# Patient Record
Sex: FEMALE | Race: WHITE | NOT HISPANIC OR LATINO | Employment: OTHER | URBAN - METROPOLITAN AREA
[De-identification: names, ages, dates, MRNs, and addresses within clinical notes are randomized per-mention and may not be internally consistent; named-entity substitution may affect disease eponyms.]

---

## 2020-01-14 ENCOUNTER — TELEPHONE (OUTPATIENT)
Dept: FAMILY MEDICINE CLINIC | Facility: CLINIC | Age: 55
End: 2020-01-14

## 2020-01-14 RX ORDER — SIMVASTATIN 80 MG
80 TABLET ORAL NIGHTLY
Qty: 90 TABLET | Refills: 0 | Status: SHIPPED | OUTPATIENT
Start: 2020-01-14 | End: 2020-06-02

## 2020-01-14 RX ORDER — VENLAFAXINE 75 MG/1
75 TABLET ORAL 3 TIMES DAILY
Qty: 270 TABLET | Refills: 0 | Status: SHIPPED | OUTPATIENT
Start: 2020-01-14 | End: 2020-04-06 | Stop reason: SDUPTHER

## 2020-01-14 NOTE — TELEPHONE ENCOUNTER
Pt made an appt and signed a release at previous office.    Needing refills on two medications:    venlafaxine 75mg 3x daily   simvastatin 80mg 1x daily

## 2020-01-22 PROBLEM — F32.A ANXIETY AND DEPRESSION: Status: ACTIVE | Noted: 2020-01-22

## 2020-01-22 PROBLEM — F41.9 ANXIETY AND DEPRESSION: Status: ACTIVE | Noted: 2020-01-22

## 2020-01-22 PROBLEM — M25.50 MULTIPLE JOINT PAIN: Status: ACTIVE | Noted: 2020-01-22

## 2020-01-22 PROBLEM — E66.9 DIABETES MELLITUS TYPE 2 IN OBESE (HCC): Status: ACTIVE | Noted: 2020-01-22

## 2020-01-22 PROBLEM — R06.83 SNORING: Status: ACTIVE | Noted: 2020-01-22

## 2020-01-22 PROBLEM — I10 ESSENTIAL HYPERTENSION: Status: ACTIVE | Noted: 2020-01-22

## 2020-01-22 PROBLEM — E11.69 DIABETES MELLITUS TYPE 2 IN OBESE (HCC): Status: ACTIVE | Noted: 2020-01-22

## 2020-01-22 PROBLEM — G47.30 SLEEP APNEA: Status: ACTIVE | Noted: 2020-01-22

## 2020-01-29 ENCOUNTER — TRANSCRIBE ORDERS (OUTPATIENT)
Dept: CARDIOLOGY | Facility: HOSPITAL | Age: 55
End: 2020-01-29

## 2020-01-29 ENCOUNTER — HOSPITAL ENCOUNTER (OUTPATIENT)
Dept: GENERAL RADIOLOGY | Facility: HOSPITAL | Age: 55
Discharge: HOME | End: 2020-01-29

## 2020-01-29 ENCOUNTER — CONSULT (OUTPATIENT)
Dept: BARIATRICS/WEIGHT MGMT | Facility: CLINIC | Age: 55
End: 2020-01-29

## 2020-01-29 ENCOUNTER — HOSPITAL ENCOUNTER (OUTPATIENT)
Dept: CARDIOLOGY | Facility: HOSPITAL | Age: 55
Discharge: HOME | End: 2020-01-29
Admitting: NURSE PRACTITIONER

## 2020-01-29 ENCOUNTER — LAB (OUTPATIENT)
Dept: LAB | Facility: HOSPITAL | Age: 55
End: 2020-01-29

## 2020-01-29 VITALS
WEIGHT: 283 LBS | HEART RATE: 84 BPM | BODY MASS INDEX: 45.48 KG/M2 | TEMPERATURE: 98 F | DIASTOLIC BLOOD PRESSURE: 94 MMHG | RESPIRATION RATE: 18 BRPM | HEIGHT: 66 IN | SYSTOLIC BLOOD PRESSURE: 182 MMHG

## 2020-01-29 DIAGNOSIS — Z01.811 PRE-OP CHEST EXAM: Primary | ICD-10-CM

## 2020-01-29 DIAGNOSIS — G47.30 SLEEP APNEA, UNSPECIFIED TYPE: ICD-10-CM

## 2020-01-29 DIAGNOSIS — I10 ESSENTIAL HYPERTENSION: ICD-10-CM

## 2020-01-29 DIAGNOSIS — F41.9 ANXIETY AND DEPRESSION: ICD-10-CM

## 2020-01-29 DIAGNOSIS — M25.50 MULTIPLE JOINT PAIN: ICD-10-CM

## 2020-01-29 DIAGNOSIS — E66.9 DIABETES MELLITUS TYPE 2 IN OBESE (HCC): ICD-10-CM

## 2020-01-29 DIAGNOSIS — F32.A ANXIETY AND DEPRESSION: ICD-10-CM

## 2020-01-29 DIAGNOSIS — E11.69 DIABETES MELLITUS TYPE 2 IN OBESE (HCC): ICD-10-CM

## 2020-01-29 DIAGNOSIS — E66.01 OBESITY, CLASS III, BMI 40-49.9 (MORBID OBESITY) (HCC): ICD-10-CM

## 2020-01-29 DIAGNOSIS — E66.01 OBESITY, CLASS III, BMI 40-49.9 (MORBID OBESITY) (HCC): Primary | ICD-10-CM

## 2020-01-29 PROBLEM — E66.813 OBESITY, CLASS III, BMI 40-49.9 (MORBID OBESITY): Status: ACTIVE | Noted: 2020-01-29

## 2020-01-29 LAB
ALBUMIN SERPL-MCNC: 3.9 G/DL (ref 3.5–5.2)
ALBUMIN/GLOB SERPL: 1.1 G/DL
ALP SERPL-CCNC: 83 U/L (ref 39–117)
ALT SERPL W P-5'-P-CCNC: 28 U/L (ref 1–33)
ANION GAP SERPL CALCULATED.3IONS-SCNC: 14.1 MMOL/L (ref 5–15)
AST SERPL-CCNC: 35 U/L (ref 1–32)
BASOPHILS # BLD AUTO: 0.04 10*3/MM3 (ref 0–0.2)
BASOPHILS NFR BLD AUTO: 0.7 % (ref 0–1.5)
BILIRUB SERPL-MCNC: 0.4 MG/DL (ref 0.2–1.2)
BUN BLD-MCNC: 6 MG/DL (ref 6–20)
BUN/CREAT SERPL: 7.8 (ref 7–25)
CALCIUM SPEC-SCNC: 8.9 MG/DL (ref 8.6–10.5)
CHLORIDE SERPL-SCNC: 101 MMOL/L (ref 98–107)
CHOLEST SERPL-MCNC: 167 MG/DL (ref 0–200)
CO2 SERPL-SCNC: 24.9 MMOL/L (ref 22–29)
CREAT BLD-MCNC: 0.77 MG/DL (ref 0.57–1)
DEPRECATED RDW RBC AUTO: 43.8 FL (ref 37–54)
EOSINOPHIL # BLD AUTO: 0.15 10*3/MM3 (ref 0–0.4)
EOSINOPHIL NFR BLD AUTO: 2.7 % (ref 0.3–6.2)
ERYTHROCYTE [DISTWIDTH] IN BLOOD BY AUTOMATED COUNT: 12.7 % (ref 12.3–15.4)
GFR SERPL CREATININE-BSD FRML MDRD: 78 ML/MIN/1.73
GLOBULIN UR ELPH-MCNC: 3.7 GM/DL
GLUCOSE BLD-MCNC: 191 MG/DL (ref 65–99)
HBA1C MFR BLD: 8.8 % (ref 4.8–5.6)
HCT VFR BLD AUTO: 40.9 % (ref 34–46.6)
HDLC SERPL-MCNC: 48 MG/DL (ref 40–60)
HGB BLD-MCNC: 13.7 G/DL (ref 12–15.9)
LDLC SERPL CALC-MCNC: 98 MG/DL (ref 0–100)
LDLC/HDLC SERPL: 2.05 {RATIO}
LYMPHOCYTES # BLD AUTO: 2.23 10*3/MM3 (ref 0.7–3.1)
LYMPHOCYTES NFR BLD AUTO: 40.1 % (ref 19.6–45.3)
MCH RBC QN AUTO: 31.3 PG (ref 26.6–33)
MCHC RBC AUTO-ENTMCNC: 33.5 G/DL (ref 31.5–35.7)
MCV RBC AUTO: 93.4 FL (ref 79–97)
MONOCYTES # BLD AUTO: 0.33 10*3/MM3 (ref 0.1–0.9)
MONOCYTES NFR BLD AUTO: 5.9 % (ref 5–12)
NEUTROPHILS # BLD AUTO: 2.8 10*3/MM3 (ref 1.7–7)
NEUTROPHILS NFR BLD AUTO: 50.4 % (ref 42.7–76)
PLATELET # BLD AUTO: 127 10*3/MM3 (ref 140–450)
PMV BLD AUTO: 9.9 FL (ref 6–12)
POTASSIUM BLD-SCNC: 4.4 MMOL/L (ref 3.5–5.2)
PROT SERPL-MCNC: 7.6 G/DL (ref 6–8.5)
RBC # BLD AUTO: 4.38 10*6/MM3 (ref 3.77–5.28)
SODIUM BLD-SCNC: 140 MMOL/L (ref 136–145)
TRIGL SERPL-MCNC: 103 MG/DL (ref 0–150)
TSH SERPL DL<=0.05 MIU/L-ACNC: 4.22 UIU/ML (ref 0.27–4.2)
VLDLC SERPL-MCNC: 20.6 MG/DL (ref 5–40)
WBC NRBC COR # BLD: 5.56 10*3/MM3 (ref 3.4–10.8)

## 2020-01-29 PROCEDURE — 83036 HEMOGLOBIN GLYCOSYLATED A1C: CPT

## 2020-01-29 PROCEDURE — 71046 X-RAY EXAM CHEST 2 VIEWS: CPT

## 2020-01-29 PROCEDURE — 93005 ELECTROCARDIOGRAM TRACING: CPT

## 2020-01-29 PROCEDURE — 99205 OFFICE O/P NEW HI 60 MIN: CPT | Performed by: NURSE PRACTITIONER

## 2020-01-29 PROCEDURE — 80061 LIPID PANEL: CPT

## 2020-01-29 PROCEDURE — 80053 COMPREHEN METABOLIC PANEL: CPT

## 2020-01-29 PROCEDURE — 85025 COMPLETE CBC W/AUTO DIFF WBC: CPT

## 2020-01-29 PROCEDURE — 84443 ASSAY THYROID STIM HORMONE: CPT

## 2020-01-29 PROCEDURE — 93010 ELECTROCARDIOGRAM REPORT: CPT | Performed by: INTERNAL MEDICINE

## 2020-01-29 PROCEDURE — 36415 COLL VENOUS BLD VENIPUNCTURE: CPT

## 2020-01-29 NOTE — PROGRESS NOTES
"Bariatric Nutrition Counseling Interview    Patient Name:  Stefani Bhandari  YOB: 1965  Age:  54 y.o.  Sex:  female  MRN: [id number]  Date:  01/29/20    Procedure Considering:  Sleeve    Last Documented Height:    Ht Readings from Last 1 Encounters:   01/29/20 167.1 cm (65.79\")     Last Documented Weight:   Wt Readings from Last 1 Encounters:   01/29/20 128 kg (283 lb)      Body mass index is 45.97 kg/m².    Highest Weight:  305  Goal Weight: 160    History:  No past medical history on file.  Past Surgical History:   Procedure Laterality Date   • HYSTERECTOMY  2007   • TUBAL ABDOMINAL LIGATION  1996     Family History   Problem Relation Age of Onset   • Obesity Mother    • Diabetes Mother    • Hypertension Mother    • Sleep apnea Mother    • Hypertension Father    • Heart attack Father    • Diabetes Sister    • Hypertension Sister    • Heart attack Sister    • Diabetes Brother    • Hypertension Brother    • Heart attack Brother      Social History     Socioeconomic History   • Marital status:      Spouse name: Not on file   • Number of children: Not on file   • Years of education: Not on file   • Highest education level: Not on file   Tobacco Use   • Smoking status: Former Smoker     Years: 20.00   • Smokeless tobacco: Never Used   Substance and Sexual Activity   • Alcohol use: Never     Frequency: Never   • Drug use: Never     Additional Health Issues to Consider:  Diabetes, high cholesterol, high blood pressure per patient report; noted high TSH on labs (refer to PCP)    Weight History:  Weight gain as a result of an event or condition - pregnancies and loss of mother (stress/emotional eating but states that she has it under control now)    Previous Weight Loss Efforts:  Weight Watchers, Calorie counting, exercise  Most Successful Weight Loss Effort:  Weight Watchers - lost 15lbs    Eating Habits: Eat in response to stress, Eat large portions, emotional eating  Eat three meals on most days? "  No  Worst eating habit?  Eat in response to stress, Eat large portions    How often do you eat fast food? 3 times weekly    Do you exercise regularly? (at least 3 times each week)  No walks 2 times/week for 30 min    Occupation:  Retired    Personal Goal After Procedure:  Decrease meds and be more active with grand children   Personal Support:  children    Assessment:  Program materials for successful weight loss before/after bariatric surgery were provided, reviewed, and discussed. The significance of taking in at least 70g of protein and 64 ounces of fluid was emphasized. The importance of routine exercise was discussed. Nutrition materials provided included a reduced calorie meal plan, protein sources, snack options, and diet guidelines post-surgery. Discussed personal habits and lifestyle behaviors that may influence diet efforts. She demonstrated a good comprehension of diet requirements and a commitment to work on personal challenges.  Patient was also provided a list of short-term goals to work towards prior to surgery. She appears to be an appropriate candidate for bariatric surgery.    Electronically signed by:  Angelica Molina RD  01/29/20 1:27 PM

## 2020-01-29 NOTE — PROGRESS NOTES
[facility]  [address] [facility] [address] [id number] [phone]  [address] [id number] [phone]  Dept: [phone]  1/29/2020      Stefani Bhandari.  [id number]  [id number]  1965  female      Chief Complaint of weight gain; unable to maintain weight loss    History of Present Illness:   Stefani is a 54 y.o. female who presents today for evaluation, education and consultation regarding weight loss surgery. The patient is interested in the sleeve gastrectomy.      Diet History:Stefani has been overweight for at least 10 years, has been 35 pounds or more overweight for at least 8 years, has been 100 pounds or more overweight for 4 or more years and started dieting at age 15.  The most weight Stefani lost was 30 pounds on diet and exercise and maintained the weight loss for 6 months. Stefani describes her eating habits as snacking between meals, eating a lot of sweets, drinking coffee and soda, eating to cope with emotions or loneliness. Stefani Bhandari has tried Weight Watchers, Fasting, Physician monitored, reduced calorie and exercising among others with success of losing up to 30 pounds, but in each instance regained the weight.    See dietician documentation for complete history.    Bariatric Surgery Evaluation: The patient is being seen for an initial visit for bariatric surgery evaluation.     Bariatric Co-morbidities:  sleep apnea, diabetes, hypertension, back pain, knee pain, depression and mental health disease    Patient Active Problem List   Diagnosis   • Essential hypertension   • Snoring   • Sleep apnea   • Multiple joint pain   • Anxiety and depression   • Diabetes mellitus type 2 in obese (CMS/[facility])   • Obesity, Class III, BMI 40-49.9 (morbid obesity) (CMS/[facility])       No past medical history on file.    Past Surgical History:   Procedure Laterality Date   • HYSTERECTOMY  2007   • TUBAL ABDOMINAL LIGATION   "1996       Allergies   Allergen Reactions   • Tramadol Provider Review Needed     \"seizure like behavior\"         Current Outpatient Medications:   •  cholecalciferol (VITAMIN D3) 25 MCG (1000 UT) tablet, Take 2,000 Units by mouth Daily., Disp: , Rfl:   •  cyclobenzaprine (FLEXERIL) 10 MG tablet, Take 10 mg by mouth 3 (Three) Times a Day As Needed for Muscle Spasms., Disp: , Rfl:   •  dicloxacillin (DYNAPEN) 250 MG capsule, Take 500 mg by mouth 4 (Four) Times a Day., Disp: , Rfl:   •  HYDROcodone-acetaminophen (NORCO)  MG per tablet, Take 1 tablet by mouth Every 6 (Six) Hours As Needed for Moderate Pain ., Disp: , Rfl:   •  levothyroxine (SYNTHROID, LEVOTHROID) 75 MCG tablet, Take 75 mcg by mouth Daily., Disp: , Rfl:   •  losartan (COZAAR) 100 MG tablet, Take 100 mg by mouth Daily., Disp: , Rfl:   •  metFORMIN (GLUCOPHAGE) 500 MG tablet, Take 500 mg by mouth 2 (Two) Times a Day With Meals., Disp: , Rfl:   •  simvastatin (ZOCOR) 80 MG tablet, Take 1 tablet by mouth Every Night., Disp: 90 tablet, Rfl: 0  •  venlafaxine (EFFEXOR) 75 MG tablet, Take 1 tablet by mouth 3 (Three) Times a Day., Disp: 270 tablet, Rfl: 0    Social History     Socioeconomic History   • Marital status:      Spouse name: Not on file   • Number of children: Not on file   • Years of education: Not on file   • Highest education level: Not on file   Tobacco Use   • Smoking status: Former Smoker     Years: 20.00   • Smokeless tobacco: Never Used   Substance and Sexual Activity   • Alcohol use: Never     Frequency: Never   • Drug use: Never       Family History   Problem Relation Age of Onset   • Obesity Mother    • Diabetes Mother    • Hypertension Mother    • Sleep apnea Mother    • Hypertension Father    • Heart attack Father    • Diabetes Sister    • Hypertension Sister    • Heart attack Sister    • Diabetes Brother    • Hypertension Brother    • Heart attack Brother          Review of Systems:  Review of Systems   Constitutional: " Positive for fatigue.   HENT: Negative.    Respiratory: Negative.    Cardiovascular: Negative.    Gastrointestinal: Negative.    Endocrine: Negative.    Genitourinary: Negative.    Musculoskeletal: Positive for back pain.   Skin: Negative.    Neurological: Negative.    Psychiatric/Behavioral: Negative.        Physical Exam:  Vital Signs:  Weight: 128 kg (283 lb)   Body mass index is 45.97 kg/m².  Temp: 98 °F (36.7 °C)   Heart Rate: 84   BP: (!) 182/94     Physical Exam   Constitutional: She is oriented to person, place, and time. She appears well-developed and well-nourished.   HENT:   Head: Normocephalic and atraumatic.   Neck: Normal range of motion.   Cardiovascular: Normal rate, regular rhythm and normal heart sounds.   Pulmonary/Chest: Effort normal and breath sounds normal. No respiratory distress. She has no wheezes.   Abdominal: Soft. Bowel sounds are normal. She exhibits no distension. There is no tenderness.   Musculoskeletal: She exhibits no edema or deformity.   Neurological: She is alert and oriented to person, place, and time.   Skin: Skin is warm and dry.   Psychiatric: She has a normal mood and affect. Her behavior is normal.   Nursing note and vitals reviewed.         Assessment:         Stefani Bhandari is a 54 y.o. year old female with medically complicated severe obesity. Weight: 128 kg (283 lb), Body mass index is 45.97 kg/m². and weight related problems including sleep apnea, diabetes, hypertension and GERD.    I explained in detail the procedures that we are performing.  All of those procedures can be performed laparoscopically but there is a chance to convert to open if any technical challenges or complications do occur.  Bariatric surgery is not cosmetic surgery but rather a tool to help a patient make a life-long commitment lifestyle changes including diet, exercise, behavior changes, and taking supplemental vitamins and minerals.    Due to the patient's BMI and co-morbidities they are at a  high risk for surgery and will obtain the following:  The patient has been advised that a letter of medical support and a history and physical must be obtained from her primary care physician. A psychological evaluation will be arranged for this patient. CBC, CMP, FLP, TSH and HgbA1C will be drawn. Stefani Bhandari will obtain a pre-operative CXR and EKG.       Stefani Bhandari will be set up for a pre-operative diagnostic esophagogastroduodenoscopy with biopsy for evaluation. The risks and benefits of the procedure were discussed with the patient in detail and all questions were answered.  Possibility of perforation, bleeding, aspiration, anoxic brain injury, respiratory and/or cardiac arrest and death were discussed.   She received handouts regarding, all questions were answered.     The risks, benefits, alternatives, and potential complications of all of the procedures were explained in detail including, but not limited to death, anesthesia and medication adverse effect/DVT, pulmonary embolism, trocar site/incisional hernia, wound infection, abdominal infection, bleeding, failure to lose weight or gain weight and change in body image, metabolic complications with calcium, thiamine, vitamin B12, folate, iron, and anemia.    The patient was advised to start a high protein, low fat and low carbohydrate diet. The patient was given individualized information by our dietician along with general group information and handouts.       The patient was given information regarding the YURY educational video. YURY is an internet based educational video which explains the surgical procedure and answers basic questions regarding the procedure. The patient was provided with instructions and a password to watch the video.    The patient was encouraged to start routine exercise including but not limited to 150 minutes per week. The patient received a resistance band along with a handout of exercises.     The consultation plan was  reviewed with the patient.    The patient understands the surgical procedures and the different surgical options that are available.  She understands the lifestyle changes that would be required after surgery and has agreed to participate in a pre-operative and postoperative weight management program.  She also expressed understanding of possible risks, had several questions answered and desires to proceed.    I think she is a good candidate for this surgery, and is interested in a sleeve gastrectomy.    Encounter Diagnoses   Name Primary?   • Obesity, Class III, BMI 40-49.9 (morbid obesity) (CMS/HCC) Yes   • Essential hypertension    • Sleep apnea, unspecified type    • Diabetes mellitus type 2 in obese (CMS/HCC)    • Multiple joint pain    • Anxiety and depression        Plan:    Patient will have evaluations and follow up with bariatric dieticians and a psychologist before undergoing a multidisciplinary review of her candidacy.  We also discussed the weight loss requirement and rationale, and other program requirements.      Tamiko Ojeda, NITESH  1/29/2020

## 2020-01-30 ENCOUNTER — TELEPHONE (OUTPATIENT)
Dept: BARIATRICS/WEIGHT MGMT | Facility: CLINIC | Age: 55
End: 2020-01-30

## 2020-01-30 NOTE — TELEPHONE ENCOUNTER
LM for patient regarding lab results and normal chest xray. Instructed patient to call office back.    ----- Message from NITESH Choudhury sent at 1/29/2020 12:14 PM EST -----  His labs were not resulted in time to go over with patient during intake.  Please let her know that her TSH was elevated.  Her HbA1c was elevated.  Fasting blood sugar was elevated.  These have patient follow-up with her PCP regarding her thyroid

## 2020-01-31 ENCOUNTER — TELEPHONE (OUTPATIENT)
Dept: BARIATRICS/WEIGHT MGMT | Facility: CLINIC | Age: 55
End: 2020-01-31

## 2020-01-31 NOTE — TELEPHONE ENCOUNTER
Spoke with pt regarding normal EGK. Pt gave a verbal understanding          ----- Message from NITESH Choudhury sent at 1/31/2020  1:36 PM EST -----  Normal EKG

## 2020-04-06 ENCOUNTER — TELEMEDICINE (OUTPATIENT)
Dept: FAMILY MEDICINE CLINIC | Facility: CLINIC | Age: 55
End: 2020-04-06

## 2020-04-06 VITALS — BODY MASS INDEX: 44.98 KG/M2 | HEIGHT: 65 IN | WEIGHT: 270 LBS

## 2020-04-06 DIAGNOSIS — M51.36 LUMBAR DEGENERATIVE DISC DISEASE: ICD-10-CM

## 2020-04-06 DIAGNOSIS — E78.2 MIXED HYPERLIPIDEMIA: ICD-10-CM

## 2020-04-06 DIAGNOSIS — I10 ESSENTIAL HYPERTENSION: ICD-10-CM

## 2020-04-06 DIAGNOSIS — E66.9 DIABETES MELLITUS TYPE 2 IN OBESE (HCC): Primary | ICD-10-CM

## 2020-04-06 DIAGNOSIS — E03.8 OTHER SPECIFIED HYPOTHYROIDISM: ICD-10-CM

## 2020-04-06 DIAGNOSIS — E11.69 DIABETES MELLITUS TYPE 2 IN OBESE (HCC): Primary | ICD-10-CM

## 2020-04-06 DIAGNOSIS — F32.A ANXIETY AND DEPRESSION: ICD-10-CM

## 2020-04-06 DIAGNOSIS — F41.9 ANXIETY AND DEPRESSION: ICD-10-CM

## 2020-04-06 PROBLEM — M25.50 MULTIPLE JOINT PAIN: Status: RESOLVED | Noted: 2020-01-22 | Resolved: 2020-04-06

## 2020-04-06 PROBLEM — R06.83 SNORING: Status: RESOLVED | Noted: 2020-01-22 | Resolved: 2020-04-06

## 2020-04-06 PROBLEM — M51.369 LUMBAR DEGENERATIVE DISC DISEASE: Status: ACTIVE | Noted: 2020-04-06

## 2020-04-06 PROCEDURE — 99214 OFFICE O/P EST MOD 30 MIN: CPT | Performed by: FAMILY MEDICINE

## 2020-04-06 RX ORDER — VENLAFAXINE 75 MG/1
75 TABLET ORAL 3 TIMES DAILY
Qty: 270 TABLET | Refills: 1 | Status: SHIPPED | OUTPATIENT
Start: 2020-04-06 | End: 2020-06-02

## 2020-04-06 RX ORDER — LEVOTHYROXINE SODIUM 0.07 MG/1
75 TABLET ORAL DAILY
Qty: 90 TABLET | Refills: 1 | Status: SHIPPED | OUTPATIENT
Start: 2020-04-06 | End: 2020-07-15 | Stop reason: SDUPTHER

## 2020-04-06 RX ORDER — DICLOFENAC SODIUM 75 MG/1
75 TABLET, DELAYED RELEASE ORAL 2 TIMES DAILY
Qty: 180 TABLET | Refills: 1 | Status: SHIPPED | OUTPATIENT
Start: 2020-04-06 | End: 2020-10-19

## 2020-04-06 NOTE — PROGRESS NOTES
Subjective   Stefani Bhandari is a 54 y.o. female.     There were no vitals filed for this visit.     Chief Complaint   Patient presents with   • Establish Care     new pt establishing today with dr gutiérrez   • Hypothyroidism     follow up no complains will need refills   • Spasms     will need muscle relaxer  refills        History of Present Illness    This regular scheduled appointment has been converted to a video visit in order to comply with CDC recommendations and guidelines due to the coronavirus pandemic    6-month follow-up on diabetes, hypertension, hypothyroidism, hyperlipidemia, anxiety/depression, and chronic arthritis/back pain    Last office visit with me at [place] was approximately 6 months ago for medication refills and follow-up labs.  At that time, all labs were stable and within normal limits thus no changes were made to medications.  Unfortunately, patient's records are unavailable for my review as a record release has not been signed yet    On a good note, in the last 3 to 4 months Stefani has lost approximately 35 pounds and is feeling much better.  She has done this with healthy eating, decreased sugars, and increasing cardio exercise.  She was hoping she would be able to discontinue some medications.  Thus, she thought since she was feeling better and lost weight that she would not need her thyroid medication.  Therefore, she has not been taking her Synthroid for the last 2 months.  She has been compliant with all other medications.    Her diabetes has been well controlled with metformin 500 mg twice daily.  She reports her fasting blood sugars have been approximately 100-120.    Her hypertension has been well controlled with losartan.  She recently ordered a home blood pressure monitor and is waiting to receive it.  Thus, at this time she does not have any blood pressure readings for me.  She does report in recent weeks she has noticed when she bends over and gets up she will occasionally  get lightheaded.  Denies chest pain, shortness of air    Her hyperlipidemia has been well controlled with Zocor 80 mg.  She tolerates this medication without any side effects.    Her generalized anxiety has been well controlled with Effexor 225 mg daily.    Patient has lumbar degenerative disc disease/chronic back pain has been treated by Dr. Cardona with injections and chronic pain prescription/[place].  She does complain of more low back pain with activity and spasms.  She stopped Flexeril because it made her groggy.  For some unknown reason, she has not been taking her anti-inflammatory.  In the past she has used meloxicam and describes this as just only being somewhat effective    The following portions of the patient's history were reviewed and updated as appropriate: allergies, current medications, past family history, past medical history, past social history, past surgical history and problem list.    Review of Systems   Constitutional: Negative.    HENT: Negative.    Eyes: Negative.    Respiratory: Negative.    Cardiovascular: Negative for chest pain, palpitations and leg swelling.   Gastrointestinal: Negative.    Endocrine: Negative.    Genitourinary: Negative.    Musculoskeletal: Negative.    Skin: Negative.    Allergic/Immunologic: Negative.    Neurological: Positive for light-headedness.   Hematological: Negative.    Psychiatric/Behavioral: Negative.    I have reviewed and confirmed the accuracy of the ROS as documented by the MA/LPN/RN Dea Martinez MD      Objective   Physical Exam   Constitutional: She is oriented to person, place, and time. She appears well-developed and well-nourished. No distress.   HENT:   Head: Normocephalic and atraumatic.   Pulmonary/Chest: Effort normal.   Neurological: She is alert and oriented to person, place, and time.   Psychiatric: She has a normal mood and affect. Her behavior is normal. Judgment and thought content normal.   Nursing note and vitals reviewed.        LABS/STUDIES --CMP, lipids, A1c proximally 6 months ago within normal limits per patient report    Procedures     Assessment/Plan   Stefani was seen today for establish care, hypothyroidism and spasms.    Diagnoses and all orders for this visit:    Diabetes mellitus type 2 in obese (CMS/[facility]) --stable, given patient's much improved healthy lifestyle have asked her to check her fasting blood sugar and nightly blood sugar and send me the numbers in 2 weeks.  If this looks much improved we may go ahead and try to decrease metformin.  As of right now no change with metformin 500 mg 1 p.o. twice daily will refill upon request    Other specified hypothyroidism --likely uncontrolled as patient has been out of this medication for 2 months.  Will restart Synthroid 75 mcg 1 p.o. every morning.  Recheck in 2 months TSH    Essential hypertension --stable, at this time we will make no changes to losartan.  However, have asked patient to check home ambulatory blood pressures and send me numbers in 2 weeks as we may be able to decrease the dose of losartan given her new healthy lifestyle.    Anxiety and depression --stable, again, given patient's new healthy lifestyle will go ahead and try to wean Effexor.  Thus have asked patient to take 75 mg 2-1/2 p.o. daily x10 days then 2 p.o. daily until further evaluated    Mixed hyperlipidemia --stable, no change to Zocor at this time.  Will recheck lipids and CMP in 2 months    Lumbar degenerative disc disease --uncontrolled, will restart nonsteroidal.  Prescribed Voltaren 75 mg 1 p.o. twice daily as needed.  Continue with patient's pain management doctor as directed    Other orders  -     diclofenac (VOLTAREN) 75 MG EC tablet; Take 1 tablet by mouth 2 (Two) Times a Day.  -     levothyroxine (SYNTHROID, LEVOTHROID) 75 MCG tablet; Take 1 tablet by mouth Daily.  -     venlafaxine (EFFEXOR) 75 MG tablet; Take 1 tablet by mouth 3 (Three) Times a Day.                 Return in about 2 months  (around 6/6/2020) for Recheck.

## 2020-04-06 NOTE — PATIENT INSTRUCTIONS
Please start checking blood pressures and blood sugars in the morning and at bed and send numbers to me in 2 to 4 weeks.  Also, restart Synthroid every a.m.  And given doing much better with diet and exercise may try to wean Effexor very slowly.  Therefore may take 2-1/2 tablets a day for 10 days and then may go to 2 tablets a day until seen  Signed record release

## 2020-06-01 ENCOUNTER — TELEPHONE (OUTPATIENT)
Dept: FAMILY MEDICINE CLINIC | Facility: CLINIC | Age: 55
End: 2020-06-01

## 2020-06-01 NOTE — TELEPHONE ENCOUNTER
Patient called and requested a refill for losartan (COZAAR) 100 MG tablet be sent to    [id number] - [place] [id number] AT [facility] [address]  & [facility] - [phone]  - [phone] FX    Patient would like a 90 day supply       Patient also stated her back Dr wanted her to ask Dr. Martinez if she would start her back on  flexeril      Please advise   [phone]

## 2020-06-02 RX ORDER — SIMVASTATIN 80 MG
80 TABLET ORAL NIGHTLY
Qty: 90 TABLET | Refills: 0 | Status: SHIPPED | OUTPATIENT
Start: 2020-06-02 | End: 2020-08-31

## 2020-06-02 RX ORDER — VENLAFAXINE 75 MG/1
TABLET ORAL
Qty: 270 TABLET | Refills: 1 | Status: SHIPPED | OUTPATIENT
Start: 2020-06-02 | End: 2020-11-25

## 2020-06-04 RX ORDER — LOSARTAN POTASSIUM 100 MG/1
100 TABLET ORAL DAILY
Qty: 30 TABLET | Refills: 2 | Status: SHIPPED | OUTPATIENT
Start: 2020-06-04 | End: 2020-08-26

## 2020-07-15 ENCOUNTER — OFFICE VISIT (OUTPATIENT)
Dept: FAMILY MEDICINE CLINIC | Facility: CLINIC | Age: 55
End: 2020-07-15

## 2020-07-15 VITALS
HEIGHT: 65 IN | BODY MASS INDEX: 47.48 KG/M2 | WEIGHT: 285 LBS | TEMPERATURE: 97.9 F | OXYGEN SATURATION: 100 % | SYSTOLIC BLOOD PRESSURE: 126 MMHG | HEART RATE: 64 BPM | DIASTOLIC BLOOD PRESSURE: 82 MMHG

## 2020-07-15 DIAGNOSIS — F32.A ANXIETY AND DEPRESSION: ICD-10-CM

## 2020-07-15 DIAGNOSIS — E66.9 DIABETES MELLITUS TYPE 2 IN OBESE (HCC): Primary | ICD-10-CM

## 2020-07-15 DIAGNOSIS — E78.2 MIXED HYPERLIPIDEMIA: ICD-10-CM

## 2020-07-15 DIAGNOSIS — E11.69 DIABETES MELLITUS TYPE 2 IN OBESE (HCC): Primary | ICD-10-CM

## 2020-07-15 DIAGNOSIS — E03.8 OTHER SPECIFIED HYPOTHYROIDISM: ICD-10-CM

## 2020-07-15 DIAGNOSIS — D69.6 THROMBOCYTOPENIA (HCC): ICD-10-CM

## 2020-07-15 DIAGNOSIS — F41.9 ANXIETY AND DEPRESSION: ICD-10-CM

## 2020-07-15 DIAGNOSIS — I10 ESSENTIAL HYPERTENSION: ICD-10-CM

## 2020-07-15 DIAGNOSIS — K14.8 TONGUE LESION: ICD-10-CM

## 2020-07-15 PROCEDURE — 99214 OFFICE O/P EST MOD 30 MIN: CPT | Performed by: FAMILY MEDICINE

## 2020-07-15 RX ORDER — LEVOTHYROXINE SODIUM 0.07 MG/1
75 TABLET ORAL DAILY
Qty: 90 TABLET | Refills: 1 | Status: SHIPPED | OUTPATIENT
Start: 2020-07-15 | End: 2020-07-17 | Stop reason: SDUPTHER

## 2020-07-15 NOTE — PROGRESS NOTES
Subjective   Stefani Bhandari is a 54 y.o. female.     Vitals:    07/15/20 0854   BP: 126/82   Pulse: 64   Temp: 97.9 °F (36.6 °C)   SpO2: 100%        Chief Complaint   Patient presents with   • Hypothyroidism     follow up no complains    • Diabetes Mellitus     follow up no complains    • Hypertension     Complains of a lesion on her tongue        History of Present Illness    3 month follow-up on diabetes, hypertension, hypo-thyroidism, hyperlipidemia, anxiety, and complaints of a tongue lesion    Last office visit via telehealth to get established and medication refills.  A few changes were made at that time, see below    Diabetes was uncontrolled earlier this year (January 2020) with an A1c of 8.8.  She was planning surgical gastric sleeve bypass; however, given the pandemic the surgery was canceled.  She decided to improve upon healthy lifestyle on her own.  She has made remarkable improvements with her lifestyle in terms of a healthier diet/low carb/low calorie and increased cardio exercise.  She had lost 20 to 30 pounds in recent months and her blood sugars were much improved at our tele-visit in April.  Thus, the decision was made to continue the metformin 500 mg twice daily given her new improved lifestyle.  Currently, she has continued to do well for the most part however has regained some weight in recent months.  Yet, approximately 4 weeks ago she did restart a good exercise program and improve upon her diet.  Needs A1c today.    Hypertension has remained well controlled with losartan.  Tolerates medication without side effects.  Denies chest pain, shortness of air.  Due for labs    Hyperlipidemia has been in fair control with Zocor 80 mg daily.  She tolerates this medication without side effects.  Last LDL checked January 2020 was 98.  Due for labs today, fasting    Hypothyroidism had previously been well controlled with levothyroxine 75 mcg daily.  However, she discontinued the medication when she lost  weight and was feeling much better (she did not think she needed it anymore).  Yet, at our last tele-visit in April 2020 we did discuss this and she did restart the Synthroid 75 mcg daily.  Has been compliant with dosing regimen.  Weight gain noted in the last 3 months.  Due for TSH level    Anxiety disorder has been well controlled with decreased dose of Effexor.  At our telemedicine visit she was doing much better with her anxiety thus asked about weaning her Effexor down.  On a good note, she has been able to wean successfully from Effexor 225 mg a day to 150 mg daily.  She is doing well on this decreased dose of Effexor.    Complains of a lesion on her tongue, x2 months.  She states this is getting bigger.  It is irritating.  She is concerned.  Previous history of tobacco use.      The following portions of the patient's history were reviewed and updated as appropriate: allergies, current medications, past family history, past medical history, past social history, past surgical history and problem list.    Review of Systems   Constitutional: Negative for fever, unexpected weight gain and unexpected weight loss.   Respiratory: Negative for shortness of breath.    Cardiovascular: Negative for chest pain.   Endocrine: Negative for polydipsia, polyphagia and polyuria.       Objective   Physical Exam   Constitutional: She appears well-developed and well-nourished.   HENT:   Head: Normocephalic and atraumatic.   Mouth/Throat: Oral lesions present.   Lateral aspect of tongue with a raised smooth symmetric papular lesion   Eyes: Pupils are equal, round, and reactive to light. Conjunctivae are normal.   Neck: Normal range of motion. Neck supple. No thyromegaly present.   Cardiovascular: Normal rate, regular rhythm and normal heart sounds.   No murmur heard.  Pulmonary/Chest: Effort normal and breath sounds normal.   Abdominal: Soft. Bowel sounds are normal. She exhibits no distension. There is no hepatosplenomegaly.  There is no tenderness.   Musculoskeletal: She exhibits no edema.   Lymphadenopathy:     She has no cervical adenopathy.   Psychiatric: She has a normal mood and affect. Her behavior is normal. Judgment and thought content normal.   Nursing note and vitals reviewed.       LABS/STUDIES today's hemoglobin A1c 7.1 (previous A1c 8.8 in January 2020)                              January 2020 platelets 127, LDL 98, TSH 4.2    Procedures     Assessment/Plan   Stefani was seen today for hypothyroidism, diabetes mellitus and hypertension.    Diagnoses and all orders for this visit:    Diabetes mellitus type 2 in obese (CMS/HCC) uncontrolled although much improved.  At this time will not change metformin 500 mg 1 p.o. twice daily.  As patient is improving upon a healthier lifestyle overall.  Continue to encourage low calorie/low carb diet and increase cardio exercise greater than 150 minutes weekly.  Will recheck A1c in 3 months    Other specified hypothyroidism uncontrolled?  Last TSH value in January slightly uncontrolled.  Weight gain noted.  Will recheck TSH today.  May need to increase the dose of Synthroid?  If therapeutic then will refill Synthroid 75 mcg 1 p.o. every morning.  -     TSH    Mixed hyperlipidemia uncontrolled.  Goal LDL needs to be less than 70 given history of diabetes.  If at goal then no change to Zocor 80 mg 1 p.o. daily.  Otherwise, may need to add Zetia?  Order placed to check CMP and lipids today    Thrombocytopenia (CMS/HCC) etiology?  Reactive?  We will recheck CBC today  -     CBC & Differential    Essential hypertension stable.  No change in medication.  Will refill losartan 100 mg 1 p.o. daily.  Improve upon healthier lifestyle  -     Comprehensive Metabolic Panel  -     Lipid Panel With LDL / HDL Ratio    Anxiety and depression stable.  Continue/refill Effexor 75 mg 1 p.o. twice daily upon request    Tongue lesion appears benign yet enlarging.  Will refer to ENT per patient request  -      Ambulatory Referral to ENT (Otolaryngology)    Other orders  -     levothyroxine (SYNTHROID, LEVOTHROID) 75 MCG tablet; Take 1 tablet by mouth Daily.                 Return in about 3 months (around 10/15/2020), or pelvic exam, for Annual physical.

## 2020-07-16 LAB
ALBUMIN SERPL-MCNC: 4.3 G/DL (ref 3.5–5.2)
ALBUMIN/GLOB SERPL: 1.3 G/DL
ALP SERPL-CCNC: 81 U/L (ref 39–117)
ALT SERPL-CCNC: 26 U/L (ref 1–33)
AST SERPL-CCNC: 36 U/L (ref 1–32)
BASOPHILS # BLD AUTO: ABNORMAL 10*3/UL
BILIRUB SERPL-MCNC: 0.4 MG/DL (ref 0–1.2)
BUN SERPL-MCNC: 10 MG/DL (ref 6–20)
BUN/CREAT SERPL: 10.6 (ref 7–25)
CALCIUM SERPL-MCNC: 8.7 MG/DL (ref 8.6–10.5)
CHLORIDE SERPL-SCNC: 103 MMOL/L (ref 98–107)
CHOLEST SERPL-MCNC: 175 MG/DL (ref 0–200)
CO2 SERPL-SCNC: 24.1 MMOL/L (ref 22–29)
CREAT SERPL-MCNC: 0.94 MG/DL (ref 0.57–1)
DIFFERENTIAL COMMENT: ABNORMAL
EOSINOPHIL # BLD AUTO: ABNORMAL 10*3/UL
EOSINOPHIL NFR BLD AUTO: ABNORMAL %
ERYTHROCYTE [DISTWIDTH] IN BLOOD BY AUTOMATED COUNT: 13.2 % (ref 12.3–15.4)
GLOBULIN SER CALC-MCNC: 3.2 GM/DL
GLUCOSE SERPL-MCNC: 112 MG/DL (ref 65–99)
HCT VFR BLD AUTO: 38.6 % (ref 34–46.6)
HDLC SERPL-MCNC: 53 MG/DL (ref 40–60)
HGB BLD-MCNC: 13.1 G/DL (ref 12–15.9)
LDLC SERPL CALC-MCNC: 96 MG/DL (ref 0–100)
LDLC/HDLC SERPL: 1.81 {RATIO}
LYMPHOCYTES # BLD AUTO: ABNORMAL 10*3/UL
LYMPHOCYTES # BLD MANUAL: 2.45 10*3/MM3 (ref 0.7–3.1)
LYMPHOCYTES NFR BLD AUTO: ABNORMAL %
LYMPHOCYTES NFR BLD MANUAL: 46 % (ref 19.6–45.3)
MCH RBC QN AUTO: 31.7 PG (ref 26.6–33)
MCHC RBC AUTO-ENTMCNC: 33.9 G/DL (ref 31.5–35.7)
MCV RBC AUTO: 93.5 FL (ref 79–97)
MONOCYTES # BLD MANUAL: 0.32 10*3/MM3 (ref 0.1–0.9)
MONOCYTES NFR BLD AUTO: ABNORMAL %
MONOCYTES NFR BLD MANUAL: 6 % (ref 5–12)
NEUTROPHILS # BLD MANUAL: 2.55 10*3/MM3 (ref 1.7–7)
NEUTROPHILS NFR BLD AUTO: ABNORMAL %
NEUTROPHILS NFR BLD MANUAL: 48 % (ref 42.7–76)
PLATELET # BLD AUTO: 123 10*3/MM3 (ref 140–450)
PLATELET BLD QL SMEAR: ABNORMAL
POTASSIUM SERPL-SCNC: 4.1 MMOL/L (ref 3.5–5.2)
PROT SERPL-MCNC: 7.5 G/DL (ref 6–8.5)
RBC # BLD AUTO: 4.13 10*6/MM3 (ref 3.77–5.28)
RBC MORPH BLD: ABNORMAL
SODIUM SERPL-SCNC: 139 MMOL/L (ref 136–145)
TRIGL SERPL-MCNC: 131 MG/DL (ref 0–150)
TSH SERPL DL<=0.005 MIU/L-ACNC: 5.7 UIU/ML (ref 0.27–4.2)
VLDLC SERPL CALC-MCNC: 26.2 MG/DL
WBC # BLD AUTO: 5.32 10*3/MM3 (ref 3.4–10.8)

## 2020-07-17 RX ORDER — LEVOTHYROXINE SODIUM 88 UG/1
88 TABLET ORAL DAILY
Qty: 30 TABLET | Refills: 2 | Status: SHIPPED | OUTPATIENT
Start: 2020-07-17 | End: 2020-10-24

## 2020-07-17 RX ORDER — EZETIMIBE 10 MG/1
10 TABLET ORAL DAILY
Qty: 30 TABLET | Refills: 3 | Status: SHIPPED | OUTPATIENT
Start: 2020-07-17 | End: 2020-11-23

## 2020-08-26 RX ORDER — LOSARTAN POTASSIUM 100 MG/1
100 TABLET ORAL DAILY
Qty: 30 TABLET | Refills: 2 | Status: SHIPPED | OUTPATIENT
Start: 2020-08-26 | End: 2020-10-14 | Stop reason: ALTCHOICE

## 2020-08-27 ENCOUNTER — TELEPHONE (OUTPATIENT)
Dept: FAMILY MEDICINE CLINIC | Facility: CLINIC | Age: 55
End: 2020-08-27

## 2020-08-27 RX ORDER — CYCLOBENZAPRINE HCL 10 MG
10 TABLET ORAL 3 TIMES DAILY PRN
Qty: 90 TABLET | Refills: 1 | Status: SHIPPED | OUTPATIENT
Start: 2020-08-27 | End: 2020-12-29

## 2020-08-27 NOTE — TELEPHONE ENCOUNTER
PATIENT CALLED IN REGARDS TO HAVING BAD PAIN IN HER KNEES AND BACK. PATIENTS PAIN MANAGEMENT DOCTOR ASKED IF SHE WAS TAKING HER FLEXERIL AND SUGGESTED THAT MAY HELP WITH PATIENT TRYING TO EXERCISE.    PATIENT IS REQUESTING THIS TO BE SENT TO [id number] - [place], [place] - [facility] AT [facility] - [phone]  - [phone]   [phone]    PLEASE CALL PATIENT [phone]

## 2020-08-31 RX ORDER — SIMVASTATIN 80 MG
80 TABLET ORAL NIGHTLY
Qty: 90 TABLET | Refills: 0 | Status: SHIPPED | OUTPATIENT
Start: 2020-08-31 | End: 2020-12-09

## 2020-10-14 ENCOUNTER — OFFICE VISIT (OUTPATIENT)
Dept: FAMILY MEDICINE CLINIC | Facility: CLINIC | Age: 55
End: 2020-10-14

## 2020-10-14 VITALS
DIASTOLIC BLOOD PRESSURE: 82 MMHG | OXYGEN SATURATION: 99 % | SYSTOLIC BLOOD PRESSURE: 134 MMHG | TEMPERATURE: 97 F | BODY MASS INDEX: 47.15 KG/M2 | HEART RATE: 79 BPM | HEIGHT: 65 IN | WEIGHT: 283 LBS

## 2020-10-14 DIAGNOSIS — D69.6 THROMBOCYTOPENIA (HCC): ICD-10-CM

## 2020-10-14 DIAGNOSIS — I10 ESSENTIAL HYPERTENSION: ICD-10-CM

## 2020-10-14 DIAGNOSIS — Z23 NEED FOR IMMUNIZATION AGAINST INFLUENZA: ICD-10-CM

## 2020-10-14 DIAGNOSIS — E03.8 OTHER SPECIFIED HYPOTHYROIDISM: ICD-10-CM

## 2020-10-14 DIAGNOSIS — Z78.0 POSTMENOPAUSAL: ICD-10-CM

## 2020-10-14 DIAGNOSIS — Z12.11 ENCOUNTER FOR SCREENING COLONOSCOPY: ICD-10-CM

## 2020-10-14 DIAGNOSIS — Z87.891 PERSONAL HISTORY OF TOBACCO USE: ICD-10-CM

## 2020-10-14 DIAGNOSIS — Z01.419 WELL WOMAN EXAM WITH ROUTINE GYNECOLOGICAL EXAM: Primary | ICD-10-CM

## 2020-10-14 DIAGNOSIS — E11.69 DIABETES MELLITUS TYPE 2 IN OBESE (HCC): ICD-10-CM

## 2020-10-14 DIAGNOSIS — E66.9 DIABETES MELLITUS TYPE 2 IN OBESE (HCC): ICD-10-CM

## 2020-10-14 DIAGNOSIS — Z12.31 ENCOUNTER FOR SCREENING MAMMOGRAM FOR BREAST CANCER: ICD-10-CM

## 2020-10-14 DIAGNOSIS — E78.2 MIXED HYPERLIPIDEMIA: ICD-10-CM

## 2020-10-14 LAB — HBA1C MFR BLD: 7.5 %

## 2020-10-14 PROCEDURE — 83036 HEMOGLOBIN GLYCOSYLATED A1C: CPT | Performed by: FAMILY MEDICINE

## 2020-10-14 PROCEDURE — 99214 OFFICE O/P EST MOD 30 MIN: CPT | Performed by: FAMILY MEDICINE

## 2020-10-14 PROCEDURE — 90686 IIV4 VACC NO PRSV 0.5 ML IM: CPT | Performed by: FAMILY MEDICINE

## 2020-10-14 PROCEDURE — 90471 IMMUNIZATION ADMIN: CPT | Performed by: FAMILY MEDICINE

## 2020-10-14 PROCEDURE — 99396 PREV VISIT EST AGE 40-64: CPT | Performed by: FAMILY MEDICINE

## 2020-10-14 RX ORDER — LOSARTAN POTASSIUM AND HYDROCHLOROTHIAZIDE 25; 100 MG/1; MG/1
1 TABLET ORAL DAILY
Qty: 90 TABLET | Refills: 1 | Status: SHIPPED | OUTPATIENT
Start: 2020-10-14 | End: 2020-11-25

## 2020-10-14 RX ORDER — LOSARTAN POTASSIUM 100 MG/1
100 TABLET ORAL DAILY
Qty: 30 TABLET | Refills: 2 | Status: CANCELLED | OUTPATIENT
Start: 2020-10-14

## 2020-10-14 NOTE — PROGRESS NOTES
Subjective   Stefani Bhandari is a 54 y.o. female.     Vitals:    10/14/20 0853   BP: 134/82   Pulse: 79   Temp: 97 °F (36.1 °C)   SpO2: 99%        Chief Complaint   Patient presents with   • Annual Exam     no complains i wore mask andfface shield    • Gynecologic Exam     no complains    • Diabetes     Blood sugar still high   • Hyperlipidemia     Added Zetia last visit   • Hypothyroidism   • Hypertension        History of Present Illness    Here for yearly well woman exam and 3 month F/u on uncontrolled DM, HTN, Hyperlipidemia, and hypothyroidism    LOV, several changes made. See below  Overall, doing ok   No compliants, except still struggling with weight.    DM--uncontrolled, A1C was 7.1. No changes made to meds b/c blood sugars were improving ( previous A1C was 8.8). checking blood sugars and generally running between 150-180 fasting and qhs. Diet overall has improved but sounds like eating lots of fruits. On metformin 500 mg BID, ten med.     HTN--fair controll with losartan. ten med . Denies CP, SOA     Hyperlipidemia---uncontdrolled at LOV. Thus, Zetia was added to zocor 80 mg .Ten med without side effects.      Hypothyroidism--uncontrolled at LOV. Synthroid was increased to 88 mcg am. Takes med correctly. Weight stable. Yet, frustrated b/c feels like eating healthier and trying to be more active yet not losing weight.     The following portions of the patient's history were reviewed and updated as appropriate: allergies, current medications, past family history, past medical history, past social history, past surgical history and problem list.    Review of Systems   Constitutional: Negative for fatigue, fever, unexpected weight gain and unexpected weight loss.   Respiratory: Negative for shortness of breath.    Cardiovascular: Negative for chest pain.   I have reviewed and confirmed the accuracy of the ROS as documented by the MA/LPN/RN Dea Martinez MD      Objective   Physical Exam  Vitals signs and  nursing note reviewed. Exam conducted with a chaperone present.   Constitutional:       Appearance: Normal appearance. She is well-developed.   HENT:      Head: Normocephalic and atraumatic.      Right Ear: External ear normal.      Left Ear: External ear normal.      Nose: Nose normal.   Eyes:      Conjunctiva/sclera: Conjunctivae normal.      Pupils: Pupils are equal, round, and reactive to light.   Neck:      Musculoskeletal: Normal range of motion and neck supple.      Thyroid: No thyromegaly.      Vascular: No carotid bruit.   Cardiovascular:      Rate and Rhythm: Normal rate and regular rhythm.      Pulses: Normal pulses.      Heart sounds: Normal heart sounds. No murmur.   Pulmonary:      Effort: Pulmonary effort is normal.      Breath sounds: Normal breath sounds.   Chest:      Breasts:         Right: Normal. No inverted nipple, mass, nipple discharge or tenderness.         Left: Normal. No inverted nipple, mass, nipple discharge or tenderness.   Abdominal:      General: Abdomen is flat. Bowel sounds are normal. There is no distension.      Palpations: Abdomen is soft. There is no hepatomegaly, splenomegaly or mass.      Tenderness: There is no abdominal tenderness.      Hernia: No hernia is present. There is no hernia in the left inguinal area or right inguinal area.   Genitourinary:     General: Normal vulva.      Exam position: Lithotomy position.      Vagina: Normal.      Adnexa: Right adnexa normal and left adnexa normal.        Right: No mass, tenderness or fullness.          Left: No mass, tenderness or fullness.        Rectum: Normal. Guaiac result negative. No mass.      Comments: Vaginal cuff intact  Musculoskeletal: Normal range of motion.      Right lower leg: No edema.      Left lower leg: No edema.   Feet:      Right foot:      Skin integrity: Skin integrity normal. No ulcer.      Left foot:      Skin integrity: Skin integrity normal. No ulcer.   Lymphadenopathy:      Cervical: No cervical  adenopathy.      Upper Body:      Right upper body: No supraclavicular or axillary adenopathy.      Left upper body: No supraclavicular or axillary adenopathy.      Lower Body: No right inguinal adenopathy. No left inguinal adenopathy.   Skin:     General: Skin is warm and dry.      Capillary Refill: Capillary refill takes less than 2 seconds.      Comments: No dysplastic nevi or abnormal lesions   Neurological:      General: No focal deficit present.      Mental Status: She is alert and oriented to person, place, and time.   Psychiatric:         Mood and Affect: Mood normal.         Behavior: Behavior normal.         Thought Content: Thought content normal.         Judgment: Judgment normal.          LABS/STUDIES --today's hemoglobin A1c 7.5 (previous A1c 7.1)                                  July 2020--LDL 96, normal CMP, platelets 123    Procedures     Assessment/Plan   Diagnoses and all orders for this visit:    1. Well woman exam with routine gynecological exam (Primary) --S/P breast, pelvic exam done, wnl.  Needs a lot of screening up dated which includes: labs listed below, Mammo, Cscope, Dexa, CT chest, Tdap and Influenza vaccine. See below orders.   Otherwise, continue to improve upon healthy well-balanced diet that includes fruits, vegetables, and increase cardio exercise to greater than 150 minutes weekly.  -     CBC & Differential  -     Hepatitis C Antibody    2. Encounter for screening colonoscopy  -     Ambulatory Referral For Screening Colonoscopy    3. Encounter for screening mammogram for breast cancer  -     Mammo Screening Bilateral With CAD; Future    4. Postmenopausal  -     DEXA Bone Density Axial; Future    5. Diabetes mellitus type 2 in obese (CMS/HCC) uncontrolled.   Will increase Metformin to 1500 mg a day, fill as directed below.   Cont to improve upon low carb diet and inc exercise. More vegetables, less simple fruits.  To opthal, check urine microalbumin. S/P DM foot exam done, wnl.  -      POC Glycosylated Hemoglobin (Hb A1C)  -     Microalbumin / Creatinine Urine Ratio - Urine, Clean Catch    6. Essential hypertension uncontrolled given DM hx.  Stop losartan  Start Hyzaar /25 mg a day  -     Comprehensive Metabolic Panel    7. Other specified hypothyroidism uncontrolled.   Recheck TSH , likely may still need to be adjusted??   -     TSH    8. Mixed hyperlipidemia uncontrolled. Recheck lipids, goal LDL needs to be < 70 given DM.  If at goal, then cont/refill Zocor 80 mg a day and Zetia 10mg a day.   Needs low-carb/low calorie/low cholesterol diet and increase cardio exercise to greater than 150 minutes weekly  -     Comprehensive Metabolic Panel  -     Lipid Panel With LDL / HDL Ratio    9. Thrombocytopenia (CMS/HCC) stable. Asym  Recheck CBC  -     CBC & Differential    10. Personal history of tobacco use - screen for lung cancer with low dose CT chest.   -     CT Chest Low Dose Wo; Future    11. Need for immunization against influenza  -     FluLaval Quad >6 Months ([id number])    Other orders  -     losartan-hydrochlorothiazide (Hyzaar) 100-25 MG per tablet; Take 1 tablet by mouth Daily.  Dispense: 90 tablet; Refill: 1  -     Cancel: losartan (COZAAR) 100 MG tablet; Take 1 tablet by mouth Daily.  Dispense: 30 tablet; Refill: 2  -     metFORMIN (GLUCOPHAGE) 500 MG tablet; Take 2 p.o. every morning 1 p.o. every afternoon  Dispense: 270 tablet; Refill: 1                 Wore goggles and face mask during entire visit.    Return in about 3 months (around 1/14/2021) for Recheck.     Answers for HPI/ROS submitted by the patient on 10/14/2020   What is the primary reason for your visit?: Physical

## 2020-10-14 NOTE — PATIENT INSTRUCTIONS
Start hyzaar   Increase metformin  Recommend low fat/low calorie diet and exercise greater than 150 minutes of cardio per week.   Continue current treatment plan.

## 2020-10-15 LAB
ALBUMIN SERPL-MCNC: 4.3 G/DL (ref 3.5–5.2)
ALBUMIN/CREAT UR: 7 MG/G CREAT (ref 0–29)
ALBUMIN/GLOB SERPL: 1.4 G/DL
ALP SERPL-CCNC: 92 U/L (ref 39–117)
ALT SERPL-CCNC: 37 U/L (ref 1–33)
AST SERPL-CCNC: 59 U/L (ref 1–32)
BASOPHILS # BLD AUTO: 0.05 10*3/MM3 (ref 0–0.2)
BASOPHILS NFR BLD AUTO: 0.9 % (ref 0–1.5)
BILIRUB SERPL-MCNC: 0.5 MG/DL (ref 0–1.2)
BUN SERPL-MCNC: 12 MG/DL (ref 6–20)
BUN/CREAT SERPL: 14.5 (ref 7–25)
CALCIUM SERPL-MCNC: 8.7 MG/DL (ref 8.6–10.5)
CHLORIDE SERPL-SCNC: 103 MMOL/L (ref 98–107)
CHOLEST SERPL-MCNC: 109 MG/DL (ref 0–200)
CO2 SERPL-SCNC: 23.8 MMOL/L (ref 22–29)
CREAT SERPL-MCNC: 0.83 MG/DL (ref 0.57–1)
CREAT UR-MCNC: 167 MG/DL
EOSINOPHIL # BLD AUTO: 0.13 10*3/MM3 (ref 0–0.4)
EOSINOPHIL NFR BLD AUTO: 2.2 % (ref 0.3–6.2)
ERYTHROCYTE [DISTWIDTH] IN BLOOD BY AUTOMATED COUNT: 12.8 % (ref 12.3–15.4)
GLOBULIN SER CALC-MCNC: 3.1 GM/DL
GLUCOSE SERPL-MCNC: 141 MG/DL (ref 65–99)
HCT VFR BLD AUTO: 37.3 % (ref 34–46.6)
HCV AB S/CO SERPL IA: <0.1 S/CO RATIO (ref 0–0.9)
HDLC SERPL-MCNC: 44 MG/DL (ref 40–60)
HGB BLD-MCNC: 12.8 G/DL (ref 12–15.9)
IMM GRANULOCYTES # BLD AUTO: 0.01 10*3/MM3 (ref 0–0.05)
IMM GRANULOCYTES NFR BLD AUTO: 0.2 % (ref 0–0.5)
LDLC SERPL CALC-MCNC: 48 MG/DL (ref 0–100)
LDLC/HDLC SERPL: 1.08 {RATIO}
LYMPHOCYTES # BLD AUTO: 2.37 10*3/MM3 (ref 0.7–3.1)
LYMPHOCYTES NFR BLD AUTO: 40.4 % (ref 19.6–45.3)
MCH RBC QN AUTO: 31.2 PG (ref 26.6–33)
MCHC RBC AUTO-ENTMCNC: 34.3 G/DL (ref 31.5–35.7)
MCV RBC AUTO: 91 FL (ref 79–97)
MICROALBUMIN UR-MCNC: 11.3 UG/ML
MONOCYTES # BLD AUTO: 0.28 10*3/MM3 (ref 0.1–0.9)
MONOCYTES NFR BLD AUTO: 4.8 % (ref 5–12)
NEUTROPHILS # BLD AUTO: 3.03 10*3/MM3 (ref 1.7–7)
NEUTROPHILS NFR BLD AUTO: 51.5 % (ref 42.7–76)
NRBC BLD AUTO-RTO: 0 /100 WBC (ref 0–0.2)
PLATELET # BLD AUTO: 134 10*3/MM3 (ref 140–450)
POTASSIUM SERPL-SCNC: 4.7 MMOL/L (ref 3.5–5.2)
PROT SERPL-MCNC: 7.4 G/DL (ref 6–8.5)
RBC # BLD AUTO: 4.1 10*6/MM3 (ref 3.77–5.28)
SODIUM SERPL-SCNC: 138 MMOL/L (ref 136–145)
TRIGL SERPL-MCNC: 87 MG/DL (ref 0–150)
TSH SERPL DL<=0.005 MIU/L-ACNC: 1.42 UIU/ML (ref 0.27–4.2)
VLDLC SERPL CALC-MCNC: 17 MG/DL (ref 5–40)
WBC # BLD AUTO: 5.87 10*3/MM3 (ref 3.4–10.8)

## 2020-10-19 RX ORDER — DICLOFENAC SODIUM 75 MG/1
TABLET, DELAYED RELEASE ORAL
Qty: 180 TABLET | Refills: 0 | Status: SHIPPED | OUTPATIENT
Start: 2020-10-19 | End: 2020-10-20 | Stop reason: SDUPTHER

## 2020-10-21 RX ORDER — DICLOFENAC SODIUM 75 MG/1
75 TABLET, DELAYED RELEASE ORAL 2 TIMES DAILY
Qty: 180 TABLET | Refills: 0 | Status: SHIPPED | OUTPATIENT
Start: 2020-10-21 | End: 2021-01-20

## 2020-10-23 ENCOUNTER — PREP FOR SURGERY (OUTPATIENT)
Dept: OTHER | Facility: HOSPITAL | Age: 55
End: 2020-10-23

## 2020-10-23 DIAGNOSIS — Z12.11 ENCOUNTER FOR SCREENING FOR MALIGNANT NEOPLASM OF COLON: Primary | ICD-10-CM

## 2020-10-24 RX ORDER — LEVOTHYROXINE SODIUM 88 UG/1
88 TABLET ORAL DAILY
Qty: 30 TABLET | Refills: 2 | Status: SHIPPED | OUTPATIENT
Start: 2020-10-24 | End: 2021-01-23

## 2020-11-18 ENCOUNTER — TRANSCRIBE ORDERS (OUTPATIENT)
Dept: ADMINISTRATIVE | Facility: HOSPITAL | Age: 55
End: 2020-11-18

## 2020-11-18 DIAGNOSIS — M54.50 LOW BACK PAIN, UNSPECIFIED BACK PAIN LATERALITY, UNSPECIFIED CHRONICITY, UNSPECIFIED WHETHER SCIATICA PRESENT: Primary | ICD-10-CM

## 2020-11-18 DIAGNOSIS — M54.16 LUMBAR RADICULOPATHY: ICD-10-CM

## 2020-11-20 ENCOUNTER — APPOINTMENT (OUTPATIENT)
Dept: MRI IMAGING | Facility: HOSPITAL | Age: 55
End: 2020-11-20

## 2020-11-23 RX ORDER — EZETIMIBE 10 MG/1
10 TABLET ORAL DAILY
Qty: 30 TABLET | Refills: 1 | Status: SHIPPED | OUTPATIENT
Start: 2020-11-23 | End: 2021-01-15 | Stop reason: SDUPTHER

## 2020-11-25 RX ORDER — LOSARTAN POTASSIUM 100 MG/1
100 TABLET ORAL DAILY
Qty: 30 TABLET | Refills: 1 | Status: SHIPPED | OUTPATIENT
Start: 2020-11-25 | End: 2020-12-29 | Stop reason: SDUPTHER

## 2020-11-25 RX ORDER — VENLAFAXINE 75 MG/1
75 TABLET ORAL 2 TIMES DAILY
Qty: 60 TABLET | Refills: 1 | Status: SHIPPED | OUTPATIENT
Start: 2020-11-25 | End: 2020-12-29 | Stop reason: SDUPTHER

## 2020-12-09 RX ORDER — SIMVASTATIN 80 MG
80 TABLET ORAL NIGHTLY
Qty: 30 TABLET | Refills: 0 | Status: SHIPPED | OUTPATIENT
Start: 2020-12-09 | End: 2020-12-10

## 2020-12-12 RX ORDER — SIMVASTATIN 80 MG
80 TABLET ORAL NIGHTLY
Qty: 30 TABLET | Refills: 0 | Status: SHIPPED | OUTPATIENT
Start: 2020-12-12 | End: 2020-12-29

## 2020-12-17 ENCOUNTER — APPOINTMENT (OUTPATIENT)
Dept: MRI IMAGING | Facility: HOSPITAL | Age: 55
End: 2020-12-17

## 2020-12-18 ENCOUNTER — HOSPITAL ENCOUNTER (OUTPATIENT)
Dept: MAMMOGRAPHY | Facility: HOSPITAL | Age: 55
Discharge: HOME | End: 2020-12-18

## 2020-12-18 ENCOUNTER — HOSPITAL ENCOUNTER (OUTPATIENT)
Dept: BONE DENSITY | Facility: HOSPITAL | Age: 55
Discharge: HOME | End: 2020-12-18

## 2020-12-18 ENCOUNTER — TELEPHONE (OUTPATIENT)
Dept: FAMILY MEDICINE CLINIC | Facility: CLINIC | Age: 55
End: 2020-12-18

## 2020-12-18 ENCOUNTER — HOSPITAL ENCOUNTER (OUTPATIENT)
Dept: CT IMAGING | Facility: HOSPITAL | Age: 55
Discharge: HOME | End: 2020-12-18

## 2020-12-18 DIAGNOSIS — Z87.891 PERSONAL HISTORY OF TOBACCO USE: ICD-10-CM

## 2020-12-18 DIAGNOSIS — Z78.0 POSTMENOPAUSAL: ICD-10-CM

## 2020-12-18 DIAGNOSIS — Z12.31 ENCOUNTER FOR SCREENING MAMMOGRAM FOR BREAST CANCER: ICD-10-CM

## 2020-12-18 PROCEDURE — 77063 BREAST TOMOSYNTHESIS BI: CPT

## 2020-12-18 PROCEDURE — 77080 DXA BONE DENSITY AXIAL: CPT

## 2020-12-18 PROCEDURE — G0297 LDCT FOR LUNG CA SCREEN: HCPCS

## 2020-12-18 PROCEDURE — 77067 SCR MAMMO BI INCL CAD: CPT

## 2020-12-29 RX ORDER — VENLAFAXINE 75 MG/1
75 TABLET ORAL 2 TIMES DAILY
Qty: 180 TABLET | Refills: 0 | Status: SHIPPED | OUTPATIENT
Start: 2020-12-29 | End: 2021-02-19 | Stop reason: SDUPTHER

## 2020-12-29 RX ORDER — LOSARTAN POTASSIUM 100 MG/1
100 TABLET ORAL DAILY
Qty: 90 TABLET | Refills: 0 | Status: ON HOLD | OUTPATIENT
Start: 2020-12-29 | End: 2021-01-24

## 2020-12-29 RX ORDER — SIMVASTATIN 80 MG
80 TABLET ORAL NIGHTLY
Qty: 90 TABLET | Refills: 0 | Status: SHIPPED | OUTPATIENT
Start: 2020-12-29 | End: 2021-02-19 | Stop reason: SDUPTHER

## 2020-12-29 RX ORDER — CYCLOBENZAPRINE HCL 10 MG
TABLET ORAL
Qty: 270 TABLET | Refills: 0 | Status: SHIPPED | OUTPATIENT
Start: 2020-12-29 | End: 2021-02-19 | Stop reason: SDUPTHER

## 2020-12-29 NOTE — TELEPHONE ENCOUNTER
Caller: [id number] - [place] - [address] AT [facility] - [phone] [facility] [phone] FX    Relationship:     Best call back number: [phone]    Medication needed:   Requested Prescriptions     Pending Prescriptions Disp Refills   • cyclobenzaprine (FLEXERIL) 10 MG tablet [Pharmacy Med Name: CYCLOBENZAPRINE 10MG TABLETS] 270 tablet      Sig: TAKE 1 TABLET BY MOUTH THREE TIMES DAILY AS NEEDED FOR MUSCLE SPASMS   • simvastatin (ZOCOR) 80 MG tablet [Pharmacy Med Name: SIMVASTATIN 80MG TABLETS] 30 tablet 0     Sig: TAKE 1 TABLET BY MOUTH EVERY NIGHT   • losartan (COZAAR) 100 MG tablet 30 tablet 1     Sig: Take 1 tablet by mouth Daily.   • venlafaxine (EFFEXOR) 75 MG tablet 60 tablet 1     Sig: Take 1 tablet by mouth 2 (Two) Times a Day.       When do you need the refill by: 12/29    What details did the patient provide when requesting the medication: PATIENT REQUESTS A 90 DAY SUPPLY OF ALL MEDICATIONS.  STATED THAT SHE USED TO GET A 90 DAYS SUPPLY BUT DOESN'T KNOW WHEN THAT WAS CHANGED.  ONLY HAS ONE DAY LEFT OF ALL EXCEPT THE FLEXERIL.    Does the patient have less than a 3 day supply:  [x] Yes  [] No    What is the patient's preferred pharmacy: [facility] #[id number] - [place] - [address] AT [facility] - [phone] [facility] [phone] FX

## 2021-01-15 ENCOUNTER — TELEMEDICINE (OUTPATIENT)
Dept: FAMILY MEDICINE CLINIC | Facility: CLINIC | Age: 56
End: 2021-01-15

## 2021-01-15 VITALS — HEIGHT: 65 IN | WEIGHT: 285 LBS | BODY MASS INDEX: 47.48 KG/M2

## 2021-01-15 DIAGNOSIS — I10 ESSENTIAL HYPERTENSION: ICD-10-CM

## 2021-01-15 DIAGNOSIS — E66.9 DIABETES MELLITUS TYPE 2 IN OBESE (HCC): Primary | ICD-10-CM

## 2021-01-15 DIAGNOSIS — U07.1 COVID-19 VIRUS INFECTION: ICD-10-CM

## 2021-01-15 DIAGNOSIS — R79.89 ELEVATED LIVER FUNCTION TESTS: ICD-10-CM

## 2021-01-15 DIAGNOSIS — E11.69 DIABETES MELLITUS TYPE 2 IN OBESE (HCC): Primary | ICD-10-CM

## 2021-01-15 DIAGNOSIS — R93.89 ABNORMAL CT OF THE CHEST: ICD-10-CM

## 2021-01-15 DIAGNOSIS — R91.8 PULMONARY NODULES: ICD-10-CM

## 2021-01-15 DIAGNOSIS — E78.2 MIXED HYPERLIPIDEMIA: ICD-10-CM

## 2021-01-15 PROCEDURE — 99214 OFFICE O/P EST MOD 30 MIN: CPT | Performed by: FAMILY MEDICINE

## 2021-01-15 RX ORDER — LOSARTAN POTASSIUM AND HYDROCHLOROTHIAZIDE 25; 100 MG/1; MG/1
1 TABLET ORAL DAILY
COMMUNITY
End: 2021-02-05 | Stop reason: HOSPADM

## 2021-01-15 RX ORDER — BUPRENORPHINE HYDROCHLORIDE 150 UG/1
FILM, SOLUBLE BUCCAL
Status: ON HOLD | COMMUNITY
End: 2021-01-24

## 2021-01-15 RX ORDER — EZETIMIBE 10 MG/1
10 TABLET ORAL DAILY
Qty: 90 TABLET | Refills: 1 | Status: SHIPPED | OUTPATIENT
Start: 2021-01-15 | End: 2021-02-19 | Stop reason: SDUPTHER

## 2021-01-15 NOTE — PROGRESS NOTES
"Gabrielle Bhandari is a 55 y.o. female.     There were no vitals filed for this visit.     Chief Complaint   Patient presents with   • Diabetes     3 months check up nose is sore and dry needs something   • Hyperlipidemia     Follow-up last visit a lot screening        History of Present Illness    This appointment was converted to a video visit in accordance with CDC guidelines given the current coronavirus pandemic    3-month follow-up on DM, hyperlipidemia, elevated LFTs, hypertension, and lots of screening test from last visit including abnormal CT of chest    LOV with me in October for well woman exam, uncontrolled DM and HTN, and med refills with labs  At last visit, DM was uncontrolled with an A1c of 7.5, thus metformin was increased to 1500 mg daily.  Also, HTN was slightly uncontrolled thus Hyzaar was changed to 100/25 mg daily.  On a good note, she is tolerating both these medication adjustments without side effects.  States she is doing better with diet and exercise.  Not checking blood sugars.    Overall, she is doing okay.  Needs refill, labs, and follow-up on screening tests today.  Mammo and DEXA were both done, within normal limits  Lung cancer screening with low-dose CT of chest--- showed 2 tiny pulmonary nodules, yet subtle groundglass infiltrate bilaterally possibly consistent with \"Covid infection\"  Shortly after this CT scan of chest was done she did test positive for COVID-19.  Luckily, remained relatively asymptomatic and was able to recover at home with no hospitalization.        The following portions of the patient's history were reviewed and updated as appropriate: allergies, current medications, past family history, past medical history, past social history, past surgical history and problem list.    Review of Systems   Constitutional: Negative for fever, unexpected weight gain and unexpected weight loss.   Respiratory: Negative for shortness of breath and wheezing.  "   Cardiovascular: Negative for chest pain.       Objective   Physical Exam  Vitals signs and nursing note reviewed.   Constitutional:       General: She is not in acute distress.     Appearance: She is well-developed. She is obese.   HENT:      Head: Normocephalic and atraumatic.   Pulmonary:      Effort: Pulmonary effort is normal.   Neurological:      Mental Status: She is alert and oriented to person, place, and time.   Psychiatric:         Behavior: Behavior normal.         Thought Content: Thought content normal.         Judgment: Judgment normal.          LABS/STUDIES --October 2020--low-dose CT of chest--2 tiny 6 mm pulmonary nodules, groundglass infiltrate bilaterally, recommend follow-up CT of chest in 12 months and clinical correlation, urine microalbumin normal, TSH normal, LDL 48, ALT 37    Procedures     Assessment/Plan   Diagnoses and all orders for this visit:    1. Diabetes mellitus type 2 in obese (CMS/HCC) (Primary) --uncontrolled at last visit.  Hopefully improved since increase dosage of Metformin  Order placed to check A1c in near future, goal A1c needs to be less than 7.  If at goal the no change with Metformin 1500 mg daily, will refill upon request.  Otherwise, may need to increase Metformin to 1000 mg twice daily?  Continue to improve upon a low carb/low calorie diet and increase cardio exercise to greater than 150 minutes weekly  -     Cancel: Hemoglobin A1c  -     Hemoglobin A1c; Future    2. Elevated liver function tests --new diagnosis. asymptomatic, mild.  Most likely secondary to fatty infiltration of liver/uncontrolled DM at last visit?  Order placed to recheck LFTs with a CMP.  If within normal limits then no further work-up  If remains elevated especially in light of controlled DM then will order liver ultrasound and further lab work?  -     Cancel: Comprehensive Metabolic Panel  -     Comprehensive Metabolic Panel; Future    3. Mixed hyperlipidemia --well-controlled.  Lipids/LDL  up-to-date, 48.  No change in medication at this time, continue both Zocor 80 mg and Zetia 10 mg daily  Continue/refill Zocor 80 mg daily, will refill upon request  Continue/refill today Zetia 10 mg 1 p.o. daily  -     Cancel: Comprehensive Metabolic Panel  -     Comprehensive Metabolic Panel; Future  -     Cancel: Comprehensive Metabolic Panel; Future    4. Essential hypertension --stable.  No change of medication  Continue Hyzaar 100/25 mg 1 p.o. daily, will refill upon request  -     Cancel: Comprehensive Metabolic Panel; Future    5. Abnormal CT of the chest --clinically consistent with history of Covid 19 infection/pneumonia.    6. COVID-19 virus infection --clinically resolved.  No further treatment needed    7. Pulmonary nodules --new diagnosis.  Benign.  We will recheck low-dose CT of chest in 12 months (due October 2021)    Other orders  -     ezetimibe (ZETIA) 10 MG tablet; Take 1 tablet by mouth Daily.  Dispense: 90 tablet; Refill: 1      Follow-up based on labs, if all labs within normal limits and no changes made to medications then may follow-up in 6 months  Otherwise, may need follow-up in 3 months       Video visit time 25 minutes    Return in about 4 months (around 5/15/2021) for Recheck.     This was an audio and video enabled telemedicine encounter.

## 2021-01-16 NOTE — PATIENT INSTRUCTIONS
Get labs that have been ordered  Recommend low fat/low calorie diet and exercise greater than 150 minutes of cardio per week.   Continue current treatment plan.

## 2021-01-19 PROCEDURE — 99283 EMERGENCY DEPT VISIT LOW MDM: CPT

## 2021-01-19 PROCEDURE — 93005 ELECTROCARDIOGRAM TRACING: CPT | Performed by: EMERGENCY MEDICINE

## 2021-01-19 PROCEDURE — 93005 ELECTROCARDIOGRAM TRACING: CPT

## 2021-01-19 PROCEDURE — 93010 ELECTROCARDIOGRAM REPORT: CPT | Performed by: INTERNAL MEDICINE

## 2021-01-19 PROCEDURE — 36415 COLL VENOUS BLD VENIPUNCTURE: CPT

## 2021-01-20 ENCOUNTER — HOSPITAL ENCOUNTER (EMERGENCY)
Facility: HOSPITAL | Age: 56
Discharge: HOME | End: 2021-01-20
Attending: EMERGENCY MEDICINE | Admitting: EMERGENCY MEDICINE

## 2021-01-20 ENCOUNTER — APPOINTMENT (OUTPATIENT)
Dept: GENERAL RADIOLOGY | Facility: HOSPITAL | Age: 56
End: 2021-01-20

## 2021-01-20 VITALS
SYSTOLIC BLOOD PRESSURE: 122 MMHG | BODY MASS INDEX: 44.73 KG/M2 | DIASTOLIC BLOOD PRESSURE: 88 MMHG | RESPIRATION RATE: 18 BRPM | HEART RATE: 100 BPM | WEIGHT: 285 LBS | OXYGEN SATURATION: 98 % | TEMPERATURE: 97.9 F | HEIGHT: 67 IN

## 2021-01-20 DIAGNOSIS — M79.602 LEFT ARM PAIN: ICD-10-CM

## 2021-01-20 DIAGNOSIS — M25.512 ACUTE PAIN OF LEFT SHOULDER: Primary | ICD-10-CM

## 2021-01-20 DIAGNOSIS — R79.89 ELEVATED SERUM CREATININE: ICD-10-CM

## 2021-01-20 LAB
ANION GAP SERPL CALCULATED.3IONS-SCNC: 10.3 MMOL/L (ref 5–15)
BASOPHILS # BLD AUTO: 0.04 10*3/MM3 (ref 0–0.2)
BASOPHILS NFR BLD AUTO: 0.7 % (ref 0–1.5)
BUN SERPL-MCNC: 17 MG/DL (ref 6–20)
BUN/CREAT SERPL: 12.5 (ref 7–25)
CALCIUM SPEC-SCNC: 8.5 MG/DL (ref 8.6–10.5)
CHLORIDE SERPL-SCNC: 96 MMOL/L (ref 98–107)
CO2 SERPL-SCNC: 25.7 MMOL/L (ref 22–29)
CREAT SERPL-MCNC: 1.36 MG/DL (ref 0.57–1)
DEPRECATED RDW RBC AUTO: 48 FL (ref 37–54)
EOSINOPHIL # BLD AUTO: 0.01 10*3/MM3 (ref 0–0.4)
EOSINOPHIL NFR BLD AUTO: 0.2 % (ref 0.3–6.2)
ERYTHROCYTE [DISTWIDTH] IN BLOOD BY AUTOMATED COUNT: 13.5 % (ref 12.3–15.4)
GFR SERPL CREATININE-BSD FRML MDRD: 40 ML/MIN/1.73
GLUCOSE SERPL-MCNC: 219 MG/DL (ref 65–99)
HCT VFR BLD AUTO: 34.5 % (ref 34–46.6)
HGB BLD-MCNC: 11.8 G/DL (ref 12–15.9)
IMM GRANULOCYTES # BLD AUTO: 0.01 10*3/MM3 (ref 0–0.05)
IMM GRANULOCYTES NFR BLD AUTO: 0.2 % (ref 0–0.5)
LYMPHOCYTES # BLD AUTO: 0.95 10*3/MM3 (ref 0.7–3.1)
LYMPHOCYTES NFR BLD AUTO: 17.6 % (ref 19.6–45.3)
MCH RBC QN AUTO: 32.8 PG (ref 26.6–33)
MCHC RBC AUTO-ENTMCNC: 34.2 G/DL (ref 31.5–35.7)
MCV RBC AUTO: 95.8 FL (ref 79–97)
MONOCYTES # BLD AUTO: 0.38 10*3/MM3 (ref 0.1–0.9)
MONOCYTES NFR BLD AUTO: 7 % (ref 5–12)
NEUTROPHILS NFR BLD AUTO: 4.02 10*3/MM3 (ref 1.7–7)
NEUTROPHILS NFR BLD AUTO: 74.3 % (ref 42.7–76)
NRBC BLD AUTO-RTO: 0 /100 WBC (ref 0–0.2)
PLATELET # BLD AUTO: 94 10*3/MM3 (ref 140–450)
PMV BLD AUTO: 10.7 FL (ref 6–12)
POTASSIUM SERPL-SCNC: 4.1 MMOL/L (ref 3.5–5.2)
QT INTERVAL: 355 MS
RBC # BLD AUTO: 3.6 10*6/MM3 (ref 3.77–5.28)
SODIUM SERPL-SCNC: 132 MMOL/L (ref 136–145)
TROPONIN T SERPL-MCNC: <0.01 NG/ML (ref 0–0.03)
WBC # BLD AUTO: 5.41 10*3/MM3 (ref 3.4–10.8)

## 2021-01-20 PROCEDURE — 84484 ASSAY OF TROPONIN QUANT: CPT | Performed by: PHYSICIAN ASSISTANT

## 2021-01-20 PROCEDURE — 85025 COMPLETE CBC W/AUTO DIFF WBC: CPT | Performed by: PHYSICIAN ASSISTANT

## 2021-01-20 PROCEDURE — 80048 BASIC METABOLIC PNL TOTAL CA: CPT | Performed by: PHYSICIAN ASSISTANT

## 2021-01-20 PROCEDURE — 36415 COLL VENOUS BLD VENIPUNCTURE: CPT

## 2021-01-20 PROCEDURE — 73030 X-RAY EXAM OF SHOULDER: CPT

## 2021-01-20 RX ORDER — OXYCODONE HYDROCHLORIDE AND ACETAMINOPHEN 5; 325 MG/1; MG/1
1 TABLET ORAL ONCE
Status: COMPLETED | OUTPATIENT
Start: 2021-01-20 | End: 2021-01-20

## 2021-01-20 RX ORDER — METHYLPREDNISOLONE 4 MG/1
TABLET ORAL
Qty: 1 EACH | Refills: 0 | Status: SHIPPED | OUTPATIENT
Start: 2021-01-20 | End: 2021-02-19

## 2021-01-20 RX ORDER — DICLOFENAC SODIUM 75 MG/1
TABLET, DELAYED RELEASE ORAL
Qty: 180 TABLET | Refills: 0 | Status: SHIPPED | OUTPATIENT
Start: 2021-01-20 | End: 2021-02-19

## 2021-01-20 RX ORDER — OXYCODONE HYDROCHLORIDE AND ACETAMINOPHEN 5; 325 MG/1; MG/1
1 TABLET ORAL EVERY 6 HOURS PRN
Qty: 10 TABLET | Refills: 0 | Status: SHIPPED | OUTPATIENT
Start: 2021-01-20 | End: 2021-01-20

## 2021-01-20 RX ORDER — METHYLPREDNISOLONE 4 MG/1
TABLET ORAL
Qty: 1 EACH | Refills: 0 | Status: SHIPPED | OUTPATIENT
Start: 2021-01-20 | End: 2021-01-20 | Stop reason: SDUPTHER

## 2021-01-20 RX ADMIN — OXYCODONE HYDROCHLORIDE AND ACETAMINOPHEN 1 TABLET: 5; 325 TABLET ORAL at 01:08

## 2021-01-20 NOTE — ED NOTES
Patient states she has pain in the Left AC area- She has restricted movement in that limb.   Constant dull pain.      Jayme Arora RN  01/20/21 0036

## 2021-01-20 NOTE — ED NOTES
I wore full protective equipment throughout this patient encounter including a face mask, goggles, and gloves. Hand hygiene was performed before donning protective equipment and after removal when leaving the room.       Cheyanne Muhammad RN  01/20/21 0109     Statement Selected

## 2021-01-20 NOTE — ED PROVIDER NOTES
MD ATTESTATION NOTE    The GILLIAN and I have discussed this patient's history, physical exam, and treatment plan.  I have reviewed the documentation and personally had a face to face interaction with the patient. I affirm the documentation and agree with the treatment and plan.  The attached note describes my personal findings.      History  55-year-old female with history of diabetes and obesity presents with left arm and shoulder pain ongoing for 4 days.  Pain is worsened with movement    Physical Exam  Vital Signs reviewed  Alert, Well Appearing in NAD  Lungs-respirations unlabored, saturations upper 90s on room air  Extremities-tenderness to palpation across the upper left arm.  Distal strength sensation pulses grossly intact.    Disposition  I discussed treatment evaluation this patient with LUCILLE Kramer.  I reviewed EKG x-ray and labs and see no worrisome abnormalities.  She does have a mildly elevated creatinine and will ask her to drink plenty of fluids and follow-up with primary care provider.  We will give a short course of steroids as well as immobilization of the left arm with a sling.  Patient does have chronic pain syndrome will avoid narcotic pain medications at this time.     James Lozada MD  01/20/21 0314

## 2021-01-20 NOTE — ED PROVIDER NOTES
EMERGENCY DEPARTMENT ENCOUNTER    Room Number:  08/08  Date of encounter:  1/20/2021  PCP: Dea Martinez MD  Historian: Patient      HPI:  Chief Complaint: Left shoulder and left arm pain.  A complete HPI/ROS/PMH/PSH/SH/FH are unobtainable due to: Nothing    Context: Stefani Bhandari is a 55 y.o. female who presents to the ED c/o left shoulder and left arm pain that has been ongoing for the past 4 days.  She states during that time her pain is progressively worsened.  She denies injury.  Pain starts at the left lateral shoulder and extends down the arm to the left wrist.  The pain is made worse with slight movement and slightly improved when holding it completely still.  She denies chest pain, palpitations, fever, chills, neck pain associated with the shoulder pain.  She is here for further evaluation.    Reviewing patient's chart she has a history of diabetes mellitus, GERD, obesity, hyperlipidemia, hypertension, elevated LFTs.  Patient is followed by Dr. Dea Martinez.  There are no previous hospital or ER visits in [facility].      PAST MEDICAL HISTORY  Active Ambulatory Problems     Diagnosis Date Noted   • Essential hypertension 01/22/2020   • Sleep apnea 01/22/2020   • Anxiety and depression 01/22/2020   • Diabetes mellitus type 2 in obese (CMS/[facility]) 01/22/2020   • Obesity, Class III, BMI 40-49.9 (morbid obesity) (CMS/[facility]) 01/29/2020   • Other specified hypothyroidism 04/06/2020   • Mixed hyperlipidemia 04/06/2020   • Lumbar degenerative disc disease 04/06/2020   • Thrombocytopenia (CMS/[facility]) 07/15/2020   • Tongue lesion 07/15/2020   • Pulmonary nodules 01/15/2021   • Abnormal CT of the chest 01/15/2021   • Elevated liver function tests 01/15/2021   • COVID-19 virus infection 01/15/2021     Resolved Ambulatory Problems     Diagnosis Date Noted   • Snoring 01/22/2020   • Multiple joint pain 01/22/2020     Past Medical History:   Diagnosis Date   • Anxiety    • DDD (degenerative disc disease), lumbar    • Depression     • Diabetes mellitus (CMS/HCC)    • Elevated LFTs    • NICK (generalized anxiety disorder)    • History of epilepsy    • Hyperlipidemia    • Hypertension    • Hypothyroidism    • MDD (major depressive disorder)    • Morbid obesity (CMS/HCC)    • OA (osteoarthritis)    • Obesity          PAST SURGICAL HISTORY  Past Surgical History:   Procedure Laterality Date   • HYSTERECTOMY  2007   • TUBAL ABDOMINAL LIGATION  1996         FAMILY HISTORY  Family History   Problem Relation Age of Onset   • Obesity Mother    • Diabetes Mother    • Hypertension Mother    • Sleep apnea Mother    • Asthma Mother    • Hypertension Father    • Heart attack Father    • Diabetes Sister    • Hypertension Sister    • Heart attack Sister    • Diabetes Brother    • Hypertension Brother    • Heart attack Brother    • No Known Problems Other    • Breast cancer Paternal Cousin          SOCIAL HISTORY  Social History     Socioeconomic History   • Marital status:      Spouse name: Not on file   • Number of children: Not on file   • Years of education: Not on file   • Highest education level: Not on file   Tobacco Use   • Smoking status: Former Smoker     Years: 20.00   • Smokeless tobacco: Never Used   • Tobacco comment: 1 pack per 2 days   Substance and Sexual Activity   • Alcohol use: Defer     Frequency: Never   • Drug use: Never         ALLERGIES  Tramadol        REVIEW OF SYSTEMS  Review of Systems   Constitutional: Negative for chills and fever.   HENT: Negative.    Respiratory: Negative for cough and shortness of breath.    Cardiovascular: Negative for chest pain, palpitations and leg swelling.   Gastrointestinal: Negative.    Genitourinary: Negative.    Musculoskeletal: Negative for neck pain.        Right shoulder pain   Neurological: Negative for numbness and headaches.        All systems reviewed and negative except for those discussed in HPI.       PHYSICAL EXAM    I have reviewed the triage vital signs and nursing notes.    ED  Triage Vitals   Temp Heart Rate Resp BP SpO2   01/19/21 2143 01/19/21 2143 01/19/21 2143 01/19/21 2228 01/19/21 2143   97.9 °F (36.6 °C) 119 18 132/77 98 %      Temp src Heart Rate Source Patient Position BP Location FiO2 (%)   01/19/21 2143 01/19/21 2143 -- -- --   Tympanic Monitor          Physical Exam  GENERAL: Obese, nontoxic, appears uncomfortable  HENT: nares patent and atraumatic  Neck: C-spine nontender, negative Spurling's test  EYES: no scleral icterus, PERRL   CV: regular rhythm, regular rate, no obvious murmur, no JVD, no pedal edema, palpable radial and brachial pulses bilaterally, palpable pedal pulses bilaterally.  RESPIRATORY: normal effort  ABDOMEN: soft  MUSCULOSKELETAL: no deformity, increased pain with slight extension, flexion, abduction of the left shoulder.  Compartments of the involved extremity are soft.  NEURO: alert and oriented x3, moves all extremities, follows commands, sharp sensation is intact C5-C6-C7.  Motor function of the radial median and ulnar nerves appear intact.  SKIN: warm, dry, no obvious shingles rash, ecchymosis and/or signs of trauma.        LAB RESULTS  Recent Results (from the past 24 hour(s))   ECG 12 Lead    Collection Time: 01/19/21  9:48 PM   Result Value Ref Range    QT Interval 355 ms   Basic Metabolic Panel    Collection Time: 01/20/21  1:07 AM    Specimen: Blood   Result Value Ref Range    Glucose 219 (H) 65 - 99 mg/dL    BUN 17 6 - 20 mg/dL    Creatinine 1.36 (H) 0.57 - 1.00 mg/dL    Sodium 132 (L) 136 - 145 mmol/L    Potassium 4.1 3.5 - 5.2 mmol/L    Chloride 96 (L) 98 - 107 mmol/L    CO2 25.7 22.0 - 29.0 mmol/L    Calcium 8.5 (L) 8.6 - 10.5 mg/dL    eGFR Non African Amer 40 (L) >60 mL/min/1.73    BUN/Creatinine Ratio 12.5 7.0 - 25.0    Anion Gap 10.3 5.0 - 15.0 mmol/L   Troponin    Collection Time: 01/20/21  1:07 AM    Specimen: Blood   Result Value Ref Range    Troponin T <0.010 0.000 - 0.030 ng/mL   CBC Auto Differential    Collection Time: 01/20/21  1:07  AM    Specimen: Blood   Result Value Ref Range    WBC 5.41 3.40 - 10.80 10*3/mm3    RBC 3.60 (L) 3.77 - 5.28 10*6/mm3    Hemoglobin 11.8 (L) 12.0 - 15.9 g/dL    Hematocrit 34.5 34.0 - 46.6 %    MCV 95.8 79.0 - 97.0 fL    MCH 32.8 26.6 - 33.0 pg    MCHC 34.2 31.5 - 35.7 g/dL    RDW 13.5 12.3 - 15.4 %    RDW-SD 48.0 37.0 - 54.0 fl    MPV 10.7 6.0 - 12.0 fL    Platelets 94 (L) 140 - 450 10*3/mm3    Neutrophil % 74.3 42.7 - 76.0 %    Lymphocyte % 17.6 (L) 19.6 - 45.3 %    Monocyte % 7.0 5.0 - 12.0 %    Eosinophil % 0.2 (L) 0.3 - 6.2 %    Basophil % 0.7 0.0 - 1.5 %    Immature Grans % 0.2 0.0 - 0.5 %    Neutrophils, Absolute 4.02 1.70 - 7.00 10*3/mm3    Lymphocytes, Absolute 0.95 0.70 - 3.10 10*3/mm3    Monocytes, Absolute 0.38 0.10 - 0.90 10*3/mm3    Eosinophils, Absolute 0.01 0.00 - 0.40 10*3/mm3    Basophils, Absolute 0.04 0.00 - 0.20 10*3/mm3    Immature Grans, Absolute 0.01 0.00 - 0.05 10*3/mm3    nRBC 0.0 0.0 - 0.2 /100 WBC       Ordered the above labs and independently reviewed the results.        RADIOLOGY  Xr Shoulder 2+ View Left    Result Date: 1/20/2021  Patient: DAHIANA LEE  Time Out: 01:27 Exam(s): FILM LEFT SHOULDER EXAM:   XR Left Shoulder Complete, 2 or More Views CLINICAL HISTORY:    Left shoulder pain  Reason for exam: Left shoulder pain. Reason for Exam:->Left shoulder pain. Portable->No. Does this patient have a diabetic monitoring medication delivering device on?->No.. Additional notes: LEFT SHOULDER PAIN TECHNIQUE:   Two or more views of the left shoulder. COMPARISON:   none available FINDINGS:   Bones joints:  Unremarkable.  No acute fracture.  No dislocation.   Soft tissues:  Unremarkable.       Normal left shoulder x-rays. Electronically signed by Anastacio Bledsoe M.D. on 01-20-21 at 0127      I ordered the above noted radiological studies. Reviewed by me and discussed with radiologist.  See dictation for official radiology interpretation.      PROCEDURES    Procedures      MEDICATIONS GIVEN  IN ER    Medications   oxyCODONE-acetaminophen (PERCOCET) 5-325 MG per tablet 1 tablet (1 tablet Oral Given 1/20/21 0108)         PROGRESS, DATA ANALYSIS, CONSULTS, AND MEDICAL DECISION MAKING    All labs have been independently reviewed by me.  All radiology studies have been reviewed by me and discussed with radiologist dictating the report.   EKG's independently viewed and interpreted by me.  Discussion below represents my analysis of pertinent findings related to patient's condition, differential diagnosis, treatment plan and final disposition.    DDx includes but is not limited to: Cervical radiculopathy, musculoskeletal pain, bursitis, arthritis, ACS.  Cardiac work-up is unremarkable.  Pain is worse with movement.  Suspect the patient's pain is more musculoskeletal in nature.  I doubt cervical radiculopathy but cannot completely rule out.  We will place the patient on a Medrol pack and a sling for the next 3 to 5 days.  We will have her to follow-up with the on-call orthopedist for further management.  He is to return to the ER with any chest pain, shortness of breath, fever, worsening symptoms not controlled with the medication prescribed.    Is also noted that her serum creatinine is slightly elevated.  She is to hydrate avoid NSAIDs and follow-up with her PCP in 2 weeks for a repeat test.    ED Course as of Jan 20 0602   Wed Jan 20, 2021   0150 I have viewed the patient's two-view left shoulder and there are no acute findings.    [RC]   0256 EKG          EKG time: 2148  Rhythm/Rate: 100/Sinus  P waves and VA: Normal p waves and pr interval  QRS, axis: Normal Axis   ST and T waves: No acute st or t wave changes.     Interpreted Contemporaneously by me, independently viewed  -No acute changes when compared to 1/29/2020      [RC]      ED Course User Index  [RC] Boris Kramer III, PA       Patient was placed in face mask in first look. Patient was wearing facemask when I entered the room and  throughout our encounter. I wore full protective equipment throughout this patient encounter including a face mask, and gloves. Hand hygiene was performed before donning protective equipment and after removal when leaving the room.    AS OF 06:02 EST VITALS:    BP - 122/88  HR - 100  TEMP - 97.9 °F (36.6 °C) (Tympanic)  O2 SATS - 98%        DIAGNOSIS  Final diagnoses:   Acute pain of left shoulder   Left arm pain   Elevated serum creatinine         DISPOSITION  DISCHARGE    Patient discharged in stable condition.    Reviewed implications of results, diagnosis, meds, responsibility to follow up, warning signs and symptoms of possible worsening, potential complications and reasons to return to ER.    Patient/Family voiced understanding of above instructions.    Discussed plan for discharge, as there is no emergent indication for admission. Patient referred to primary care provider for BP management due to today's BP. Pt/family is agreeable and understands need for follow up and repeat testing.  Pt is aware that discharge does not mean that nothing is wrong but it indicates no emergency is present that requires admission and they must continue care with follow-up as given below or physician of their choice.     FOLLOW-UP  Shubham Mcqueen MD  [address] [facility] [address]  [facility] [place] [id number] [phone]    Schedule an appointment as soon as possible for a visit   For further evaluation and treatment    Dea Martinez MD  [address] [facility] [id number] [phone]    Schedule an appointment as soon as possible for a visit   For further evaluation and treatment and interim management as needed         Medication List      New Prescriptions    methylPREDNISolone 4 MG dose pack  Commonly known as: MEDROL  Take as directed on package instructions.           Where to Get Your Medications      You can get these medications from any pharmacy    Bring a paper prescription for each of these  medications  · methylPREDNISolone 4 MG dose pack                Boris Kramer III, PA  01/20/21 0602

## 2021-01-20 NOTE — ED NOTES
Pt to ER via POV from home,pt in mask, staff in mask. Pt reports left upper arm pain for 2 days, no known injury, no numbness or tingling of fingers. No discoloration or swelling noted.     Isabel Campos, RN  01/19/21 1178

## 2021-01-20 NOTE — DISCHARGE INSTRUCTIONS
Wear the sling as needed for the next 3 days.  Recommend following up with the orthopedist provided above.    Return to the emergency department should your pain not be controlled or improving with the medication provided tonight in the emergency department, should your arm began to swell, should you develop a fever, or should you develop chest pain.

## 2021-01-23 RX ORDER — LEVOTHYROXINE SODIUM 88 UG/1
88 TABLET ORAL DAILY
Qty: 30 TABLET | Refills: 3 | Status: SHIPPED | OUTPATIENT
Start: 2021-01-23 | End: 2021-02-19 | Stop reason: SDUPTHER

## 2021-01-24 ENCOUNTER — APPOINTMENT (OUTPATIENT)
Dept: CT IMAGING | Facility: HOSPITAL | Age: 56
End: 2021-01-24

## 2021-01-24 ENCOUNTER — APPOINTMENT (OUTPATIENT)
Dept: GENERAL RADIOLOGY | Facility: HOSPITAL | Age: 56
End: 2021-01-24

## 2021-01-24 ENCOUNTER — HOSPITAL ENCOUNTER (INPATIENT)
Facility: HOSPITAL | Age: 56
LOS: 12 days | Discharge: HOME HEALTH | End: 2021-02-05
Attending: EMERGENCY MEDICINE | Admitting: HOSPITALIST

## 2021-01-24 DIAGNOSIS — R73.9 HYPERGLYCEMIA: ICD-10-CM

## 2021-01-24 DIAGNOSIS — E87.1 HYPONATREMIA: ICD-10-CM

## 2021-01-24 DIAGNOSIS — A41.9 SEPSIS WITHOUT ACUTE ORGAN DYSFUNCTION, DUE TO UNSPECIFIED ORGANISM (HCC): Primary | ICD-10-CM

## 2021-01-24 DIAGNOSIS — N17.9 AKI (ACUTE KIDNEY INJURY) (HCC): ICD-10-CM

## 2021-01-24 DIAGNOSIS — R91.8 PULMONARY NODULES: ICD-10-CM

## 2021-01-24 PROBLEM — Z86.16 HISTORY OF COVID-19: Status: ACTIVE | Noted: 2021-01-24

## 2021-01-24 LAB
ALBUMIN SERPL-MCNC: 2.8 G/DL (ref 3.5–5.2)
ALBUMIN/GLOB SERPL: 0.6 G/DL
ALP SERPL-CCNC: 80 U/L (ref 39–117)
ALT SERPL W P-5'-P-CCNC: 34 U/L (ref 1–33)
ANION GAP SERPL CALCULATED.3IONS-SCNC: 18 MMOL/L (ref 5–15)
ANISOCYTOSIS BLD QL: ABNORMAL
ARTERIAL PATENCY WRIST A: POSITIVE
AST SERPL-CCNC: 41 U/L (ref 1–32)
ATMOSPHERIC PRESS: 755.1 MMHG
B PARAPERT DNA SPEC QL NAA+PROBE: NOT DETECTED
B PERT DNA SPEC QL NAA+PROBE: NOT DETECTED
BACTERIA BLD CULT: ABNORMAL
BACTERIA UR QL AUTO: ABNORMAL /HPF
BASE EXCESS BLDA CALC-SCNC: -2.8 MMOL/L (ref 0–2)
BDY SITE: ABNORMAL
BILIRUB SERPL-MCNC: 0.7 MG/DL (ref 0–1.2)
BILIRUB UR QL STRIP: NEGATIVE
BOTTLE TYPE: ABNORMAL
BUN SERPL-MCNC: 43 MG/DL (ref 6–20)
BUN/CREAT SERPL: 30.3 (ref 7–25)
C PNEUM DNA NPH QL NAA+NON-PROBE: NOT DETECTED
CALCIUM SPEC-SCNC: 8.5 MG/DL (ref 8.6–10.5)
CHLORIDE SERPL-SCNC: 89 MMOL/L (ref 98–107)
CLARITY UR: ABNORMAL
CO2 SERPL-SCNC: 19 MMOL/L (ref 22–29)
COLOR UR: ABNORMAL
CREAT SERPL-MCNC: 1.42 MG/DL (ref 0.57–1)
CRP SERPL-MCNC: 28.13 MG/DL (ref 0–0.5)
D-LACTATE SERPL-SCNC: 3.6 MMOL/L (ref 0.5–2)
D-LACTATE SERPL-SCNC: 6.7 MMOL/L (ref 0.5–2)
DEPRECATED RDW RBC AUTO: 46.1 FL (ref 37–54)
ERYTHROCYTE [DISTWIDTH] IN BLOOD BY AUTOMATED COUNT: 13.4 % (ref 12.3–15.4)
ERYTHROCYTE [SEDIMENTATION RATE] IN BLOOD: 111 MM/HR (ref 0–30)
FLUAV SUBTYP SPEC NAA+PROBE: NOT DETECTED
FLUBV RNA ISLT QL NAA+PROBE: NOT DETECTED
GAS FLOW AIRWAY: 2 LPM
GFR SERPL CREATININE-BSD FRML MDRD: 38 ML/MIN/1.73
GLOBULIN UR ELPH-MCNC: 4.6 GM/DL
GLUCOSE BLDC GLUCOMTR-MCNC: 281 MG/DL (ref 70–130)
GLUCOSE BLDC GLUCOMTR-MCNC: 286 MG/DL (ref 70–130)
GLUCOSE BLDC GLUCOMTR-MCNC: 321 MG/DL (ref 70–130)
GLUCOSE SERPL-MCNC: 320 MG/DL (ref 65–99)
GLUCOSE UR STRIP-MCNC: ABNORMAL MG/DL
GRAN CASTS URNS QL MICRO: ABNORMAL /LPF
HADV DNA SPEC NAA+PROBE: NOT DETECTED
HCO3 BLDA-SCNC: 20.9 MMOL/L (ref 22–28)
HCOV 229E RNA SPEC QL NAA+PROBE: NOT DETECTED
HCOV HKU1 RNA SPEC QL NAA+PROBE: NOT DETECTED
HCOV NL63 RNA SPEC QL NAA+PROBE: NOT DETECTED
HCOV OC43 RNA SPEC QL NAA+PROBE: NOT DETECTED
HCT VFR BLD AUTO: 34.3 % (ref 34–46.6)
HGB BLD-MCNC: 11.6 G/DL (ref 12–15.9)
HGB UR QL STRIP.AUTO: ABNORMAL
HMPV RNA NPH QL NAA+NON-PROBE: NOT DETECTED
HPIV1 RNA SPEC QL NAA+PROBE: NOT DETECTED
HPIV2 RNA SPEC QL NAA+PROBE: NOT DETECTED
HPIV3 RNA NPH QL NAA+PROBE: NOT DETECTED
HPIV4 P GENE NPH QL NAA+PROBE: NOT DETECTED
HYALINE CASTS UR QL AUTO: ABNORMAL /LPF
KETONES UR QL STRIP: ABNORMAL
LACTATE HOLD SPECIMEN: NORMAL
LEUKOCYTE ESTERASE UR QL STRIP.AUTO: NEGATIVE
LYMPHOCYTES # BLD MANUAL: 0.73 10*3/MM3 (ref 0.7–3.1)
LYMPHOCYTES NFR BLD MANUAL: 7.1 % (ref 19.6–45.3)
M PNEUMO IGG SER IA-ACNC: NOT DETECTED
MCH RBC QN AUTO: 31.9 PG (ref 26.6–33)
MCHC RBC AUTO-ENTMCNC: 33.8 G/DL (ref 31.5–35.7)
MCV RBC AUTO: 94.2 FL (ref 79–97)
MICROCYTES BLD QL: ABNORMAL
MODALITY: ABNORMAL
NEUTROPHILS # BLD AUTO: 9.61 10*3/MM3 (ref 1.7–7)
NEUTROPHILS NFR BLD MANUAL: 92.9 % (ref 42.7–76)
NITRITE UR QL STRIP: NEGATIVE
NRBC SPEC MANUAL: 1 /100 WBC (ref 0–0.2)
PCO2 BLDA: 31.2 MM HG (ref 35–45)
PH BLDA: 7.43 PH UNITS (ref 7.35–7.45)
PH UR STRIP.AUTO: <=5 [PH] (ref 5–8)
PLAT MORPH BLD: NORMAL
PLATELET # BLD AUTO: 102 10*3/MM3 (ref 140–450)
PMV BLD AUTO: 10.7 FL (ref 6–12)
PO2 BLDA: 126.9 MM HG (ref 80–100)
POTASSIUM SERPL-SCNC: 4.5 MMOL/L (ref 3.5–5.2)
PROCALCITONIN SERPL-MCNC: 3.96 NG/ML (ref 0–0.25)
PROT SERPL-MCNC: 7.4 G/DL (ref 6–8.5)
PROT UR QL STRIP: ABNORMAL
RBC # BLD AUTO: 3.64 10*6/MM3 (ref 3.77–5.28)
RBC # UR: ABNORMAL /HPF
REF LAB TEST METHOD: ABNORMAL
RHINOVIRUS RNA SPEC NAA+PROBE: NOT DETECTED
RSV RNA NPH QL NAA+NON-PROBE: NOT DETECTED
SAO2 % BLDCOA: 99 % (ref 92–99)
SARS-COV-2 RNA NPH QL NAA+NON-PROBE: NOT DETECTED
SODIUM SERPL-SCNC: 126 MMOL/L (ref 136–145)
SP GR UR STRIP: 1.02 (ref 1–1.03)
SQUAMOUS #/AREA URNS HPF: ABNORMAL /HPF
TOTAL RATE: 22 BREATHS/MINUTE
UROBILINOGEN UR QL STRIP: ABNORMAL
WBC # BLD AUTO: 10.34 10*3/MM3 (ref 3.4–10.8)
WBC MORPH BLD: NORMAL
WBC UR QL AUTO: ABNORMAL /HPF

## 2021-01-24 PROCEDURE — 87147 CULTURE TYPE IMMUNOLOGIC: CPT | Performed by: EMERGENCY MEDICINE

## 2021-01-24 PROCEDURE — 85652 RBC SED RATE AUTOMATED: CPT | Performed by: EMERGENCY MEDICINE

## 2021-01-24 PROCEDURE — 25010000002 PIPERACILLIN SOD-TAZOBACTAM PER 1 G: Performed by: PHYSICIAN ASSISTANT

## 2021-01-24 PROCEDURE — 25010000002 HYDROMORPHONE 1 MG/ML SOLUTION: Performed by: PHYSICIAN ASSISTANT

## 2021-01-24 PROCEDURE — 87186 SC STD MICRODIL/AGAR DIL: CPT | Performed by: EMERGENCY MEDICINE

## 2021-01-24 PROCEDURE — 80053 COMPREHEN METABOLIC PANEL: CPT | Performed by: EMERGENCY MEDICINE

## 2021-01-24 PROCEDURE — 87040 BLOOD CULTURE FOR BACTERIA: CPT | Performed by: EMERGENCY MEDICINE

## 2021-01-24 PROCEDURE — 25010000002 ONDANSETRON PER 1 MG: Performed by: EMERGENCY MEDICINE

## 2021-01-24 PROCEDURE — P9612 CATHETERIZE FOR URINE SPEC: HCPCS

## 2021-01-24 PROCEDURE — 85025 COMPLETE CBC W/AUTO DIFF WBC: CPT | Performed by: EMERGENCY MEDICINE

## 2021-01-24 PROCEDURE — 99285 EMERGENCY DEPT VISIT HI MDM: CPT

## 2021-01-24 PROCEDURE — 82803 BLOOD GASES ANY COMBINATION: CPT

## 2021-01-24 PROCEDURE — 82962 GLUCOSE BLOOD TEST: CPT

## 2021-01-24 PROCEDURE — 25010000002 PIPERACILLIN SOD-TAZOBACTAM PER 1 G: Performed by: HOSPITALIST

## 2021-01-24 PROCEDURE — 71250 CT THORAX DX C-: CPT

## 2021-01-24 PROCEDURE — 87150 DNA/RNA AMPLIFIED PROBE: CPT | Performed by: EMERGENCY MEDICINE

## 2021-01-24 PROCEDURE — 25010000002 VANCOMYCIN 10 G RECONSTITUTED SOLUTION: Performed by: PHYSICIAN ASSISTANT

## 2021-01-24 PROCEDURE — 85007 BL SMEAR W/DIFF WBC COUNT: CPT | Performed by: EMERGENCY MEDICINE

## 2021-01-24 PROCEDURE — 36600 WITHDRAWAL OF ARTERIAL BLOOD: CPT

## 2021-01-24 PROCEDURE — 83605 ASSAY OF LACTIC ACID: CPT | Performed by: EMERGENCY MEDICINE

## 2021-01-24 PROCEDURE — 36415 COLL VENOUS BLD VENIPUNCTURE: CPT | Performed by: EMERGENCY MEDICINE

## 2021-01-24 PROCEDURE — 63710000001 INSULIN LISPRO (HUMAN) PER 5 UNITS: Performed by: NURSE PRACTITIONER

## 2021-01-24 PROCEDURE — 84145 PROCALCITONIN (PCT): CPT | Performed by: EMERGENCY MEDICINE

## 2021-01-24 PROCEDURE — 0202U NFCT DS 22 TRGT SARS-COV-2: CPT | Performed by: EMERGENCY MEDICINE

## 2021-01-24 PROCEDURE — 71045 X-RAY EXAM CHEST 1 VIEW: CPT

## 2021-01-24 PROCEDURE — 25010000002 MORPHINE PER 10 MG: Performed by: EMERGENCY MEDICINE

## 2021-01-24 PROCEDURE — 74176 CT ABD & PELVIS W/O CONTRAST: CPT

## 2021-01-24 PROCEDURE — 81001 URINALYSIS AUTO W/SCOPE: CPT | Performed by: EMERGENCY MEDICINE

## 2021-01-24 PROCEDURE — 86140 C-REACTIVE PROTEIN: CPT | Performed by: EMERGENCY MEDICINE

## 2021-01-24 RX ORDER — MORPHINE SULFATE 2 MG/ML
4 INJECTION, SOLUTION INTRAMUSCULAR; INTRAVENOUS ONCE
Status: COMPLETED | OUTPATIENT
Start: 2021-01-24 | End: 2021-01-24

## 2021-01-24 RX ORDER — BUPRENORPHINE HYDROCHLORIDE 150 UG/1
FILM, SOLUBLE BUCCAL
COMMUNITY
Start: 2020-10-27 | End: 2021-02-19

## 2021-01-24 RX ORDER — LEVOTHYROXINE SODIUM 88 UG/1
88 TABLET ORAL DAILY
Status: DISCONTINUED | OUTPATIENT
Start: 2021-01-24 | End: 2021-02-05 | Stop reason: HOSPADM

## 2021-01-24 RX ORDER — INSULIN LISPRO 100 [IU]/ML
0-9 INJECTION, SOLUTION INTRAVENOUS; SUBCUTANEOUS
Status: DISCONTINUED | OUTPATIENT
Start: 2021-01-24 | End: 2021-02-05 | Stop reason: HOSPADM

## 2021-01-24 RX ORDER — ACETAMINOPHEN 500 MG
1000 TABLET ORAL ONCE
Status: COMPLETED | OUTPATIENT
Start: 2021-01-24 | End: 2021-01-24

## 2021-01-24 RX ORDER — OXYCODONE AND ACETAMINOPHEN 7.5; 325 MG/1; MG/1
1 TABLET ORAL EVERY 4 HOURS PRN
Status: DISPENSED | OUTPATIENT
Start: 2021-01-24 | End: 2021-02-05

## 2021-01-24 RX ORDER — SODIUM CHLORIDE 0.9 % (FLUSH) 0.9 %
10 SYRINGE (ML) INJECTION EVERY 12 HOURS SCHEDULED
Status: DISCONTINUED | OUTPATIENT
Start: 2021-01-24 | End: 2021-02-05 | Stop reason: HOSPADM

## 2021-01-24 RX ORDER — SODIUM CHLORIDE 9 MG/ML
125 INJECTION, SOLUTION INTRAVENOUS CONTINUOUS
Status: DISCONTINUED | OUTPATIENT
Start: 2021-01-24 | End: 2021-01-27

## 2021-01-24 RX ORDER — SODIUM CHLORIDE 0.9 % (FLUSH) 0.9 %
10 SYRINGE (ML) INJECTION AS NEEDED
Status: DISCONTINUED | OUTPATIENT
Start: 2021-01-24 | End: 2021-02-05 | Stop reason: HOSPADM

## 2021-01-24 RX ORDER — CYCLOBENZAPRINE HCL 10 MG
10 TABLET ORAL 3 TIMES DAILY PRN
Status: DISCONTINUED | OUTPATIENT
Start: 2021-01-24 | End: 2021-02-05 | Stop reason: HOSPADM

## 2021-01-24 RX ORDER — VENLAFAXINE 75 MG/1
75 TABLET ORAL 2 TIMES DAILY
Status: DISCONTINUED | OUTPATIENT
Start: 2021-01-24 | End: 2021-02-05 | Stop reason: HOSPADM

## 2021-01-24 RX ORDER — ONDANSETRON 4 MG/1
4 TABLET, FILM COATED ORAL EVERY 6 HOURS PRN
Status: DISCONTINUED | OUTPATIENT
Start: 2021-01-24 | End: 2021-02-05 | Stop reason: HOSPADM

## 2021-01-24 RX ORDER — CEFAZOLIN SODIUM 2 G/100ML
2 INJECTION, SOLUTION INTRAVENOUS EVERY 8 HOURS
Status: DISCONTINUED | OUTPATIENT
Start: 2021-01-25 | End: 2021-02-05 | Stop reason: HOSPADM

## 2021-01-24 RX ORDER — HYDROMORPHONE HYDROCHLORIDE 1 MG/ML
0.5 INJECTION, SOLUTION INTRAMUSCULAR; INTRAVENOUS; SUBCUTANEOUS
Status: DISPENSED | OUTPATIENT
Start: 2021-01-24 | End: 2021-02-05

## 2021-01-24 RX ORDER — NICOTINE POLACRILEX 4 MG
15 LOZENGE BUCCAL
Status: DISCONTINUED | OUTPATIENT
Start: 2021-01-24 | End: 2021-02-05 | Stop reason: HOSPADM

## 2021-01-24 RX ORDER — LORAZEPAM 1 MG/1
1 TABLET ORAL ONCE
Status: COMPLETED | OUTPATIENT
Start: 2021-01-25 | End: 2021-01-25

## 2021-01-24 RX ORDER — ACETAMINOPHEN 325 MG/1
650 TABLET ORAL EVERY 4 HOURS PRN
Status: DISCONTINUED | OUTPATIENT
Start: 2021-01-24 | End: 2021-02-05 | Stop reason: HOSPADM

## 2021-01-24 RX ORDER — DEXTROSE MONOHYDRATE 25 G/50ML
25 INJECTION, SOLUTION INTRAVENOUS
Status: DISCONTINUED | OUTPATIENT
Start: 2021-01-24 | End: 2021-02-05 | Stop reason: HOSPADM

## 2021-01-24 RX ORDER — ONDANSETRON 2 MG/ML
4 INJECTION INTRAMUSCULAR; INTRAVENOUS ONCE
Status: COMPLETED | OUTPATIENT
Start: 2021-01-24 | End: 2021-01-24

## 2021-01-24 RX ADMIN — SODIUM CHLORIDE 1000 ML: 9 INJECTION, SOLUTION INTRAVENOUS at 07:49

## 2021-01-24 RX ADMIN — ONDANSETRON 4 MG: 2 INJECTION INTRAMUSCULAR; INTRAVENOUS at 07:28

## 2021-01-24 RX ADMIN — INSULIN LISPRO 7 UNITS: 100 INJECTION, SOLUTION INTRAVENOUS; SUBCUTANEOUS at 16:51

## 2021-01-24 RX ADMIN — ACETAMINOPHEN 1000 MG: 500 TABLET ORAL at 07:26

## 2021-01-24 RX ADMIN — ACETAMINOPHEN 650 MG: 325 TABLET, FILM COATED ORAL at 21:17

## 2021-01-24 RX ADMIN — SODIUM CHLORIDE, PRESERVATIVE FREE 10 ML: 5 INJECTION INTRAVENOUS at 20:13

## 2021-01-24 RX ADMIN — OXYCODONE HYDROCHLORIDE AND ACETAMINOPHEN 1 TABLET: 7.5; 325 TABLET ORAL at 20:12

## 2021-01-24 RX ADMIN — HYDROMORPHONE HYDROCHLORIDE 1 MG: 1 INJECTION, SOLUTION INTRAMUSCULAR; INTRAVENOUS; SUBCUTANEOUS at 09:02

## 2021-01-24 RX ADMIN — VANCOMYCIN HYDROCHLORIDE 2500 MG: 10 INJECTION, POWDER, LYOPHILIZED, FOR SOLUTION INTRAVENOUS at 09:46

## 2021-01-24 RX ADMIN — TAZOBACTAM SODIUM AND PIPERACILLIN SODIUM 4.5 G: 500; 4 INJECTION, SOLUTION INTRAVENOUS at 22:39

## 2021-01-24 RX ADMIN — SODIUM CHLORIDE 125 ML/HR: 9 INJECTION, SOLUTION INTRAVENOUS at 23:32

## 2021-01-24 RX ADMIN — LEVOTHYROXINE SODIUM 88 MCG: 88 TABLET ORAL at 16:51

## 2021-01-24 RX ADMIN — TAZOBACTAM SODIUM AND PIPERACILLIN SODIUM 4.5 G: 500; 4 INJECTION, SOLUTION INTRAVENOUS at 15:42

## 2021-01-24 RX ADMIN — SODIUM CHLORIDE 1000 ML: 9 INJECTION, SOLUTION INTRAVENOUS at 09:15

## 2021-01-24 RX ADMIN — MORPHINE SULFATE 4 MG: 2 INJECTION, SOLUTION INTRAMUSCULAR; INTRAVENOUS at 07:29

## 2021-01-24 RX ADMIN — OXYCODONE HYDROCHLORIDE AND ACETAMINOPHEN 1 TABLET: 7.5; 325 TABLET ORAL at 16:14

## 2021-01-24 RX ADMIN — SODIUM CHLORIDE, PRESERVATIVE FREE 10 ML: 5 INJECTION INTRAVENOUS at 14:19

## 2021-01-24 RX ADMIN — TAZOBACTAM SODIUM AND PIPERACILLIN SODIUM 3.38 G: 375; 3 INJECTION, SOLUTION INTRAVENOUS at 09:16

## 2021-01-24 RX ADMIN — VENLAFAXINE HYDROCHLORIDE 75 MG: 75 TABLET ORAL at 20:12

## 2021-01-24 RX ADMIN — SODIUM CHLORIDE 125 ML/HR: 9 INJECTION, SOLUTION INTRAVENOUS at 14:19

## 2021-01-25 ENCOUNTER — APPOINTMENT (OUTPATIENT)
Dept: CARDIOLOGY | Facility: HOSPITAL | Age: 56
End: 2021-01-25

## 2021-01-25 ENCOUNTER — APPOINTMENT (OUTPATIENT)
Dept: MRI IMAGING | Facility: HOSPITAL | Age: 56
End: 2021-01-25

## 2021-01-25 LAB
ALBUMIN SERPL-MCNC: 2.5 G/DL (ref 3.5–5.2)
ALBUMIN/GLOB SERPL: 0.6 G/DL
ALP SERPL-CCNC: 77 U/L (ref 39–117)
ALT SERPL W P-5'-P-CCNC: 29 U/L (ref 1–33)
ANION GAP SERPL CALCULATED.3IONS-SCNC: 10.5 MMOL/L (ref 5–15)
AORTIC DIMENSIONLESS INDEX: 0.6 (DI)
AST SERPL-CCNC: 39 U/L (ref 1–32)
AV HCM GRAD VALS: 43 MMHG
AV LVOT PEAK GRADIENT: 24 MMHG
BH CV ECHO MEAS - ACS: 1.8 CM
BH CV ECHO MEAS - AO MAX PG: 24 MMHG
BH CV ECHO MEAS - AO MEAN PG (FULL): 8 MMHG
BH CV ECHO MEAS - AO MEAN PG: 13 MMHG
BH CV ECHO MEAS - AO ROOT AREA (BSA CORRECTED): 1.2
BH CV ECHO MEAS - AO ROOT AREA: 5.7 CM^2
BH CV ECHO MEAS - AO ROOT DIAM: 2.7 CM
BH CV ECHO MEAS - AO V2 MAX: 243 CM/SEC
BH CV ECHO MEAS - AO V2 MEAN: 167 CM/SEC
BH CV ECHO MEAS - AO V2 VTI: 36.4 CM
BH CV ECHO MEAS - AVA(I,A): 1.9 CM^2
BH CV ECHO MEAS - AVA(I,D): 1.9 CM^2
BH CV ECHO MEAS - BSA(HAYCOCK): 2.5 M^2
BH CV ECHO MEAS - BSA: 2.3 M^2
BH CV ECHO MEAS - BZI_BMI: 44.5 KILOGRAMS/M^2
BH CV ECHO MEAS - BZI_METRIC_HEIGHT: 170.2 CM
BH CV ECHO MEAS - BZI_METRIC_WEIGHT: 128.8 KG
BH CV ECHO MEAS - EDV(MOD-SP2): 59 ML
BH CV ECHO MEAS - EDV(MOD-SP4): 79 ML
BH CV ECHO MEAS - EDV(TEICH): 54.4 ML
BH CV ECHO MEAS - EF(CUBED): 85.3 %
BH CV ECHO MEAS - EF(MOD-BP): 63 %
BH CV ECHO MEAS - EF(MOD-SP2): 64.4 %
BH CV ECHO MEAS - EF(MOD-SP4): 62 %
BH CV ECHO MEAS - EF(TEICH): 79.5 %
BH CV ECHO MEAS - ESV(MOD-SP2): 21 ML
BH CV ECHO MEAS - ESV(MOD-SP4): 30 ML
BH CV ECHO MEAS - ESV(TEICH): 11.2 ML
BH CV ECHO MEAS - FS: 47.2 %
BH CV ECHO MEAS - IVS/LVPW: 0.9
BH CV ECHO MEAS - IVSD: 0.9 CM
BH CV ECHO MEAS - LAT PEAK E' VEL: 15 CM/SEC
BH CV ECHO MEAS - LV DIASTOLIC VOL/BSA (35-75): 33.7 ML/M^2
BH CV ECHO MEAS - LV MASS(C)D: 100.2 GRAMS
BH CV ECHO MEAS - LV MASS(C)DI: 42.7 GRAMS/M^2
BH CV ECHO MEAS - LV MAX PG: 11 MMHG
BH CV ECHO MEAS - LV MEAN PG: 5 MMHG
BH CV ECHO MEAS - LV SYSTOLIC VOL/BSA (12-30): 12.8 ML/M^2
BH CV ECHO MEAS - LV V1 MAX: 165 CM/SEC
BH CV ECHO MEAS - LV V1 MEAN: 97.9 CM/SEC
BH CV ECHO MEAS - LV V1 VTI: 22 CM
BH CV ECHO MEAS - LVIDD: 3.6 CM
BH CV ECHO MEAS - LVIDS: 1.9 CM
BH CV ECHO MEAS - LVLD AP2: 7.4 CM
BH CV ECHO MEAS - LVLD AP4: 7.2 CM
BH CV ECHO MEAS - LVLS AP2: 6.2 CM
BH CV ECHO MEAS - LVLS AP4: 5.7 CM
BH CV ECHO MEAS - LVOT AREA (M): 3.1 CM^2
BH CV ECHO MEAS - LVOT AREA: 3.1 CM^2
BH CV ECHO MEAS - LVOT DIAM: 2 CM
BH CV ECHO MEAS - LVPWD: 1 CM
BH CV ECHO MEAS - MED PEAK E' VEL: 9 CM/SEC
BH CV ECHO MEAS - MR MAX PG: 16 MMHG
BH CV ECHO MEAS - MR MAX VEL: 200 CM/SEC
BH CV ECHO MEAS - MV A DUR: 0.11 SEC
BH CV ECHO MEAS - MV A MAX VEL: 77.1 CM/SEC
BH CV ECHO MEAS - MV DEC SLOPE: 825 CM/SEC^2
BH CV ECHO MEAS - MV DEC TIME: 0.17 SEC
BH CV ECHO MEAS - MV E MAX VEL: 101 CM/SEC
BH CV ECHO MEAS - MV E/A: 1.3
BH CV ECHO MEAS - MV MEAN PG: 3 MMHG
BH CV ECHO MEAS - MV P1/2T MAX VEL: 110.5 CM/SEC
BH CV ECHO MEAS - MV P1/2T: 39.2 MSEC
BH CV ECHO MEAS - MV V2 MEAN: 76.5 CM/SEC
BH CV ECHO MEAS - MV V2 VTI: 25 CM
BH CV ECHO MEAS - MVA P1/2T LCG: 2 CM^2
BH CV ECHO MEAS - MVA(P1/2T): 5.6 CM^2
BH CV ECHO MEAS - MVA(VTI): 2.8 CM^2
BH CV ECHO MEAS - PA MAX PG: 12.8 MMHG
BH CV ECHO MEAS - PA V2 MAX: 179 CM/SEC
BH CV ECHO MEAS - PULM A REVS DUR: 0.13 SEC
BH CV ECHO MEAS - PULM A REVS VEL: 39.4 CM/SEC
BH CV ECHO MEAS - PULM DIAS VEL: 36.4 CM/SEC
BH CV ECHO MEAS - PULM S/D: 1.4
BH CV ECHO MEAS - PULM SYS VEL: 52 CM/SEC
BH CV ECHO MEAS - QP/QS: 0.86
BH CV ECHO MEAS - RAP SYSTOLE: 3 MMHG
BH CV ECHO MEAS - RV MEAN PG: 3 MMHG
BH CV ECHO MEAS - RV V1 MEAN: 76.2 CM/SEC
BH CV ECHO MEAS - RV V1 VTI: 18.9 CM
BH CV ECHO MEAS - RVOT AREA: 3.1 CM^2
BH CV ECHO MEAS - RVOT DIAM: 2 CM
BH CV ECHO MEAS - RVSP: 26 MMHG
BH CV ECHO MEAS - SI(AO): 88.8 ML/M^2
BH CV ECHO MEAS - SI(CUBED): 17 ML/M^2
BH CV ECHO MEAS - SI(LVOT): 29.4 ML/M^2
BH CV ECHO MEAS - SI(MOD-SP2): 16.2 ML/M^2
BH CV ECHO MEAS - SI(MOD-SP4): 20.9 ML/M^2
BH CV ECHO MEAS - SI(TEICH): 18.4 ML/M^2
BH CV ECHO MEAS - SV(AO): 208.4 ML
BH CV ECHO MEAS - SV(CUBED): 39.8 ML
BH CV ECHO MEAS - SV(LVOT): 69.1 ML
BH CV ECHO MEAS - SV(MOD-SP2): 38 ML
BH CV ECHO MEAS - SV(MOD-SP4): 49 ML
BH CV ECHO MEAS - SV(RVOT): 59.4 ML
BH CV ECHO MEAS - SV(TEICH): 43.3 ML
BH CV ECHO MEAS - TAPSE (>1.6): 2.5 CM
BH CV ECHO MEAS - TR MAX VEL: 242 CM/SEC
BH CV ECHO MEASUREMENTS AVERAGE E/E' RATIO: 8.42
BH CV XLRA - RV BASE: 2.5 CM
BH CV XLRA - RV LENGTH: 6.1 CM
BH CV XLRA - RV MID: 2.3 CM
BH CV XLRA - TDI S': 25 CM/SEC
BILIRUB SERPL-MCNC: 0.6 MG/DL (ref 0–1.2)
BUN SERPL-MCNC: 44 MG/DL (ref 6–20)
BUN/CREAT SERPL: 30.6 (ref 7–25)
CALCIUM SPEC-SCNC: 7.9 MG/DL (ref 8.6–10.5)
CHLORIDE SERPL-SCNC: 92 MMOL/L (ref 98–107)
CO2 SERPL-SCNC: 23.5 MMOL/L (ref 22–29)
CREAT SERPL-MCNC: 1.44 MG/DL (ref 0.57–1)
D-LACTATE SERPL-SCNC: 2 MMOL/L (ref 0.5–2)
DEPRECATED RDW RBC AUTO: 47.2 FL (ref 37–54)
ERYTHROCYTE [DISTWIDTH] IN BLOOD BY AUTOMATED COUNT: 14 % (ref 12.3–15.4)
GFR SERPL CREATININE-BSD FRML MDRD: 38 ML/MIN/1.73
GLOBULIN UR ELPH-MCNC: 4.2 GM/DL
GLUCOSE BLDC GLUCOMTR-MCNC: 262 MG/DL (ref 70–130)
GLUCOSE BLDC GLUCOMTR-MCNC: 275 MG/DL (ref 70–130)
GLUCOSE BLDC GLUCOMTR-MCNC: 276 MG/DL (ref 70–130)
GLUCOSE BLDC GLUCOMTR-MCNC: 304 MG/DL (ref 70–130)
GLUCOSE SERPL-MCNC: 252 MG/DL (ref 65–99)
HBA1C MFR BLD: 7.3 % (ref 4.8–5.6)
HCT VFR BLD AUTO: 29.9 % (ref 34–46.6)
HGB BLD-MCNC: 10.1 G/DL (ref 12–15.9)
LEFT ATRIUM VOLUME INDEX: 23 ML/M2
LV EF 2D ECHO EST: 63 %
MAXIMAL PREDICTED HEART RATE: 164 BPM
MCH RBC QN AUTO: 31.1 PG (ref 26.6–33)
MCHC RBC AUTO-ENTMCNC: 33.8 G/DL (ref 31.5–35.7)
MCV RBC AUTO: 92 FL (ref 79–97)
PLATELET # BLD AUTO: 96 10*3/MM3 (ref 140–450)
PMV BLD AUTO: 10.7 FL (ref 6–12)
POTASSIUM SERPL-SCNC: 3.5 MMOL/L (ref 3.5–5.2)
PROT SERPL-MCNC: 6.7 G/DL (ref 6–8.5)
RBC # BLD AUTO: 3.25 10*6/MM3 (ref 3.77–5.28)
SODIUM SERPL-SCNC: 126 MMOL/L (ref 136–145)
STRESS TARGET HR: 139 BPM
WBC # BLD AUTO: 6.2 10*3/MM3 (ref 3.4–10.8)

## 2021-01-25 PROCEDURE — 63710000001 INSULIN LISPRO (HUMAN) PER 5 UNITS: Performed by: NURSE PRACTITIONER

## 2021-01-25 PROCEDURE — 93306 TTE W/DOPPLER COMPLETE: CPT | Performed by: INTERNAL MEDICINE

## 2021-01-25 PROCEDURE — 82962 GLUCOSE BLOOD TEST: CPT

## 2021-01-25 PROCEDURE — 93306 TTE W/DOPPLER COMPLETE: CPT

## 2021-01-25 PROCEDURE — 25010000002 HYDROMORPHONE 1 MG/ML SOLUTION: Performed by: HOSPITALIST

## 2021-01-25 PROCEDURE — 85027 COMPLETE CBC AUTOMATED: CPT | Performed by: HOSPITALIST

## 2021-01-25 PROCEDURE — 83036 HEMOGLOBIN GLYCOSYLATED A1C: CPT | Performed by: NURSE PRACTITIONER

## 2021-01-25 PROCEDURE — 83605 ASSAY OF LACTIC ACID: CPT | Performed by: NURSE PRACTITIONER

## 2021-01-25 PROCEDURE — 99254 IP/OBS CNSLTJ NEW/EST MOD 60: CPT | Performed by: INTERNAL MEDICINE

## 2021-01-25 PROCEDURE — 25010000003 CEFAZOLIN IN DEXTROSE 2-4 GM/100ML-% SOLUTION: Performed by: HOSPITALIST

## 2021-01-25 PROCEDURE — 80053 COMPREHEN METABOLIC PANEL: CPT | Performed by: HOSPITALIST

## 2021-01-25 RX ADMIN — SODIUM CHLORIDE 125 ML/HR: 9 INJECTION, SOLUTION INTRAVENOUS at 22:52

## 2021-01-25 RX ADMIN — OXYCODONE HYDROCHLORIDE AND ACETAMINOPHEN 1 TABLET: 7.5; 325 TABLET ORAL at 10:46

## 2021-01-25 RX ADMIN — VENLAFAXINE HYDROCHLORIDE 75 MG: 75 TABLET ORAL at 10:46

## 2021-01-25 RX ADMIN — INSULIN LISPRO 7 UNITS: 100 INJECTION, SOLUTION INTRAVENOUS; SUBCUTANEOUS at 18:12

## 2021-01-25 RX ADMIN — CEFAZOLIN SODIUM 2 G: 2 INJECTION, SOLUTION INTRAVENOUS at 18:12

## 2021-01-25 RX ADMIN — OXYCODONE HYDROCHLORIDE AND ACETAMINOPHEN 1 TABLET: 7.5; 325 TABLET ORAL at 22:52

## 2021-01-25 RX ADMIN — HYDROMORPHONE HYDROCHLORIDE 0.5 MG: 10 INJECTION INTRAMUSCULAR; INTRAVENOUS; SUBCUTANEOUS at 07:46

## 2021-01-25 RX ADMIN — SODIUM CHLORIDE 125 ML/HR: 9 INJECTION, SOLUTION INTRAVENOUS at 10:46

## 2021-01-25 RX ADMIN — OXYCODONE HYDROCHLORIDE AND ACETAMINOPHEN 1 TABLET: 7.5; 325 TABLET ORAL at 18:12

## 2021-01-25 RX ADMIN — INSULIN LISPRO 6 UNITS: 100 INJECTION, SOLUTION INTRAVENOUS; SUBCUTANEOUS at 12:52

## 2021-01-25 RX ADMIN — LEVOTHYROXINE SODIUM 88 MCG: 88 TABLET ORAL at 10:46

## 2021-01-25 RX ADMIN — VENLAFAXINE HYDROCHLORIDE 75 MG: 75 TABLET ORAL at 20:21

## 2021-01-25 RX ADMIN — SODIUM CHLORIDE, PRESERVATIVE FREE 10 ML: 5 INJECTION INTRAVENOUS at 10:47

## 2021-01-25 RX ADMIN — OXYCODONE HYDROCHLORIDE AND ACETAMINOPHEN 1 TABLET: 7.5; 325 TABLET ORAL at 02:05

## 2021-01-25 RX ADMIN — CEFAZOLIN SODIUM 2 G: 2 INJECTION, SOLUTION INTRAVENOUS at 02:01

## 2021-01-25 RX ADMIN — LORAZEPAM 1 MG: 1 TABLET ORAL at 06:02

## 2021-01-25 RX ADMIN — SODIUM CHLORIDE 125 ML/HR: 9 INJECTION, SOLUTION INTRAVENOUS at 14:55

## 2021-01-25 RX ADMIN — CEFAZOLIN SODIUM 2 G: 2 INJECTION, SOLUTION INTRAVENOUS at 10:46

## 2021-01-26 ENCOUNTER — ANESTHESIA EVENT (OUTPATIENT)
Dept: MRI IMAGING | Facility: HOSPITAL | Age: 56
End: 2021-01-26

## 2021-01-26 ENCOUNTER — ANESTHESIA (OUTPATIENT)
Dept: MRI IMAGING | Facility: HOSPITAL | Age: 56
End: 2021-01-26

## 2021-01-26 ENCOUNTER — APPOINTMENT (OUTPATIENT)
Dept: MRI IMAGING | Facility: HOSPITAL | Age: 56
End: 2021-01-26

## 2021-01-26 LAB
ALBUMIN SERPL-MCNC: 3.5 G/DL (ref 3.5–5.2)
ALBUMIN/GLOB SERPL: 0.8 G/DL
ALP SERPL-CCNC: 101 U/L (ref 39–117)
ALT SERPL W P-5'-P-CCNC: 27 U/L (ref 1–33)
ANION GAP SERPL CALCULATED.3IONS-SCNC: 19.1 MMOL/L (ref 5–15)
AST SERPL-CCNC: 44 U/L (ref 1–32)
BACTERIA SPEC AEROBE CULT: ABNORMAL
BILIRUB SERPL-MCNC: 0.6 MG/DL (ref 0–1.2)
BUN SERPL-MCNC: 30 MG/DL (ref 6–20)
BUN/CREAT SERPL: 25.9 (ref 7–25)
CALCIUM SPEC-SCNC: 8.7 MG/DL (ref 8.6–10.5)
CHLORIDE SERPL-SCNC: 93 MMOL/L (ref 98–107)
CO2 SERPL-SCNC: 18.9 MMOL/L (ref 22–29)
CREAT SERPL-MCNC: 0.92 MG/DL (ref 0.57–1)
CREAT SERPL-MCNC: 1.16 MG/DL (ref 0.57–1)
GFR SERPL CREATININE-BSD FRML MDRD: 48 ML/MIN/1.73
GFR SERPL CREATININE-BSD FRML MDRD: 63 ML/MIN/1.73
GLOBULIN UR ELPH-MCNC: 4.2 GM/DL
GLUCOSE BLDC GLUCOMTR-MCNC: 203 MG/DL (ref 70–130)
GLUCOSE BLDC GLUCOMTR-MCNC: 211 MG/DL (ref 70–130)
GLUCOSE BLDC GLUCOMTR-MCNC: 229 MG/DL (ref 70–130)
GLUCOSE BLDC GLUCOMTR-MCNC: 316 MG/DL (ref 70–130)
GLUCOSE SERPL-MCNC: 224 MG/DL (ref 65–99)
GRAM STN SPEC: ABNORMAL
GRAM STN SPEC: ABNORMAL
POTASSIUM SERPL-SCNC: 3.6 MMOL/L (ref 3.5–5.2)
PROT SERPL-MCNC: 7.7 G/DL (ref 6–8.5)
SODIUM SERPL-SCNC: 131 MMOL/L (ref 136–145)

## 2021-01-26 PROCEDURE — 82962 GLUCOSE BLOOD TEST: CPT

## 2021-01-26 PROCEDURE — 25010000003 CEFAZOLIN IN DEXTROSE 2-4 GM/100ML-% SOLUTION: Performed by: HOSPITALIST

## 2021-01-26 PROCEDURE — 25010000002 PROPOFOL 10 MG/ML EMULSION: Performed by: ANESTHESIOLOGY

## 2021-01-26 PROCEDURE — 80053 COMPREHEN METABOLIC PANEL: CPT | Performed by: HOSPITALIST

## 2021-01-26 PROCEDURE — 63710000001 INSULIN GLARGINE PER 5 UNITS: Performed by: HOSPITALIST

## 2021-01-26 PROCEDURE — 73223 MRI JOINT UPR EXTR W/O&W/DYE: CPT

## 2021-01-26 PROCEDURE — 72158 MRI LUMBAR SPINE W/O & W/DYE: CPT

## 2021-01-26 PROCEDURE — 82565 ASSAY OF CREATININE: CPT | Performed by: HOSPITALIST

## 2021-01-26 PROCEDURE — A9577 INJ MULTIHANCE: HCPCS | Performed by: HOSPITALIST

## 2021-01-26 PROCEDURE — 25010000002 FENTANYL CITRATE (PF) 100 MCG/2ML SOLUTION: Performed by: ANESTHESIOLOGY

## 2021-01-26 PROCEDURE — 0 GADOBENATE DIMEGLUMINE 529 MG/ML SOLUTION: Performed by: HOSPITALIST

## 2021-01-26 PROCEDURE — 63710000001 INSULIN LISPRO (HUMAN) PER 5 UNITS: Performed by: NURSE PRACTITIONER

## 2021-01-26 PROCEDURE — 25010000002 PHENYLEPHRINE PER 1 ML: Performed by: ANESTHESIOLOGY

## 2021-01-26 PROCEDURE — 87040 BLOOD CULTURE FOR BACTERIA: CPT | Performed by: INTERNAL MEDICINE

## 2021-01-26 RX ORDER — HYDROMORPHONE HYDROCHLORIDE 1 MG/ML
0.5 INJECTION, SOLUTION INTRAMUSCULAR; INTRAVENOUS; SUBCUTANEOUS
Status: DISCONTINUED | OUTPATIENT
Start: 2021-01-26 | End: 2021-01-30

## 2021-01-26 RX ORDER — SODIUM CHLORIDE, SODIUM LACTATE, POTASSIUM CHLORIDE, CALCIUM CHLORIDE 600; 310; 30; 20 MG/100ML; MG/100ML; MG/100ML; MG/100ML
INJECTION, SOLUTION INTRAVENOUS CONTINUOUS PRN
Status: DISCONTINUED | OUTPATIENT
Start: 2021-01-26 | End: 2021-01-26 | Stop reason: SURG

## 2021-01-26 RX ORDER — PROMETHAZINE HYDROCHLORIDE 25 MG/1
25 SUPPOSITORY RECTAL ONCE AS NEEDED
Status: DISCONTINUED | OUTPATIENT
Start: 2021-01-26 | End: 2021-01-30

## 2021-01-26 RX ORDER — OXYCODONE AND ACETAMINOPHEN 7.5; 325 MG/1; MG/1
1 TABLET ORAL ONCE AS NEEDED
Status: COMPLETED | OUTPATIENT
Start: 2021-01-26 | End: 2021-01-29

## 2021-01-26 RX ORDER — HYDROCODONE BITARTRATE AND ACETAMINOPHEN 7.5; 325 MG/1; MG/1
1 TABLET ORAL ONCE AS NEEDED
Status: COMPLETED | OUTPATIENT
Start: 2021-01-26 | End: 2021-01-26

## 2021-01-26 RX ORDER — DIPHENHYDRAMINE HCL 25 MG
25 CAPSULE ORAL
Status: DISCONTINUED | OUTPATIENT
Start: 2021-01-26 | End: 2021-01-30

## 2021-01-26 RX ORDER — NALOXONE HCL 0.4 MG/ML
0.2 VIAL (ML) INJECTION AS NEEDED
Status: DISCONTINUED | OUTPATIENT
Start: 2021-01-26 | End: 2021-01-30

## 2021-01-26 RX ORDER — DEXMEDETOMIDINE HYDROCHLORIDE 100 UG/ML
INJECTION, SOLUTION INTRAVENOUS AS NEEDED
Status: DISCONTINUED | OUTPATIENT
Start: 2021-01-26 | End: 2021-01-26 | Stop reason: SURG

## 2021-01-26 RX ORDER — HYDRALAZINE HYDROCHLORIDE 20 MG/ML
5 INJECTION INTRAMUSCULAR; INTRAVENOUS
Status: DISCONTINUED | OUTPATIENT
Start: 2021-01-26 | End: 2021-01-30

## 2021-01-26 RX ORDER — PROMETHAZINE HYDROCHLORIDE 25 MG/1
25 TABLET ORAL ONCE AS NEEDED
Status: DISCONTINUED | OUTPATIENT
Start: 2021-01-26 | End: 2021-01-30

## 2021-01-26 RX ORDER — FENTANYL CITRATE 50 UG/ML
50 INJECTION, SOLUTION INTRAMUSCULAR; INTRAVENOUS
Status: DISCONTINUED | OUTPATIENT
Start: 2021-01-26 | End: 2021-01-30

## 2021-01-26 RX ORDER — LABETALOL HYDROCHLORIDE 5 MG/ML
5 INJECTION, SOLUTION INTRAVENOUS
Status: DISCONTINUED | OUTPATIENT
Start: 2021-01-26 | End: 2021-01-30

## 2021-01-26 RX ORDER — INSULIN GLARGINE 100 [IU]/ML
15 INJECTION, SOLUTION SUBCUTANEOUS NIGHTLY
Status: DISCONTINUED | OUTPATIENT
Start: 2021-01-26 | End: 2021-02-02

## 2021-01-26 RX ORDER — LIDOCAINE HYDROCHLORIDE 20 MG/ML
INJECTION, SOLUTION INFILTRATION; PERINEURAL AS NEEDED
Status: DISCONTINUED | OUTPATIENT
Start: 2021-01-26 | End: 2021-01-26 | Stop reason: SURG

## 2021-01-26 RX ORDER — FLUMAZENIL 0.1 MG/ML
0.2 INJECTION INTRAVENOUS AS NEEDED
Status: DISCONTINUED | OUTPATIENT
Start: 2021-01-26 | End: 2021-01-30

## 2021-01-26 RX ORDER — DIPHENHYDRAMINE HYDROCHLORIDE 50 MG/ML
12.5 INJECTION INTRAMUSCULAR; INTRAVENOUS
Status: DISCONTINUED | OUTPATIENT
Start: 2021-01-26 | End: 2021-01-30

## 2021-01-26 RX ORDER — PROPOFOL 10 MG/ML
VIAL (ML) INTRAVENOUS AS NEEDED
Status: DISCONTINUED | OUTPATIENT
Start: 2021-01-26 | End: 2021-01-26 | Stop reason: SURG

## 2021-01-26 RX ORDER — ONDANSETRON 2 MG/ML
4 INJECTION INTRAMUSCULAR; INTRAVENOUS ONCE AS NEEDED
Status: DISCONTINUED | OUTPATIENT
Start: 2021-01-26 | End: 2021-01-30

## 2021-01-26 RX ORDER — EPHEDRINE SULFATE 50 MG/ML
5 INJECTION, SOLUTION INTRAVENOUS ONCE AS NEEDED
Status: DISCONTINUED | OUTPATIENT
Start: 2021-01-26 | End: 2021-01-30

## 2021-01-26 RX ADMIN — OXYCODONE HYDROCHLORIDE AND ACETAMINOPHEN 1 TABLET: 7.5; 325 TABLET ORAL at 05:53

## 2021-01-26 RX ADMIN — SODIUM CHLORIDE 125 ML/HR: 9 INJECTION, SOLUTION INTRAVENOUS at 21:35

## 2021-01-26 RX ADMIN — PHENYLEPHRINE HYDROCHLORIDE 100 MCG: 10 INJECTION INTRAVENOUS at 14:32

## 2021-01-26 RX ADMIN — CEFAZOLIN SODIUM 2 G: 2 INJECTION, SOLUTION INTRAVENOUS at 16:54

## 2021-01-26 RX ADMIN — OXYCODONE HYDROCHLORIDE AND ACETAMINOPHEN 1 TABLET: 7.5; 325 TABLET ORAL at 21:47

## 2021-01-26 RX ADMIN — CEFAZOLIN SODIUM 2 G: 2 INJECTION, SOLUTION INTRAVENOUS at 08:46

## 2021-01-26 RX ADMIN — SODIUM CHLORIDE, POTASSIUM CHLORIDE, SODIUM LACTATE AND CALCIUM CHLORIDE: 600; 310; 30; 20 INJECTION, SOLUTION INTRAVENOUS at 14:09

## 2021-01-26 RX ADMIN — FENTANYL CITRATE 50 MCG: 50 INJECTION, SOLUTION INTRAMUSCULAR; INTRAVENOUS at 16:21

## 2021-01-26 RX ADMIN — HYDROCODONE BITARTRATE AND ACETAMINOPHEN 1 TABLET: 7.5; 325 TABLET ORAL at 17:04

## 2021-01-26 RX ADMIN — CEFAZOLIN SODIUM 2 G: 2 INJECTION, SOLUTION INTRAVENOUS at 00:56

## 2021-01-26 RX ADMIN — VENLAFAXINE HYDROCHLORIDE 75 MG: 75 TABLET ORAL at 21:41

## 2021-01-26 RX ADMIN — PHENYLEPHRINE HYDROCHLORIDE 100 MCG: 10 INJECTION INTRAVENOUS at 14:25

## 2021-01-26 RX ADMIN — ACETAMINOPHEN 650 MG: 325 TABLET, FILM COATED ORAL at 17:04

## 2021-01-26 RX ADMIN — INSULIN GLARGINE 15 UNITS: 100 INJECTION, SOLUTION SUBCUTANEOUS at 21:42

## 2021-01-26 RX ADMIN — SODIUM CHLORIDE 125 ML/HR: 9 INJECTION, SOLUTION INTRAVENOUS at 08:45

## 2021-01-26 RX ADMIN — SODIUM CHLORIDE, PRESERVATIVE FREE 10 ML: 5 INJECTION INTRAVENOUS at 08:46

## 2021-01-26 RX ADMIN — LIDOCAINE HYDROCHLORIDE 60 MG: 20 INJECTION, SOLUTION INFILTRATION; PERINEURAL at 14:13

## 2021-01-26 RX ADMIN — DEXMEDETOMIDINE 12 MCG: 100 INJECTION, SOLUTION, CONCENTRATE INTRAVENOUS at 14:09

## 2021-01-26 RX ADMIN — GADOBENATE DIMEGLUMINE 20 ML: 529 INJECTION, SOLUTION INTRAVENOUS at 15:24

## 2021-01-26 RX ADMIN — PHENYLEPHRINE HYDROCHLORIDE 200 MCG: 10 INJECTION INTRAVENOUS at 14:27

## 2021-01-26 RX ADMIN — SODIUM CHLORIDE, PRESERVATIVE FREE 10 ML: 5 INJECTION INTRAVENOUS at 21:53

## 2021-01-26 RX ADMIN — INSULIN LISPRO 4 UNITS: 100 INJECTION, SOLUTION INTRAVENOUS; SUBCUTANEOUS at 16:54

## 2021-01-26 RX ADMIN — PROPOFOL 150 MG: 10 INJECTION, EMULSION INTRAVENOUS at 14:13

## 2021-01-26 NOTE — ANESTHESIA PROCEDURE NOTES
Airway  Date/Time: 1/26/2021 2:15 PM  End Time:1/26/2021 2:15 PM  Airway not difficult    General Information and Staff    Patient location during procedure: radiology  Anesthesiologist: Howard Chang DO    Indications and Patient Condition  Indications for airway management: airway protection    Preoxygenated: yes  MILS maintained throughout  Mask difficulty assessment: 0 - not attempted    Final Airway Details  Final airway type: supraglottic airway      Successful airway: classic  Size 4    Number of attempts at approach: 1  Assessment: lips, teeth, and gum same as pre-op

## 2021-01-26 NOTE — ANESTHESIA POSTPROCEDURE EVALUATION
Patient: Stefani Bhandari    Procedure Summary     Date: 01/26/21 Room / Location: [facility] MRI    Anesthesia Start: 1409 Anesthesia Stop: 1608    Procedure: MRI LUMBAR SPINE W WO CONTRAST Diagnosis:     Scheduled Providers:  Provider: Howard Chang DO    Anesthesia Type: general ASA Status: 3          Anesthesia Type: general    Vitals  Vitals Value Taken Time   /82 01/26/21 1630   Temp 38.3 °C (100.9 °F) 01/26/21 1638   Pulse 103 01/26/21 1641   Resp 22 01/26/21 1630   SpO2 94 % 01/26/21 1643   Vitals shown include unvalidated device data.        Post Anesthesia Care and Evaluation    Patient location during evaluation: PACU  Anesthetic complications: No anesthetic complications

## 2021-01-26 NOTE — ANESTHESIA PREPROCEDURE EVALUATION
Anesthesia Evaluation     Patient summary reviewed and Nursing notes reviewed   no history of anesthetic complications:  NPO Solid Status: > 8 hours  NPO Liquid Status: > 2 hours           Airway   Mallampati: I  TM distance: >3 FB  Neck ROM: full  No difficulty expected  Dental - normal exam     Pulmonary - normal exam   (+) a smoker Former, sleep apnea,   (-) shortness of breath, recent URI  Cardiovascular - normal exam    (+) hypertension,   (-) past MI, cardiac stents    ROS comment: TTE (1/25/21) Interpretation Summary    · Estimated left ventricular EF = 63% Left ventricular systolic function is normal.  · Left ventricular diastolic function was normal.  · Mild tricuspid valve regurgitation is present. Estimated right ventricular systolic pressure from tricuspid regurgitation is normal (<35 mmHg). Calculated right ventricular systolic pressure from tricuspid regurgitation is 26 mmHg.  · There is turbulent flow through the left ventricular outflow tract. There is a 43 mmHg gradient with Valsalva. This appears to be due to the hyperdynamic looking function of the left ventricle.        Neuro/Psych  (+) psychiatric history Anxiety and Depression,     (-) seizures (as a child, none sense), neuromuscular disease, TIA, CVA  GI/Hepatic/Renal/Endo    (+) obesity, morbid obesity,  renal disease (Cr 1.16, trending down), diabetes mellitus type 2 poorly controlled, thyroid problem hypothyroidism    Musculoskeletal     (+) back pain,       ROS comment: Concern for sepsis from L shoulder or L spine.  Abdominal  - normal exam    Bowel sounds: normal.   Substance History - negative use     OB/GYN negative ob/gyn ROS         Other   arthritis, blood dyscrasia anemia thrombocytopenia,                     Anesthesia Plan    ASA 3     general   (+LMA)    Anesthetic plan, all risks, benefits, and alternatives have been provided, discussed and informed consent has been obtained with: patient.

## 2021-01-27 ENCOUNTER — APPOINTMENT (OUTPATIENT)
Dept: GENERAL RADIOLOGY | Facility: HOSPITAL | Age: 56
End: 2021-01-27

## 2021-01-27 LAB
ANION GAP SERPL CALCULATED.3IONS-SCNC: 12.9 MMOL/L (ref 5–15)
BUN SERPL-MCNC: 19 MG/DL (ref 6–20)
BUN/CREAT SERPL: 24.4 (ref 7–25)
CALCIUM SPEC-SCNC: 8.1 MG/DL (ref 8.6–10.5)
CHLORIDE SERPL-SCNC: 98 MMOL/L (ref 98–107)
CO2 SERPL-SCNC: 24.1 MMOL/L (ref 22–29)
CREAT SERPL-MCNC: 0.78 MG/DL (ref 0.57–1)
DEPRECATED RDW RBC AUTO: 46.5 FL (ref 37–54)
ERYTHROCYTE [DISTWIDTH] IN BLOOD BY AUTOMATED COUNT: 14.4 % (ref 12.3–15.4)
GFR SERPL CREATININE-BSD FRML MDRD: 76 ML/MIN/1.73
GLUCOSE BLDC GLUCOMTR-MCNC: 200 MG/DL (ref 70–130)
GLUCOSE BLDC GLUCOMTR-MCNC: 216 MG/DL (ref 70–130)
GLUCOSE BLDC GLUCOMTR-MCNC: 218 MG/DL (ref 70–130)
GLUCOSE BLDC GLUCOMTR-MCNC: 240 MG/DL (ref 70–130)
GLUCOSE SERPL-MCNC: 213 MG/DL (ref 65–99)
HCT VFR BLD AUTO: 27.8 % (ref 34–46.6)
HGB BLD-MCNC: 9.5 G/DL (ref 12–15.9)
MCH RBC QN AUTO: 30.6 PG (ref 26.6–33)
MCHC RBC AUTO-ENTMCNC: 34.2 G/DL (ref 31.5–35.7)
MCV RBC AUTO: 89.7 FL (ref 79–97)
PLATELET # BLD AUTO: 132 10*3/MM3 (ref 140–450)
PMV BLD AUTO: 10.2 FL (ref 6–12)
POTASSIUM SERPL-SCNC: 4.1 MMOL/L (ref 3.5–5.2)
RBC # BLD AUTO: 3.1 10*6/MM3 (ref 3.77–5.28)
SODIUM SERPL-SCNC: 135 MMOL/L (ref 136–145)
WBC # BLD AUTO: 11 10*3/MM3 (ref 3.4–10.8)

## 2021-01-27 PROCEDURE — 82962 GLUCOSE BLOOD TEST: CPT

## 2021-01-27 PROCEDURE — 63710000001 INSULIN LISPRO (HUMAN) PER 5 UNITS: Performed by: NURSE PRACTITIONER

## 2021-01-27 PROCEDURE — 77002 NEEDLE LOCALIZATION BY XRAY: CPT

## 2021-01-27 PROCEDURE — 99222 1ST HOSP IP/OBS MODERATE 55: CPT | Performed by: NURSE PRACTITIONER

## 2021-01-27 PROCEDURE — 63710000001 INSULIN GLARGINE PER 5 UNITS: Performed by: HOSPITALIST

## 2021-01-27 PROCEDURE — 80048 BASIC METABOLIC PNL TOTAL CA: CPT | Performed by: HOSPITALIST

## 2021-01-27 PROCEDURE — 85027 COMPLETE CBC AUTOMATED: CPT | Performed by: HOSPITALIST

## 2021-01-27 PROCEDURE — 25010000003 LIDOCAINE 1 % SOLUTION: Performed by: RADIOLOGY

## 2021-01-27 PROCEDURE — 25010000003 CEFAZOLIN IN DEXTROSE 2-4 GM/100ML-% SOLUTION: Performed by: HOSPITALIST

## 2021-01-27 PROCEDURE — 99232 SBSQ HOSP IP/OBS MODERATE 35: CPT | Performed by: INTERNAL MEDICINE

## 2021-01-27 RX ORDER — LOSARTAN POTASSIUM 100 MG/1
100 TABLET ORAL
Status: DISCONTINUED | OUTPATIENT
Start: 2021-01-27 | End: 2021-02-05 | Stop reason: HOSPADM

## 2021-01-27 RX ORDER — LIDOCAINE HYDROCHLORIDE 10 MG/ML
10 INJECTION, SOLUTION INFILTRATION; PERINEURAL ONCE
Status: COMPLETED | OUTPATIENT
Start: 2021-01-27 | End: 2021-01-27

## 2021-01-27 RX ADMIN — OXYCODONE HYDROCHLORIDE AND ACETAMINOPHEN 1 TABLET: 7.5; 325 TABLET ORAL at 04:58

## 2021-01-27 RX ADMIN — LEVOTHYROXINE SODIUM 88 MCG: 88 TABLET ORAL at 07:41

## 2021-01-27 RX ADMIN — INSULIN LISPRO 4 UNITS: 100 INJECTION, SOLUTION INTRAVENOUS; SUBCUTANEOUS at 18:01

## 2021-01-27 RX ADMIN — INSULIN GLARGINE 15 UNITS: 100 INJECTION, SOLUTION SUBCUTANEOUS at 20:00

## 2021-01-27 RX ADMIN — OXYCODONE HYDROCHLORIDE AND ACETAMINOPHEN 1 TABLET: 7.5; 325 TABLET ORAL at 23:54

## 2021-01-27 RX ADMIN — VENLAFAXINE HYDROCHLORIDE 75 MG: 75 TABLET ORAL at 07:41

## 2021-01-27 RX ADMIN — OXYCODONE HYDROCHLORIDE AND ACETAMINOPHEN 1 TABLET: 7.5; 325 TABLET ORAL at 15:35

## 2021-01-27 RX ADMIN — VENLAFAXINE HYDROCHLORIDE 75 MG: 75 TABLET ORAL at 20:01

## 2021-01-27 RX ADMIN — CEFAZOLIN SODIUM 2 G: 2 INJECTION, SOLUTION INTRAVENOUS at 01:15

## 2021-01-27 RX ADMIN — LOSARTAN POTASSIUM 100 MG: 100 TABLET, FILM COATED ORAL at 14:56

## 2021-01-27 RX ADMIN — OXYCODONE HYDROCHLORIDE AND ACETAMINOPHEN 1 TABLET: 7.5; 325 TABLET ORAL at 20:00

## 2021-01-27 RX ADMIN — OXYCODONE HYDROCHLORIDE AND ACETAMINOPHEN 1 TABLET: 7.5; 325 TABLET ORAL at 09:45

## 2021-01-27 RX ADMIN — LIDOCAINE HYDROCHLORIDE 2 ML: 10 INJECTION, SOLUTION INFILTRATION; PERINEURAL at 17:57

## 2021-01-27 RX ADMIN — CEFAZOLIN SODIUM 2 G: 2 INJECTION, SOLUTION INTRAVENOUS at 16:52

## 2021-01-27 RX ADMIN — SODIUM CHLORIDE, PRESERVATIVE FREE 10 ML: 5 INJECTION INTRAVENOUS at 20:01

## 2021-01-27 RX ADMIN — CEFAZOLIN SODIUM 2 G: 2 INJECTION, SOLUTION INTRAVENOUS at 09:37

## 2021-01-28 PROBLEM — K74.60 LIVER CIRRHOSIS SECONDARY TO NASH (HCC): Status: ACTIVE | Noted: 2021-01-28

## 2021-01-28 PROBLEM — R78.81 MSSA BACTEREMIA: Status: ACTIVE | Noted: 2021-01-28

## 2021-01-28 PROBLEM — B95.61 MSSA BACTEREMIA: Status: ACTIVE | Noted: 2021-01-28

## 2021-01-28 PROBLEM — K75.81 LIVER CIRRHOSIS SECONDARY TO NASH (HCC): Status: ACTIVE | Noted: 2021-01-28

## 2021-01-28 PROBLEM — M00.9 SEPTIC ARTHRITIS OF SHOULDER, LEFT: Status: ACTIVE | Noted: 2021-01-28

## 2021-01-28 LAB
BACTERIA SPEC AEROBE CULT: ABNORMAL
GLUCOSE BLDC GLUCOMTR-MCNC: 160 MG/DL (ref 70–130)
GLUCOSE BLDC GLUCOMTR-MCNC: 169 MG/DL (ref 70–130)
GLUCOSE BLDC GLUCOMTR-MCNC: 213 MG/DL (ref 70–130)
GLUCOSE BLDC GLUCOMTR-MCNC: 253 MG/DL (ref 70–130)
GRAM STN SPEC: ABNORMAL
INR PPP: 1.3 (ref 0.9–1.1)
PROTHROMBIN TIME: 16 SECONDS (ref 11.7–14.2)

## 2021-01-28 PROCEDURE — 85610 PROTHROMBIN TIME: CPT | Performed by: INTERNAL MEDICINE

## 2021-01-28 PROCEDURE — 25010000003 CEFAZOLIN IN DEXTROSE 2-4 GM/100ML-% SOLUTION: Performed by: HOSPITALIST

## 2021-01-28 PROCEDURE — 99231 SBSQ HOSP IP/OBS SF/LOW 25: CPT | Performed by: NURSE PRACTITIONER

## 2021-01-28 PROCEDURE — 63710000001 INSULIN GLARGINE PER 5 UNITS: Performed by: HOSPITALIST

## 2021-01-28 PROCEDURE — 63710000001 INSULIN LISPRO (HUMAN) PER 5 UNITS: Performed by: NURSE PRACTITIONER

## 2021-01-28 PROCEDURE — 82962 GLUCOSE BLOOD TEST: CPT

## 2021-01-28 RX ADMIN — VENLAFAXINE HYDROCHLORIDE 75 MG: 75 TABLET ORAL at 08:42

## 2021-01-28 RX ADMIN — CEFAZOLIN SODIUM 2 G: 2 INJECTION, SOLUTION INTRAVENOUS at 18:07

## 2021-01-28 RX ADMIN — LOSARTAN POTASSIUM 100 MG: 100 TABLET, FILM COATED ORAL at 08:43

## 2021-01-28 RX ADMIN — CEFAZOLIN SODIUM 2 G: 2 INJECTION, SOLUTION INTRAVENOUS at 08:43

## 2021-01-28 RX ADMIN — LEVOTHYROXINE SODIUM 88 MCG: 88 TABLET ORAL at 08:42

## 2021-01-28 RX ADMIN — OXYCODONE HYDROCHLORIDE AND ACETAMINOPHEN 1 TABLET: 7.5; 325 TABLET ORAL at 18:53

## 2021-01-28 RX ADMIN — SODIUM CHLORIDE, PRESERVATIVE FREE 10 ML: 5 INJECTION INTRAVENOUS at 20:53

## 2021-01-28 RX ADMIN — CEFAZOLIN SODIUM 2 G: 2 INJECTION, SOLUTION INTRAVENOUS at 00:28

## 2021-01-28 RX ADMIN — INSULIN LISPRO 4 UNITS: 100 INJECTION, SOLUTION INTRAVENOUS; SUBCUTANEOUS at 18:07

## 2021-01-28 RX ADMIN — VENLAFAXINE HYDROCHLORIDE 75 MG: 75 TABLET ORAL at 20:45

## 2021-01-28 RX ADMIN — OXYCODONE HYDROCHLORIDE AND ACETAMINOPHEN 1 TABLET: 7.5; 325 TABLET ORAL at 23:40

## 2021-01-28 RX ADMIN — INSULIN GLARGINE 15 UNITS: 100 INJECTION, SOLUTION SUBCUTANEOUS at 20:45

## 2021-01-28 RX ADMIN — OXYCODONE HYDROCHLORIDE AND ACETAMINOPHEN 1 TABLET: 7.5; 325 TABLET ORAL at 14:37

## 2021-01-28 RX ADMIN — OXYCODONE HYDROCHLORIDE AND ACETAMINOPHEN 1 TABLET: 7.5; 325 TABLET ORAL at 08:47

## 2021-01-29 ENCOUNTER — TELEPHONE (OUTPATIENT)
Dept: NEUROSURGERY | Facility: CLINIC | Age: 56
End: 2021-01-29

## 2021-01-29 LAB
GLUCOSE BLDC GLUCOMTR-MCNC: 186 MG/DL (ref 70–130)
GLUCOSE BLDC GLUCOMTR-MCNC: 186 MG/DL (ref 70–130)
GLUCOSE BLDC GLUCOMTR-MCNC: 188 MG/DL (ref 70–130)
GLUCOSE BLDC GLUCOMTR-MCNC: 192 MG/DL (ref 70–130)

## 2021-01-29 PROCEDURE — 63710000001 INSULIN LISPRO (HUMAN) PER 5 UNITS: Performed by: NURSE PRACTITIONER

## 2021-01-29 PROCEDURE — 63710000001 INSULIN GLARGINE PER 5 UNITS: Performed by: HOSPITALIST

## 2021-01-29 PROCEDURE — 99232 SBSQ HOSP IP/OBS MODERATE 35: CPT | Performed by: INTERNAL MEDICINE

## 2021-01-29 PROCEDURE — 82962 GLUCOSE BLOOD TEST: CPT

## 2021-01-29 PROCEDURE — 25010000003 CEFAZOLIN IN DEXTROSE 2-4 GM/100ML-% SOLUTION: Performed by: HOSPITALIST

## 2021-01-29 PROCEDURE — U0004 COV-19 TEST NON-CDC HGH THRU: HCPCS | Performed by: INTERNAL MEDICINE

## 2021-01-29 RX ADMIN — LOSARTAN POTASSIUM 100 MG: 100 TABLET, FILM COATED ORAL at 08:35

## 2021-01-29 RX ADMIN — VENLAFAXINE HYDROCHLORIDE 75 MG: 75 TABLET ORAL at 20:40

## 2021-01-29 RX ADMIN — OXYCODONE HYDROCHLORIDE AND ACETAMINOPHEN 1 TABLET: 7.5; 325 TABLET ORAL at 16:24

## 2021-01-29 RX ADMIN — SODIUM CHLORIDE, PRESERVATIVE FREE 10 ML: 5 INJECTION INTRAVENOUS at 20:41

## 2021-01-29 RX ADMIN — OXYCODONE HYDROCHLORIDE AND ACETAMINOPHEN 1 TABLET: 7.5; 325 TABLET ORAL at 08:38

## 2021-01-29 RX ADMIN — OXYCODONE HYDROCHLORIDE AND ACETAMINOPHEN 1 TABLET: 7.5; 325 TABLET ORAL at 04:40

## 2021-01-29 RX ADMIN — INSULIN GLARGINE 15 UNITS: 100 INJECTION, SOLUTION SUBCUTANEOUS at 20:40

## 2021-01-29 RX ADMIN — CEFAZOLIN SODIUM 2 G: 2 INJECTION, SOLUTION INTRAVENOUS at 08:35

## 2021-01-29 RX ADMIN — OXYCODONE HYDROCHLORIDE AND ACETAMINOPHEN 1 TABLET: 7.5; 325 TABLET ORAL at 12:19

## 2021-01-29 RX ADMIN — INSULIN LISPRO 2 UNITS: 100 INJECTION, SOLUTION INTRAVENOUS; SUBCUTANEOUS at 08:34

## 2021-01-29 RX ADMIN — OXYCODONE HYDROCHLORIDE AND ACETAMINOPHEN 1 TABLET: 7.5; 325 TABLET ORAL at 20:40

## 2021-01-29 RX ADMIN — INSULIN LISPRO 2 UNITS: 100 INJECTION, SOLUTION INTRAVENOUS; SUBCUTANEOUS at 12:19

## 2021-01-29 RX ADMIN — LEVOTHYROXINE SODIUM 88 MCG: 88 TABLET ORAL at 08:35

## 2021-01-29 RX ADMIN — VENLAFAXINE HYDROCHLORIDE 75 MG: 75 TABLET ORAL at 08:35

## 2021-01-29 RX ADMIN — CEFAZOLIN SODIUM 2 G: 2 INJECTION, SOLUTION INTRAVENOUS at 16:26

## 2021-01-29 RX ADMIN — SODIUM CHLORIDE, PRESERVATIVE FREE 10 ML: 5 INJECTION INTRAVENOUS at 08:35

## 2021-01-29 RX ADMIN — CEFAZOLIN SODIUM 2 G: 2 INJECTION, SOLUTION INTRAVENOUS at 00:23

## 2021-01-29 RX ADMIN — INSULIN LISPRO 2 UNITS: 100 INJECTION, SOLUTION INTRAVENOUS; SUBCUTANEOUS at 16:58

## 2021-01-29 NOTE — TELEPHONE ENCOUNTER
Disregard. It will need to be 3 weeks instead.         Please arrange f/u in office in 6 weeks. Also include in appointment to remind whomever sees her that repeat MRI lumbar spine w/wo contrast will need to be considered (if she has completed antibiotics). We can order it at time of appointment if off antibiotics.

## 2021-01-30 ENCOUNTER — ANESTHESIA EVENT (OUTPATIENT)
Dept: PERIOP | Facility: HOSPITAL | Age: 56
End: 2021-01-30

## 2021-01-30 ENCOUNTER — ANESTHESIA (OUTPATIENT)
Dept: PERIOP | Facility: HOSPITAL | Age: 56
End: 2021-01-30

## 2021-01-30 LAB
ALBUMIN SERPL-MCNC: 2 G/DL (ref 3.5–5.2)
ALBUMIN/GLOB SERPL: 0.4 G/DL
ALP SERPL-CCNC: 110 U/L (ref 39–117)
ALT SERPL W P-5'-P-CCNC: 10 U/L (ref 1–33)
ANION GAP SERPL CALCULATED.3IONS-SCNC: 6.9 MMOL/L (ref 5–15)
AST SERPL-CCNC: 27 U/L (ref 1–32)
BASOPHILS # BLD AUTO: 0.04 10*3/MM3 (ref 0–0.2)
BASOPHILS NFR BLD AUTO: 0.3 % (ref 0–1.5)
BILIRUB SERPL-MCNC: 0.7 MG/DL (ref 0–1.2)
BUN SERPL-MCNC: 24 MG/DL (ref 6–20)
BUN/CREAT SERPL: 27.9 (ref 7–25)
CALCIUM SPEC-SCNC: 7.6 MG/DL (ref 8.6–10.5)
CHLORIDE SERPL-SCNC: 91 MMOL/L (ref 98–107)
CO2 SERPL-SCNC: 26.1 MMOL/L (ref 22–29)
CREAT SERPL-MCNC: 0.86 MG/DL (ref 0.57–1)
DEPRECATED RDW RBC AUTO: 49.3 FL (ref 37–54)
EOSINOPHIL # BLD AUTO: 0.03 10*3/MM3 (ref 0–0.4)
EOSINOPHIL NFR BLD AUTO: 0.3 % (ref 0.3–6.2)
ERYTHROCYTE [DISTWIDTH] IN BLOOD BY AUTOMATED COUNT: 14.5 % (ref 12.3–15.4)
GFR SERPL CREATININE-BSD FRML MDRD: 68 ML/MIN/1.73
GLOBULIN UR ELPH-MCNC: 4.7 GM/DL
GLUCOSE BLDC GLUCOMTR-MCNC: 157 MG/DL (ref 70–130)
GLUCOSE BLDC GLUCOMTR-MCNC: 159 MG/DL (ref 70–130)
GLUCOSE BLDC GLUCOMTR-MCNC: 176 MG/DL (ref 70–130)
GLUCOSE BLDC GLUCOMTR-MCNC: 177 MG/DL (ref 70–130)
GLUCOSE BLDC GLUCOMTR-MCNC: 199 MG/DL (ref 70–130)
GLUCOSE SERPL-MCNC: 185 MG/DL (ref 65–99)
HCT VFR BLD AUTO: 25.7 % (ref 34–46.6)
HGB BLD-MCNC: 8.8 G/DL (ref 12–15.9)
IMM GRANULOCYTES # BLD AUTO: 0.26 10*3/MM3 (ref 0–0.05)
IMM GRANULOCYTES NFR BLD AUTO: 2.2 % (ref 0–0.5)
LYMPHOCYTES # BLD AUTO: 1.52 10*3/MM3 (ref 0.7–3.1)
LYMPHOCYTES NFR BLD AUTO: 13.1 % (ref 19.6–45.3)
MCH RBC QN AUTO: 31.5 PG (ref 26.6–33)
MCHC RBC AUTO-ENTMCNC: 34.2 G/DL (ref 31.5–35.7)
MCV RBC AUTO: 92.1 FL (ref 79–97)
MONOCYTES # BLD AUTO: 0.38 10*3/MM3 (ref 0.1–0.9)
MONOCYTES NFR BLD AUTO: 3.3 % (ref 5–12)
NEUTROPHILS NFR BLD AUTO: 80.8 % (ref 42.7–76)
NEUTROPHILS NFR BLD AUTO: 9.35 10*3/MM3 (ref 1.7–7)
NRBC BLD AUTO-RTO: 0 /100 WBC (ref 0–0.2)
PLATELET # BLD AUTO: 122 10*3/MM3 (ref 140–450)
PMV BLD AUTO: 10 FL (ref 6–12)
POTASSIUM SERPL-SCNC: 3.8 MMOL/L (ref 3.5–5.2)
PROT SERPL-MCNC: 6.7 G/DL (ref 6–8.5)
RBC # BLD AUTO: 2.79 10*6/MM3 (ref 3.77–5.28)
SARS-COV-2 ORF1AB RESP QL NAA+PROBE: NOT DETECTED
SODIUM SERPL-SCNC: 124 MMOL/L (ref 136–145)
WBC # BLD AUTO: 11.58 10*3/MM3 (ref 3.4–10.8)

## 2021-01-30 PROCEDURE — 87070 CULTURE OTHR SPECIMN AEROBIC: CPT | Performed by: ORTHOPAEDIC SURGERY

## 2021-01-30 PROCEDURE — 63710000001 INSULIN LISPRO (HUMAN) PER 5 UNITS: Performed by: ORTHOPAEDIC SURGERY

## 2021-01-30 PROCEDURE — 0R9K0ZZ DRAINAGE OF LEFT SHOULDER JOINT, OPEN APPROACH: ICD-10-PCS | Performed by: ORTHOPAEDIC SURGERY

## 2021-01-30 PROCEDURE — 87205 SMEAR GRAM STAIN: CPT | Performed by: ORTHOPAEDIC SURGERY

## 2021-01-30 PROCEDURE — 82962 GLUCOSE BLOOD TEST: CPT

## 2021-01-30 PROCEDURE — 25010000002 FENTANYL CITRATE (PF) 100 MCG/2ML SOLUTION: Performed by: ANESTHESIOLOGY

## 2021-01-30 PROCEDURE — 85025 COMPLETE CBC W/AUTO DIFF WBC: CPT | Performed by: INTERNAL MEDICINE

## 2021-01-30 PROCEDURE — 25010000002 FENTANYL CITRATE (PF) 100 MCG/2ML SOLUTION: Performed by: NURSE ANESTHETIST, CERTIFIED REGISTERED

## 2021-01-30 PROCEDURE — 25010000003 CEFAZOLIN IN DEXTROSE 2-4 GM/100ML-% SOLUTION: Performed by: ORTHOPAEDIC SURGERY

## 2021-01-30 PROCEDURE — 63710000001 INSULIN GLARGINE PER 5 UNITS: Performed by: ORTHOPAEDIC SURGERY

## 2021-01-30 PROCEDURE — 87186 SC STD MICRODIL/AGAR DIL: CPT | Performed by: ORTHOPAEDIC SURGERY

## 2021-01-30 PROCEDURE — 87075 CULTR BACTERIA EXCEPT BLOOD: CPT | Performed by: ORTHOPAEDIC SURGERY

## 2021-01-30 PROCEDURE — 25010000003 CEFAZOLIN IN DEXTROSE 2-4 GM/100ML-% SOLUTION: Performed by: HOSPITALIST

## 2021-01-30 PROCEDURE — 25010000002 PROPOFOL 10 MG/ML EMULSION: Performed by: NURSE ANESTHETIST, CERTIFIED REGISTERED

## 2021-01-30 PROCEDURE — 87147 CULTURE TYPE IMMUNOLOGIC: CPT | Performed by: ORTHOPAEDIC SURGERY

## 2021-01-30 PROCEDURE — 80053 COMPREHEN METABOLIC PANEL: CPT | Performed by: INTERNAL MEDICINE

## 2021-01-30 DEVICE — KNOTLESS TISSUE CONTROL DEVICE, VIOLET UNIDIRECTIONAL (ANTIBACTERIAL) SYNTHETIC ABSORBABLE DEVICE
Type: IMPLANTABLE DEVICE | Site: SHOULDER | Status: FUNCTIONAL
Brand: STRATAFIX

## 2021-01-30 RX ORDER — SODIUM CHLORIDE 0.9 % (FLUSH) 0.9 %
3-10 SYRINGE (ML) INJECTION AS NEEDED
Status: DISCONTINUED | OUTPATIENT
Start: 2021-01-30 | End: 2021-01-30 | Stop reason: HOSPADM

## 2021-01-30 RX ORDER — PROPOFOL 10 MG/ML
VIAL (ML) INTRAVENOUS AS NEEDED
Status: DISCONTINUED | OUTPATIENT
Start: 2021-01-30 | End: 2021-01-30 | Stop reason: SURG

## 2021-01-30 RX ORDER — MIDAZOLAM HYDROCHLORIDE 1 MG/ML
2 INJECTION INTRAMUSCULAR; INTRAVENOUS
Status: DISCONTINUED | OUTPATIENT
Start: 2021-01-30 | End: 2021-01-30 | Stop reason: HOSPADM

## 2021-01-30 RX ORDER — SODIUM CHLORIDE, SODIUM LACTATE, POTASSIUM CHLORIDE, CALCIUM CHLORIDE 600; 310; 30; 20 MG/100ML; MG/100ML; MG/100ML; MG/100ML
9 INJECTION, SOLUTION INTRAVENOUS CONTINUOUS PRN
Status: DISCONTINUED | OUTPATIENT
Start: 2021-01-30 | End: 2021-02-02

## 2021-01-30 RX ORDER — MIDAZOLAM HYDROCHLORIDE 1 MG/ML
1 INJECTION INTRAMUSCULAR; INTRAVENOUS
Status: DISCONTINUED | OUTPATIENT
Start: 2021-01-30 | End: 2021-01-30 | Stop reason: HOSPADM

## 2021-01-30 RX ORDER — MAGNESIUM HYDROXIDE 1200 MG/15ML
LIQUID ORAL AS NEEDED
Status: DISCONTINUED | OUTPATIENT
Start: 2021-01-30 | End: 2021-01-30 | Stop reason: HOSPADM

## 2021-01-30 RX ORDER — FENTANYL CITRATE 50 UG/ML
INJECTION, SOLUTION INTRAMUSCULAR; INTRAVENOUS AS NEEDED
Status: DISCONTINUED | OUTPATIENT
Start: 2021-01-30 | End: 2021-01-30 | Stop reason: SURG

## 2021-01-30 RX ORDER — FAMOTIDINE 10 MG/ML
20 INJECTION, SOLUTION INTRAVENOUS ONCE
Status: COMPLETED | OUTPATIENT
Start: 2021-01-30 | End: 2021-01-30

## 2021-01-30 RX ORDER — ROCURONIUM BROMIDE 10 MG/ML
INJECTION, SOLUTION INTRAVENOUS AS NEEDED
Status: DISCONTINUED | OUTPATIENT
Start: 2021-01-30 | End: 2021-01-30 | Stop reason: SURG

## 2021-01-30 RX ORDER — LIDOCAINE HYDROCHLORIDE 20 MG/ML
INJECTION, SOLUTION INFILTRATION; PERINEURAL AS NEEDED
Status: DISCONTINUED | OUTPATIENT
Start: 2021-01-30 | End: 2021-01-30 | Stop reason: SURG

## 2021-01-30 RX ORDER — SODIUM CHLORIDE 0.9 % (FLUSH) 0.9 %
3 SYRINGE (ML) INJECTION EVERY 12 HOURS SCHEDULED
Status: DISCONTINUED | OUTPATIENT
Start: 2021-01-30 | End: 2021-01-30 | Stop reason: HOSPADM

## 2021-01-30 RX ORDER — FENTANYL CITRATE 50 UG/ML
50 INJECTION, SOLUTION INTRAMUSCULAR; INTRAVENOUS
Status: DISCONTINUED | OUTPATIENT
Start: 2021-01-30 | End: 2021-01-30 | Stop reason: HOSPADM

## 2021-01-30 RX ADMIN — INSULIN GLARGINE 15 UNITS: 100 INJECTION, SOLUTION SUBCUTANEOUS at 20:12

## 2021-01-30 RX ADMIN — ROCURONIUM BROMIDE 40 MG: 10 INJECTION INTRAVENOUS at 09:53

## 2021-01-30 RX ADMIN — INSULIN LISPRO 2 UNITS: 100 INJECTION, SOLUTION INTRAVENOUS; SUBCUTANEOUS at 17:09

## 2021-01-30 RX ADMIN — FENTANYL CITRATE 100 MCG: 50 INJECTION INTRAMUSCULAR; INTRAVENOUS at 09:45

## 2021-01-30 RX ADMIN — VENLAFAXINE HYDROCHLORIDE 75 MG: 75 TABLET ORAL at 20:11

## 2021-01-30 RX ADMIN — CEFAZOLIN SODIUM 2 G: 2 INJECTION, SOLUTION INTRAVENOUS at 08:00

## 2021-01-30 RX ADMIN — OXYCODONE HYDROCHLORIDE AND ACETAMINOPHEN 1 TABLET: 7.5; 325 TABLET ORAL at 20:11

## 2021-01-30 RX ADMIN — FENTANYL CITRATE 50 MCG: 50 INJECTION, SOLUTION INTRAMUSCULAR; INTRAVENOUS at 10:46

## 2021-01-30 RX ADMIN — OXYCODONE HYDROCHLORIDE AND ACETAMINOPHEN 1 TABLET: 7.5; 325 TABLET ORAL at 16:00

## 2021-01-30 RX ADMIN — FENTANYL CITRATE 50 MCG: 50 INJECTION, SOLUTION INTRAMUSCULAR; INTRAVENOUS at 11:03

## 2021-01-30 RX ADMIN — LOSARTAN POTASSIUM 100 MG: 100 TABLET, FILM COATED ORAL at 17:09

## 2021-01-30 RX ADMIN — SUGAMMADEX 400 MG: 100 INJECTION, SOLUTION INTRAVENOUS at 10:24

## 2021-01-30 RX ADMIN — FENTANYL CITRATE 50 MCG: 50 INJECTION, SOLUTION INTRAMUSCULAR; INTRAVENOUS at 11:11

## 2021-01-30 RX ADMIN — SODIUM CHLORIDE, PRESERVATIVE FREE 10 ML: 5 INJECTION INTRAVENOUS at 20:11

## 2021-01-30 RX ADMIN — LIDOCAINE HYDROCHLORIDE 100 MG: 20 INJECTION, SOLUTION INFILTRATION; PERINEURAL at 09:53

## 2021-01-30 RX ADMIN — LEVOTHYROXINE SODIUM 88 MCG: 88 TABLET ORAL at 17:09

## 2021-01-30 RX ADMIN — CEFAZOLIN SODIUM 2 G: 2 INJECTION, SOLUTION INTRAVENOUS at 01:17

## 2021-01-30 RX ADMIN — OXYCODONE HYDROCHLORIDE AND ACETAMINOPHEN 1 TABLET: 7.5; 325 TABLET ORAL at 01:17

## 2021-01-30 RX ADMIN — OXYCODONE HYDROCHLORIDE AND ACETAMINOPHEN 1 TABLET: 7.5; 325 TABLET ORAL at 11:28

## 2021-01-30 RX ADMIN — PROPOFOL 200 MG: 10 INJECTION, EMULSION INTRAVENOUS at 09:53

## 2021-01-30 RX ADMIN — FAMOTIDINE 20 MG: 10 INJECTION INTRAVENOUS at 09:24

## 2021-01-30 RX ADMIN — CEFAZOLIN SODIUM 2 G: 2 INJECTION, SOLUTION INTRAVENOUS at 17:09

## 2021-01-30 RX ADMIN — OXYCODONE HYDROCHLORIDE AND ACETAMINOPHEN 1 TABLET: 7.5; 325 TABLET ORAL at 06:09

## 2021-01-30 RX ADMIN — FENTANYL CITRATE 50 MCG: 50 INJECTION, SOLUTION INTRAMUSCULAR; INTRAVENOUS at 10:40

## 2021-01-30 RX ADMIN — SODIUM CHLORIDE, PRESERVATIVE FREE 10 ML: 5 INJECTION INTRAVENOUS at 08:00

## 2021-01-30 NOTE — ANESTHESIA POSTPROCEDURE EVALUATION
"Patient: Stefani Bhandari    Procedure Summary     Date: 01/30/21 [facility] / [facility]    Anesthesia Start: 0943 Anesthesia Stop: 1037    Procedure: INCISION AND DRAINAGE SHOULDER (Left Shoulder) Diagnosis:       Sepsis without acute organ dysfunction, due to unspecified organism (CMS/HCC)      (Sepsis without acute organ dysfunction, due to unspecified organism (CMS/HCC) [A41.9])    Surgeon: Juaquin Richardson II, MD Provider: Kermit Hanley MD    Anesthesia Type: general ASA Status: 3 - Emergent          Anesthesia Type: general    Vitals  Vitals Value Taken Time   /85 01/30/21 1145   Temp 37.6 °C (99.6 °F) 01/30/21 1145   Pulse 83 01/30/21 1152   Resp 14 01/30/21 1145   SpO2 99 % 01/30/21 1152   Vitals shown include unvalidated device data.        Post Anesthesia Care and Evaluation    Patient location during evaluation: bedside  Patient participation: complete - patient participated  Level of consciousness: awake  Pain management: adequate  Airway patency: patent  Anesthetic complications: No anesthetic complications    Cardiovascular status: acceptable  Respiratory status: acceptable  Hydration status: acceptable    Comments: */85   Pulse 86   Temp 37.6 °C (99.6 °F)   Resp 14   Ht 170.2 cm (67\")   Wt 129 kg (284 lb)   SpO2 99%   BMI 44.48 kg/m²         "

## 2021-01-30 NOTE — ANESTHESIA PROCEDURE NOTES
Airway  Urgency: elective    Date/Time: 1/30/2021 9:56 AM  Airway not difficult    General Information and Staff    Patient location during procedure: OR  Anesthesiologist: Kemrit Hanley MD  CRNA: Dolores Worthy CRNA    Indications and Patient Condition  Indications for airway management: airway protection    Preoxygenated: yes  MILS not maintained throughout  Mask difficulty assessment: 1 - vent by mask    Final Airway Details  Final airway type: endotracheal airway      Successful airway: ETT  Cuffed: yes   Successful intubation technique: direct laryngoscopy  Facilitating devices/methods: anterior pressure/BURP  Endotracheal tube insertion site: oral  Blade: Krishna  Blade size: 3  ETT size (mm): 7.0  Cormack-Lehane Classification: grade I - full view of glottis  Placement verified by: chest auscultation and capnometry   Cuff volume (mL): 7  Measured from: lips  ETT/EBT  to lips (cm): 21  Number of attempts at approach: 1  Assessment: lips, teeth, and gum same as pre-op and atraumatic intubation    Additional Comments  Pt preoxygenated prior to induction, easy mask airway, atraumatic intubation,+ ETCO2, + bs bilat,  ETT secured and connected to ventilator.

## 2021-01-30 NOTE — ANESTHESIA PREPROCEDURE EVALUATION
Anesthesia Evaluation     no history of anesthetic complications:  NPO Solid Status: > 8 hours  NPO Liquid Status: > 2 hours           Airway   Mallampati: III  Possible difficult intubation  Dental - normal exam     Pulmonary - normal exam   (+) a smoker Former, sleep apnea,   (-) COPD, asthma    ROS comment: Pulmonary nodules  PE comment: nonlabored  Cardiovascular - normal exam    Rhythm: regular  Rate: normal    (+) hypertension, hyperlipidemia,   (-) valvular problems/murmurs, past MI, CAD, dysrhythmias, angina      Neuro/Psych  (+) psychiatric history Anxiety and Depression,     (-) seizures, TIA, CVA  GI/Hepatic/Renal/Endo    (+) morbid obesity,  liver disease fatty liver disease history of elevated LFT, renal disease (h/o LINDSEY), diabetes mellitus type 2,   (-) GERD, no thyroid disorder    Musculoskeletal     (+) arthralgias, back pain,       ROS comment: Septic arthritis of shoulder  Abdominal    Substance History      OB/GYN          Other   arthritis, blood dyscrasia (Hgb 8.8; Plt 122) anemia thrombocytopenia,       Other Comment: Hyponatremia (Na 124);  Sepsis  ROS/Med Hx Other: H/o Covid                Anesthesia Plan    ASA 3 - emergent     general     intravenous induction     Anesthetic plan, all risks, benefits, and alternatives have been provided, discussed and informed consent has been obtained with: patient.

## 2021-01-31 PROBLEM — R60.1 ANASARCA: Status: ACTIVE | Noted: 2021-01-31

## 2021-01-31 LAB
BACTERIA SPEC AEROBE CULT: NORMAL
BACTERIA SPEC AEROBE CULT: NORMAL
GLUCOSE BLDC GLUCOMTR-MCNC: 175 MG/DL (ref 70–130)
GLUCOSE BLDC GLUCOMTR-MCNC: 176 MG/DL (ref 70–130)
GLUCOSE BLDC GLUCOMTR-MCNC: 180 MG/DL (ref 70–130)
GLUCOSE BLDC GLUCOMTR-MCNC: 181 MG/DL (ref 70–130)
URATE SERPL-MCNC: 4.6 MG/DL (ref 2.4–5.7)

## 2021-01-31 PROCEDURE — 25010000003 CEFAZOLIN IN DEXTROSE 2-4 GM/100ML-% SOLUTION: Performed by: ORTHOPAEDIC SURGERY

## 2021-01-31 PROCEDURE — 97110 THERAPEUTIC EXERCISES: CPT

## 2021-01-31 PROCEDURE — 82962 GLUCOSE BLOOD TEST: CPT

## 2021-01-31 PROCEDURE — 63710000001 INSULIN LISPRO (HUMAN) PER 5 UNITS: Performed by: ORTHOPAEDIC SURGERY

## 2021-01-31 PROCEDURE — 97161 PT EVAL LOW COMPLEX 20 MIN: CPT

## 2021-01-31 PROCEDURE — 84550 ASSAY OF BLOOD/URIC ACID: CPT | Performed by: INTERNAL MEDICINE

## 2021-01-31 PROCEDURE — 63710000001 INSULIN GLARGINE PER 5 UNITS: Performed by: ORTHOPAEDIC SURGERY

## 2021-01-31 PROCEDURE — 25010000002 FUROSEMIDE PER 20 MG: Performed by: INTERNAL MEDICINE

## 2021-01-31 RX ORDER — FUROSEMIDE 10 MG/ML
40 INJECTION INTRAMUSCULAR; INTRAVENOUS EVERY 12 HOURS
Status: DISCONTINUED | OUTPATIENT
Start: 2021-01-31 | End: 2021-02-02

## 2021-01-31 RX ADMIN — VENLAFAXINE HYDROCHLORIDE 75 MG: 75 TABLET ORAL at 20:02

## 2021-01-31 RX ADMIN — OXYCODONE HYDROCHLORIDE AND ACETAMINOPHEN 1 TABLET: 7.5; 325 TABLET ORAL at 02:32

## 2021-01-31 RX ADMIN — CEFAZOLIN SODIUM 2 G: 2 INJECTION, SOLUTION INTRAVENOUS at 00:43

## 2021-01-31 RX ADMIN — CEFAZOLIN SODIUM 2 G: 2 INJECTION, SOLUTION INTRAVENOUS at 17:49

## 2021-01-31 RX ADMIN — FUROSEMIDE 40 MG: 10 INJECTION, SOLUTION INTRAMUSCULAR; INTRAVENOUS at 17:49

## 2021-01-31 RX ADMIN — OXYCODONE HYDROCHLORIDE AND ACETAMINOPHEN 1 TABLET: 7.5; 325 TABLET ORAL at 16:05

## 2021-01-31 RX ADMIN — VENLAFAXINE HYDROCHLORIDE 75 MG: 75 TABLET ORAL at 08:01

## 2021-01-31 RX ADMIN — INSULIN LISPRO 2 UNITS: 100 INJECTION, SOLUTION INTRAVENOUS; SUBCUTANEOUS at 17:49

## 2021-01-31 RX ADMIN — INSULIN GLARGINE 15 UNITS: 100 INJECTION, SOLUTION SUBCUTANEOUS at 20:03

## 2021-01-31 RX ADMIN — LOSARTAN POTASSIUM 100 MG: 100 TABLET, FILM COATED ORAL at 08:01

## 2021-01-31 RX ADMIN — OXYCODONE HYDROCHLORIDE AND ACETAMINOPHEN 1 TABLET: 7.5; 325 TABLET ORAL at 08:01

## 2021-01-31 RX ADMIN — SODIUM CHLORIDE, PRESERVATIVE FREE 10 ML: 5 INJECTION INTRAVENOUS at 20:03

## 2021-01-31 RX ADMIN — CEFAZOLIN SODIUM 2 G: 2 INJECTION, SOLUTION INTRAVENOUS at 08:01

## 2021-01-31 RX ADMIN — INSULIN LISPRO 2 UNITS: 100 INJECTION, SOLUTION INTRAVENOUS; SUBCUTANEOUS at 08:01

## 2021-01-31 RX ADMIN — SODIUM CHLORIDE, PRESERVATIVE FREE 10 ML: 5 INJECTION INTRAVENOUS at 08:01

## 2021-01-31 RX ADMIN — LEVOTHYROXINE SODIUM 88 MCG: 88 TABLET ORAL at 08:01

## 2021-01-31 RX ADMIN — OXYCODONE HYDROCHLORIDE AND ACETAMINOPHEN 1 TABLET: 7.5; 325 TABLET ORAL at 20:02

## 2021-01-31 RX ADMIN — INSULIN LISPRO 2 UNITS: 100 INJECTION, SOLUTION INTRAVENOUS; SUBCUTANEOUS at 11:54

## 2021-02-01 LAB
ANION GAP SERPL CALCULATED.3IONS-SCNC: 8.5 MMOL/L (ref 5–15)
BASOPHILS # BLD AUTO: 0.03 10*3/MM3 (ref 0–0.2)
BASOPHILS NFR BLD AUTO: 0.3 % (ref 0–1.5)
BUN SERPL-MCNC: 38 MG/DL (ref 6–20)
BUN/CREAT SERPL: 40.4 (ref 7–25)
CALCIUM SPEC-SCNC: 8 MG/DL (ref 8.6–10.5)
CHLORIDE SERPL-SCNC: 90 MMOL/L (ref 98–107)
CO2 SERPL-SCNC: 26.5 MMOL/L (ref 22–29)
CREAT SERPL-MCNC: 0.94 MG/DL (ref 0.57–1)
DEPRECATED RDW RBC AUTO: 46.6 FL (ref 37–54)
EOSINOPHIL # BLD AUTO: 0.02 10*3/MM3 (ref 0–0.4)
EOSINOPHIL NFR BLD AUTO: 0.2 % (ref 0.3–6.2)
ERYTHROCYTE [DISTWIDTH] IN BLOOD BY AUTOMATED COUNT: 14.2 % (ref 12.3–15.4)
FERRITIN SERPL-MCNC: 448 NG/ML (ref 13–150)
FOLATE SERPL-MCNC: 7.73 NG/ML (ref 4.78–24.2)
GFR SERPL CREATININE-BSD FRML MDRD: 62 ML/MIN/1.73
GLUCOSE BLDC GLUCOMTR-MCNC: 168 MG/DL (ref 70–130)
GLUCOSE BLDC GLUCOMTR-MCNC: 209 MG/DL (ref 70–130)
GLUCOSE BLDC GLUCOMTR-MCNC: 219 MG/DL (ref 70–130)
GLUCOSE BLDC GLUCOMTR-MCNC: 246 MG/DL (ref 70–130)
GLUCOSE SERPL-MCNC: 175 MG/DL (ref 65–99)
HCT VFR BLD AUTO: 25.3 % (ref 34–46.6)
HGB BLD-MCNC: 8.5 G/DL (ref 12–15.9)
IMM GRANULOCYTES # BLD AUTO: 0.15 10*3/MM3 (ref 0–0.05)
IMM GRANULOCYTES NFR BLD AUTO: 1.5 % (ref 0–0.5)
IRON 24H UR-MRATE: 31 MCG/DL (ref 37–145)
IRON SATN MFR SERPL: 15 % (ref 20–50)
LYMPHOCYTES # BLD AUTO: 1.72 10*3/MM3 (ref 0.7–3.1)
LYMPHOCYTES NFR BLD AUTO: 16.9 % (ref 19.6–45.3)
MCH RBC QN AUTO: 30.8 PG (ref 26.6–33)
MCHC RBC AUTO-ENTMCNC: 33.6 G/DL (ref 31.5–35.7)
MCV RBC AUTO: 91.7 FL (ref 79–97)
MONOCYTES # BLD AUTO: 0.33 10*3/MM3 (ref 0.1–0.9)
MONOCYTES NFR BLD AUTO: 3.2 % (ref 5–12)
NEUTROPHILS NFR BLD AUTO: 7.95 10*3/MM3 (ref 1.7–7)
NEUTROPHILS NFR BLD AUTO: 77.9 % (ref 42.7–76)
NRBC BLD AUTO-RTO: 0 /100 WBC (ref 0–0.2)
OSMOLALITY UR: 273 MOSM/KG
PLATELET # BLD AUTO: 152 10*3/MM3 (ref 140–450)
PMV BLD AUTO: 9.8 FL (ref 6–12)
POTASSIUM SERPL-SCNC: 4.1 MMOL/L (ref 3.5–5.2)
RBC # BLD AUTO: 2.76 10*6/MM3 (ref 3.77–5.28)
SODIUM SERPL-SCNC: 125 MMOL/L (ref 136–145)
SODIUM UR-SCNC: <20 MMOL/L
TIBC SERPL-MCNC: 203 MCG/DL (ref 298–536)
TRANSFERRIN SERPL-MCNC: 136 MG/DL (ref 200–360)
VIT B12 BLD-MCNC: >2000 PG/ML (ref 211–946)
WBC # BLD AUTO: 10.2 10*3/MM3 (ref 3.4–10.8)

## 2021-02-01 PROCEDURE — 84466 ASSAY OF TRANSFERRIN: CPT | Performed by: INTERNAL MEDICINE

## 2021-02-01 PROCEDURE — 84300 ASSAY OF URINE SODIUM: CPT | Performed by: HOSPITALIST

## 2021-02-01 PROCEDURE — 97535 SELF CARE MNGMENT TRAINING: CPT

## 2021-02-01 PROCEDURE — 82962 GLUCOSE BLOOD TEST: CPT

## 2021-02-01 PROCEDURE — 82436 ASSAY OF URINE CHLORIDE: CPT | Performed by: INTERNAL MEDICINE

## 2021-02-01 PROCEDURE — 97530 THERAPEUTIC ACTIVITIES: CPT

## 2021-02-01 PROCEDURE — 97165 OT EVAL LOW COMPLEX 30 MIN: CPT

## 2021-02-01 PROCEDURE — 25010000003 CEFAZOLIN IN DEXTROSE 2-4 GM/100ML-% SOLUTION: Performed by: ORTHOPAEDIC SURGERY

## 2021-02-01 PROCEDURE — 82746 ASSAY OF FOLIC ACID SERUM: CPT | Performed by: INTERNAL MEDICINE

## 2021-02-01 PROCEDURE — 80048 BASIC METABOLIC PNL TOTAL CA: CPT | Performed by: INTERNAL MEDICINE

## 2021-02-01 PROCEDURE — 83540 ASSAY OF IRON: CPT | Performed by: INTERNAL MEDICINE

## 2021-02-01 PROCEDURE — 99231 SBSQ HOSP IP/OBS SF/LOW 25: CPT | Performed by: NURSE PRACTITIONER

## 2021-02-01 PROCEDURE — 99232 SBSQ HOSP IP/OBS MODERATE 35: CPT | Performed by: INTERNAL MEDICINE

## 2021-02-01 PROCEDURE — 82607 VITAMIN B-12: CPT | Performed by: INTERNAL MEDICINE

## 2021-02-01 PROCEDURE — 83935 ASSAY OF URINE OSMOLALITY: CPT | Performed by: HOSPITALIST

## 2021-02-01 PROCEDURE — 82728 ASSAY OF FERRITIN: CPT | Performed by: INTERNAL MEDICINE

## 2021-02-01 PROCEDURE — 97110 THERAPEUTIC EXERCISES: CPT

## 2021-02-01 PROCEDURE — 85025 COMPLETE CBC W/AUTO DIFF WBC: CPT | Performed by: INTERNAL MEDICINE

## 2021-02-01 PROCEDURE — 63710000001 INSULIN GLARGINE PER 5 UNITS: Performed by: ORTHOPAEDIC SURGERY

## 2021-02-01 PROCEDURE — C1751 CATH, INF, PER/CENT/MIDLINE: HCPCS

## 2021-02-01 PROCEDURE — 25010000002 FUROSEMIDE PER 20 MG: Performed by: INTERNAL MEDICINE

## 2021-02-01 PROCEDURE — 63710000001 INSULIN LISPRO (HUMAN) PER 5 UNITS: Performed by: ORTHOPAEDIC SURGERY

## 2021-02-01 PROCEDURE — 82570 ASSAY OF URINE CREATININE: CPT | Performed by: INTERNAL MEDICINE

## 2021-02-01 RX ORDER — SODIUM CHLORIDE 0.9 % (FLUSH) 0.9 %
20 SYRINGE (ML) INJECTION AS NEEDED
Status: DISCONTINUED | OUTPATIENT
Start: 2021-02-01 | End: 2021-02-02

## 2021-02-01 RX ORDER — SODIUM CHLORIDE 0.9 % (FLUSH) 0.9 %
10 SYRINGE (ML) INJECTION EVERY 12 HOURS SCHEDULED
Status: DISCONTINUED | OUTPATIENT
Start: 2021-02-01 | End: 2021-02-05 | Stop reason: HOSPADM

## 2021-02-01 RX ORDER — SODIUM CHLORIDE 0.9 % (FLUSH) 0.9 %
10 SYRINGE (ML) INJECTION AS NEEDED
Status: DISCONTINUED | OUTPATIENT
Start: 2021-02-01 | End: 2021-02-05 | Stop reason: HOSPADM

## 2021-02-01 RX ADMIN — LEVOTHYROXINE SODIUM 88 MCG: 88 TABLET ORAL at 08:46

## 2021-02-01 RX ADMIN — INSULIN LISPRO 2 UNITS: 100 INJECTION, SOLUTION INTRAVENOUS; SUBCUTANEOUS at 08:47

## 2021-02-01 RX ADMIN — OXYCODONE HYDROCHLORIDE AND ACETAMINOPHEN 1 TABLET: 7.5; 325 TABLET ORAL at 19:39

## 2021-02-01 RX ADMIN — CEFAZOLIN SODIUM 2 G: 2 INJECTION, SOLUTION INTRAVENOUS at 17:48

## 2021-02-01 RX ADMIN — INSULIN LISPRO 2 UNITS: 100 INJECTION, SOLUTION INTRAVENOUS; SUBCUTANEOUS at 17:47

## 2021-02-01 RX ADMIN — OXYCODONE HYDROCHLORIDE AND ACETAMINOPHEN 1 TABLET: 7.5; 325 TABLET ORAL at 00:23

## 2021-02-01 RX ADMIN — LOSARTAN POTASSIUM 100 MG: 100 TABLET, FILM COATED ORAL at 08:46

## 2021-02-01 RX ADMIN — SODIUM CHLORIDE, PRESERVATIVE FREE 10 ML: 5 INJECTION INTRAVENOUS at 20:22

## 2021-02-01 RX ADMIN — INSULIN GLARGINE 15 UNITS: 100 INJECTION, SOLUTION SUBCUTANEOUS at 20:23

## 2021-02-01 RX ADMIN — VENLAFAXINE HYDROCHLORIDE 75 MG: 75 TABLET ORAL at 08:46

## 2021-02-01 RX ADMIN — CEFAZOLIN SODIUM 2 G: 2 INJECTION, SOLUTION INTRAVENOUS at 08:47

## 2021-02-01 RX ADMIN — INSULIN LISPRO 4 UNITS: 100 INJECTION, SOLUTION INTRAVENOUS; SUBCUTANEOUS at 11:50

## 2021-02-01 RX ADMIN — SODIUM CHLORIDE, PRESERVATIVE FREE 10 ML: 5 INJECTION INTRAVENOUS at 08:47

## 2021-02-01 RX ADMIN — FUROSEMIDE 40 MG: 10 INJECTION, SOLUTION INTRAMUSCULAR; INTRAVENOUS at 05:54

## 2021-02-01 RX ADMIN — FUROSEMIDE 40 MG: 10 INJECTION, SOLUTION INTRAMUSCULAR; INTRAVENOUS at 17:48

## 2021-02-01 RX ADMIN — OXYCODONE HYDROCHLORIDE AND ACETAMINOPHEN 1 TABLET: 7.5; 325 TABLET ORAL at 08:47

## 2021-02-01 RX ADMIN — SODIUM CHLORIDE, PRESERVATIVE FREE 10 ML: 5 INJECTION INTRAVENOUS at 17:48

## 2021-02-01 RX ADMIN — OXYCODONE HYDROCHLORIDE AND ACETAMINOPHEN 1 TABLET: 7.5; 325 TABLET ORAL at 14:29

## 2021-02-01 RX ADMIN — VENLAFAXINE HYDROCHLORIDE 75 MG: 75 TABLET ORAL at 20:22

## 2021-02-01 RX ADMIN — CEFAZOLIN SODIUM 2 G: 2 INJECTION, SOLUTION INTRAVENOUS at 00:18

## 2021-02-01 NOTE — THERAPY EVALUATION
Patient Name: Stefani Bhandari  : 1965    MRN: [id number]                              Today's Date: 2021       Admit Date: 2021    Visit Dx:     ICD-10-CM ICD-9-CM   1. Sepsis without acute organ dysfunction, due to unspecified organism (CMS/HCC)  A41.9 038.9     995.91   2. LINDSEY (acute kidney injury) (CMS/HCC)  N17.9 584.9   3. Hyponatremia  E87.1 276.1   4. Hyperglycemia  R73.9 790.29     Patient Active Problem List   Diagnosis   • Essential hypertension   • Sleep apnea   • Anxiety and depression   • Diabetes mellitus type 2 in obese (CMS/HCC)   • Obesity, Class III, BMI 40-49.9 (morbid obesity) (CMS/HCC)   • Other specified hypothyroidism   • Mixed hyperlipidemia   • Lumbar degenerative disc disease   • Thrombocytopenia (CMS/HCC)   • Tongue lesion   • Pulmonary nodules   • Abnormal CT of the chest   • Elevated liver function tests   • COVID-19 virus infection   • Sepsis without acute organ dysfunction (CMS/HCC)   • History of COVID-19   • LINDSEY (acute kidney injury) (CMS/HCC)   • Hyponatremia   • Septic arthritis of shoulder, left (CMS/HCC)   • MSSA bacteremia   • 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 (CMS/HCC)   • Anasarca     Past Medical History:   Diagnosis Date   • Anxiety    • DDD (degenerative disc disease), lumbar    • Depression    • Diabetes mellitus (CMS/HCC)    • Elevated LFTs    • NICK (generalized anxiety disorder)    • History of epilepsy     as a child-Abstracted from pt records   • Hyperlipidemia    • Hypertension    • Hypothyroidism    • MDD (major depressive disorder)    • Morbid obesity (CMS/HCC)    • OA (osteoarthritis)    • Obesity      Past Surgical History:   Procedure Laterality Date   • HYSTERECTOMY     • INCISION AND DRAINAGE SHOULDER Left 2021    Procedure: INCISION AND DRAINAGE SHOULDER;  Surgeon: Juaquin Richardson II, MD;  Location: [facility] OR;  Service: Orthopedics;  Laterality: Left;   • TUBAL ABDOMINAL LIGATION       General Information     Row  Name 02/01/21 1225          OT Time and Intention    Document Type  evaluation  -BT     Mode of Treatment  occupational therapy  -BT     Row Name 02/01/21 1225          General Information    Patient Profile Reviewed  yes  -BT     Prior Level of Function  independent:;ADL's;transfer  -BT     Existing Precautions/Restrictions  fall;shoulder recent L & D to L shoulder  -BT     Barriers to Rehab  none identified  -BT     Row Name 02/01/21 1225          Living Environment    Lives With  child(dyan), adult  -BT     Row Name 02/01/21 1225          Cognition    Orientation Status (Cognition)  oriented x 4  -BT     Row Name 02/01/21 1225          Safety Issues, Functional Mobility    Impairments Affecting Function (Mobility)  strength;endurance/activity tolerance;pain;range of motion (ROM)  -BT       User Key  (r) = Recorded By, (t) = Taken By, (c) = Cosigned By    Initials Name Provider Type    BT Shana Arreola, OT Occupational Therapist          Mobility/ADL's     Row Name 02/01/21 1232          Bed Mobility    Comment (Bed Mobility)  pt up in chair upon arrival  -BT     Row Name 02/01/21 1232          Transfers    Transfers  sit-stand transfer  -BT     Sit-Stand [place] (Transfers)  contact guard  -BT     Row Name 02/01/21 1232          Sit-Stand Transfer    Assistive Device (Sit-Stand Transfers)  walker, front-wheeled  -BT     Row Name 02/01/21 1232          Activities of Daily Living    BADL Assessment/Intervention  grooming  -BT     Row Name 02/01/21 1232          Grooming Assessment/Training    [place] Level (Grooming)  grooming skills;hair care, combing/brushing;oral care regimen;wash face, hands;contact guard assist;minimum assist (75% patient effort)  -BT     Position (Grooming)  sink side;supported sitting pt got fatigued shortly after sinkside standing and requested to sit down to continue combing hair  -BT       User Key  (r) = Recorded By, (t) = Taken By, (c) = Cosigned By    Initials Name Provider  Type    BT Shana Arreola OT Occupational Therapist        Obj/Interventions     Row Name 02/01/21 1235          Range of Motion Comprehensive    General Range of Motion  bilateral upper extremity ROM WFL  -BT     Comment, General Range of Motion  with exception to L operated shoulder with limited ROM  -BT     Row Name 02/01/21 1235          Strength Comprehensive (MMT)    Comment, General Manual Muscle Testing (MMT) Assessment  L shoulder NT; RUE WFL  -BT     Row Name 02/01/21 1235          Shoulder (Therapeutic Exercise)    Shoulder (Therapeutic Exercise)  AROM (active range of motion);pendulum exercises  -BT     Shoulder Pendulum Exercises (Therapeutic Exercise)  left  -BT     Row Name 02/01/21 1235          Elbow/Forearm (Therapeutic Exercise)    Elbow/Forearm (Therapeutic Exercise)  AROM (active range of motion)  -BT     Elbow/Forearm AROM (Therapeutic Exercise)  left;flexion;extension  -BT     Row Name 02/01/21 1235          Wrist (Therapeutic Exercise)    Wrist (Therapeutic Exercise)  AROM (active range of motion)  -BT     Wrist AROM (Therapeutic Exercise)  left;flexion;extension  -BT     Row Name 02/01/21 1235          Therapeutic Exercise    Therapeutic Exercise  elbow/forearm;wrist;shoulder  -BT       User Key  (r) = Recorded By, (t) = Taken By, (c) = Cosigned By    Initials Name Provider Type    BT Shana Arreola OT Occupational Therapist        Goals/Plan     Row Name 02/01/21 1240          Bathing Goal 1 (OT)    Activity/Device (Bathing Goal 1, OT)  bathing skills, all  -BT     [place] Level/Cues Needed (Bathing Goal 1, OT)  set-up required  -BT     Time Frame (Bathing Goal 1, OT)  short term goal (STG);2 weeks  -BT     Progress/Outcomes (Bathing Goal 1, OT)  goal ongoing  -BT     Row Name 02/01/21 1240          Dressing Goal 1 (OT)    Activity/Device (Dressing Goal 1, OT)  dressing skills, all  -BT     [place]/Cues Needed (Dressing Goal 1, OT)  set-up required  -BT     Time Frame (Dressing  Goal 1, OT)  short term goal (STG);2 weeks  -BT     Strategies/Barriers (Dressing Goal 1, OT)  utilzing linnette technique  -BT     Progress/Outcome (Dressing Goal 1, OT)  goal ongoing  -BT     Row Name 02/01/21 1240          Toileting Goal 1 (OT)    Activity/Device (Toileting Goal 1, OT)  toileting skills, all  -BT     [place] Level/Cues Needed (Toileting Goal 1, OT)  standby assist  -BT     Time Frame (Toileting Goal 1, OT)  short term goal (STG);2 weeks  -BT     Progress/Outcome (Toileting Goal 1, OT)  goal ongoing  -BT     Row Name 02/01/21 1240          ROM Goal 1 (OT)    ROM Goal 1 (OT)  Pt to be independent with LUE ROM HEP.  -BT     Time Frame (ROM Goal 1, OT)  by discharge  -BT     Row Name 02/01/21 1240          Therapy Assessment/Plan (OT)    Planned Therapy Interventions (OT)  activity tolerance training;BADL retraining;occupation/activity based interventions;patient/caregiver education/training;ROM/therapeutic exercise  -BT       User Key  (r) = Recorded By, (t) = Taken By, (c) = Cosigned By    Initials Name Provider Type    BT Shana Arreola, OT Occupational Therapist        Clinical Impression     Row Name 02/01/21 1237          Pain Assessment    Additional Documentation  Pain Scale: Numbers Pre/Post-Treatment (Group)  -BT     Row Name 02/01/21 1711          Pain Scale: Numbers Pre/Post-Treatment    Pretreatment Pain Rating  6/10  -BT     Posttreatment Pain Rating  6/10  -BT     Pain Location - Side  Left  -BT     Pain Location  shoulder  -BT     Pain Intervention(s)  Medication (See MAR);Repositioned;Rest  -BT     Row Name 02/01/21 2655          Plan of Care Review    Plan of Care Reviewed With  patient  -BT     Progress  improving  -BT     Outcome Summary  OT evaluation completed: Pt is a 55 year old female s/p L shoulder I & D. Pt reports previously being I with adls and functional transfers. On this date, pt presents with decreased strength, ROM, activity tolerance and independence with adls and  functional transfers. Pt will benefit from skilled OT services to address these deficits. Anticipate HH services upon dc. Pt agreeable.  -BT     Row Name 02/01/21 1237          Therapy Assessment/Plan (OT)    Rehab Potential (OT)  good, to achieve stated therapy goals  -BT     Criteria for Skilled Therapeutic Interventions Met (OT)  yes  -BT     Therapy Frequency (OT)  3 times/wk  -BT     Row Name 02/01/21 1237          Therapy Plan Review/Discharge Plan (OT)    Anticipated Discharge Disposition (OT)  home with home health;home with assist  -BT     Row Name 02/01/21 1237          Positioning and Restraints    Pre-Treatment Position  sitting in chair/recliner  -BT     Post Treatment Position  chair  -BT     In Chair  notified nsg;reclined;call light within reach;encouraged to call for assist;exit alarm on  -BT       User Key  (r) = Recorded By, (t) = Taken By, (c) = Cosigned By    Initials Name Provider Type    BT Shana Arreola OT Occupational Therapist        Outcome Measures     Row Name 02/01/21 1241          How much help from another is currently needed...    Putting on and taking off regular lower body clothing?  3  -BT     Bathing (including washing, rinsing, and drying)  3  -BT     Toileting (which includes using toilet bed pan or urinal)  3  -BT     Putting on and taking off regular upper body clothing  2  -BT     Taking care of personal grooming (such as brushing teeth)  3  -BT     Eating meals  3  -BT     AM-PAC 6 Clicks Score (OT)  17  -BT     Row Name 02/01/21 1241          Functional Assessment    Outcome Measure Options  AM-PAC 6 Clicks Daily Activity (OT)  -BT       User Key  (r) = Recorded By, (t) = Taken By, (c) = Cosigned By    Initials Name Provider Type    BT Shana Arreola OT Occupational Therapist        Occupational Therapy Education                 Title: PT OT SLP Therapies (Done)     Topic: Occupational Therapy (Done)     Point: ADL training (Done)     Description:   Instruct learner(s) on  proper safety adaptation and remediation techniques during self care or transfers.   Instruct in proper use of assistive devices.              Learning Progress Summary           Patient Acceptance, E, VU by BT at 2/1/2021 1241    Comment: Education regarding purpose of OT, adl retraining, functional transfer training.                   Point: Home exercise program (Done)     Description:   Instruct learner(s) on appropriate technique for monitoring, assisting and/or progressing therapeutic exercises/activities.              Learning Progress Summary           Patient Acceptance, E, VU by BT at 2/1/2021 1241    Comment: Education regarding purpose of OT, adl retraining, functional transfer training.                   Point: Precautions (Done)     Description:   Instruct learner(s) on prescribed precautions during self-care and functional transfers.              Learning Progress Summary           Patient Acceptance, E, VU by BT at 2/1/2021 1241    Comment: Education regarding purpose of OT, adl retraining, functional transfer training.                   Point: Body mechanics (Done)     Description:   Instruct learner(s) on proper positioning and spine alignment during self-care, functional mobility activities and/or exercises.              Learning Progress Summary           Patient Acceptance, E, VU by BT at 2/1/2021 1241    Comment: Education regarding purpose of OT, adl retraining, functional transfer training.                               User Key     Initials Effective Dates Name Provider Type Discipline     11/16/20 -  Shana Arreola OT Occupational Therapist OT              OT Recommendation and Plan  Planned Therapy Interventions (OT): activity tolerance training, BADL retraining, occupation/activity based interventions, patient/caregiver education/training, ROM/therapeutic exercise  Therapy Frequency (OT): 3 times/wk  Plan of Care Review  Plan of Care Reviewed With: patient  Progress: improving  Outcome  Summary: OT evaluation completed: Pt is a 55 year old female s/p L shoulder I & D. Pt reports previously being I with adls and functional transfers. On this date, pt presents with decreased strength, ROM, activity tolerance and independence with adls and functional transfers. Pt will benefit from skilled OT services to address these deficits. Anticipate HH services upon dc. Pt agreeable.     Time Calculation:   Time Calculation- OT     Row Name 02/01/21 1242             Time Calculation- OT    OT Start Time  1039  -BT      OT Stop Time  1107  -BT      OT Time Calculation (min)  28 min  -BT      Total Timed Code Minutes- OT  23 minute(s)  -BT      OT Received On  02/01/21  -BT      OT - Next Appointment  02/03/21  -BT      OT Goal Re-Cert Due Date  02/15/21  -BT        User Key  (r) = Recorded By, (t) = Taken By, (c) = Cosigned By    Initials Name Provider Type    BT Shana Arreola, OT Occupational Therapist        Therapy Charges for Today     Code Description Service Date Service Provider Modifiers Qty    31454518996 HC OT EVAL LOW COMPLEXITY 2 2/1/2021 Shana Arreola, OT GO 1    98586437354 HC OT THER PROC EA 15 MIN 2/1/2021 Shana Arreola, OT GO 1    65886805562 HC OT SELF CARE/MGMT/TRAIN EA 15 MIN 2/1/2021 Shana Arreola, OT GO 1               Shana Arreola OT  2/1/2021

## 2021-02-01 NOTE — PLAN OF CARE
Goal Outcome Evaluation:  Plan of Care Reviewed With: patient  Progress: improving  Outcome Summary: VSS, pt c/o L shoulder pain, managed by PRN Percocet x 2, worked c/ OT & PT, up in chair, R PICC placed, plan 6-8 weeks antx, likely DC home c/ HH tmrw, pt's daughter, Alessia, provided update, CTM

## 2021-02-01 NOTE — PROGRESS NOTES
LOS: 8 days     Chief Complaint: MSSA bacteremia    Interval History: Afebrile, patient underwent irrigation and debridement of the left shoulder on January 30.  Tolerated the procedure well.  Pain under control.  Tolerating antibiotics without abdominal pain nausea vomiting or diarrhea    Vital Signs  Temp:  [97.8 °F (36.6 °C)-98.8 °F (37.1 °C)] 98.1 °F (36.7 °C)  Heart Rate:  [75-84] 78  Resp:  [18] 18  BP: (149-161)/(75-83) 160/75    Physical Exam:  General: In no acute distress  Cardiovascular: RRR, no lower extremity edema  Respiratory: Breathing comfortably on room air  GI: Soft, NT/ND, + bowel sounds bilaterally  Skin: No rashes     Antibiotics:  Cefazolin 2 g IV every 8 hours     Results Review:    Lab Results   Component Value Date    WBC 10.20 02/01/2021    HGB 8.5 (L) 02/01/2021    HCT 25.3 (L) 02/01/2021    MCV 91.7 02/01/2021     02/01/2021     Lab Results   Component Value Date    GLUCOSE 175 (H) 02/01/2021    BUN 38 (H) 02/01/2021    CREATININE 0.94 02/01/2021    EGFRIFNONA 62 02/01/2021    EGFRIFAFRI 87 10/14/2020    BCR 40.4 (H) 02/01/2021    CO2 26.5 02/01/2021    CALCIUM 8.0 (L) 02/01/2021    PROTENTOTREF 7.4 10/14/2020    ALBUMIN 2.00 (L) 01/30/2021    LABIL2 1.4 10/14/2020    AST 27 01/30/2021    ALT 10 01/30/2021     Microbiology:  1/30 OR cx Staph aureus   1/26 BCx Neg x 2  1/24 BCx MSSA x2  1/24 RVP/COVID neg    Assessment/Plan   MSSA bacteremia  Lumbar epidural abscess  Left shoulder septic arthritis status post operative incision and drainage on 130  Multilevel septic arthritis of multiple facet joints in the lower lumbar spine  Possible left sacral iliac joint involvement with infection  LINDSEY  Poorly controlled type 2 diabetes  Morbid obesity with a BMI of 44 complicating above  Thrombocytopenia  Hepatosplenomegaly    Final antibiotic recommendations  1.  Cefazolin 2 g IV every 8 hours x6 to 8 weeks.  Preliminary antibiotic stop date March 12    2.  Please monitor weekly CBC with  differential and creatinine and fax the results to the infectious disease clinic at [phone]    3.  We will arrange for ID follow-up on March 12    We will order PICC line.

## 2021-02-01 NOTE — PROGRESS NOTES
Continued Stay Note  [facility]     Patient Name: Stefani Bhandari  MRN: [id number]  Today's Date: 2/1/2021    Admit Date: 1/24/2021    Discharge Plan     Row Name 02/01/21 1140       Plan    Plan  Home with [facility]/Infusion and family assist    Provided Post Acute Provider List?  Yes    Post Acute Provider List  Home Health    Patient/Family in Agreement with Plan  yes    Plan Comments  CCP followed up with pt at bedside to discuss d/c planning. Pt confirms plan remains to d/c home with assist from her two daughters (one reportedly an RN). Pt states her daughters will assist with anticipated 6-8 weeks IV antibiotics. Pt declines SNF at this time. [facility] to arrange via  Infusion. Pt agreeable to Meds to Beds enrollment (updated in EPIC). Pt states her daughter will transport her home at d/c. Jacque Noble LCSW        Discharge Codes    No documentation.       Expected Discharge Date and Time     Expected Discharge Date Expected Discharge Time    Jan 30, 2021             Nena Noble LCSW

## 2021-02-01 NOTE — PROGRESS NOTES
Continued Stay Note  [facility]     Patient Name: Stefani Bhandari  MRN: [id number]  Today's Date: 2/1/2021    Admit Date: 1/24/2021    Discharge Plan     Row Name 02/01/21 1628       Plan    Plan  Home with [facility]/Infusion and family assist    Patient/Family in Agreement with Plan  yes    Plan Comments  CCP followed up with pt's daughter (Alessia Waite, [phone]) via telephone per nursing request. Pt's daughter confirms that she is an RN and will assist pt with administering IV antibiotics at home at d/c. [facility]/Infusion ([facility]) following. Daughter requests shower chair, 3-in-1 commode, and wheeled walker for d/c. CCP informed that insurance does not typically cover shower chair or bath bench. Daughter agreeable to 3-in-1 commode and wheeled walker via [employer] (DME orders requested via [employer]). Daughter confirms family will transport at d/c. Jacque Noble LCSW        Discharge Codes    No documentation.       Expected Discharge Date and Time     Expected Discharge Date Expected Discharge Time    Feb 1, 2021             Nena Noble LCSW

## 2021-02-01 NOTE — DISCHARGE INSTRUCTIONS
Per Dr. Dianne Porras    1.  Cefazolin 2 g IV every 8 hours x6 to 8 weeks.  Preliminary antibiotic stop date March 12     2.  Please monitor weekly CBC with differential and creatinine and fax the results to the infectious disease clinic at [phone]     3.  We will arrange for ID follow-up on March 12

## 2021-02-01 NOTE — SIGNIFICANT NOTE
02/01/21 1710   PICC Single Lumen 02/01/21 Right Basilic   Placement Date/Time: 02/01/21 1705   Hand Hygiene Completed: Yes  Size (Fr): 4  Description (optional): LOT# [id number] exp 10/31/21  Length (cm): 41 cm  Orientation: Right  Location: Basilic  Site Prep: Chlorhexidine isopropyl alcohol  All 5 Sterile Ba...   Site Assessment Clean;Dry;Intact   #1 Lumen Status Blood return noted;Capped;Flushed;Normal saline locked   Length daniel (cm) 0 cm   Extremity Circumference (cm) 46 cm   Dressing Type Border Dressing;Transparent;Securing device;Antimicrobial dressing/disc   Dressing Status Clean;Dry;Intact   Dressing Intervention New dressing   Liquid Adhesive Contraindicated (comment)   Dressing Change Due 02/08/21

## 2021-02-01 NOTE — PLAN OF CARE
Goal Outcome Evaluation:         Pt rested through night up in chair. Medicated for shoulder pain with relief. Drsg clean dry and intact. Denies SOA, tolerating room air. Slight edema noted in ankles. VSS, cont to monitor.

## 2021-02-01 NOTE — PLAN OF CARE
Goal Outcome Evaluation:  Plan of Care Reviewed With: patient  Progress: improving  Outcome Summary: OT evaluation completed: Pt is a 55 year old female s/p L shoulder I & D. Pt reports previously being I with adls and functional transfers. On this date, pt presents with decreased strength, ROM, activity tolerance and independence with adls and functional transfers. Pt will benefit from skilled OT services to address these deficits. Anticipate HH services upon dc. Pt agreeable.    Pt wore face mask during this therapy encounter. Therapist wore appropriate PPE including face mask, gloves, and eye protection. Hand hygiene completed before and after this therapy session. Pt not in enhanced precautions.

## 2021-02-01 NOTE — PROGRESS NOTES
Name: Stefani Bhandari ADMIT: 2021   : 1965  PCP: Dea Martinez MD    MRN: [id number] LOS: 8 days   AGE/SEX: 55 y.o. female  ROOM: [facility]     Subjective   Subjective   shoulder sore but feels better. no chest pain or shortness of breath. PICC placed    Review of Systems   Constitutional: Negative for fever.   Respiratory: Negative for cough and shortness of breath.    Cardiovascular: Negative for chest pain.   Gastrointestinal: Negative for abdominal pain.   Musculoskeletal: Positive for arthralgias.      Objective   Objective   Vital Signs  Temp:  [97.7 °F (36.5 °C)-98.8 °F (37.1 °C)] 97.7 °F (36.5 °C)  Heart Rate:  [75-80] 79  Resp:  [18-20] 20  BP: (118-168)/(75-94) 118/94  SpO2:  [94 %-98 %] 98 %  on   ;   Device (Oxygen Therapy): room air  Body mass index is 49.45 kg/m².    Physical Exam  Vitals signs and nursing note reviewed.   Constitutional:       General: She is not in acute distress.     Appearance: She is obese.   HENT:      Head: Normocephalic and atraumatic.   Cardiovascular:      Rate and Rhythm: Normal rate and regular rhythm.   Pulmonary:      Effort: Pulmonary effort is normal. No respiratory distress.      Breath sounds: Normal breath sounds.   Abdominal:      General: Bowel sounds are normal.      Palpations: Abdomen is soft.      Tenderness: There is no abdominal tenderness. There is no guarding or rebound.   Skin:     General: Skin is warm and dry.   Neurological:      General: No focal deficit present.      Mental Status: She is alert and oriented to person, place, and time.   Psychiatric:         Mood and Affect: Mood normal.         Behavior: Behavior normal.     Results Review  I reviewed the patient's new clinical results.  Results from last 7 days   Lab Units 21  0735 21  0412 21  0454   WBC 10*3/mm3 10.20 11.58* 11.00*   HEMOGLOBIN g/dL 8.5* 8.8* 9.5*   PLATELETS 10*3/mm3 152 122* 132*     Results from last 7 days   Lab Units 21  0735  01/30/21  0412 01/27/21  0454 01/26/21  0543   SODIUM mmol/L 125* 124* 135* 131*   POTASSIUM mmol/L 4.1 3.8 4.1 3.6   CHLORIDE mmol/L 90* 91* 98 93*   CO2 mmol/L 26.5 26.1 24.1 18.9*   BUN mg/dL 38* 24* 19 30*   CREATININE mg/dL 0.94 0.86 0.78 1.16*  0.92   GLUCOSE mg/dL 175* 185* 213* 224*     Estimated Creatinine Clearance: 99.4 mL/min (by C-G formula based on SCr of 0.94 mg/dL).    Results from last 7 days   Lab Units 01/30/21  0412 01/26/21  0543   ALBUMIN g/dL 2.00* 3.50   BILIRUBIN mg/dL 0.7 0.6   ALK PHOS U/L 110 101   AST (SGOT) U/L 27 44*   ALT (SGPT) U/L 10 27     Results from last 7 days   Lab Units 02/01/21  0735 01/30/21  0412 01/27/21  0454 01/26/21  0543   CALCIUM mg/dL 8.0* 7.6* 8.1* 8.7   ALBUMIN g/dL  --  2.00*  --  3.50       COVID19   Date Value Ref Range Status   01/29/2021 Not Detected Not Detected - Ref. Range Final   01/24/2021 Not Detected Not Detected - Ref. Range Final     Glucose   Date/Time Value Ref Range Status   02/01/2021 1736 168 (H) 70 - 130 mg/dL Final   02/01/2021 1130 246 (H) 70 - 130 mg/dL Final   02/01/2021 0556 209 (H) 70 - 130 mg/dL Final   01/31/2021 2015 175 (H) 70 - 130 mg/dL Final   01/31/2021 1604 180 (H) 70 - 130 mg/dL Final   01/31/2021 1102 181 (H) 70 - 130 mg/dL Final   01/31/2021 0644 176 (H) 70 - 130 mg/dL Final     Scheduled Meds  ceFAZolin, 2 g, Intravenous, Q8H  furosemide, 40 mg, Intravenous, Q12H  insulin glargine, 15 Units, Subcutaneous, Nightly  insulin lispro, 0-9 Units, Subcutaneous, TID AC  levothyroxine, 88 mcg, Oral, Daily  losartan, 100 mg, Oral, Q24H  sodium chloride, 10 mL, Intravenous, Q12H  sodium chloride, 10 mL, Intravenous, Q12H  venlafaxine, 75 mg, Oral, BID    Continuous Infusions  lactated ringers, 9 mL/hr    PRN Meds  •  acetaminophen  •  cyclobenzaprine  •  dextrose  •  dextrose  •  glucagon (human recombinant)  •  HYDROmorphone  •  lactated ringers  •  ondansetron  •  oxyCODONE-acetaminophen  •  sodium chloride  •  sodium chloride  •   sodium chloride    lactated ringers, 9 mL/hr    Diet  Diet Regular; Consistent Carbohydrate       Intake/Output Summary (Last 24 hours) at 2/1/2021 1826  Last data filed at 2/1/2021 1429  Gross per 24 hour   Intake 509 ml   Output 200 ml   Net 309 ml    Weight change:       Assessment/Plan     Active Hospital Problems    Diagnosis  POA   • **Sepsis without acute organ dysfunction (CMS/HCC) [A41.9]  Yes   • Anasarca [R60.1]  No   • Septic arthritis of shoulder, left (CMS/HCC) [M00.9]  Yes   • MSSA bacteremia [R78.81, B95.61]  Yes   • 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 (CMS/[facility]) [K75.81, K74.60]  Yes   • History of COVID-19 [Z86.16]  Yes   • LINDSEY (acute kidney injury) (CMS/[facility]) [N17.9]  Yes   • Hyponatremia [E87.1]  Yes   • Pulmonary nodules [R91.8]  Yes   • Elevated liver function tests [R79.89]  Yes   • Abnormal CT of the chest [R93.89]  Yes   • Thrombocytopenia (CMS/HCC) [D69.6]  Yes   • Other specified hypothyroidism [E03.8]  Yes   • Mixed hyperlipidemia [E78.2]  Yes   • Lumbar degenerative disc disease [M51.36]  Yes   • Obesity, Class III, BMI 40-49.9 (morbid obesity) (CMS/[facility]) [E66.01]  Yes   • Sleep apnea [G47.30]  Yes   • Essential hypertension [I10]  Yes   • Diabetes mellitus type 2 in obese (CMS/[facility]) [E11.69, E66.9]  Yes   • Anxiety and depression [F41.9, F32.9]  Yes      Resolved Hospital Problems   No resolved problems to display.     55 y.o. female admitted with Sepsis without acute organ dysfunction (CMS/HCC).    · Septic Arthritis Left Shoulder/Sepsis with MSSA Bacteremia  · sepsis present on admission; blood cx with MSSA  · follow up blood cx NGTD  · TTE with no valvular abnormalities-no ROBERT thought necessary per ID  · continue on IV cefazolin for 6 weeks minimum.  · MRI L shoulder showed septic arthritis-attempted aspiration unsuccessful s/p I&D 1/30/21  · appreciate ID and orthopedic surgery recs  · L5-S1 Discitis/Myositis  · noted on MRI  · MITA- no surgical intervention planned for this  · Type 2  DM  · complications include METZGER  · continue on lantus 15 units nightly   · cover with ssi/hypoglycemia protocol  · co-morbid conditions include HTN for which she is on losartan  · METZGER/ ? Cirrhosis  · noted on abdominal CT scan  · has splenomegaly and TCP likely from portal HTN  · has relatively mild coagulopathy  · no e/o ascites or PSE  · will need GI follow up likely as outpatient when acute issues have resolved  · Anasarca/Hyponatremia  · likely due to above issue  · Continue IV lasix, continue to monitor Na  · monitor I&O, daily weights (up 30 lbs in a week?), and chemistries  · KRISTY  · encourage pulmonary toilet  · O2 PRN  · Hypothyroidism  · continue on levothyroxine  · SANTOS/RLL Pulmonary Nodules  · unchanged  · f/u non-contrasted chest CT in 1 year  · Anemia  · Hgb has drifted down the past couple days-likely multifactorial with inflammation and postop   · no signs of acute bleeding or hemodynamic changes  · Morbid Obesity  · Body mass index is 49.45 kg/m².   · SCDs for DVT prophylaxis.  · Full code.  · Discussed with patient.  · Anticipate discharge home tomorrow.    Richie Veloz MD  [facility]  02/01/21  18:06 EST    I wore protective equipment throughout this patient encounter including a face mask, gloves, and protective eyewear.  Hand hygiene was performed before donning protective equipment and after removal when leaving the room.

## 2021-02-01 NOTE — THERAPY TREATMENT NOTE
Patient Name: Stefani Bhandari  : 1965    MRN: [id number]                              Today's Date: 2021       Admit Date: 2021    Visit Dx:     ICD-10-CM ICD-9-CM   1. Sepsis without acute organ dysfunction, due to unspecified organism (CMS/HCC)  A41.9 038.9     995.91   2. LINDSEY (acute kidney injury) (CMS/HCC)  N17.9 584.9   3. Hyponatremia  E87.1 276.1   4. Hyperglycemia  R73.9 790.29     Patient Active Problem List   Diagnosis   • Essential hypertension   • Sleep apnea   • Anxiety and depression   • Diabetes mellitus type 2 in obese (CMS/HCC)   • Obesity, Class III, BMI 40-49.9 (morbid obesity) (CMS/HCC)   • Other specified hypothyroidism   • Mixed hyperlipidemia   • Lumbar degenerative disc disease   • Thrombocytopenia (CMS/HCC)   • Tongue lesion   • Pulmonary nodules   • Abnormal CT of the chest   • Elevated liver function tests   • COVID-19 virus infection   • Sepsis without acute organ dysfunction (CMS/HCC)   • History of COVID-19   • LINDSEY (acute kidney injury) (CMS/HCC)   • Hyponatremia   • Septic arthritis of shoulder, left (CMS/HCC)   • MSSA bacteremia   • 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 (CMS/HCC)   • Anasarca     Past Medical History:   Diagnosis Date   • Anxiety    • DDD (degenerative disc disease), lumbar    • Depression    • Diabetes mellitus (CMS/HCC)    • Elevated LFTs    • NICK (generalized anxiety disorder)    • History of epilepsy     as a child-Abstracted from pt records   • Hyperlipidemia    • Hypertension    • Hypothyroidism    • MDD (major depressive disorder)    • Morbid obesity (CMS/HCC)    • OA (osteoarthritis)    • Obesity      Past Surgical History:   Procedure Laterality Date   • HYSTERECTOMY     • INCISION AND DRAINAGE SHOULDER Left 2021    Procedure: INCISION AND DRAINAGE SHOULDER;  Surgeon: Juaquin Richardson II, MD;  Location: [facility] OR;  Service: Orthopedics;  Laterality: Left;   • TUBAL ABDOMINAL LIGATION       General Information     Row  Name 02/01/21 1431          Physical Therapy Time and Intention    Document Type  therapy note (daily note)  -SV     Mode of Treatment  individual therapy;physical therapy  -SV     Row Name 02/01/21 1431          General Information    Patient Profile Reviewed  yes  -SV       User Key  (r) = Recorded By, (t) = Taken By, (c) = Cosigned By    Initials Name Provider Type    SV Doris Lutz, PT Physical Therapist        Mobility     Row Name 02/01/21 1503          Bed Mobility    All Activities, [place] (Bed Mobility)  not tested  -SV     Comment (Bed Mobility)  uic  -SV     Row Name 02/01/21 1503          Sit-Stand Transfer    Sit-Stand [place] (Transfers)  minimum assist (75% patient effort)  -SV     Assistive Device (Sit-Stand Transfers)  walker, front-wheeled  -SV     Row Name 02/01/21 1503          Gait/Stairs (Locomotion)    [place] Level (Gait)  minimum assist (75% patient effort);contact guard  -SV     Assistive Device (Gait)  walker, front-wheeled  -SV     Distance in Feet (Gait)  12' flexed posture, shuffle step gait pattern, cues for posture and less  on rwx today, soa noted but O2 on %  -SV       User Key  (r) = Recorded By, (t) = Taken By, (c) = Cosigned By    Initials Name Provider Type    SV Doris Lutz, PT Physical Therapist        Obj/Interventions     Row Name 02/01/21 1507 02/01/21 1504       Motor Skills    Therapeutic Exercise  -- seated laq, DF/pr 10 reps lizette arom  -SV  -- pt reported completing rom ex.activities with OT  -SV      User Key  (r) = Recorded By, (t) = Taken By, (c) = Cosigned By    Initials Name Provider Type    SV Doris Lutz, PT Physical Therapist        Goals/Plan    No documentation.       Clinical Impression     Row Name 02/01/21 1505          Pain Scale: Numbers Pre/Post-Treatment    Pretreatment Pain Rating  8/10  -SV     Posttreatment Pain Rating  8/10  -SV     Pain Location - Side  Left  -SV     Pain Location  shoulder  -SV      Pre/Posttreatment Pain Comment  RN present with Pain meds just before PT  -SV     Pain Intervention(s)  Medication (See MAR);Repositioned  -SV     Row Name 02/01/21 1505          Vital Signs    Pretreatment Heart Rate (beats/min)  80  -SV     Posttreatment Heart Rate (beats/min)  92  -SV     Pre SpO2 (%)  96  -SV     O2 Delivery Pre Treatment  room air  -SV     Intra SpO2 (%)  100  -SV     O2 Delivery Intra Treatment  room air  -SV     Post SpO2 (%)  100  -SV     O2 Delivery Post Treatment  room air  -SV     Pre Patient Position  Sitting  -SV     Intra Patient Position  Standing  -SV     Post Patient Position  Sitting  -SV     Row Name 02/01/21 1505          Positioning and Restraints    Pre-Treatment Position  sitting in chair/recliner  -SV     Post Treatment Position  chair  -SV     In Chair  reclined;call light within reach;encouraged to call for assist  -SV       User Key  (r) = Recorded By, (t) = Taken By, (c) = Cosigned By    Initials Name Provider Type    Doris Calderon, PT Physical Therapist        Outcome Measures     Row Name 02/01/21 1506          How much help from another person do you currently need...    Turning from your back to your side while in flat bed without using bedrails?  2  -SV     Moving from lying on back to sitting on the side of a flat bed without bedrails?  2  -SV     Moving to and from a bed to a chair (including a wheelchair)?  2  -SV     Standing up from a chair using your arms (e.g., wheelchair, bedside chair)?  3  -SV     Climbing 3-5 steps with a railing?  1  -SV     To walk in hospital room?  1  -SV     AM-PAC 6 Clicks Score (PT)  11  -SV       User Key  (r) = Recorded By, (t) = Taken By, (c) = Cosigned By    Initials Name Provider Type    Doris Calderon, PT Physical Therapist        Physical Therapy Education                 Title: PT OT SLP Therapies (Done)     Topic: Physical Therapy (Done)     Point: Mobility training (Done)     Learning Progress Summary            Patient Eager, E,TB,D, VU,NR by SV at 2/1/2021 1506    Acceptance, E,TB, VU,NR by LB at 1/31/2021 1152                   Point: Home exercise program (Done)     Learning Progress Summary           Patient Eager, E,TB,D, VU,NR by SV at 2/1/2021 1506    Acceptance, E,TB, VU,NR by LB at 1/31/2021 1152                   Point: Body mechanics (Done)     Learning Progress Summary           Patient Eager, E,TB,D, VU,NR by SV at 2/1/2021 1506    Acceptance, E,TB, VU,NR by LB at 1/31/2021 1152                   Point: Precautions (Done)     Learning Progress Summary           Patient Eager, E,TB,D, VU,NR by SV at 2/1/2021 1506    Acceptance, E,TB, VU,NR by LB at 1/31/2021 1152                               User Key     Initials Effective Dates Name Provider Type Discipline     04/03/18 -  Doris Lutz, PT Physical Therapist PT    LB 08/09/20 -  Vania Zazueta, FRANTZ Physical Therapist PT              PT Recommendation and Plan           Time Calculation:   PT Charges     Row Name 02/01/21 1510             Time Calculation    Start Time  1430  -      Stop Time  1443  -      Time Calculation (min)  13 min  -SV      PT Received On  02/01/21  -      PT - Next Appointment  02/02/21  -        User Key  (r) = Recorded By, (t) = Taken By, (c) = Cosigned By    Initials Name Provider Type    SV Doris Lutz, PT Physical Therapist        Therapy Charges for Today     Code Description Service Date Service Provider Modifiers Qty    80885597942  PT THERAPEUTIC ACT EA 15 MIN 2/1/2021 Doris Lutz, PT GP 1          PT G-Codes  Outcome Measure Options: AM-PAC 6 Clicks Daily Activity (OT)  AM-PAC 6 Clicks Score (PT): 11  AM-PAC 6 Clicks Score (OT): 17    Doris Lutz PT  2/1/2021

## 2021-02-01 NOTE — PROGRESS NOTES
NEUROSURGERY PROGRESS NOTE     LOS: 8 days   Patient Care Team:  Dea Martinez MD as PCP - General (Family Medicine)    Chief Complaint:  Back pain    Subjective     Interval History:     Patient had irrigation and debridement of left shoulder glenohumeral joint on 1/30/2021.  Patient is sitting up in the chair eating lunch.  She indicates that her pain has improved in her back.  She feels some discomfort to palpation on the distal lateral left spine.  Denies numbness and tingling in legs. States that a lot of her leg discomfort was due to swelling. Afebrile vital signs are stable.  Tolerating PT/OT.     While in the room and during my examination of the patient I wore a mask and eye protection.  I washed my hands before and after this patient encounter.  The patient was also wearing a mask.     History taken from: patient chart    Objective      Vital Signs  Temp:  [97.8 °F (36.6 °C)-98.8 °F (37.1 °C)] 98.1 °F (36.7 °C)  Heart Rate:  [75-84] 78  Resp:  [18] 18  BP: (149-161)/(75-83) 160/75  Body mass index is 49.45 kg/m².    Intake/Output last 3 shifts:  I/O last 3 completed shifts:  In: 1470 [P.O.:1270; IV Piggyback:200]  Out: 1250 [Urine:1250]    Intake/Output this shift:  I/O this shift:  In: 349 [P.O.:240; IV Piggyback:109]  Out: -     Physical    Neurological examination:  Function: Oriented to time, place and person. Normal language including comprehension, and spontaneous speech.   Motor:  Moving extremities without difficulty.  Left arm limited r/t surgery.  Bilateral non-pitting ankle edema.  Sensation: Normal to light touch on arms and legs.     Results Review:  No new imaging    Labs:    Lab Results (last 24 hours)     Procedure Component Value Units Date/Time    POC Glucose Once [[id number]]  (Abnormal) Collected: 01/31/21 1604    Specimen: Blood Updated: 01/31/21 1606     Glucose 180 mg/dL     Uric Acid [[id number]]  (Normal) Collected: 01/31/21 1845    Specimen: Blood Updated: 01/31/21 1934     Uric  Acid 4.6 mg/dL     POC Glucose Once [[id number]]  (Abnormal) Collected: 01/31/21 2015    Specimen: Blood Updated: 01/31/21 2017     Glucose 175 mg/dL     POC Glucose Once [[id number]]  (Abnormal) Collected: 02/01/21 0556    Specimen: Blood Updated: 02/01/21 0558     Glucose 209 mg/dL     Ferritin [[id number]]  (Abnormal) Collected: 02/01/21 0735    Specimen: Blood Updated: 02/01/21 0839     Ferritin 448.00 ng/mL     Narrative:      Results may be falsely decreased if patient taking Biotin.      Iron Profile [[id number]]  (Abnormal) Collected: 02/01/21 0735    Specimen: Blood Updated: 02/01/21 0830     Iron 31 mcg/dL      Iron Saturation 15 %      Transferrin 136 mg/dL      TIBC 203 mcg/dL     Vitamin B12 [[id number]]  (Abnormal) Collected: 02/01/21 0735    Specimen: Blood Updated: 02/01/21 0846     Vitamin B-12 >2,000 pg/mL     Narrative:      Results may be falsely increased if patient taking Biotin.      Folate [[id number]]  (Normal) Collected: 02/01/21 0735    Specimen: Blood Updated: 02/01/21 0846     Folate 7.73 ng/mL     Narrative:      Results may be falsely increased if patient taking Biotin.      CBC & Differential [[id number]]  (Abnormal) Collected: 02/01/21 0735    Specimen: Blood Updated: 02/01/21 0830    Narrative:      The following orders were created for panel order CBC & Differential.  Procedure                               Abnormality         Status                     ---------                               -----------         ------                     CBC Auto Differential[[id number]]        Abnormal            Final result                 Please view results for these tests on the individual orders.    Basic Metabolic Panel [[id number]]  (Abnormal) Collected: 02/01/21 0735    Specimen: Blood Updated: 02/01/21 0831     Glucose 175 mg/dL      BUN 38 mg/dL      Creatinine 0.94 mg/dL      Sodium 125 mmol/L      Potassium 4.1 mmol/L      Chloride 90 mmol/L      CO2 26.5 mmol/L      Calcium 8.0 mg/dL       eGFR Non African Amer 62 mL/min/1.73      BUN/Creatinine Ratio 40.4     Anion Gap 8.5 mmol/L     Narrative:      GFR Normal >60  Chronic Kidney Disease <60  Kidney Failure <15      CBC Auto Differential [[id number]]  (Abnormal) Collected: 02/01/21 0735    Specimen: Blood Updated: 02/01/21 0830     WBC 10.20 10*3/mm3      RBC 2.76 10*6/mm3      Hemoglobin 8.5 g/dL      Hematocrit 25.3 %      MCV 91.7 fL      MCH 30.8 pg      MCHC 33.6 g/dL      RDW 14.2 %      RDW-SD 46.6 fl      MPV 9.8 fL      Platelets 152 10*3/mm3      Neutrophil % 77.9 %      Lymphocyte % 16.9 %      Monocyte % 3.2 %      Eosinophil % 0.2 %      Basophil % 0.3 %      Immature Grans % 1.5 %      Neutrophils, Absolute 7.95 10*3/mm3      Lymphocytes, Absolute 1.72 10*3/mm3      Monocytes, Absolute 0.33 10*3/mm3      Eosinophils, Absolute 0.02 10*3/mm3      Basophils, Absolute 0.03 10*3/mm3      Immature Grans, Absolute 0.15 10*3/mm3      nRBC 0.0 /100 WBC     POC Glucose Once [[id number]]  (Abnormal) Collected: 02/01/21 1130    Specimen: Blood Updated: 02/01/21 1132     Glucose 246 mg/dL                    Imaging:  No new images.     Current Medications:   Scheduled Meds:ceFAZolin, 2 g, Intravenous, Q8H  furosemide, 40 mg, Intravenous, Q12H  insulin glargine, 15 Units, Subcutaneous, Nightly  insulin lispro, 0-9 Units, Subcutaneous, TID AC  levothyroxine, 88 mcg, Oral, Daily  losartan, 100 mg, Oral, Q24H  sodium chloride, 10 mL, Intravenous, Q12H  venlafaxine, 75 mg, Oral, BID      Continuous Infusions:lactated ringers, 9 mL/hr        Assessment/Plan       Sepsis without acute organ dysfunction (CMS/HCC)    Essential hypertension    Sleep apnea    Anxiety and depression    Diabetes mellitus type 2 in obese (CMS/[facility])    Obesity, Class III, BMI 40-49.9 (morbid obesity) (CMS/[facility])    Other specified hypothyroidism    Mixed hyperlipidemia    Lumbar degenerative disc disease    Thrombocytopenia (CMS/[facility])    Pulmonary nodules    Abnormal CT of the chest     Elevated liver function tests    History of COVID-19    LINDSEY (acute kidney injury) (CMS/HCC)    Hyponatremia    Septic arthritis of shoulder, left (CMS/HCC)    MSSA bacteremia    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 (CMS/HCC)    Anasarca      Plan:    Continues IV antibiotics- per ID  Will follow-up as outpatient in 3 weeks.  Nothing else to add from [facility]; call if needed.      Batsheva Robbins, APRN  02/01/21  13:05 EST

## 2021-02-02 LAB
ANION GAP SERPL CALCULATED.3IONS-SCNC: 8 MMOL/L (ref 5–15)
BACTERIA SPEC AEROBE CULT: ABNORMAL
BASOPHILS # BLD AUTO: 0.02 10*3/MM3 (ref 0–0.2)
BASOPHILS NFR BLD AUTO: 0.2 % (ref 0–1.5)
BUN SERPL-MCNC: 39 MG/DL (ref 6–20)
BUN/CREAT SERPL: 42.9 (ref 7–25)
CALCIUM SPEC-SCNC: 7.7 MG/DL (ref 8.6–10.5)
CHLORIDE SERPL-SCNC: 90 MMOL/L (ref 98–107)
CHLORIDE UR-SCNC: <20 MMOL/L
CO2 SERPL-SCNC: 27 MMOL/L (ref 22–29)
CREAT SERPL-MCNC: 0.91 MG/DL (ref 0.57–1)
CREAT UR-MCNC: 54.1 MG/DL
DEPRECATED RDW RBC AUTO: 49.9 FL (ref 37–54)
EOSINOPHIL # BLD AUTO: 0.03 10*3/MM3 (ref 0–0.4)
EOSINOPHIL NFR BLD AUTO: 0.4 % (ref 0.3–6.2)
ERYTHROCYTE [DISTWIDTH] IN BLOOD BY AUTOMATED COUNT: 14.8 % (ref 12.3–15.4)
GFR SERPL CREATININE-BSD FRML MDRD: 64 ML/MIN/1.73
GLUCOSE BLDC GLUCOMTR-MCNC: 136 MG/DL (ref 70–130)
GLUCOSE BLDC GLUCOMTR-MCNC: 167 MG/DL (ref 70–130)
GLUCOSE BLDC GLUCOMTR-MCNC: 243 MG/DL (ref 70–130)
GLUCOSE BLDC GLUCOMTR-MCNC: 247 MG/DL (ref 70–130)
GLUCOSE SERPL-MCNC: 170 MG/DL (ref 65–99)
GRAM STN SPEC: ABNORMAL
HCT VFR BLD AUTO: 23.2 % (ref 34–46.6)
HGB BLD-MCNC: 7.6 G/DL (ref 12–15.9)
IMM GRANULOCYTES # BLD AUTO: 0.07 10*3/MM3 (ref 0–0.05)
IMM GRANULOCYTES NFR BLD AUTO: 0.8 % (ref 0–0.5)
LYMPHOCYTES # BLD AUTO: 1.78 10*3/MM3 (ref 0.7–3.1)
LYMPHOCYTES NFR BLD AUTO: 21 % (ref 19.6–45.3)
MCH RBC QN AUTO: 30.3 PG (ref 26.6–33)
MCHC RBC AUTO-ENTMCNC: 32.8 G/DL (ref 31.5–35.7)
MCV RBC AUTO: 92.4 FL (ref 79–97)
MONOCYTES # BLD AUTO: 0.38 10*3/MM3 (ref 0.1–0.9)
MONOCYTES NFR BLD AUTO: 4.5 % (ref 5–12)
NEUTROPHILS NFR BLD AUTO: 6.21 10*3/MM3 (ref 1.7–7)
NEUTROPHILS NFR BLD AUTO: 73.1 % (ref 42.7–76)
NRBC BLD AUTO-RTO: 0 /100 WBC (ref 0–0.2)
OSMOLALITY SERPL: 281 MOSM/KG (ref 275–300)
PLATELET # BLD AUTO: 173 10*3/MM3 (ref 140–450)
PMV BLD AUTO: 9.6 FL (ref 6–12)
POTASSIUM SERPL-SCNC: 3.9 MMOL/L (ref 3.5–5.2)
RBC # BLD AUTO: 2.51 10*6/MM3 (ref 3.77–5.28)
SODIUM SERPL-SCNC: 125 MMOL/L (ref 136–145)
TSH SERPL DL<=0.05 MIU/L-ACNC: 5.1 UIU/ML (ref 0.27–4.2)
URATE SERPL-MCNC: 5.5 MG/DL (ref 2.4–5.7)
WBC # BLD AUTO: 8.49 10*3/MM3 (ref 3.4–10.8)

## 2021-02-02 PROCEDURE — 97530 THERAPEUTIC ACTIVITIES: CPT

## 2021-02-02 PROCEDURE — 25010000002 FUROSEMIDE PER 20 MG: Performed by: INTERNAL MEDICINE

## 2021-02-02 PROCEDURE — 25010000003 CEFAZOLIN IN DEXTROSE 2-4 GM/100ML-% SOLUTION: Performed by: ORTHOPAEDIC SURGERY

## 2021-02-02 PROCEDURE — 84443 ASSAY THYROID STIM HORMONE: CPT | Performed by: INTERNAL MEDICINE

## 2021-02-02 PROCEDURE — 83930 ASSAY OF BLOOD OSMOLALITY: CPT | Performed by: HOSPITALIST

## 2021-02-02 PROCEDURE — 82962 GLUCOSE BLOOD TEST: CPT

## 2021-02-02 PROCEDURE — 63710000001 INSULIN LISPRO (HUMAN) PER 5 UNITS: Performed by: ORTHOPAEDIC SURGERY

## 2021-02-02 PROCEDURE — 85025 COMPLETE CBC W/AUTO DIFF WBC: CPT | Performed by: INTERNAL MEDICINE

## 2021-02-02 PROCEDURE — 84550 ASSAY OF BLOOD/URIC ACID: CPT | Performed by: INTERNAL MEDICINE

## 2021-02-02 PROCEDURE — 80048 BASIC METABOLIC PNL TOTAL CA: CPT | Performed by: INTERNAL MEDICINE

## 2021-02-02 PROCEDURE — 63710000001 INSULIN LISPRO (HUMAN) PER 5 UNITS: Performed by: HOSPITALIST

## 2021-02-02 PROCEDURE — 63710000001 INSULIN GLARGINE PER 5 UNITS: Performed by: HOSPITALIST

## 2021-02-02 RX ORDER — INSULIN GLARGINE 100 [IU]/ML
18 INJECTION, SOLUTION SUBCUTANEOUS NIGHTLY
Status: DISCONTINUED | OUTPATIENT
Start: 2021-02-02 | End: 2021-02-05 | Stop reason: HOSPADM

## 2021-02-02 RX ORDER — INSULIN LISPRO 100 [IU]/ML
3 INJECTION, SOLUTION INTRAVENOUS; SUBCUTANEOUS
Status: DISCONTINUED | OUTPATIENT
Start: 2021-02-02 | End: 2021-02-05 | Stop reason: HOSPADM

## 2021-02-02 RX ORDER — POTASSIUM CHLORIDE 1.5 G/1.77G
40 POWDER, FOR SOLUTION ORAL AS NEEDED
Status: DISCONTINUED | OUTPATIENT
Start: 2021-02-02 | End: 2021-02-05 | Stop reason: HOSPADM

## 2021-02-02 RX ORDER — MAGNESIUM SULFATE HEPTAHYDRATE 40 MG/ML
2 INJECTION, SOLUTION INTRAVENOUS AS NEEDED
Status: DISCONTINUED | OUTPATIENT
Start: 2021-02-02 | End: 2021-02-05 | Stop reason: HOSPADM

## 2021-02-02 RX ORDER — POTASSIUM CHLORIDE 750 MG/1
40 TABLET, FILM COATED, EXTENDED RELEASE ORAL AS NEEDED
Status: DISCONTINUED | OUTPATIENT
Start: 2021-02-02 | End: 2021-02-05 | Stop reason: HOSPADM

## 2021-02-02 RX ORDER — FUROSEMIDE 10 MG/ML
40 INJECTION INTRAMUSCULAR; INTRAVENOUS ONCE
Status: DISCONTINUED | OUTPATIENT
Start: 2021-02-02 | End: 2021-02-02

## 2021-02-02 RX ORDER — MAGNESIUM SULFATE HEPTAHYDRATE 40 MG/ML
4 INJECTION, SOLUTION INTRAVENOUS AS NEEDED
Status: DISCONTINUED | OUTPATIENT
Start: 2021-02-02 | End: 2021-02-05 | Stop reason: HOSPADM

## 2021-02-02 RX ADMIN — SODIUM CHLORIDE, PRESERVATIVE FREE 10 ML: 5 INJECTION INTRAVENOUS at 10:22

## 2021-02-02 RX ADMIN — INSULIN LISPRO 4 UNITS: 100 INJECTION, SOLUTION INTRAVENOUS; SUBCUTANEOUS at 11:53

## 2021-02-02 RX ADMIN — INSULIN LISPRO 2 UNITS: 100 INJECTION, SOLUTION INTRAVENOUS; SUBCUTANEOUS at 09:19

## 2021-02-02 RX ADMIN — FUROSEMIDE 40 MG: 10 INJECTION, SOLUTION INTRAMUSCULAR; INTRAVENOUS at 06:04

## 2021-02-02 RX ADMIN — OXYCODONE HYDROCHLORIDE AND ACETAMINOPHEN 1 TABLET: 7.5; 325 TABLET ORAL at 06:04

## 2021-02-02 RX ADMIN — VENLAFAXINE HYDROCHLORIDE 75 MG: 75 TABLET ORAL at 20:19

## 2021-02-02 RX ADMIN — CEFAZOLIN SODIUM 2 G: 2 INJECTION, SOLUTION INTRAVENOUS at 00:31

## 2021-02-02 RX ADMIN — OXYCODONE HYDROCHLORIDE AND ACETAMINOPHEN 1 TABLET: 7.5; 325 TABLET ORAL at 16:41

## 2021-02-02 RX ADMIN — VENLAFAXINE HYDROCHLORIDE 75 MG: 75 TABLET ORAL at 09:18

## 2021-02-02 RX ADMIN — FUROSEMIDE 20 MG/HR: 10 INJECTION, SOLUTION INTRAVENOUS at 11:46

## 2021-02-02 RX ADMIN — INSULIN LISPRO 3 UNITS: 100 INJECTION, SOLUTION INTRAVENOUS; SUBCUTANEOUS at 17:33

## 2021-02-02 RX ADMIN — SODIUM CHLORIDE, PRESERVATIVE FREE 10 ML: 5 INJECTION INTRAVENOUS at 20:20

## 2021-02-02 RX ADMIN — OXYCODONE HYDROCHLORIDE AND ACETAMINOPHEN 1 TABLET: 7.5; 325 TABLET ORAL at 10:20

## 2021-02-02 RX ADMIN — CEFAZOLIN SODIUM 2 G: 2 INJECTION, SOLUTION INTRAVENOUS at 17:29

## 2021-02-02 RX ADMIN — INSULIN GLARGINE 18 UNITS: 100 INJECTION, SOLUTION SUBCUTANEOUS at 20:20

## 2021-02-02 RX ADMIN — FUROSEMIDE 20 MG/HR: 10 INJECTION, SOLUTION INTRAVENOUS at 16:40

## 2021-02-02 RX ADMIN — CEFAZOLIN SODIUM 2 G: 2 INJECTION, SOLUTION INTRAVENOUS at 09:18

## 2021-02-02 RX ADMIN — FUROSEMIDE 20 MG/HR: 10 INJECTION, SOLUTION INTRAVENOUS at 21:56

## 2021-02-02 RX ADMIN — OXYCODONE HYDROCHLORIDE AND ACETAMINOPHEN 1 TABLET: 7.5; 325 TABLET ORAL at 00:31

## 2021-02-02 RX ADMIN — LOSARTAN POTASSIUM 100 MG: 100 TABLET, FILM COATED ORAL at 09:18

## 2021-02-02 RX ADMIN — OXYCODONE HYDROCHLORIDE AND ACETAMINOPHEN 1 TABLET: 7.5; 325 TABLET ORAL at 20:19

## 2021-02-02 RX ADMIN — LEVOTHYROXINE SODIUM 88 MCG: 88 TABLET ORAL at 09:18

## 2021-02-02 NOTE — PROGRESS NOTES
Consult received, full note to follow  Urine studies obtained thus far reviewed, not consistent with SIADH  She is markedly volume overloaded on exam  Will change to Lasix drip, start fluid restriction and send additional urine studies plus check TSH and uric acid  Will need to monitor renal function closely, diuresis may prove a bit difficult with such a low serum albumin but we will see how she responds to the Lasix drip      Kermit Schneider MD  [facility]

## 2021-02-02 NOTE — PLAN OF CARE
Goal Outcome Evaluation:     Progress: no change  Outcome Summary: NA decreased and edenamous. started lasix drip. will monitor. hopefully home in a few days.

## 2021-02-02 NOTE — NURSING NOTE
Iv team called to place a 2nd iv site. Assessed pt and Lt arm has been operated on and restricting it from sticks at this time. Rt extremity has single lumen picc, so no sticks and BPs in this extremity. Recommend either to send pt to radiology for wire exchange of a dual picc in place of the single lumen picc or to see if lasix can be stopped the 30 min for the Ancef to be given then restarted.

## 2021-02-02 NOTE — PLAN OF CARE
Goal Outcome Evaluation:   Pt uic today on RA with O2 sat at 100%. Hgb per chart is 7.6 and pt reports MD is ordering lasix due to edema. Significant edema noted BLE and LUE. Pt cga for STS to rwx and amb 20' with cga. Shuffle step gait with flexed posture. Pt is labored with ambulation and unable to tolerate increased distance due to fatigue and pain.

## 2021-02-02 NOTE — NURSING NOTE
AM lab results reviewed, HGB 7.6. Patient has no abnormal bleeding or bruising, had a bm this shift, with no appearance of blood in stool, no bleeding observed. Call placed to answering service. Message left with Dulce to nayeli MUIR. Dr. Veloz returned call within minutes. Reviewed lab results and assessment. MD stated no new orders at this time and that he would see her this am.

## 2021-02-02 NOTE — PROGRESS NOTES
Name: Stefani Bhandari ADMIT: 2021   : 1965  PCP: Dea Martinez MD    MRN: [id number] LOS: 9 days   AGE/SEX: 55 y.o. female  ROOM: [facility]     Subjective   Subjective   No new complaints today.  She is now on furosemide drip.  Denies shortness of breath, chest pain.    Review of Systems   Constitutional: Negative for fever.   Respiratory: Negative for cough and shortness of breath.    Cardiovascular: Negative for chest pain.   Gastrointestinal: Negative for abdominal pain.   Musculoskeletal: Positive for arthralgias.      Objective   Objective   Vital Signs  Temp:  [97.4 °F (36.3 °C)-98.3 °F (36.8 °C)] 97.9 °F (36.6 °C)  Heart Rate:  [76-80] 78  Resp:  [18-20] 18  BP: (118-168)/(65-94) 143/80  SpO2:  [97 %-100 %] 98 %  on   ;   Device (Oxygen Therapy): room air  Body mass index is 49.45 kg/m².    Physical Exam  Vitals signs and nursing note reviewed.   Constitutional:       General: She is not in acute distress.     Appearance: She is obese.   HENT:      Head: Normocephalic and atraumatic.   Cardiovascular:      Rate and Rhythm: Normal rate and regular rhythm.   Pulmonary:      Effort: Pulmonary effort is normal. No respiratory distress.      Breath sounds: Normal breath sounds.   Abdominal:      General: Bowel sounds are normal.      Palpations: Abdomen is soft.      Tenderness: There is no abdominal tenderness. There is no guarding or rebound.   Musculoskeletal:      Right lower leg: Edema present.      Left lower leg: Edema present.      Comments: 2+   Skin:     General: Skin is warm and dry.   Neurological:      General: No focal deficit present.      Mental Status: She is alert and oriented to person, place, and time.   Psychiatric:         Mood and Affect: Mood normal.         Behavior: Behavior normal.     Results Review  I reviewed the patient's new clinical results.  Results from last 7 days   Lab Units 21  0404 21  0735 21  0412 21  0454   WBC 10*3/mm3 8.49 10.20  11.58* 11.00*   HEMOGLOBIN g/dL 7.6* 8.5* 8.8* 9.5*   PLATELETS 10*3/mm3 173 152 122* 132*     Results from last 7 days   Lab Units 02/02/21  0404 02/01/21  0735 01/30/21  0412 01/27/21  0454   SODIUM mmol/L 125* 125* 124* 135*   POTASSIUM mmol/L 3.9 4.1 3.8 4.1   CHLORIDE mmol/L 90* 90* 91* 98   CO2 mmol/L 27.0 26.5 26.1 24.1   BUN mg/dL 39* 38* 24* 19   CREATININE mg/dL 0.91 0.94 0.86 0.78   GLUCOSE mg/dL 170* 175* 185* 213*     Estimated Creatinine Clearance: 102.7 mL/min (by C-G formula based on SCr of 0.91 mg/dL).    Results from last 7 days   Lab Units 01/30/21  0412   ALBUMIN g/dL 2.00*   BILIRUBIN mg/dL 0.7   ALK PHOS U/L 110   AST (SGOT) U/L 27   ALT (SGPT) U/L 10     Results from last 7 days   Lab Units 02/02/21  0404 02/01/21  0735 01/30/21  0412 01/27/21  0454   CALCIUM mg/dL 7.7* 8.0* 7.6* 8.1*   ALBUMIN g/dL  --   --  2.00*  --        COVID19   Date Value Ref Range Status   01/29/2021 Not Detected Not Detected - Ref. Range Final   01/24/2021 Not Detected Not Detected - Ref. Range Final     Glucose   Date/Time Value Ref Range Status   02/02/2021 1109 247 (H) 70 - 130 mg/dL Final   02/02/2021 0619 167 (H) 70 - 130 mg/dL Final   02/01/2021 2031 219 (H) 70 - 130 mg/dL Final   02/01/2021 1736 168 (H) 70 - 130 mg/dL Final   02/01/2021 1130 246 (H) 70 - 130 mg/dL Final   02/01/2021 0556 209 (H) 70 - 130 mg/dL Final   01/31/2021 2015 175 (H) 70 - 130 mg/dL Final     Scheduled Meds  ceFAZolin, 2 g, Intravenous, Q8H  insulin glargine, 15 Units, Subcutaneous, Nightly  insulin lispro, 0-9 Units, Subcutaneous, TID AC  levothyroxine, 88 mcg, Oral, Daily  losartan, 100 mg, Oral, Q24H  sodium chloride, 10 mL, Intravenous, Q12H  sodium chloride, 10 mL, Intravenous, Q12H  venlafaxine, 75 mg, Oral, BID    Continuous Infusions  furosemide (LASIX) 100 mg in 100mL NS infusion, 20 mg/hr, Last Rate: 20 mg/hr (02/02/21 1146)    PRN Meds  •  acetaminophen  •  cyclobenzaprine  •  dextrose  •  dextrose  •  glucagon (human  recombinant)  •  HYDROmorphone  •  magnesium sulfate **OR** magnesium sulfate **OR** magnesium sulfate  •  ondansetron  •  oxyCODONE-acetaminophen  •  potassium chloride  •  potassium chloride  •  sodium chloride  •  sodium chloride    furosemide (LASIX) 100 mg in 100mL NS infusion, 20 mg/hr, Last Rate: 20 mg/hr (02/02/21 1146)    Diet  Diet Regular; Consistent Carbohydrate, Daily Fluid Restriction; 1000 mL Fluid Per Day       Intake/Output Summary (Last 24 hours) at 2/2/2021 1423  Last data filed at 2/2/2021 1147  Gross per 24 hour   Intake 1120 ml   Output 1400 ml   Net -280 ml    Weight change: 0 kg (0 lb)      Assessment/Plan     Active Hospital Problems    Diagnosis  POA   • **Sepsis without acute organ dysfunction (CMS/HCC) [A41.9]  Yes   • Anasarca [R60.1]  No   • Septic arthritis of shoulder, left (CMS/HCC) [M00.9]  Yes   • MSSA bacteremia [R78.81, B95.61]  Yes   • 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 (CMS/HCC) [K75.81, K74.60]  Yes   • History of COVID-19 [Z86.16]  Yes   • LINDSEY (acute kidney injury) (CMS/HCC) [N17.9]  Yes   • Hyponatremia [E87.1]  Yes   • Pulmonary nodules [R91.8]  Yes   • Elevated liver function tests [R79.89]  Yes   • Abnormal CT of the chest [R93.89]  Yes   • Thrombocytopenia (CMS/HCC) [D69.6]  Yes   • Other specified hypothyroidism [E03.8]  Yes   • Mixed hyperlipidemia [E78.2]  Yes   • Lumbar degenerative disc disease [M51.36]  Yes   • Obesity, Class III, BMI 40-49.9 (morbid obesity) (CMS/HCC) [E66.01]  Yes   • Sleep apnea [G47.30]  Yes   • Essential hypertension [I10]  Yes   • Diabetes mellitus type 2 in obese (CMS/HCC) [E11.69, E66.9]  Yes   • Anxiety and depression [F41.9, F32.9]  Yes      Resolved Hospital Problems   No resolved problems to display.     55 y.o. female admitted with Sepsis without acute organ dysfunction (CMS/HCC).    · Septic Arthritis Left Shoulder/Sepsis with MSSA Bacteremia  · sepsis present on admission; blood cx with MSSA.  Follow-up 1/26/2021 blood cultures no  growth  · TTE with no valvular abnormalities-no ROBERT thought necessary per ID  · continue on IV cefazolin for 6 weeks minimum.  · MRI L shoulder showed septic arthritis-attempted aspiration unsuccessful s/p I&D 1/30/21  · L5-S1 Discitis/Myositis  · noted on MRI  · MITA- no surgical intervention planned for this  · Type 2 DM  · complications include METZGER  · Adjust Lantus, add small dose of mealtime insulin  · Continue correctional  · co-morbid conditions include HTN for which she is on losartan  · METZGER/ ? Cirrhosis  · noted on abdominal CT scan  · has splenomegaly and TCP likely from portal HTN  · has relatively mild coagulopathy  · no e/o ascites or PSE  · will need GI follow up likely as outpatient when acute issues have resolved  · Anasarca/Hyponatremia  · Appreciate nephrology.  Now on furosemide drip from IV Lasix  · Hypothyroidism  · continue on levothyroxine  · SANTOS/RLL Pulmonary Nodules  · unchanged  · f/u non-contrasted chest CT in 1 year  · Anemia  · Hgb has drifted down the past couple days-likely multifactorial with inflammation and postop   · no signs of acute bleeding or hemodynamic changes  · Morbid Obesity  · Body mass index is 49.45 kg/m².   · SCDs for DVT prophylaxis.  · Full code.  · Discussed with patient and nursing staff.  · Anticipate discharge home timing yet to be determined.    Richie Veloz MD  [facility]  02/02/21  14:23 EST    I wore protective equipment throughout this patient encounter including a face mask, gloves, and protective eyewear.  Hand hygiene was performed before donning protective equipment and after removal when leaving the room.

## 2021-02-02 NOTE — THERAPY TREATMENT NOTE
Patient Name: Stefani Bhandari  : 1965    MRN: [id number]                              Today's Date: 2021       Admit Date: 2021    Visit Dx:     ICD-10-CM ICD-9-CM   1. Sepsis without acute organ dysfunction, due to unspecified organism (CMS/HCC)  A41.9 038.9     995.91   2. LINDSEY (acute kidney injury) (CMS/HCC)  N17.9 584.9   3. Hyponatremia  E87.1 276.1   4. Hyperglycemia  R73.9 790.29     Patient Active Problem List   Diagnosis   • Essential hypertension   • Sleep apnea   • Anxiety and depression   • Diabetes mellitus type 2 in obese (CMS/HCC)   • Obesity, Class III, BMI 40-49.9 (morbid obesity) (CMS/HCC)   • Other specified hypothyroidism   • Mixed hyperlipidemia   • Lumbar degenerative disc disease   • Thrombocytopenia (CMS/HCC)   • Tongue lesion   • Pulmonary nodules   • Abnormal CT of the chest   • Elevated liver function tests   • COVID-19 virus infection   • Sepsis without acute organ dysfunction (CMS/HCC)   • History of COVID-19   • LINDSEY (acute kidney injury) (CMS/HCC)   • Hyponatremia   • Septic arthritis of shoulder, left (CMS/HCC)   • MSSA bacteremia   • 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 (CMS/HCC)   • Anasarca     Past Medical History:   Diagnosis Date   • Anxiety    • DDD (degenerative disc disease), lumbar    • Depression    • Diabetes mellitus (CMS/HCC)    • Elevated LFTs    • NICK (generalized anxiety disorder)    • History of epilepsy     as a child-Abstracted from pt records   • Hyperlipidemia    • Hypertension    • Hypothyroidism    • MDD (major depressive disorder)    • Morbid obesity (CMS/HCC)    • OA (osteoarthritis)    • Obesity      Past Surgical History:   Procedure Laterality Date   • HYSTERECTOMY     • INCISION AND DRAINAGE SHOULDER Left 2021    Procedure: INCISION AND DRAINAGE SHOULDER;  Surgeon: Juaquin Richardson II, MD;  Location: [facility] OR;  Service: Orthopedics;  Laterality: Left;   • TUBAL ABDOMINAL LIGATION       General Information     Row  "Name 02/02/21 0839          Physical Therapy Time and Intention    Document Type  therapy note (daily note)  -SV     Mode of Treatment  individual therapy;physical therapy  -SV     Row Name 02/02/21 0839          General Information    Patient Profile Reviewed  yes  -SV       User Key  (r) = Recorded By, (t) = Taken By, (c) = Cosigned By    Initials Name Provider Type    SV Doris Lutz, PT Physical Therapist        Mobility     Row Name 02/02/21 1011          Bed Mobility    All Activities, [place] (Bed Mobility)  not tested  -SV     Comment (Bed Mobility)  uic  -SV     Row Name 02/02/21 1011          Sit-Stand Transfer    Sit-Stand [place] (Transfers)  contact guard  -SV     Assistive Device (Sit-Stand Transfers)  walker, front-wheeled  -SV     Row Name 02/02/21 1011          Gait/Stairs (Locomotion)    [place] Level (Gait)  contact guard  -SV     Assistive Device (Gait)  walker, front-wheeled  -SV     Distance in Feet (Gait)  20' flexed posture, soa and repots weakness, shuffle steps, reports pain in feet due to edema  -SV       User Key  (r) = Recorded By, (t) = Taken By, (c) = Cosigned By    Initials Name Provider Type    Doris Calderon, PT Physical Therapist        Obj/Interventions     Row Name 02/02/21 1012          Motor Skills    Therapeutic Exercise  -- pt reports Ortho did not inform her of restriction at left shoulder: \" I was told to shrug \".  Pt performed: elbow flex, forearm pron/sup, wrist flex/ext and gripping x10 reps)  -SV       User Key  (r) = Recorded By, (t) = Taken By, (c) = Cosigned By    Initials Name Provider Type    Doris Calderon, PT Physical Therapist        Goals/Plan    No documentation.       Clinical Impression     Row Name 02/02/21 1013          Vital Signs    O2 Delivery Pre Treatment  room air  -SV     O2 Delivery Intra Treatment  room air  -SV     Post SpO2 (%)  100  -SV     O2 Delivery Post Treatment  room air  -SV     Pre Patient Position  Sitting  " -SV     Intra Patient Position  Standing  -SV     Post Patient Position  Sitting  -SV     Row Name 02/02/21 1013          Positioning and Restraints    Pre-Treatment Position  sitting in chair/recliner  -SV     Post Treatment Position  chair  -SV     In Chair  call light within reach;encouraged to call for assist;reclined  -SV       User Key  (r) = Recorded By, (t) = Taken By, (c) = Cosigned By    Initials Name Provider Type    Doris Calderon, PT Physical Therapist        Outcome Measures     Row Name 02/02/21 1015          How much help from another person do you currently need...    Turning from your back to your side while in flat bed without using bedrails?  4  -SV     Moving from lying on back to sitting on the side of a flat bed without bedrails?  3  -SV     Moving to and from a bed to a chair (including a wheelchair)?  3  -SV     Standing up from a chair using your arms (e.g., wheelchair, bedside chair)?  3  -SV     Climbing 3-5 steps with a railing?  2  -SV     To walk in hospital room?  3  -SV     AM-PAC 6 Clicks Score (PT)  18  -SV     Row Name 02/02/21 1015          Functional Assessment    Outcome Measure Options  AM-PAC 6 Clicks Basic Mobility (PT)  -SV       User Key  (r) = Recorded By, (t) = Taken By, (c) = Cosigned By    Initials Name Provider Type    Doris Calderon, PT Physical Therapist        Physical Therapy Education                 Title: PT OT SLP Therapies (Done)     Topic: Physical Therapy (Done)     Point: Mobility training (Done)     Learning Progress Summary           Patient Acceptance, E,TB,D, VU,NR by SV at 2/2/2021 1015    Eager, E,TB,D, VU,NR by SV at 2/1/2021 1506    Acceptance, E,TB, VU,NR by LB at 1/31/2021 1152                   Point: Home exercise program (Done)     Learning Progress Summary           Patient Acceptance, E,TB,D, VU,NR by SV at 2/2/2021 1015    Eager, E,TB,D, VU,NR by SV at 2/1/2021 1506    Acceptance, E,TB, VU,NR by LB at 1/31/2021 1152                    Point: Body mechanics (Done)     Learning Progress Summary           Patient Acceptance, E,TB,D, VU,NR by SV at 2/2/2021 1015    Eager, E,TB,D, VU,NR by SV at 2/1/2021 1506    Acceptance, E,TB, VU,NR by LB at 1/31/2021 1152                   Point: Precautions (Done)     Learning Progress Summary           Patient Acceptance, E,TB,D, VU,NR by SV at 2/2/2021 1015    Eager, E,TB,D, VU,NR by SV at 2/1/2021 1506    Acceptance, E,TB, VU,NR by LB at 1/31/2021 1152                               User Key     Initials Effective Dates Name Provider Type Discipline    SV 04/03/18 -  Doris Lutz, PT Physical Therapist PT    LB 08/09/20 -  Vania Zazueta PT Physical Therapist PT              PT Recommendation and Plan           Time Calculation:   PT Charges     Row Name 02/02/21 1017             Time Calculation    Start Time  0839  -      Stop Time  0859  -      Time Calculation (min)  20 min  -      PT Received On  02/02/21  -      PT - Next Appointment  02/03/21  -        User Key  (r) = Recorded By, (t) = Taken By, (c) = Cosigned By    Initials Name Provider Type    SV Doris Lutz, PT Physical Therapist        Therapy Charges for Today     Code Description Service Date Service Provider Modifiers Qty    94736130159 HC PT THERAPEUTIC ACT EA 15 MIN 2/1/2021 Doris Lutz, PT GP 1    78161041593 HC PT THERAPEUTIC ACT EA 15 MIN 2/2/2021 Doris Lutz, PT GP 1          PT G-Codes  Outcome Measure Options: AM-PAC 6 Clicks Basic Mobility (PT)  AM-PAC 6 Clicks Score (PT): 18  AM-PAC 6 Clicks Score (OT): 17    Doris Lutz PT  2/2/2021

## 2021-02-02 NOTE — CONSULTS
[facility]  Nephrology Initial Consult Note    Patient Identification:  Name: Stefani Bhandari MRN: [id number]  Age: 55 y.o. : 1965  Sex: female  Date:2021    Requesting Physician: As per consult order.  Reason for Consultation:   Information from:patient/ family/ chart      History of Present Illness: This is a 55 y.o. year old female admitted for septic arthritis of the left shoulder with MSSA bacteremia, and no prior workup with nephrology. She is with volume overload/anasarca secondary to liver cirrhosis related to METZGER. Historically, she hasn't had any problems with hyponatremia, though not much data to review has been 138-140, today 125 down from admission of 134. Her weight is up over 30 pounds since 21, and reports decreasing urine output. She has worsening anemia but has been asymptomatic. The patent's baseline creatinine was elevated on admission but has since returned to her baseline. Labs also demonstrated an elevated TSH in the setting of bacteremia. The patient denies any nausea or vomiting, no diarrhea or constipation, no fevers or chills, no shortness of breath, and denies chest pain.     The following medical history and medications personally reviewed by me:    Problem List:   Patient Active Problem List    Diagnosis   • *Sepsis without acute organ dysfunction (CMS/HCC) [A41.9]   • Anasarca [R60.1]   • Septic arthritis of shoulder, left (CMS/HCC) [M00.9]   • MSSA bacteremia [R78.81, B95.61]   • 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 (CMS/HCC) [K75.81, K74.60]   • History of COVID-19 [Z86.16]   • LINDSEY (acute kidney injury) (CMS/HCC) [N17.9]   • Hyponatremia [E87.1]   • Pulmonary nodules [R91.8]   • Abnormal CT of the chest [R93.89]   • Elevated liver function tests [R79.89]   • COVID-19 virus infection [U07.1]   • Thrombocytopenia (CMS/HCC) [D69.6]   • Tongue lesion [K14.8]   • Other specified hypothyroidism [E03.8]   • Mixed hyperlipidemia [E78.2]   • Lumbar degenerative  disc disease [M51.36]   • Obesity, Class III, BMI 40-49.9 (morbid obesity) (CMS/[facility]) [E66.01]   • Essential hypertension [I10]   • Sleep apnea [G47.30]   • Anxiety and depression [F41.9, F32.9]   • Diabetes mellitus type 2 in obese (CMS/[facility]) [E11.69, E66.9]       Past Medical History:  Past Medical History:   Diagnosis Date   • Anxiety    • DDD (degenerative disc disease), lumbar    • Depression    • Diabetes mellitus (CMS/[facility])    • Elevated LFTs    • NICK (generalized anxiety disorder)    • History of epilepsy     as a child-Abstracted from pt records   • Hyperlipidemia    • Hypertension    • Hypothyroidism    • MDD (major depressive disorder)    • Morbid obesity (CMS/[facility])    • OA (osteoarthritis)    • Obesity        Past Surgical History:  Past Surgical History:   Procedure Laterality Date   • HYSTERECTOMY  2007   • INCISION AND DRAINAGE SHOULDER Left 1/30/2021    Procedure: INCISION AND DRAINAGE SHOULDER;  Surgeon: Juaquin Richardson II, MD;  Location: [facility];  Service: Orthopedics;  Laterality: Left;   • TUBAL ABDOMINAL LIGATION  1996        Home Meds:   Medications Prior to Admission   Medication Sig Dispense Refill Last Dose   • Buprenorphine HCl (Belbuca) 150 MCG film    Past Week at Unknown time   • cyclobenzaprine (FLEXERIL) 10 MG tablet TAKE 1 TABLET BY MOUTH THREE TIMES DAILY AS NEEDED FOR MUSCLE SPASMS 270 tablet 0 Past Week at Unknown time   • diclofenac (VOLTAREN) 75 MG EC tablet TAKE 1 TABLET BY MOUTH TWICE DAILY 180 tablet 0 1/23/2021 at Unknown time   • ezetimibe (ZETIA) 10 MG tablet Take 1 tablet by mouth Daily. 90 tablet 1 Past Week at Unknown time   • levothyroxine (SYNTHROID, LEVOTHROID) 88 MCG tablet TAKE 1 TABLET BY MOUTH DAILY 30 tablet 3 1/23/2021 at Unknown time   • losartan-hydrochlorothiazide (HYZAAR) 100-25 MG per tablet Take 1 tablet by mouth Daily.   1/23/2021 at Unknown time   • metFORMIN (GLUCOPHAGE) 500 MG tablet Take 2 p.o. every morning 1 p.o. every afternoon 270  tablet 1 1/23/2021 at Unknown time   • methylPREDNISolone (MEDROL) 4 MG dose pack Take as directed on package instructions. 1 each 0 1/23/2021 at Unknown time   • simvastatin (ZOCOR) 80 MG tablet TAKE 1 TABLET BY MOUTH EVERY NIGHT 90 tablet 0 1/23/2021 at Unknown time   • venlafaxine (EFFEXOR) 75 MG tablet Take 1 tablet by mouth 2 (Two) Times a Day. 180 tablet 0 1/23/2021 at Unknown time   • cholecalciferol (VITAMIN D3) 25 MCG (1000 UT) tablet Take 2,000 Units by mouth Daily.   More than a month at Unknown time       Current Meds:   Current Facility-Administered Medications   Medication Dose Route Frequency Provider Last Rate Last Admin   • acetaminophen (TYLENOL) tablet 650 mg  650 mg Oral Q4H PRN Juaquin Richardson II, MD   650 mg at 01/26/21 1704   • ceFAZolin in dextrose (ANCEF) IVPB solution 2 g  2 g Intravenous Q8H Juaquin Richardson II, MD   2 g at 02/02/21 0918   • cyclobenzaprine (FLEXERIL) tablet 10 mg  10 mg Oral TID PRN Juaquin Richardson II, MD       • dextrose (D50W) 25 g/ 50mL Intravenous Solution 25 g  25 g Intravenous Q15 Min PRN Juaquin Richardson II, MD       • dextrose (GLUTOSE) oral gel 15 g  15 g Oral Q15 Min PRN Juaquin Richardson II, MD       • furosemide (LASIX) 100 mg in sodium chloride 0.9 % 100 mL infusion  20 mg/hr Intravenous Continuous Kermit Schneider MD       • glucagon (human recombinant) (GLUCAGEN DIAGNOSTIC) injection 1 mg  1 mg Subcutaneous Q15 Min PRN Juaquin Richardson II, MD       • HYDROmorphone (DILAUDID) injection 0.5 mg  0.5 mg Intravenous Q3H PRN Juaquin Richardson II, MD   0.5 mg at 01/25/21 0746   • insulin glargine (LANTUS, SEMGLEE) injection 15 Units  15 Units Subcutaneous Nightly Juaquin Richardson II, MD   15 Units at 02/01/21 2023   • insulin lispro (humaLOG, ADMELOG) injection 0-9 Units  0-9 Units Subcutaneous TID AC Juaquin Richardson II, MD   2 Units at 02/02/21 0919   • levothyroxine (SYNTHROID,  "LEVOTHROID) tablet 88 mcg  88 mcg Oral Daily Juaquin Richardson II, MD   88 mcg at 02/02/21 0918   • losartan (COZAAR) tablet 100 mg  100 mg Oral Q24H Juaquin Richardson II, MD   100 mg at 02/02/21 0918   • ondansetron (ZOFRAN) tablet 4 mg  4 mg Oral Q6H PRN Juaquin Richardson II, MD       • oxyCODONE-acetaminophen (PERCOCET) 7.5-325 MG per tablet 1 tablet  1 tablet Oral Q4H PRN Juaquin Richardson II, MD   1 tablet at 02/02/21 1020   • sodium chloride 0.9 % flush 10 mL  10 mL Intravenous Q12H Juaquin Richardson II, MD   10 mL at 02/01/21 0847   • sodium chloride 0.9 % flush 10 mL  10 mL Intravenous PRN Juaquin Richardson II, MD       • sodium chloride 0.9 % flush 10 mL  10 mL Intravenous Q12H Dianne Porras MD   10 mL at 02/02/21 1022   • sodium chloride 0.9 % flush 10 mL  10 mL Intravenous PRN Dianne Porras MD   10 mL at 02/01/21 1748   • venlafaxine (EFFEXOR) tablet 75 mg  75 mg Oral BID Juaquin Richardson II, MD   75 mg at 02/02/21 0918       Allergies:  Allergies   Allergen Reactions   • Tramadol Anaphylaxis     \"seizure like behavior\"       Social History:   Social History     Socioeconomic History   • Marital status:      Spouse name: Not on file   • Number of children: Not on file   • Years of education: Not on file   • Highest education level: Not on file   Tobacco Use   • Smoking status: Former Smoker     Years: 20.00   • Smokeless tobacco: Never Used   • Tobacco comment: 1 pack per 2 days   Substance and Sexual Activity   • Alcohol use: Not Currently     Frequency: Never     Comment: one glass of wine q6 months   • Drug use: Never   • Sexual activity: Defer        Family History:  Family History   Problem Relation Age of Onset   • Obesity Mother    • Diabetes Mother    • Hypertension Mother    • Sleep apnea Mother    • Asthma Mother    • Hypertension Father    • Heart attack Father    • Diabetes Sister    • Hypertension Sister    • Heart attack Sister    • " "Diabetes Brother    • Hypertension Brother    • Heart attack Brother    • No Known Problems Other    • Breast cancer Paternal Cousin    • Malig Hyperthermia Neg Hx         Review of Systems: as per HPI, in addition:    General:      No weakness / fatigue,                       No fevers / chills                       no weight loss  HEENT:       no dysphagia / odynophagia  Neck:           normal range of motion, no swelling  Respiratory: no cough / congestion                      No shortness of air                       No wheezing  CV:              No chest pain                       No palpitations  Abdomen/GI: no nausea / vomiting                      No diarrhea / constipation                      No abdominal pain  :             no dysuria / urinary frequency                       No urgency, normal output  Endocrine:   no polyuria / polydipsia,                      No heat or cold intolerance  Skin:           no rashes or skin breakdown   Vascular:   + edema                     No claudication  Psych:        no depression/ anxiety  Neuro:        no focal weakness, no seizures  Musculoskeletal: no joint pain or deformities      Physical Exam:  Vitals:   Temp (24hrs), Av.9 °F (36.6 °C), Min:97.4 °F (36.3 °C), Max:98.3 °F (36.8 °C)    /65 (BP Location: Right arm, Patient Position: Sitting)   Pulse 76   Temp 97.4 °F (36.3 °C) (Oral)   Resp 18   Ht 170.2 cm (67\")   Wt (!) 143 kg (315 lb 11.2 oz)   SpO2 100%   BMI 49.45 kg/m²   Intake/Output:     Intake/Output Summary (Last 24 hours) at 2021 1035  Last data filed at 2021 0535  Gross per 24 hour   Intake 880 ml   Output 1400 ml   Net -520 ml        Wt Readings from Last 1 Encounters:   21 0535 (!) 143 kg (315 lb 11.2 oz)   21 0456 (!) 143 kg (315 lb 11.2 oz)   21 0906 129 kg (284 lb)   21 1626 129 kg (284 lb 11.2 oz)   21 0734 127 kg (280 lb)       Exam:    General Appearance:  Awake, alert, oriented x3, no " acute distress  Obese and chronically ill-appearing   Head and Face:  Normocephalic, atraumatic, mucus membranes moist, oropharynx clear   Eyes:  No icterus, pupils equal round and reactive to light, extraocular movements intact    ENMT: Moist mucosa, tongue symmetric    Neck: Supple  no jugular venous distention  no thyromegaly   Pulmonary:  Respiratory effort: Normal  Auscultation of lungs: Clear bilaterally  No wheezes  No rhonchi  Good air movement, good expansion   Chest wall:  No tenderness or deformity   Cardiovascular:  Auscultation of the heart: Normal rhythm, no murmurs  + edema of bilateral lower extremities   Abdomen:  Abdomen: soft, non-tender, normal bowel sounds all four quadrants, no masses, + obesity  Liver and spleen: + hepatosplenomegaly   Musculoskeletal: Digits and nails: normal  Normal range of motion with limitation due to shoulder   + joint swelling or gross deformities    Skin: Skin inspection: color normal, no visible rashes or lesions  Skin palpation: texture, turgor normal, no palpable lesions   Lymphatic:  no cervical lymphadenopathy    Psychiatric: Judgement and insight: normal  Orientation to person place and time: normal  Mood and affect: normal       DATA:  Radiology and Labs:  The following labs independently reviewed by me, additional AM labs ordered  Old records independently reviewed showing baseline creatinine 0.7-0.9  The following radiologic studies independently viewed by me, findings CT abdomen showing hepatosplenomegaly   Interval notes, chart personally reviewed by me.   I have reviewed and summation of old records   New problems include anasarca     Risk/ complexity of medical care/ medical decision making:  High:  Chronic illness with severe exacerbation or progression on lasix drip with need for close monitoring of fluid/electrolyte balance       Labs:   Recent Results (from the past 24 hour(s))   POC Glucose Once    Collection Time: 02/01/21 11:30 AM    Specimen: Blood    Result Value Ref Range    Glucose 246 (H) 70 - 130 mg/dL   POC Glucose Once    Collection Time: 02/01/21  5:36 PM    Specimen: Blood   Result Value Ref Range    Glucose 168 (H) 70 - 130 mg/dL   POC Glucose Once    Collection Time: 02/01/21  8:31 PM    Specimen: Blood   Result Value Ref Range    Glucose 219 (H) 70 - 130 mg/dL   Sodium, Urine, Random - Urine, Clean Catch    Collection Time: 02/01/21 10:16 PM    Specimen: Urine, Clean Catch   Result Value Ref Range    Sodium, Urine <20 mmol/L   Osmolality, Urine - Urine, Clean Catch    Collection Time: 02/01/21 10:16 PM    Specimen: Urine, Clean Catch   Result Value Ref Range    Osmolality, Urine 273 mOsm/kg   Osmolality, Serum    Collection Time: 02/02/21  4:04 AM    Specimen: Blood   Result Value Ref Range    Osmolality 281 275 - 300 mOsm/kg   Basic Metabolic Panel    Collection Time: 02/02/21  4:04 AM    Specimen: Blood   Result Value Ref Range    Glucose 170 (H) 65 - 99 mg/dL    BUN 39 (H) 6 - 20 mg/dL    Creatinine 0.91 0.57 - 1.00 mg/dL    Sodium 125 (L) 136 - 145 mmol/L    Potassium 3.9 3.5 - 5.2 mmol/L    Chloride 90 (L) 98 - 107 mmol/L    CO2 27.0 22.0 - 29.0 mmol/L    Calcium 7.7 (L) 8.6 - 10.5 mg/dL    eGFR Non African Amer 64 >60 mL/min/1.73    BUN/Creatinine Ratio 42.9 (H) 7.0 - 25.0    Anion Gap 8.0 5.0 - 15.0 mmol/L   CBC Auto Differential    Collection Time: 02/02/21  4:04 AM    Specimen: Blood   Result Value Ref Range    WBC 8.49 3.40 - 10.80 10*3/mm3    RBC 2.51 (L) 3.77 - 5.28 10*6/mm3    Hemoglobin 7.6 (L) 12.0 - 15.9 g/dL    Hematocrit 23.2 (L) 34.0 - 46.6 %    MCV 92.4 79.0 - 97.0 fL    MCH 30.3 26.6 - 33.0 pg    MCHC 32.8 31.5 - 35.7 g/dL    RDW 14.8 12.3 - 15.4 %    RDW-SD 49.9 37.0 - 54.0 fl    MPV 9.6 6.0 - 12.0 fL    Platelets 173 140 - 450 10*3/mm3    Neutrophil % 73.1 42.7 - 76.0 %    Lymphocyte % 21.0 19.6 - 45.3 %    Monocyte % 4.5 (L) 5.0 - 12.0 %    Eosinophil % 0.4 0.3 - 6.2 %    Basophil % 0.2 0.0 - 1.5 %    Immature Grans % 0.8 (H)  0.0 - 0.5 %    Neutrophils, Absolute 6.21 1.70 - 7.00 10*3/mm3    Lymphocytes, Absolute 1.78 0.70 - 3.10 10*3/mm3    Monocytes, Absolute 0.38 0.10 - 0.90 10*3/mm3    Eosinophils, Absolute 0.03 0.00 - 0.40 10*3/mm3    Basophils, Absolute 0.02 0.00 - 0.20 10*3/mm3    Immature Grans, Absolute 0.07 (H) 0.00 - 0.05 10*3/mm3    nRBC 0.0 0.0 - 0.2 /100 WBC   Uric Acid    Collection Time: 02/02/21  4:04 AM    Specimen: Blood   Result Value Ref Range    Uric Acid 5.5 2.4 - 5.7 mg/dL   TSH    Collection Time: 02/02/21  4:04 AM    Specimen: Blood   Result Value Ref Range    TSH 5.100 (H) 0.270 - 4.200 uIU/mL   POC Glucose Once    Collection Time: 02/02/21  6:19 AM    Specimen: Blood   Result Value Ref Range    Glucose 167 (H) 70 - 130 mg/dL     ASSESSMENT:   LINDSEY, has resolved since admission  CKD II  New Hyponatremia  Anasarca   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  Sepsis bacteremia from septic arthritis of left shoulder, MSSA  Obesity   KRISTY  Anemia of chronic disease, worsening   LATOYA  HTN  DM    PLAN:   Per Dr. Schneider:   Urine studies obtained thus far reviewed, not consistent with SIADH  She is markedly volume overloaded on exam  Start Lasix drip  Start fluid restriction   Send additional urine studies plus check TSH and uric acid  Will need to monitor renal function closely, diuresis may prove a bit difficult with such a low serum albumin but we will see how she responds to the Lasix drip  Will order Q6 renal panel and electrolyte protocol to use PRN    Continue to monitor electrolytes and volume closely, avoid IV contrast and nephrotoxic medications     Replace lytes PRN  Follow up labs in AM    I appreciate the consult request, please call if any questions    Jayme Godwin DNP, APRN  [facility]  Office phone number: [phone]  Answering service phone number: [phone]      2/2/2021    I have personally seen, interviewed and examined the patient as well as reviewed new clinical data and  documentation.   I agree with the above note by NITESH Car with the following additions:    55-year-old female initially omitted for septic arthritis, no prior CKD but did have acute kidney injury on admission.  Also has a history of cirrhosis from [place], no previous hyponatremia but has been progressively worsening, now down to 125 with increasing weight gain since admission.  Her albumin has also been low    Nephrology Attending Physical exam  Objective:  Vitals as per APRN note  General Appearance:  Comfortable, obese but otherwise well-appearing, in no acute distress. Awake, alert, oriented  HEENT: Mucous membranes moist, oropharynx clear  Lungs:  Normal effort and resp rate.  clear to auscultation, no distress, no rales or rhonchi.  Heart: Regular rate and rhythm, S1-S2  Abdomen: Soft nontender nondistended a adequate bowel sounds   Extremities: Normal range of motion.  He to 4+ edema of bilateral lower extremities  Neurological: No focal motor or sensory deficits  Skin:  Warm and dry. No rash or cyanosis.       Assessment/plan:    Urine studies not consistent with SIADH  Volume overloaded on exam, will proceed with Lasix drip, fluid restriction  Check additional urine studies, TSH, uric acid  Monitor renal function and electrolytes closely while on Lasix drip      Kermit Schneider MD  [facility]  [phone]

## 2021-02-02 NOTE — PLAN OF CARE
Goal Outcome Evaluation:  Plan of Care Reviewed With: patient  Progress: improving  Outcome Summary: Patient is alert and oriented. Vital signs stable. Up to BSC several times. Voiding without difficulty. Urine specimen obtained and sent to lab. Patient has had BM this shift. Tolerating new PICC line well. No discomfort to area. Dressing is well intact. Continues abt without adverse reactions noted. Tolerating IV lasix well. Continues with lizette lower ext edema. Blood sugars obtained. No s/s of hypo or hyperglycemia. Plan is for patient to potentially discharge today. Patient is excited and states she feels confident  about going home. Dressing remains in place to left shoulder. No drainage noted to outside dressing. Call light in reach. Patient has been sleeping/resting in recliner all shift, per her request.

## 2021-02-03 LAB
ALBUMIN SERPL-MCNC: 2.1 G/DL (ref 3.5–5.2)
ALBUMIN SERPL-MCNC: 2.1 G/DL (ref 3.5–5.2)
ANION GAP SERPL CALCULATED.3IONS-SCNC: 6.6 MMOL/L (ref 5–15)
ANION GAP SERPL CALCULATED.3IONS-SCNC: 7.2 MMOL/L (ref 5–15)
BASOPHILS # BLD AUTO: 0.02 10*3/MM3 (ref 0–0.2)
BASOPHILS NFR BLD AUTO: 0.3 % (ref 0–1.5)
BUN SERPL-MCNC: 34 MG/DL (ref 6–20)
BUN SERPL-MCNC: 36 MG/DL (ref 6–20)
BUN/CREAT SERPL: 38.2 (ref 7–25)
BUN/CREAT SERPL: 40.9 (ref 7–25)
CALCIUM SPEC-SCNC: 7.8 MG/DL (ref 8.6–10.5)
CALCIUM SPEC-SCNC: 8 MG/DL (ref 8.6–10.5)
CHLORIDE SERPL-SCNC: 94 MMOL/L (ref 98–107)
CHLORIDE SERPL-SCNC: 94 MMOL/L (ref 98–107)
CO2 SERPL-SCNC: 27.4 MMOL/L (ref 22–29)
CO2 SERPL-SCNC: 29.8 MMOL/L (ref 22–29)
CREAT SERPL-MCNC: 0.88 MG/DL (ref 0.57–1)
CREAT SERPL-MCNC: 0.89 MG/DL (ref 0.57–1)
DEPRECATED RDW RBC AUTO: 47.7 FL (ref 37–54)
EOSINOPHIL # BLD AUTO: 0.02 10*3/MM3 (ref 0–0.4)
EOSINOPHIL NFR BLD AUTO: 0.3 % (ref 0.3–6.2)
ERYTHROCYTE [DISTWIDTH] IN BLOOD BY AUTOMATED COUNT: 14.4 % (ref 12.3–15.4)
GFR SERPL CREATININE-BSD FRML MDRD: 66 ML/MIN/1.73
GFR SERPL CREATININE-BSD FRML MDRD: 66 ML/MIN/1.73
GLUCOSE BLDC GLUCOMTR-MCNC: 121 MG/DL (ref 70–130)
GLUCOSE BLDC GLUCOMTR-MCNC: 158 MG/DL (ref 70–130)
GLUCOSE BLDC GLUCOMTR-MCNC: 162 MG/DL (ref 70–130)
GLUCOSE BLDC GLUCOMTR-MCNC: 204 MG/DL (ref 70–130)
GLUCOSE SERPL-MCNC: 156 MG/DL (ref 65–99)
GLUCOSE SERPL-MCNC: 199 MG/DL (ref 65–99)
HCT VFR BLD AUTO: 23.1 % (ref 34–46.6)
HGB BLD-MCNC: 7.5 G/DL (ref 12–15.9)
IMM GRANULOCYTES # BLD AUTO: 0.04 10*3/MM3 (ref 0–0.05)
IMM GRANULOCYTES NFR BLD AUTO: 0.6 % (ref 0–0.5)
LYMPHOCYTES # BLD AUTO: 1.66 10*3/MM3 (ref 0.7–3.1)
LYMPHOCYTES NFR BLD AUTO: 24 % (ref 19.6–45.3)
MAGNESIUM SERPL-MCNC: 1.8 MG/DL (ref 1.6–2.6)
MCH RBC QN AUTO: 29.8 PG (ref 26.6–33)
MCHC RBC AUTO-ENTMCNC: 32.5 G/DL (ref 31.5–35.7)
MCV RBC AUTO: 91.7 FL (ref 79–97)
MONOCYTES # BLD AUTO: 0.39 10*3/MM3 (ref 0.1–0.9)
MONOCYTES NFR BLD AUTO: 5.6 % (ref 5–12)
NEUTROPHILS NFR BLD AUTO: 4.8 10*3/MM3 (ref 1.7–7)
NEUTROPHILS NFR BLD AUTO: 69.2 % (ref 42.7–76)
NRBC BLD AUTO-RTO: 0 /100 WBC (ref 0–0.2)
PHOSPHATE SERPL-MCNC: 3.5 MG/DL (ref 2.5–4.5)
PHOSPHATE SERPL-MCNC: 3.8 MG/DL (ref 2.5–4.5)
PLATELET # BLD AUTO: 211 10*3/MM3 (ref 140–450)
PMV BLD AUTO: 9.4 FL (ref 6–12)
POTASSIUM SERPL-SCNC: 4.1 MMOL/L (ref 3.5–5.2)
POTASSIUM SERPL-SCNC: 4.5 MMOL/L (ref 3.5–5.2)
RBC # BLD AUTO: 2.52 10*6/MM3 (ref 3.77–5.28)
SODIUM SERPL-SCNC: 128 MMOL/L (ref 136–145)
SODIUM SERPL-SCNC: 131 MMOL/L (ref 136–145)
WBC # BLD AUTO: 6.93 10*3/MM3 (ref 3.4–10.8)

## 2021-02-03 PROCEDURE — 25010000002 FUROSEMIDE PER 20 MG: Performed by: INTERNAL MEDICINE

## 2021-02-03 PROCEDURE — 25010000003 CEFAZOLIN IN DEXTROSE 2-4 GM/100ML-% SOLUTION: Performed by: ORTHOPAEDIC SURGERY

## 2021-02-03 PROCEDURE — 63710000001 INSULIN LISPRO (HUMAN) PER 5 UNITS: Performed by: ORTHOPAEDIC SURGERY

## 2021-02-03 PROCEDURE — 97535 SELF CARE MNGMENT TRAINING: CPT

## 2021-02-03 PROCEDURE — 82962 GLUCOSE BLOOD TEST: CPT

## 2021-02-03 PROCEDURE — 63710000001 INSULIN GLARGINE PER 5 UNITS: Performed by: HOSPITALIST

## 2021-02-03 PROCEDURE — 85025 COMPLETE CBC W/AUTO DIFF WBC: CPT | Performed by: INTERNAL MEDICINE

## 2021-02-03 PROCEDURE — 63710000001 INSULIN LISPRO (HUMAN) PER 5 UNITS: Performed by: HOSPITALIST

## 2021-02-03 PROCEDURE — 97110 THERAPEUTIC EXERCISES: CPT

## 2021-02-03 PROCEDURE — 83735 ASSAY OF MAGNESIUM: CPT | Performed by: NURSE PRACTITIONER

## 2021-02-03 PROCEDURE — 80069 RENAL FUNCTION PANEL: CPT | Performed by: NURSE PRACTITIONER

## 2021-02-03 RX ADMIN — INSULIN GLARGINE 18 UNITS: 100 INJECTION, SOLUTION SUBCUTANEOUS at 20:40

## 2021-02-03 RX ADMIN — VENLAFAXINE HYDROCHLORIDE 75 MG: 75 TABLET ORAL at 09:17

## 2021-02-03 RX ADMIN — OXYCODONE HYDROCHLORIDE AND ACETAMINOPHEN 1 TABLET: 7.5; 325 TABLET ORAL at 14:04

## 2021-02-03 RX ADMIN — CEFAZOLIN SODIUM 2 G: 2 INJECTION, SOLUTION INTRAVENOUS at 00:19

## 2021-02-03 RX ADMIN — OXYCODONE HYDROCHLORIDE AND ACETAMINOPHEN 1 TABLET: 7.5; 325 TABLET ORAL at 06:01

## 2021-02-03 RX ADMIN — OXYCODONE HYDROCHLORIDE AND ACETAMINOPHEN 1 TABLET: 7.5; 325 TABLET ORAL at 23:35

## 2021-02-03 RX ADMIN — SODIUM CHLORIDE, PRESERVATIVE FREE 10 ML: 5 INJECTION INTRAVENOUS at 09:18

## 2021-02-03 RX ADMIN — SODIUM CHLORIDE, PRESERVATIVE FREE 10 ML: 5 INJECTION INTRAVENOUS at 20:41

## 2021-02-03 RX ADMIN — FUROSEMIDE 20 MG/HR: 10 INJECTION, SOLUTION INTRAVENOUS at 14:13

## 2021-02-03 RX ADMIN — SODIUM CHLORIDE, PRESERVATIVE FREE 10 ML: 5 INJECTION INTRAVENOUS at 20:42

## 2021-02-03 RX ADMIN — OXYCODONE HYDROCHLORIDE AND ACETAMINOPHEN 1 TABLET: 7.5; 325 TABLET ORAL at 10:03

## 2021-02-03 RX ADMIN — FUROSEMIDE 20 MG/HR: 10 INJECTION, SOLUTION INTRAVENOUS at 09:16

## 2021-02-03 RX ADMIN — FUROSEMIDE 20 MG/HR: 10 INJECTION, SOLUTION INTRAVENOUS at 20:40

## 2021-02-03 RX ADMIN — LEVOTHYROXINE SODIUM 88 MCG: 88 TABLET ORAL at 09:16

## 2021-02-03 RX ADMIN — SODIUM CHLORIDE, PRESERVATIVE FREE 10 ML: 5 INJECTION INTRAVENOUS at 09:17

## 2021-02-03 RX ADMIN — OXYCODONE HYDROCHLORIDE AND ACETAMINOPHEN 1 TABLET: 7.5; 325 TABLET ORAL at 18:34

## 2021-02-03 RX ADMIN — VENLAFAXINE HYDROCHLORIDE 75 MG: 75 TABLET ORAL at 20:40

## 2021-02-03 RX ADMIN — LEVOTHYROXINE SODIUM 88 MCG: 88 TABLET ORAL at 09:17

## 2021-02-03 RX ADMIN — VENLAFAXINE HYDROCHLORIDE 75 MG: 75 TABLET ORAL at 09:16

## 2021-02-03 RX ADMIN — INSULIN LISPRO 3 UNITS: 100 INJECTION, SOLUTION INTRAVENOUS; SUBCUTANEOUS at 14:04

## 2021-02-03 RX ADMIN — INSULIN LISPRO 2 UNITS: 100 INJECTION, SOLUTION INTRAVENOUS; SUBCUTANEOUS at 14:03

## 2021-02-03 RX ADMIN — INSULIN LISPRO 3 UNITS: 100 INJECTION, SOLUTION INTRAVENOUS; SUBCUTANEOUS at 18:34

## 2021-02-03 RX ADMIN — CEFAZOLIN SODIUM 2 G: 2 INJECTION, SOLUTION INTRAVENOUS at 09:16

## 2021-02-03 RX ADMIN — LOSARTAN POTASSIUM 100 MG: 100 TABLET, FILM COATED ORAL at 09:16

## 2021-02-03 RX ADMIN — INSULIN LISPRO 3 UNITS: 100 INJECTION, SOLUTION INTRAVENOUS; SUBCUTANEOUS at 09:16

## 2021-02-03 RX ADMIN — OXYCODONE HYDROCHLORIDE AND ACETAMINOPHEN 1 TABLET: 7.5; 325 TABLET ORAL at 00:19

## 2021-02-03 RX ADMIN — FUROSEMIDE 20 MG/HR: 10 INJECTION, SOLUTION INTRAVENOUS at 03:50

## 2021-02-03 RX ADMIN — CEFAZOLIN SODIUM 2 G: 2 INJECTION, SOLUTION INTRAVENOUS at 18:34

## 2021-02-03 NOTE — PROGRESS NOTES
"Adult Nutrition  Assessment/PES    Patient Name:  Stefani Bhandari  YOB: 1965  MRN: [id number]  Admit Date:  1/24/2021    Assessment Date:  2/3/2021  Nutrition assessment.   Reason for Assessment     Row Name 02/03/21 1136          Reason for Assessment    Reason For Assessment  per organizational policy length of stay     Diagnosis   Septic Arthritis Left Shoulder/Sepsis with MSSA Bacteremia; liver cirrhosis, HLD, HTN, KRISTY, DM         Nutrition/Diet History     Row Name 02/03/21 1136          Nutrition/Diet History    Typical Food/Fluid Intake  CC diet, fluid restriction 1000 mL/day; po intake average 75% of meals-reported good appetite; will continue to follow/monitor         Anthropometrics     Row Name 02/03/21 1137          Anthropometrics    Height  170.2 cm (67.01\")        Admit Weight    Admit Weight  142 kg (313 lb 0.9 oz)        Ideal Body Weight (IBW)    Ideal Body Weight (IBW) (kg)  61.88     % of Ideal Body Weight Assessment  -- 229%        Body Mass Index (BMI)    BMI Assessment  BMI 40 or greater: obesity grade III BMI-48.9         Labs/Tests/Procedures/Meds     Row Name 02/03/21 1137          Labs/Procedures/Meds    Lab Results Reviewed  reviewed, pertinent     Lab Results Comments  Glu, Na        Diagnostic Tests/Procedures    Diagnostic Test/Procedure Reviewed  reviewed, pertinent        Medications    Pertinent Medications Reviewed  reviewed, pertinent     Pertinent Medications Comments  insulin, NaCl         Physical Findings     Row Name 02/03/21 1137          Physical Findings    Overall Physical Appearance  obese B=20         Estimated/Assessed Needs     Row Name 02/03/21 1137          Calculation Measurements    Height  170.2 cm (67.01\")         Nutrition Prescription Ordered     Row Name 02/03/21 1138          Nutrition Prescription PO    Common Modifiers  Consistent Carbohydrate;Fluid Restriction     Fluid Restriction mL per Day  1000 mL         Evaluation of Received " Nutrient/Fluid Intake     Row Name 02/03/21 1138          PO Evaluation    Number of Meals  3     % PO Intake  75               Problem/Interventions:  Problem 1     Row Name 02/03/21 1138          Nutrition Diagnoses Problem 1    Problem 1  Overweight/Obesity     Etiology (related to)  MNT for Treatment/Condition     Signs/Symptoms (evidenced by)  BMI     BMI  Greater than 40               Intervention Goal     Row Name 02/03/21 1138          Intervention Goal    General  Maintain nutrition;Meet nutritional needs for age/condition     PO  Tolerate PO;Maintain intake     Weight  Appropriate weight loss         Nutrition Intervention     Row Name 02/03/21 1138          Nutrition Intervention    RD/Tech Action  Care plan reviewd;Follow Tx progress           Education/Evaluation     Row Name 02/03/21 1138          Education    Education  Will Instruct as appropriate        Monitor/Evaluation    Monitor  Per protocol           Electronically signed by:  Ariana Knight RD  02/03/21 11:38 EST

## 2021-02-03 NOTE — PROGRESS NOTES
" LOS: 10 days   Patient Care Team:  Dea Martinez MD as PCP - General (Family Medicine)    Chief Complaint: hyponatremia     Subjective     History of Present Illness  This is a 55 y.o. year old female admitted for septic arthritis of the left shoulder with MSSA bacteremia. Na 135 on admission and dropped to 125. She has anasarca related to liver cirrhosis due to METZGER.     She was started on lasix gtt yesterday       Subjective  She is feeling better today  Reports edema is improved some, but still present.   Reports good UOP  Denies sob or chest pain     History taken from: patient chart    Objective     Vital Sign Min/Max for last 24 hours  Temp  Min: 97.9 °F (36.6 °C)  Max: 98.9 °F (37.2 °C)   BP  Min: 143/80  Max: 145/74   Pulse  Min: 78  Max: 86   Resp  Min: 18  Max: 18   SpO2  Min: 96 %  Max: 99 %   No data recorded   Weight  Min: 142 kg (312 lb 11.2 oz)  Max: 142 kg (312 lb 11.2 oz)     Flowsheet Rows      First Filed Value   Admission Height  170.2 cm (67\") Documented at 01/24/2021 0734   Admission Weight  127 kg (280 lb) Documented at 01/24/2021 0734          I/O this shift:  In: -   Out: 600 [Urine:600]  I/O last 3 completed shifts:  In: 1260 [P.O.:1160; IV Piggyback:100]  Out: 1600 [Urine:1600]    Objective  Physical Examination: General appearance - alert, well appearing, and in no distress  Mental status - alert, oriented to person, place, and time  Eyes - pupils equal and reactive, extraocular eye movements intact  Chest - clear to auscultation, no wheezes, rales or rhonchi, symmetric air entry  Heart - normal rate, regular rhythm, normal S1, S2, no murmurs, rubs, clicks or gallops  Abdomen - soft, nontender, nondistended, no masses or organomegaly  Neurological - alert, oriented, normal speech, no focal findings or movement disorder noted  Extremities - + edema in upper and lower extremities. No cyanosis,   Results Review:     I reviewed the patient's new clinical results.    WBC WBC   Date Value " Ref Range Status   02/03/2021 6.93 3.40 - 10.80 10*3/mm3 Final   02/02/2021 8.49 3.40 - 10.80 10*3/mm3 Final   02/01/2021 10.20 3.40 - 10.80 10*3/mm3 Final      HGB Hemoglobin   Date Value Ref Range Status   02/03/2021 7.5 (L) 12.0 - 15.9 g/dL Final   02/02/2021 7.6 (L) 12.0 - 15.9 g/dL Final   02/01/2021 8.5 (L) 12.0 - 15.9 g/dL Final      HCT Hematocrit   Date Value Ref Range Status   02/03/2021 23.1 (L) 34.0 - 46.6 % Final   02/02/2021 23.2 (L) 34.0 - 46.6 % Final   02/01/2021 25.3 (L) 34.0 - 46.6 % Final      Platlets No results found for: LABPLAT   MCV MCV   Date Value Ref Range Status   02/03/2021 91.7 79.0 - 97.0 fL Final   02/02/2021 92.4 79.0 - 97.0 fL Final   02/01/2021 91.7 79.0 - 97.0 fL Final          Sodium Sodium   Date Value Ref Range Status   02/03/2021 128 (L) 136 - 145 mmol/L Final   02/02/2021 125 (L) 136 - 145 mmol/L Final   02/01/2021 125 (L) 136 - 145 mmol/L Final      Potassium Potassium   Date Value Ref Range Status   02/03/2021 4.1 3.5 - 5.2 mmol/L Final   02/02/2021 3.9 3.5 - 5.2 mmol/L Final   02/01/2021 4.1 3.5 - 5.2 mmol/L Final      Chloride Chloride   Date Value Ref Range Status   02/03/2021 94 (L) 98 - 107 mmol/L Final   02/02/2021 90 (L) 98 - 107 mmol/L Final   02/01/2021 90 (L) 98 - 107 mmol/L Final      CO2 CO2   Date Value Ref Range Status   02/03/2021 27.4 22.0 - 29.0 mmol/L Final   02/02/2021 27.0 22.0 - 29.0 mmol/L Final   02/01/2021 26.5 22.0 - 29.0 mmol/L Final      BUN BUN   Date Value Ref Range Status   02/03/2021 36 (H) 6 - 20 mg/dL Final   02/02/2021 39 (H) 6 - 20 mg/dL Final   02/01/2021 38 (H) 6 - 20 mg/dL Final      Creatinine Creatinine   Date Value Ref Range Status   02/03/2021 0.88 0.57 - 1.00 mg/dL Final   02/02/2021 0.91 0.57 - 1.00 mg/dL Final   02/01/2021 0.94 0.57 - 1.00 mg/dL Final      Calcium Calcium   Date Value Ref Range Status   02/03/2021 7.8 (L) 8.6 - 10.5 mg/dL Final   02/02/2021 7.7 (L) 8.6 - 10.5 mg/dL Final   02/01/2021 8.0 (L) 8.6 - 10.5 mg/dL  Final      PO4 No results found for: CAPO4   Albumin Albumin   Date Value Ref Range Status   02/03/2021 2.10 (L) 3.50 - 5.20 g/dL Final      Magnesium Magnesium   Date Value Ref Range Status   02/03/2021 1.8 1.6 - 2.6 mg/dL Final      Uric Acid Uric Acid   Date Value Ref Range Status   02/02/2021 5.5 2.4 - 5.7 mg/dL Final   01/31/2021 4.6 2.4 - 5.7 mg/dL Final        Medication Review:     Current Facility-Administered Medications:   •  acetaminophen (TYLENOL) tablet 650 mg, 650 mg, Oral, Q4H PRN, Juaquin Richardson II, MD, 650 mg at 01/26/21 1704  •  ceFAZolin in dextrose (ANCEF) IVPB solution 2 g, 2 g, Intravenous, Q8H, Juaquin Richardson II, MD, 2 g at 02/03/21 0019  •  cyclobenzaprine (FLEXERIL) tablet 10 mg, 10 mg, Oral, TID PRN, Juaquin Richardson II, MD  •  dextrose (D50W) 25 g/ 50mL Intravenous Solution 25 g, 25 g, Intravenous, Q15 Min PRN, Juaquin Richardson II, MD  •  dextrose (GLUTOSE) oral gel 15 g, 15 g, Oral, Q15 Min PRN, Juaquin Richardson II, MD  •  furosemide (LASIX) 100 mg in sodium chloride 0.9 % 100 mL infusion, 20 mg/hr, Intravenous, Continuous, Kermit Schneider MD, Last Rate: 20 mL/hr at 02/03/21 0350, 20 mg/hr at 02/03/21 0350  •  glucagon (human recombinant) (GLUCAGEN DIAGNOSTIC) injection 1 mg, 1 mg, Subcutaneous, Q15 Min PRN, Juaquin Richardson II, MD  •  HYDROmorphone (DILAUDID) injection 0.5 mg, 0.5 mg, Intravenous, Q3H PRN, Juaquin Richardson II, MD, 0.5 mg at 01/25/21 0746  •  insulin glargine (LANTUS, SEMGLEE) injection 18 Units, 18 Units, Subcutaneous, Nightly, Richie Veloz MD, 18 Units at 02/02/21 2020  •  insulin lispro (humaLOG, ADMELOG) injection 0-9 Units, 0-9 Units, Subcutaneous, TID AC, Juaquin Richardson II, MD, 4 Units at 02/02/21 1153  •  insulin lispro (humaLOG, ADMELOG) injection 3 Units, 3 Units, Subcutaneous, TID With Meals, Richie Veloz MD, 3 Units at 02/02/21 5023  •  levothyroxine (SYNTHROID, LEVOTHROID)  tablet 88 mcg, 88 mcg, Oral, Daily, Juaquin Richardson II, MD, 88 mcg at 02/02/21 0918  •  losartan (COZAAR) tablet 100 mg, 100 mg, Oral, Q24H, Juaquin Richardson II, MD, 100 mg at 02/02/21 0918  •  Magnesium Sulfate 2 gram Bolus, followed by 8 gram infusion (total Mg dose 10 grams)- Mg less than or equal to 1mg/dL, 2 g, Intravenous, PRN **OR** Magnesium Sulfate 2 gram / 50mL Infusion (GIVE X 3 BAGS TO EQUAL 6GM TOTAL DOSE) - Mg 1.1 - 1.5 mg/dl, 2 g, Intravenous, PRN **OR** Magnesium Sulfate 4 gram infusion- Mg 1.6-1.9 mg/dL, 4 g, Intravenous, PRN, PrestauryiaJayme michelle, APRN  •  ondansetron (ZOFRAN) tablet 4 mg, 4 mg, Oral, Q6H PRN, Juaquin Richardson II, MD  •  oxyCODONE-acetaminophen (PERCOCET) 7.5-325 MG per tablet 1 tablet, 1 tablet, Oral, Q4H PRN, Juaquin Richardson II, MD, 1 tablet at 02/03/21 0601  •  potassium chloride (K-DUR,KLOR-CON) ER tablet 40 mEq, 40 mEq, Oral, PRN, Prestigiaviviana, Jayme, APRN  •  potassium chloride (KLOR-CON) packet 40 mEq, 40 mEq, Oral, PRN, Prestigiaviviana, Jayme, APRN  •  sodium chloride 0.9 % flush 10 mL, 10 mL, Intravenous, Q12H, Juaquin Richardson II, MD, 10 mL at 02/02/21 2020  •  sodium chloride 0.9 % flush 10 mL, 10 mL, Intravenous, PRN, Juaquin Richardson II, MD  •  sodium chloride 0.9 % flush 10 mL, 10 mL, Intravenous, Q12H, Dianne Porras MD, 10 mL at 02/02/21 2020  •  sodium chloride 0.9 % flush 10 mL, 10 mL, Intravenous, PRN, Dianne Porras MD, 10 mL at 02/01/21 1748  •  venlafaxine (EFFEXOR) tablet 75 mg, 75 mg, Oral, BID, Juaquin Richardson II, MD, 75 mg at 02/02/21 2019    Assessment/Plan       Sepsis without acute organ dysfunction (CMS/HCC)    Essential hypertension    Sleep apnea    Anxiety and depression    Diabetes mellitus type 2 in obese (CMS/[facility])    Obesity, Class III, BMI 40-49.9 (morbid obesity) (CMS/[facility])    Other specified hypothyroidism    Mixed hyperlipidemia    Lumbar degenerative disc disease    Thrombocytopenia  (CMS/HCC)    Pulmonary nodules    Abnormal CT of the chest    Elevated liver function tests    History of COVID-19    LINDSEY (acute kidney injury) (CMS/HCC)    Hyponatremia    Septic arthritis of shoulder, left (CMS/HCC)    MSSA bacteremia    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 (CMS/HCC)    Anasarca      Assessment & Plan  1. Hyponatremia   2. Anasarca  3. Liver cirrhosis 2/2 METZGER  4. Sepsis   5. Septic arthritis (left shoulder MSSA)  6. Anemia of chronic disease   7. HTN  8. DM    Na improved today to 128  Continue lasix gtt today as she is still volume overloaded. Continue fluid restriction   Strict ins/outs. Will consider changing to scheduled IV loop diuretics tomorrow.   Monitor Na and K wile on the gtt. K at goal today as well as Mg. Recheck in am   Abs per primary team   Renal function stable.   Will follow.     Glenda Paniagua MD  02/03/21  09:01 EST

## 2021-02-03 NOTE — PLAN OF CARE
Goal Outcome Evaluation:  Plan of Care Reviewed With: patient  Progress: improving  Outcome Summary: The patient is agreeable to PT this date. The patient completed ambulation for 20ft with Rwx requiring CGA and STS to Rwx with CGA. PT attempted to progress ambulation distance this date and patient does not feel like ambulating more due to L quadriceps pain. Pt educated regarding progression of therpay as tolerated to increase overall activity tolerance and independence.    Patient was intermittently wearing a face mask during this therapy encounter. Therapist used appropriate personal protective equipment including eye protection, mask, and gloves.  Mask used was standard procedure mask. Appropriate PPE was worn during the entire therapy session. Hand hygiene was completed before and after therapy session. Patient is not in enhanced droplet precautions.

## 2021-02-03 NOTE — PLAN OF CARE
Problem: Adult Inpatient Plan of Care  Goal: Plan of Care Review  Outcome: Ongoing, Progressing  Flowsheets (Taken 2/3/2021 1433)  Plan of Care Reviewed With: patient  Outcome Summary: Pt agreeable to treatment. Pt performed BSC transfer with SBA-supervision. Pt requires SBA-supervision for toileting. Pt completed UB bathing with min A and LB bathing with mod A due to impaired functional use of LUE. Pt requires mod A for hair grooming and mod I for facial hygiene. Pt then tolerated pendulums in standing to LUE and AROM of all unaffected joints distal to shoulder from chair level. Pt remains limited by impaired functional use of LUE with self care and at risk for ROM loss of LUE without continued therapeutic intervention. OT recommends continued treatment at 3x/week and d/c with home with HH OT/family assist.    Patient was wearing a face mask during this therapy encounter. Therapist used appropriate personal protective equipment including mask, gloves, and eye protection.  Mask used was standard procedure mask. Appropriate PPE was worn during the entire therapy session. Hand hygiene was completed before and after therapy session. Patient is not in enhanced droplet precautions.

## 2021-02-03 NOTE — PAYOR COMM NOTE
"PaulinaeduardoStefani (55 y.o. Female)     PLEASE SEE ATTACHED CLINICAL REVIEW.     REF#[id number]    PT REMAINS ON LASIX DRIP  AND FLUID RESTRICTION. .    PLEASE CALL   OR  [phone] WITH INPT CONTINUED STAY AND DAYS.    THANK YOU    CLARISA MULLER LPN CCP    Date of Birth Social Security Number Address Home Phone MRN    1965  [id number] [facility] [phone] [id number]    Orthodox Marital Status          None        Admission Date Admission Type Admitting Provider Attending Provider Department, Room/Bed    1/24/21 [facility] [place] Nguyen, Minh Loc, MD [address], [address]    Discharge Date Discharge Disposition Discharge Destination                       Attending Provider: Richie Veloz MD    Allergies: Tramadol    Isolation: None   Infection: None   Code Status: CPR    Ht: 170.2 cm (67.01\")   Wt: 142 kg (312 lb 11.2 oz)    Admission Cmt: None   Principal Problem: Sepsis without acute organ dysfunction (CMS/HCC) [A41.9]                 Active Insurance as of 1/24/2021     Primary Coverage     Payor Plan Insurance Group Employer/Plan Group    [employer] [facility] [employer] EMPLOYEE [id number]     Payor Plan Address Payor Plan Phone Number Payor Plan Fax Number Effective Dates    [address] [phone]  1/1/2018 - None Entered    [place] [address]       Subscriber Name Subscriber Birth Date Member ID       STEFANI LEE 1965 [id number]                 Emergency Contacts      (Rel.) Home Phone Work Phone Mobile Phone    Alessia Waite (ED RN) (Daughter) -- -- [phone]    JF LEE (Daughter) [phone] -- --            Oxygen Therapy (last day)     Date/Time   SpO2   Device (Oxygen Therapy)   Flow (L/min)   Oxygen Concentration (%)   ETCO2 (mmHg)    02/03/21 0007   --   room air   --   --   --    02/02/21 2251   96   room air   --   --   --    02/02/21 2019   -- "   room air   --   --   --    21 1908   99   room air   --   --   --    21 1420   --   room air   --   --   --    21 1303   98   room air   --   --   --    21 0855   --   room air   --   --   --    21 0812   100   room air   --   --   --    21 0420   97   --   --   --   --    21 0400   --   room air   --   --   --    21 0015   --   room air   --   --   --              Intake & Output (last day)        07 -  07 07 -  0700    P.O. 680     IV Piggyback      Total Intake(mL/kg) 680 (4.8)     Urine (mL/kg/hr) 800 (0.2) 600 (0.8)    Stool 0 0    Total Output 800 600    Net -120 -600          Urine Unmeasured Occurrence 4 x     Stool Unmeasured Occurrence 2 x 1 x        Goal Outcome Evaluation:  Plan of Care Reviewed With: patient  Progress: improving  Outcome Summary: The patient is agreeable to PT this date. The patient completed ambulation for 20ft with Rwx requiring CGA and STS to Rwx with CGA. PT attempted to progress ambulation distance this date and patient does not feel like ambulating more due to L quadriceps pain. Pt educated regarding progression of therpay as tolerated to increase overall activity tolerance and independence.     Patient was intermittently wearing a face mask during this therapy encounter. Therapist used appropriate personal protective equipment including eye protection, mask, and gloves.  Mask used was standard procedure mask. Appropriate PPE was worn during the entire therapy session. Hand hygiene was completed before and after therapy session. Patient is not in enhanced droplet precautions.             Physician Progress Notes (last 24 hours) (Notes from 21 1202 through 21 1202)      Richie Veloz MD at 21              Name: Stefani Bhandari ADMIT: 2021   : 1965  PCP: Dea Martinez MD    MRN: [id number] LOS: 10 days   AGE/SEX: 55 y.o. female  ROOM: [facility]     Subjective    Subjective   No new complaints today.  Denies shortness of breath, chest pain.  Just worked with PT.  Now sitting in a chair.  Appetite is fine.  She is asking about when she can go home.    Review of Systems   Constitutional: Negative for fever.   Respiratory: Negative for cough and shortness of breath.    Cardiovascular: Negative for chest pain.   Gastrointestinal: Negative for abdominal pain.     Objective   Objective   Vital Signs  Temp:  [97.9 °F (36.6 °C)-98.9 °F (37.2 °C)] 98.8 °F (37.1 °C)  Heart Rate:  [78-86] 79  Resp:  [18] 18  BP: (143-145)/(72-80) 144/72  SpO2:  [96 %-99 %] 96 %  on   ;   Device (Oxygen Therapy): room air  Body mass index is 48.98 kg/m².    Physical Exam  Vitals signs and nursing note reviewed.   Constitutional:       General: She is not in acute distress.     Appearance: She is obese.   HENT:      Head: Normocephalic and atraumatic.   Cardiovascular:      Rate and Rhythm: Normal rate and regular rhythm.   Pulmonary:      Effort: Pulmonary effort is normal. No respiratory distress.      Breath sounds: Normal breath sounds.   Abdominal:      General: Bowel sounds are normal.      Palpations: Abdomen is soft.      Tenderness: There is no abdominal tenderness. There is no guarding or rebound.   Musculoskeletal:      Right lower leg: Edema present.      Left lower leg: Edema present.      Comments: 2+   Skin:     General: Skin is warm and dry.   Neurological:      General: No focal deficit present.      Mental Status: She is alert and oriented to person, place, and time.   Psychiatric:         Mood and Affect: Mood normal.         Behavior: Behavior normal.     Results Review  I reviewed the patient's new clinical results.  Results from last 7 days   Lab Units 02/03/21  0559 02/02/21  0404 02/01/21  0735 01/30/21  0412   WBC 10*3/mm3 6.93 8.49 10.20 11.58*   HEMOGLOBIN g/dL 7.5* 7.6* 8.5* 8.8*   PLATELETS 10*3/mm3 211 173 152 122*     Results from last 7 days   Lab Units 02/03/21  0559  02/02/21  0404 02/01/21  0735 01/30/21  0412   SODIUM mmol/L 128* 125* 125* 124*   POTASSIUM mmol/L 4.1 3.9 4.1 3.8   CHLORIDE mmol/L 94* 90* 90* 91*   CO2 mmol/L 27.4 27.0 26.5 26.1   BUN mg/dL 36* 39* 38* 24*   CREATININE mg/dL 0.88 0.91 0.94 0.86   GLUCOSE mg/dL 156* 170* 175* 185*     Estimated Creatinine Clearance: 105.7 mL/min (by C-G formula based on SCr of 0.88 mg/dL).    Results from last 7 days   Lab Units 02/03/21 0559 01/30/21  0412   ALBUMIN g/dL 2.10* 2.00*   BILIRUBIN mg/dL  --  0.7   ALK PHOS U/L  --  110   AST (SGOT) U/L  --  27   ALT (SGPT) U/L  --  10     Results from last 7 days   Lab Units 02/03/21 0559 02/02/21 0404 02/01/21 0735 01/30/21  0412   CALCIUM mg/dL 7.8* 7.7* 8.0* 7.6*   ALBUMIN g/dL 2.10*  --   --  2.00*   MAGNESIUM mg/dL 1.8  --   --   --    PHOSPHORUS mg/dL 3.8  --   --   --        COVID19   Date Value Ref Range Status   01/29/2021 Not Detected Not Detected - Ref. Range Final   01/24/2021 Not Detected Not Detected - Ref. Range Final     Glucose   Date/Time Value Ref Range Status   02/03/2021 0533 158 (H) 70 - 130 mg/dL Final   02/02/2021 1946 243 (H) 70 - 130 mg/dL Final   02/02/2021 1637 136 (H) 70 - 130 mg/dL Final   02/02/2021 1109 247 (H) 70 - 130 mg/dL Final   02/02/2021 0619 167 (H) 70 - 130 mg/dL Final   02/01/2021 2031 219 (H) 70 - 130 mg/dL Final   02/01/2021 1736 168 (H) 70 - 130 mg/dL Final     Scheduled Meds  ceFAZolin, 2 g, Intravenous, Q8H  insulin glargine, 18 Units, Subcutaneous, Nightly  insulin lispro, 0-9 Units, Subcutaneous, TID AC  insulin lispro, 3 Units, Subcutaneous, TID With Meals  levothyroxine, 88 mcg, Oral, Daily  losartan, 100 mg, Oral, Q24H  sodium chloride, 10 mL, Intravenous, Q12H  sodium chloride, 10 mL, Intravenous, Q12H  venlafaxine, 75 mg, Oral, BID    Continuous Infusions  furosemide (LASIX) 100 mg in 100mL NS infusion, 20 mg/hr, Last Rate: 20 mg/hr (02/03/21 0916)    PRN Meds  •  acetaminophen  •  cyclobenzaprine  •  dextrose  •   dextrose  •  glucagon (human recombinant)  •  HYDROmorphone  •  magnesium sulfate **OR** magnesium sulfate **OR** magnesium sulfate  •  ondansetron  •  oxyCODONE-acetaminophen  •  potassium chloride  •  potassium chloride  •  sodium chloride  •  sodium chloride    furosemide (LASIX) 100 mg in 100mL NS infusion, 20 mg/hr, Last Rate: 20 mg/hr (02/03/21 0916)    Diet  Diet Regular; Consistent Carbohydrate, Daily Fluid Restriction; 1000 mL Fluid Per Day       Intake/Output Summary (Last 24 hours) at 2/3/2021 0929  Last data filed at 2/3/2021 0758  Gross per 24 hour   Intake 680 ml   Output 1400 ml   Net -720 ml    Weight change: -1.361 kg (-3 lb)     Assessment/Plan     Active Hospital Problems    Diagnosis  POA   • **Sepsis without acute organ dysfunction (CMS/HCC) [A41.9]  Yes   • Anasarca [R60.1]  No   • Septic arthritis of shoulder, left (CMS/HCC) [M00.9]  Yes   • MSSA bacteremia [R78.81, B95.61]  Yes   • 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 (CMS/HCC) [K75.81, K74.60]  Yes   • History of COVID-19 [Z86.16]  Yes   • LINDSEY (acute kidney injury) (CMS/HCC) [N17.9]  Yes   • Hyponatremia [E87.1]  Yes   • Pulmonary nodules [R91.8]  Yes   • Elevated liver function tests [R79.89]  Yes   • Abnormal CT of the chest [R93.89]  Yes   • Thrombocytopenia (CMS/HCC) [D69.6]  Yes   • Other specified hypothyroidism [E03.8]  Yes   • Mixed hyperlipidemia [E78.2]  Yes   • Lumbar degenerative disc disease [M51.36]  Yes   • Obesity, Class III, BMI 40-49.9 (morbid obesity) (CMS/HCC) [E66.01]  Yes   • Sleep apnea [G47.30]  Yes   • Essential hypertension [I10]  Yes   • Diabetes mellitus type 2 in obese (CMS/HCC) [E11.69, E66.9]  Yes   • Anxiety and depression [F41.9, F32.9]  Yes      Resolved Hospital Problems   No resolved problems to display.     55 y.o. female admitted with Sepsis without acute organ dysfunction (CMS/HCC).    · Septic Arthritis Left Shoulder/Sepsis with MSSA Bacteremia  · sepsis present on admission; blood cx with MSSA.   Follow-up 1/26/2021 blood cultures no growth  · TTE with no valvular abnormalities-no ROBERT thought necessary per ID  · continue on IV cefazolin for 6 weeks minimum.  · MRI L shoulder showed septic arthritis-attempted aspiration unsuccessful. 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 Cirrhosis  · noted on abdominal CT scan  · has splenomegaly and TCP likely from portal HTN  · has relatively mild coagulopathy  · no e/o ascites or PSE  · will need GI follow up likely as outpatient when acute issues have resolved  · Anasarca/Hyponatremia  · Appreciate nephrology.  Now on furosemide drip  · Hypothyroidism  · continue on levothyroxine  · SANTOS/RLL Pulmonary Nodules  · unchanged  · f/u non-contrasted chest CT in 1 year  · Anemia  · Hgb has drifted down the past couple days-likely multifactorial with inflammation and postop   · no signs of acute bleeding or hemodynamic changes  · Continue to monitor  · Morbid Obesity  · Body mass index is 48.98 kg/m².   · SCDs for DVT prophylaxis.  · Full code.  · Discussed with patient and nursing staff.  · Anticipate discharge home timing yet to be determined.    Richie Veloz MD  [facility]  02/03/21  09:29 EST    I wore protective equipment throughout this patient encounter including a face mask, gloves, and protective eyewear.  Hand hygiene was performed before donning protective equipment and after removal when leaving the room.      Electronically signed by Richie Veloz MD at 02/03/21 0933     Glenda Paniagua MD at 02/03/21 0901           LOS: 10 days   Patient Care Team:  Dea Martinez MD as PCP - General (Family Medicine)    Chief Complaint: hyponatremia  "    Subjective     History of Present Illness  This is a 55 y.o. year old female admitted for septic arthritis of the left shoulder with MSSA bacteremia. Na 135 on admission and dropped to 125. She has anasarca related to liver cirrhosis due to METZGER.     She was started on lasix gtt yesterday       Subjective  She is feeling better today  Reports edema is improved some, but still present.   Reports good UOP  Denies sob or chest pain     History taken from: patient chart    Objective     Vital Sign Min/Max for last 24 hours  Temp  Min: 97.9 °F (36.6 °C)  Max: 98.9 °F (37.2 °C)   BP  Min: 143/80  Max: 145/74   Pulse  Min: 78  Max: 86   Resp  Min: 18  Max: 18   SpO2  Min: 96 %  Max: 99 %   No data recorded   Weight  Min: 142 kg (312 lb 11.2 oz)  Max: 142 kg (312 lb 11.2 oz)     Flowsheet Rows      First Filed Value   Admission Height  170.2 cm (67\") Documented at 01/24/2021 0734   Admission Weight  127 kg (280 lb) Documented at 01/24/2021 0734          I/O this shift:  In: -   Out: 600 [Urine:600]  I/O last 3 completed shifts:  In: 1260 [P.O.:1160; IV Piggyback:100]  Out: 1600 [Urine:1600]    Objective  Physical Examination: General appearance - alert, well appearing, and in no distress  Mental status - alert, oriented to person, place, and time  Eyes - pupils equal and reactive, extraocular eye movements intact  Chest - clear to auscultation, no wheezes, rales or rhonchi, symmetric air entry  Heart - normal rate, regular rhythm, normal S1, S2, no murmurs, rubs, clicks or gallops  Abdomen - soft, nontender, nondistended, no masses or organomegaly  Neurological - alert, oriented, normal speech, no focal findings or movement disorder noted  Extremities - + edema in upper and lower extremities. No cyanosis,   Results Review:     I reviewed the patient's new clinical results.    WBC WBC   Date Value Ref Range Status   02/03/2021 6.93 3.40 - 10.80 10*3/mm3 Final   02/02/2021 8.49 3.40 - 10.80 10*3/mm3 Final   02/01/2021 " 10.20 3.40 - 10.80 10*3/mm3 Final      HGB Hemoglobin   Date Value Ref Range Status   02/03/2021 7.5 (L) 12.0 - 15.9 g/dL Final   02/02/2021 7.6 (L) 12.0 - 15.9 g/dL Final   02/01/2021 8.5 (L) 12.0 - 15.9 g/dL Final      HCT Hematocrit   Date Value Ref Range Status   02/03/2021 23.1 (L) 34.0 - 46.6 % Final   02/02/2021 23.2 (L) 34.0 - 46.6 % Final   02/01/2021 25.3 (L) 34.0 - 46.6 % Final      Platlets No results found for: LABPLAT   MCV MCV   Date Value Ref Range Status   02/03/2021 91.7 79.0 - 97.0 fL Final   02/02/2021 92.4 79.0 - 97.0 fL Final   02/01/2021 91.7 79.0 - 97.0 fL Final          Sodium Sodium   Date Value Ref Range Status   02/03/2021 128 (L) 136 - 145 mmol/L Final   02/02/2021 125 (L) 136 - 145 mmol/L Final   02/01/2021 125 (L) 136 - 145 mmol/L Final      Potassium Potassium   Date Value Ref Range Status   02/03/2021 4.1 3.5 - 5.2 mmol/L Final   02/02/2021 3.9 3.5 - 5.2 mmol/L Final   02/01/2021 4.1 3.5 - 5.2 mmol/L Final      Chloride Chloride   Date Value Ref Range Status   02/03/2021 94 (L) 98 - 107 mmol/L Final   02/02/2021 90 (L) 98 - 107 mmol/L Final   02/01/2021 90 (L) 98 - 107 mmol/L Final      CO2 CO2   Date Value Ref Range Status   02/03/2021 27.4 22.0 - 29.0 mmol/L Final   02/02/2021 27.0 22.0 - 29.0 mmol/L Final   02/01/2021 26.5 22.0 - 29.0 mmol/L Final      BUN BUN   Date Value Ref Range Status   02/03/2021 36 (H) 6 - 20 mg/dL Final   02/02/2021 39 (H) 6 - 20 mg/dL Final   02/01/2021 38 (H) 6 - 20 mg/dL Final      Creatinine Creatinine   Date Value Ref Range Status   02/03/2021 0.88 0.57 - 1.00 mg/dL Final   02/02/2021 0.91 0.57 - 1.00 mg/dL Final   02/01/2021 0.94 0.57 - 1.00 mg/dL Final      Calcium Calcium   Date Value Ref Range Status   02/03/2021 7.8 (L) 8.6 - 10.5 mg/dL Final   02/02/2021 7.7 (L) 8.6 - 10.5 mg/dL Final   02/01/2021 8.0 (L) 8.6 - 10.5 mg/dL Final      PO4 No results found for: CAPO4   Albumin Albumin   Date Value Ref Range Status   02/03/2021 2.10 (L) 3.50 - 5.20  g/dL Final      Magnesium Magnesium   Date Value Ref Range Status   02/03/2021 1.8 1.6 - 2.6 mg/dL Final      Uric Acid Uric Acid   Date Value Ref Range Status   02/02/2021 5.5 2.4 - 5.7 mg/dL Final   01/31/2021 4.6 2.4 - 5.7 mg/dL Final        Medication Review:     Current Facility-Administered Medications:   •  acetaminophen (TYLENOL) tablet 650 mg, 650 mg, Oral, Q4H PRN, Juaquin Richardson II, MD, 650 mg at 01/26/21 1704  •  ceFAZolin in dextrose (ANCEF) IVPB solution 2 g, 2 g, Intravenous, Q8H, Juaquin Richardson II, MD, 2 g at 02/03/21 0019  •  cyclobenzaprine (FLEXERIL) tablet 10 mg, 10 mg, Oral, TID PRN, Juaquin Richardson II, MD  •  dextrose (D50W) 25 g/ 50mL Intravenous Solution 25 g, 25 g, Intravenous, Q15 Min PRN, Juaquin Richardson II, MD  •  dextrose (GLUTOSE) oral gel 15 g, 15 g, Oral, Q15 Min PRN, Juaquin Richardson II, MD  •  furosemide (LASIX) 100 mg in sodium chloride 0.9 % 100 mL infusion, 20 mg/hr, Intravenous, Continuous, Kermit Schneider MD, Last Rate: 20 mL/hr at 02/03/21 0350, 20 mg/hr at 02/03/21 0350  •  glucagon (human recombinant) (GLUCAGEN DIAGNOSTIC) injection 1 mg, 1 mg, Subcutaneous, Q15 Min PRN, Juaquin Richardson II, MD  •  HYDROmorphone (DILAUDID) injection 0.5 mg, 0.5 mg, Intravenous, Q3H PRN, Juaquin Richardson II, MD, 0.5 mg at 01/25/21 0746  •  insulin glargine (LANTUS, SEMGLEE) injection 18 Units, 18 Units, Subcutaneous, Nightly, Richie Veloz MD, 18 Units at 02/02/21 2020  •  insulin lispro (humaLOG, ADMELOG) injection 0-9 Units, 0-9 Units, Subcutaneous, TID AC, Juaquin Richardson II, MD, 4 Units at 02/02/21 1153  •  insulin lispro (humaLOG, ADMELOG) injection 3 Units, 3 Units, Subcutaneous, TID With Meals, Richie Veloz MD, 3 Units at 02/02/21 1733  •  levothyroxine (SYNTHROID, LEVOTHROID) tablet 88 mcg, 88 mcg, Oral, Daily, Sweet, Juaquin Rodriguez II, MD, 88 mcg at 02/02/21 0918  •  losartan (COZAAR) tablet 100  mg, 100 mg, Oral, Q24H, Juaquin Richardson II, MD, 100 mg at 02/02/21 0918  •  Magnesium Sulfate 2 gram Bolus, followed by 8 gram infusion (total Mg dose 10 grams)- Mg less than or equal to 1mg/dL, 2 g, Intravenous, PRN **OR** Magnesium Sulfate 2 gram / 50mL Infusion (GIVE X 3 BAGS TO EQUAL 6GM TOTAL DOSE) - Mg 1.1 - 1.5 mg/dl, 2 g, Intravenous, PRN **OR** Magnesium Sulfate 4 gram infusion- Mg 1.6-1.9 mg/dL, 4 g, Intravenous, PRN, PrestJayme justice, APRN  •  ondansetron (ZOFRAN) tablet 4 mg, 4 mg, Oral, Q6H PRN, Juaquin Richardson II, MD  •  oxyCODONE-acetaminophen (PERCOCET) 7.5-325 MG per tablet 1 tablet, 1 tablet, Oral, Q4H PRN, Juaquin Richardson II, MD, 1 tablet at 02/03/21 0601  •  potassium chloride (K-DUR,KLOR-CON) ER tablet 40 mEq, 40 mEq, Oral, PRN, Jayme Godwin, APRN  •  potassium chloride (KLOR-CON) packet 40 mEq, 40 mEq, Oral, PRN, Jayme Godwin, APRN  •  sodium chloride 0.9 % flush 10 mL, 10 mL, Intravenous, Q12H, Juaquin Richardson II, MD, 10 mL at 02/02/21 2020  •  sodium chloride 0.9 % flush 10 mL, 10 mL, Intravenous, PRN, Juaquin Richardson II, MD  •  sodium chloride 0.9 % flush 10 mL, 10 mL, Intravenous, Q12H, Dianne Porras MD, 10 mL at 02/02/21 2020  •  sodium chloride 0.9 % flush 10 mL, 10 mL, Intravenous, PRN, Dianne Porras MD, 10 mL at 02/01/21 1748  •  venlafaxine (EFFEXOR) tablet 75 mg, 75 mg, Oral, BID, Juaqiun Richardson II, MD, 75 mg at 02/02/21 2019    Assessment/Plan       Sepsis without acute organ dysfunction (CMS/HCC)    Essential hypertension    Sleep apnea    Anxiety and depression    Diabetes mellitus type 2 in obese (CMS/HCC)    Obesity, Class III, BMI 40-49.9 (morbid obesity) (CMS/HCC)    Other specified hypothyroidism    Mixed hyperlipidemia    Lumbar degenerative disc disease    Thrombocytopenia (CMS/HCC)    Pulmonary nodules    Abnormal CT of the chest    Elevated liver function tests    History of COVID-19    LINDSEY (acute  kidney injury) (CMS/HCC)    Hyponatremia    Septic arthritis of shoulder, left (CMS/HCC)    MSSA bacteremia    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 (CMS/HCC)    Anasarca      Assessment & Plan  1. Hyponatremia   2. Anasarca  3. Liver cirrhosis 2/2 METZGER  4. Sepsis   5. Septic arthritis (left shoulder MSSA)  6. Anemia of chronic disease   7. HTN  8. DM    Na improved today to 128  Continue lasix gtt today as she is still volume overloaded. Continue fluid restriction   Strict ins/outs. Will consider changing to scheduled IV loop diuretics tomorrow.   Monitor Na and K wile on the gtt. K at goal today as well as Mg. Recheck in am   Abs per primary team   Renal function stable.   Will follow.     Glenda Paniagua MD  02/03/21  09:01 EST        Electronically signed by Glenda Paniagua MD at 02/03/21 3868

## 2021-02-03 NOTE — THERAPY TREATMENT NOTE
Patient Name: Stefani Bhandari  : 1965    MRN: [id number]                              Today's Date: 2/3/2021       Admit Date: 2021    Visit Dx:     ICD-10-CM ICD-9-CM   1. Sepsis without acute organ dysfunction, due to unspecified organism (CMS/HCC)  A41.9 038.9     995.91   2. LINDSEY (acute kidney injury) (CMS/HCC)  N17.9 584.9   3. Hyponatremia  E87.1 276.1   4. Hyperglycemia  R73.9 790.29     Patient Active Problem List   Diagnosis   • Essential hypertension   • Sleep apnea   • Anxiety and depression   • Diabetes mellitus type 2 in obese (CMS/HCC)   • Obesity, Class III, BMI 40-49.9 (morbid obesity) (CMS/HCC)   • Other specified hypothyroidism   • Mixed hyperlipidemia   • Lumbar degenerative disc disease   • Thrombocytopenia (CMS/HCC)   • Tongue lesion   • Pulmonary nodules   • Abnormal CT of the chest   • Elevated liver function tests   • COVID-19 virus infection   • Sepsis without acute organ dysfunction (CMS/HCC)   • History of COVID-19   • LINDSEY (acute kidney injury) (CMS/HCC)   • Hyponatremia   • Septic arthritis of shoulder, left (CMS/HCC)   • MSSA bacteremia   • 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 (CMS/HCC)   • Anasarca     Past Medical History:   Diagnosis Date   • Anxiety    • DDD (degenerative disc disease), lumbar    • Depression    • Diabetes mellitus (CMS/HCC)    • Elevated LFTs    • NICK (generalized anxiety disorder)    • History of epilepsy     as a child-Abstracted from pt records   • Hyperlipidemia    • Hypertension    • Hypothyroidism    • MDD (major depressive disorder)    • Morbid obesity (CMS/HCC)    • OA (osteoarthritis)    • Obesity      Past Surgical History:   Procedure Laterality Date   • HYSTERECTOMY     • INCISION AND DRAINAGE SHOULDER Left 2021    Procedure: INCISION AND DRAINAGE SHOULDER;  Surgeon: Juaquin Richardson II, MD;  Location: [facility] OR;  Service: Orthopedics;  Laterality: Left;   • TUBAL ABDOMINAL LIGATION       General Information     Row  Name 02/03/21 1007          Physical Therapy Time and Intention    Document Type  therapy note (daily note)  -CB     Mode of Treatment  physical therapy  -CB     Row Name 02/03/21 1007          General Information    Patient Profile Reviewed  yes  -CB     Existing Precautions/Restrictions  fall  -CB       User Key  (r) = Recorded By, (t) = Taken By, (c) = Cosigned By    Initials Name Provider Type    CB Bessy Nix Physical Therapist        Mobility     Row Name 02/03/21 1008          Bed-Chair Transfer    Bed-Chair Theresa (Transfers)  not tested  -CB     Row Name 02/03/21 1008          Sit-Stand Transfer    Sit-Stand Theresa (Transfers)  contact guard  -CB     Assistive Device (Sit-Stand Transfers)  walker, front-wheeled  -CB     Row Name 02/03/21 1008          Gait/Stairs (Locomotion)    Theresa Level (Gait)  contact guard;nonverbal cues (demo/gesture);verbal cues  -CB     Assistive Device (Gait)  walker, front-wheeled  -CB     Distance in Feet (Gait)  20ft  -CB     Deviations/Abnormal Patterns (Gait)  billy decreased;gait speed decreased  -CB     Left Sided Gait Deviations  -- Pt has increased difficulty clearing LLE this date.  -CB     Comment (Gait/Stairs)  gait belt donned for safety  -CB       User Key  (r) = Recorded By, (t) = Taken By, (c) = Cosigned By    Initials Name Provider Type    Bessy Morales Physical Therapist        Obj/Interventions     Row Name 02/03/21 1009          Motor Skills    Therapeutic Exercise  -- DF/PF, LAQ, marching 2 sets x10 reps with rest breaks as needed due to fatigue.  -CB     Row Name 02/03/21 1009          Balance    Balance Assessment  standing static balance;standing dynamic balance  -CB     Static Standing Balance  mild impairment  -CB     Dynamic Standing Balance  mild impairment  -CB       User Key  (r) = Recorded By, (t) = Taken By, (c) = Cosigned By    Initials Name Provider Type    Bessy Morales Physical Therapist        Goals/Plan    No  documentation.       Clinical Impression     Row Name 02/03/21 1010          Pain    Additional Documentation  Pain Scale: Numbers Pre/Post-Treatment (Group)  -CB     Row Name 02/03/21 1010          Pain Scale: Numbers Pre/Post-Treatment    Pretreatment Pain Rating  4/10  -CB     Posttreatment Pain Rating  4/10  -CB     Pain Location - Side  Left  -CB     Pain Location  -- quadriceps region  -CB     Pain Intervention(s)  Repositioned;Ambulation/increased activity  -CB     Row Name 02/03/21 1010          Plan of Care Review    Plan of Care Reviewed With  patient  -CB     Progress  improving  -CB     Outcome Summary  The patient is agreeable to PT this date. The patient completed ambulation for 20ft with Rwx requiring CGA and STS to Rwx with CGA. PT attempted to progress ambulation distance this date and patient does not feel like ambulating more due to L quadriceps pain. Pt educated regarding progression of therpay as tolerated to increase overall activity tolerance and independence.  -CB     Row Name 02/03/21 1010          Positioning and Restraints    Pre-Treatment Position  sitting in chair/recliner  -CB     Post Treatment Position  chair  -CB     In Chair  reclined;call light within reach;encouraged to call for assist  -CB       User Key  (r) = Recorded By, (t) = Taken By, (c) = Cosigned By    Initials Name Provider Type    CB Bessy Nix Physical Therapist        Outcome Measures     Row Name 02/03/21 1012          How much help from another person do you currently need...    Turning from your back to your side while in flat bed without using bedrails?  3  -CB     Moving from lying on back to sitting on the side of a flat bed without bedrails?  3  -CB     Moving to and from a bed to a chair (including a wheelchair)?  3  -CB     Standing up from a chair using your arms (e.g., wheelchair, bedside chair)?  3  -CB     Climbing 3-5 steps with a railing?  2  -CB     To walk in hospital room?  3  -CB     AM-PAC 6  Clicks Score (PT)  17  -CB     Row Name 02/03/21 1012          Functional Assessment    Outcome Measure Options  AM-PAC 6 Clicks Basic Mobility (PT)  -CB       User Key  (r) = Recorded By, (t) = Taken By, (c) = Cosigned By    Initials Name Provider Type    CB Bessy Nix Physical Therapist        Physical Therapy Education                 Title: PT OT SLP Therapies (Done)     Topic: Physical Therapy (Done)     Point: Mobility training (Done)     Learning Progress Summary           Patient Acceptance, E,TB, VU,NR by CB at 2/3/2021 1013    Acceptance, E,TB,D, VU,NR by SV at 2/2/2021 1015    Eager, E,TB,D, VU,NR by SV at 2/1/2021 1506    Acceptance, E,TB, VU,NR by LB at 1/31/2021 1152                   Point: Home exercise program (Done)     Learning Progress Summary           Patient Acceptance, E,TB, VU,NR by CB at 2/3/2021 1013    Acceptance, E,TB,D, VU,NR by SV at 2/2/2021 1015    Eager, E,TB,D, VU,NR by SV at 2/1/2021 1506    Acceptance, E,TB, VU,NR by LB at 1/31/2021 1152                   Point: Body mechanics (Done)     Learning Progress Summary           Patient Acceptance, E,TB, VU,NR by CB at 2/3/2021 1013    Acceptance, E,TB,D, VU,NR by SV at 2/2/2021 1015    Eager, E,TB,D, VU,NR by SV at 2/1/2021 1506    Acceptance, E,TB, VU,NR by LB at 1/31/2021 1152                   Point: Precautions (Done)     Learning Progress Summary           Patient Acceptance, E,TB, VU,NR by CB at 2/3/2021 1013    Acceptance, E,TB,D, VU,NR by SV at 2/2/2021 1015    Eager, E,TB,D, VU,NR by SV at 2/1/2021 1506    Acceptance, E,TB, VU,NR by LB at 1/31/2021 1152                               User Key     Initials Effective Dates Name Provider Type Discipline    SV 04/03/18 -  Doris Lutz, PT Physical Therapist PT    LB 08/09/20 -  Vania Zazueta, FRANTZ Physical Therapist PT    CB 12/30/20 -  Bessy Nix Physical Therapist PT              PT Recommendation and Plan     Plan of Care Reviewed With: patient  Progress:  improving  Outcome Summary: The patient is agreeable to PT this date. The patient completed ambulation for 20ft with Rwx requiring CGA and STS to Rwx with CGA. PT attempted to progress ambulation distance this date and patient does not feel like ambulating more due to L quadriceps pain. Pt educated regarding progression of therpay as tolerated to increase overall activity tolerance and independence.     Time Calculation:   PT Charges     Row Name 02/03/21 1013             Time Calculation    Start Time  0843  -CB      Stop Time  0906  -CB      Time Calculation (min)  23 min  -CB      PT Received On  02/03/21  -CB      PT - Next Appointment  02/04/21  -CB         Time Calculation- PT    Total Timed Code Minutes- PT  23 minute(s)  -CB        User Key  (r) = Recorded By, (t) = Taken By, (c) = Cosigned By    Initials Name Provider Type    Bessy Morales Physical Therapist        Therapy Charges for Today     Code Description Service Date Service Provider Modifiers Qty    [id number] HC PT THER PROC EA 15 MIN 2/3/2021 Bessy Nix GP 2          PT G-Codes  Outcome Measure Options: AM-PAC 6 Clicks Basic Mobility (PT)  AM-PAC 6 Clicks Score (PT): 17  AM-PAC 6 Clicks Score (OT): 17    Bessy Nix  2/3/2021

## 2021-02-03 NOTE — PROGRESS NOTES
Name: Stefani Bhandari ADMIT: 2021   : 1965  PCP: Dea Martinez MD    MRN: [id number] LOS: 10 days   AGE/SEX: 55 y.o. female  ROOM: [facility]     Subjective   Subjective   No new complaints today.  Denies shortness of breath, chest pain.  Just worked with PT.  Now sitting in a chair.  Appetite is fine.  She is asking about when she can go home.    Review of Systems   Constitutional: Negative for fever.   Respiratory: Negative for cough and shortness of breath.    Cardiovascular: Negative for chest pain.   Gastrointestinal: Negative for abdominal pain.      Objective   Objective   Vital Signs  Temp:  [97.9 °F (36.6 °C)-98.9 °F (37.2 °C)] 98.8 °F (37.1 °C)  Heart Rate:  [78-86] 79  Resp:  [18] 18  BP: (143-145)/(72-80) 144/72  SpO2:  [96 %-99 %] 96 %  on   ;   Device (Oxygen Therapy): room air  Body mass index is 48.98 kg/m².    Physical Exam  Vitals signs and nursing note reviewed.   Constitutional:       General: She is not in acute distress.     Appearance: She is obese.   HENT:      Head: Normocephalic and atraumatic.   Cardiovascular:      Rate and Rhythm: Normal rate and regular rhythm.   Pulmonary:      Effort: Pulmonary effort is normal. No respiratory distress.      Breath sounds: Normal breath sounds.   Abdominal:      General: Bowel sounds are normal.      Palpations: Abdomen is soft.      Tenderness: There is no abdominal tenderness. There is no guarding or rebound.   Musculoskeletal:      Right lower leg: Edema present.      Left lower leg: Edema present.      Comments: 2+   Skin:     General: Skin is warm and dry.   Neurological:      General: No focal deficit present.      Mental Status: She is alert and oriented to person, place, and time.   Psychiatric:         Mood and Affect: Mood normal.         Behavior: Behavior normal.     Results Review  I reviewed the patient's new clinical results.  Results from last 7 days   Lab Units 21  0559 21  0404 21  0755  01/30/21  0412   WBC 10*3/mm3 6.93 8.49 10.20 11.58*   HEMOGLOBIN g/dL 7.5* 7.6* 8.5* 8.8*   PLATELETS 10*3/mm3 211 173 152 122*     Results from last 7 days   Lab Units 02/03/21  0559 02/02/21  0404 02/01/21  0735 01/30/21  0412   SODIUM mmol/L 128* 125* 125* 124*   POTASSIUM mmol/L 4.1 3.9 4.1 3.8   CHLORIDE mmol/L 94* 90* 90* 91*   CO2 mmol/L 27.4 27.0 26.5 26.1   BUN mg/dL 36* 39* 38* 24*   CREATININE mg/dL 0.88 0.91 0.94 0.86   GLUCOSE mg/dL 156* 170* 175* 185*     Estimated Creatinine Clearance: 105.7 mL/min (by C-G formula based on SCr of 0.88 mg/dL).    Results from last 7 days   Lab Units 02/03/21 0559 01/30/21  0412   ALBUMIN g/dL 2.10* 2.00*   BILIRUBIN mg/dL  --  0.7   ALK PHOS U/L  --  110   AST (SGOT) U/L  --  27   ALT (SGPT) U/L  --  10     Results from last 7 days   Lab Units 02/03/21 0559 02/02/21  0404 02/01/21  0735 01/30/21  0412   CALCIUM mg/dL 7.8* 7.7* 8.0* 7.6*   ALBUMIN g/dL 2.10*  --   --  2.00*   MAGNESIUM mg/dL 1.8  --   --   --    PHOSPHORUS mg/dL 3.8  --   --   --        COVID19   Date Value Ref Range Status   01/29/2021 Not Detected Not Detected - Ref. Range Final   01/24/2021 Not Detected Not Detected - Ref. Range Final     Glucose   Date/Time Value Ref Range Status   02/03/2021 0533 158 (H) 70 - 130 mg/dL Final   02/02/2021 1946 243 (H) 70 - 130 mg/dL Final   02/02/2021 1637 136 (H) 70 - 130 mg/dL Final   02/02/2021 1109 247 (H) 70 - 130 mg/dL Final   02/02/2021 0619 167 (H) 70 - 130 mg/dL Final   02/01/2021 2031 219 (H) 70 - 130 mg/dL Final   02/01/2021 1736 168 (H) 70 - 130 mg/dL Final     Scheduled Meds  ceFAZolin, 2 g, Intravenous, Q8H  insulin glargine, 18 Units, Subcutaneous, Nightly  insulin lispro, 0-9 Units, Subcutaneous, TID AC  insulin lispro, 3 Units, Subcutaneous, TID With Meals  levothyroxine, 88 mcg, Oral, Daily  losartan, 100 mg, Oral, Q24H  sodium chloride, 10 mL, Intravenous, Q12H  sodium chloride, 10 mL, Intravenous, Q12H  venlafaxine, 75 mg, Oral,  BID    Continuous Infusions  furosemide (LASIX) 100 mg in 100mL NS infusion, 20 mg/hr, Last Rate: 20 mg/hr (02/03/21 0916)    PRN Meds  •  acetaminophen  •  cyclobenzaprine  •  dextrose  •  dextrose  •  glucagon (human recombinant)  •  HYDROmorphone  •  magnesium sulfate **OR** magnesium sulfate **OR** magnesium sulfate  •  ondansetron  •  oxyCODONE-acetaminophen  •  potassium chloride  •  potassium chloride  •  sodium chloride  •  sodium chloride    furosemide (LASIX) 100 mg in 100mL NS infusion, 20 mg/hr, Last Rate: 20 mg/hr (02/03/21 0916)    Diet  Diet Regular; Consistent Carbohydrate, Daily Fluid Restriction; 1000 mL Fluid Per Day       Intake/Output Summary (Last 24 hours) at 2/3/2021 0929  Last data filed at 2/3/2021 0758  Gross per 24 hour   Intake 680 ml   Output 1400 ml   Net -720 ml    Weight change: -1.361 kg (-3 lb)      Assessment/Plan     Active Hospital Problems    Diagnosis  POA   • **Sepsis without acute organ dysfunction (CMS/HCC) [A41.9]  Yes   • Anasarca [R60.1]  No   • Septic arthritis of shoulder, left (CMS/HCC) [M00.9]  Yes   • MSSA bacteremia [R78.81, B95.61]  Yes   • 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 (CMS/HCC) [K75.81, K74.60]  Yes   • History of COVID-19 [Z86.16]  Yes   • LINDSEY (acute kidney injury) (CMS/HCC) [N17.9]  Yes   • Hyponatremia [E87.1]  Yes   • Pulmonary nodules [R91.8]  Yes   • Elevated liver function tests [R79.89]  Yes   • Abnormal CT of the chest [R93.89]  Yes   • Thrombocytopenia (CMS/HCC) [D69.6]  Yes   • Other specified hypothyroidism [E03.8]  Yes   • Mixed hyperlipidemia [E78.2]  Yes   • Lumbar degenerative disc disease [M51.36]  Yes   • Obesity, Class III, BMI 40-49.9 (morbid obesity) (CMS/HCC) [E66.01]  Yes   • Sleep apnea [G47.30]  Yes   • Essential hypertension [I10]  Yes   • Diabetes mellitus type 2 in obese (CMS/HCC) [E11.69, E66.9]  Yes   • Anxiety and depression [F41.9, F32.9]  Yes      Resolved Hospital Problems   No resolved problems to display.     55 y.o.  female admitted with Sepsis without acute organ dysfunction (CMS/HCC).    · Septic Arthritis Left Shoulder/Sepsis with MSSA Bacteremia  · sepsis present on admission; blood cx with MSSA.  Follow-up 1/26/2021 blood cultures no growth  · TTE with no valvular abnormalities-no ROBERT thought necessary per ID  · continue on IV cefazolin for 6 weeks minimum.  · MRI L shoulder showed septic arthritis-attempted aspiration unsuccessful. 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 Cirrhosis  · noted on abdominal CT scan  · has splenomegaly and TCP likely from portal HTN  · has relatively mild coagulopathy  · no e/o ascites or PSE  · will need GI follow up likely as outpatient when acute issues have resolved  · Anasarca/Hyponatremia  · Appreciate nephrology.  Now on furosemide drip  · Hypothyroidism  · continue on levothyroxine  · SANTOS/RLL Pulmonary Nodules  · unchanged  · f/u non-contrasted chest CT in 1 year  · Anemia  · Hgb has drifted down the past couple days-likely multifactorial with inflammation and postop   · no signs of acute bleeding or hemodynamic changes  · Continue to monitor  · Morbid Obesity  · Body mass index is 48.98 kg/m².   · SCDs for DVT prophylaxis.  · Full code.  · Discussed with patient and nursing staff.  · Anticipate discharge home timing yet to be determined.    Richie Veloz MD  [facility]  02/03/21  09:29 EST    I wore protective equipment throughout this patient encounter including a face mask, gloves, and protective eyewear.  Hand hygiene was performed before donning protective equipment and after removal when leaving the room.

## 2021-02-03 NOTE — THERAPY TREATMENT NOTE
Patient Name: Stefani Bhandari  : 1965    MRN: [id number]                              Today's Date: 2/3/2021       Admit Date: 2021    Visit Dx:     ICD-10-CM ICD-9-CM   1. Sepsis without acute organ dysfunction, due to unspecified organism (CMS/HCC)  A41.9 038.9     995.91   2. LINDSEY (acute kidney injury) (CMS/HCC)  N17.9 584.9   3. Hyponatremia  E87.1 276.1   4. Hyperglycemia  R73.9 790.29     Patient Active Problem List   Diagnosis   • Essential hypertension   • Sleep apnea   • Anxiety and depression   • Diabetes mellitus type 2 in obese (CMS/HCC)   • Obesity, Class III, BMI 40-49.9 (morbid obesity) (CMS/HCC)   • Other specified hypothyroidism   • Mixed hyperlipidemia   • Lumbar degenerative disc disease   • Thrombocytopenia (CMS/HCC)   • Tongue lesion   • Pulmonary nodules   • Abnormal CT of the chest   • Elevated liver function tests   • COVID-19 virus infection   • Sepsis without acute organ dysfunction (CMS/HCC)   • History of COVID-19   • LINDSEY (acute kidney injury) (CMS/HCC)   • Hyponatremia   • Septic arthritis of shoulder, left (CMS/HCC)   • MSSA bacteremia   • 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 (CMS/HCC)   • Anasarca     Past Medical History:   Diagnosis Date   • Anxiety    • DDD (degenerative disc disease), lumbar    • Depression    • Diabetes mellitus (CMS/HCC)    • Elevated LFTs    • NICK (generalized anxiety disorder)    • History of epilepsy     as a child-Abstracted from pt records   • Hyperlipidemia    • Hypertension    • Hypothyroidism    • MDD (major depressive disorder)    • Morbid obesity (CMS/HCC)    • OA (osteoarthritis)    • Obesity      Past Surgical History:   Procedure Laterality Date   • HYSTERECTOMY     • INCISION AND DRAINAGE SHOULDER Left 2021    Procedure: INCISION AND DRAINAGE SHOULDER;  Surgeon: Juaquin Richardson II, MD;  Location: [facility] OR;  Service: Orthopedics;  Laterality: Left;   • TUBAL ABDOMINAL LIGATION       General Information     Row  Name 02/03/21 1425          OT Time and Intention    Document Type  therapy note (daily note)  -     Mode of Treatment  occupational therapy;individual therapy  -     Row Name 02/03/21 1425          General Information    Patient Profile Reviewed  yes  -     Existing Precautions/Restrictions  fall;other (see comments) Questionable WB'ing of TIFFANY JAIMEM.  -     Row Name 02/03/21 1425          Cognition    Orientation Status (Cognition)  oriented x 4  -     Row Name 02/03/21 1425          Safety Issues, Functional Mobility    Impairments Affecting Function (Mobility)  endurance/activity tolerance;range of motion (ROM);strength;coordination  -     Comment, Safety Issues/Impairments (Mobility)  Gait belt and non skid socks used  -       User Key  (r) = Recorded By, (t) = Taken By, (c) = Cosigned By    Initials Name Provider Type    Nena Chaidez OT Occupational Therapist          Mobility/ADL's     Row Name 02/03/21 1427          Bed Mobility    Comment (Bed Mobility)  NT; up in chair  -     Row Name 02/03/21 1427          Transfers    Transfers  toilet transfer  -     [place] Level (Toilet Transfer)  set up;supervision  -     Assistive Device (Toilet Transfer)  commode, bedside without drop arms  -     Row Name 02/03/21 1427          Toilet Transfer    Type (Toilet Transfer)  stand-sit;sit-stand  -     Row Name 02/03/21 1427          Functional Mobility    Functional Mobility- Ind. Level  not tested;other (see comments)  -     Functional Mobility- Comment  Pt declined. Agreeable to short transfers this date.  -     Row Name 02/03/21 1427          Activities of Daily Living    BADL Assessment/Intervention  grooming;bathing;upper body dressing;lower body dressing;toileting  -     Row Name 02/03/21 1427          Grooming Assessment/Training    [place] Level (Grooming)  hair care, combing/brushing;moderate assist (50% patient effort);wash face, hands;modified independence;set  up  -EGDAR     Position (Grooming)  supported sitting  -EDGAR     Comment (Grooming)  Pt requires mod A for grooming/hair combing due to impaired functional use of LUE. Pt able to complete facial hygiene with mod I after setup.  -EDGAR     Row Name 02/03/21 1427          Bathing Assessment/Intervention    [place] Level (Bathing)  upper body;minimum assist (75% patient effort);lower body;moderate assist (50% patient effort)  -EDGAR     Position (Bathing)  supported sitting  -EDGAR     Comment (Bathing)  Pt requires min A for UB bathing and mod A for LB bathing due to impaired functional use of LUE.  -EDGAR     Row Name 02/03/21 1427          Upper Body Dressing Assessment/Training    [place] Level (Upper Body Dressing)  don;doff;front opening garment;moderate assist (50% patient effort)  -EDGAR     Position (Upper Body Dressing)  supported sitting  -EDGAR     Comment (Upper Body Dressing)  Mod A for impaired functional use of LUE.  -EDGAR     Row Name 02/03/21 1427          Lower Body Dressing Assessment/Training    [place] Level (Lower Body Dressing)  don;doff;socks;maximum assist (25% patient effort);dependent (less than 25% patient effort)  -EDGAR     Position (Lower Body Dressing)  supported sitting  -EDGAR     Row Name 02/03/21 1427          Toileting Assessment/Training    [place] Level (Toileting)  perform perineal hygiene;standby assist  -EDGAR     Assistive Devices (Toileting)  commode, bedside without drop arms  -EDGAR     Position (Toileting)  unsupported standing  -EDGAR       User Key  (r) = Recorded By, (t) = Taken By, (c) = Cosigned By    Initials Name Provider Type    Nena Chaidez OT Occupational Therapist        Obj/Interventions     Row Name 02/03/21 1431          Shoulder (Therapeutic Exercise)    Shoulder (Therapeutic Exercise)  pendulum exercises LUE in standing (passively).  -EDGAR     Row Name 02/03/21 1431          Elbow/Forearm (Therapeutic Exercise)    Elbow/Forearm (Therapeutic Exercise)  AROM (active  range of motion)  -     Elbow/Forearm AROM (Therapeutic Exercise)  left;flexion;extension;10 repetitions  -     Row Name 02/03/21 1431          Wrist (Therapeutic Exercise)    Wrist (Therapeutic Exercise)  AROM (active range of motion)  -     Wrist AROM (Therapeutic Exercise)  left;flexion;extension;10 repetitions  -     Row Name 02/03/21 1431          Balance    Balance Assessment  sitting static balance;standing static balance;standing dynamic balance  -     Static Sitting Balance  WFL;unsupported;sitting in chair  -EDGAR     Static Standing Balance  WFL;unsupported;standing  -EDGAR     Dynamic Standing Balance  mild impairment;supported;standing  -EDGAR     Balance Interventions  occupation based/functional task  -     Comment, Balance  Pt completed toileting in standing with SBA-supervision.  -     Row Name 02/03/21 1431          Therapeutic Exercise    Therapeutic Exercise  shoulder;elbow/forearm;wrist  -       User Key  (r) = Recorded By, (t) = Taken By, (c) = Cosigned By    Initials Name Provider Type    Nena Chaidez OT Occupational Therapist        Goals/Plan     Row Name 02/03/21 1437          Therapy Assessment/Plan (OT)    Planned Therapy Interventions (OT)  activity tolerance training;functional balance retraining;occupation/activity based interventions;ROM/therapeutic exercise;strengthening exercise;transfer/mobility retraining;patient/caregiver education/training;neuromuscular control/coordination retraining;cognitive/visual perception retraining;edema control/reduction;BADL retraining;adaptive equipment training  -       User Key  (r) = Recorded By, (t) = Taken By, (c) = Cosigned By    Initials Name Provider Type    Nena Chaidez OT Occupational Therapist        Clinical Impression     Row Name 02/03/21 1433          Pain Scale: Numbers Pre/Post-Treatment    Pretreatment Pain Rating  4/10  -EDGAR     Posttreatment Pain Rating  5/10  -EDGAR     Pain Location - Side  Left  -EDGAR      Pain Location - Orientation  upper  -EDGAR     Pain Location  shoulder  -EDGAR     Pain Intervention(s)  Repositioned;Ambulation/increased activity;Emotional support  -EDGAR     Row Name 02/03/21 1433          Plan of Care Review    Plan of Care Reviewed With  patient  -EDGAR     Outcome Summary  Pt agreeable to treatment. Pt performed BSC transfer with SBA-supervision. Pt requires SBA-supervision for toileting. Pt completed UB bathing with min A and LB bathing with mod A due to impaired functional use of LUE. Pt requires mod A for hair grooming and mod I for facial hygiene. Pt then tolerated pendulums in standing to LUE and AROM of all unaffected joints distal to shoulder from chair level. Pt remains limited by impaired functional use of LUE with self care and at risk for ROM loss of LUE without continued therapeutic intervention. OT recommends continued treatment at 3x/week and d/c with home with HH OT/family assist.  -EDGAR     Row Name 02/03/21 1433          Therapy Assessment/Plan (OT)    Rehab Potential (OT)  good, to achieve stated therapy goals  -EDGAR     Criteria for Skilled Therapeutic Interventions Met (OT)  yes;skilled treatment is necessary  -EDGAR     Therapy Frequency (OT)  3 times/wk  -EDGAR     Row Name 02/03/21 1433          Therapy Plan Review/Discharge Plan (OT)    Anticipated Discharge Disposition (OT)  home with home health;home with assist  -EDGAR     Row Name 02/03/21 1433          Vital Signs    Recovery Time  WFL VSS  -EDGAR     Row Name 02/03/21 1433          Positioning and Restraints    Pre-Treatment Position  sitting in chair/recliner  -EDGAR     Post Treatment Position  chair  -EDGAR     In Chair  notified nsg;sitting;call light within reach;encouraged to call for assist  -EDGAR       User Key  (r) = Recorded By, (t) = Taken By, (c) = Cosigned By    Initials Name Provider Type    Nena Chaidez, OT Occupational Therapist        Outcome Measures     Row Name 02/03/21 1437          How much help from another is  currently needed...    Putting on and taking off regular lower body clothing?  2  -EDGAR     Bathing (including washing, rinsing, and drying)  2  -EDGAR     Toileting (which includes using toilet bed pan or urinal)  3  -EDGAR     Putting on and taking off regular upper body clothing  2  -EDGAR     Taking care of personal grooming (such as brushing teeth)  2  -EDGAR     Eating meals  3  -EDGAR     AM-PAC 6 Clicks Score (OT)  14  -EDGAR       User Key  (r) = Recorded By, (t) = Taken By, (c) = Cosigned By    Initials Name Provider Type    Nena Chaidez OT Occupational Therapist        Occupational Therapy Education                 Title: PT OT SLP Therapies (Done)     Topic: Occupational Therapy (Done)     Point: ADL training (Done)     Description:   Instruct learner(s) on proper safety adaptation and remediation techniques during self care or transfers.   Instruct in proper use of assistive devices.              Learning Progress Summary           Patient Acceptance, E,TB,D, VU,NR by  at 2/2/2021 1015    Eager, E,TB,D, VU,NR by  at 2/1/2021 1506    Acceptance, E, VU by BT at 2/1/2021 1241    Comment: Education regarding purpose of OT, adl retraining, functional transfer training.                   Point: Home exercise program (Done)     Description:   Instruct learner(s) on appropriate technique for monitoring, assisting and/or progressing therapeutic exercises/activities.              Learning Progress Summary           Patient Acceptance, E,TB,D, VU,NR by  at 2/2/2021 1015    Eager, E,TB,D, VU,NR by  at 2/1/2021 1506    Acceptance, E, VU by BT at 2/1/2021 1241    Comment: Education regarding purpose of OT, adl retraining, functional transfer training.                   Point: Precautions (Done)     Description:   Instruct learner(s) on prescribed precautions during self-care and functional transfers.              Learning Progress Summary           Patient Acceptance, E,TB,D, VU,NR by SV at 2/2/2021 1015    Eager,  E,TB,D, VU,NR by SV at 2/1/2021 1506    Acceptance, E, VU by BT at 2/1/2021 1241    Comment: Education regarding purpose of OT, adl retraining, functional transfer training.                   Point: Body mechanics (Done)     Description:   Instruct learner(s) on proper positioning and spine alignment during self-care, functional mobility activities and/or exercises.              Learning Progress Summary           Patient Acceptance, E,TB,D, VU,NR by  at 2/2/2021 1015    Eager, E,TB,D, VU,NR by SV at 2/1/2021 1506    Acceptance, E, VU by BT at 2/1/2021 1241    Comment: Education regarding purpose of OT, adl retraining, functional transfer training.                               User Key     Initials Effective Dates Name Provider Type Discipline     04/03/18 -  Doris Lutz PT Physical Therapist PT    BT 11/16/20 -  Shana Arreola OT Occupational Therapist OT              OT Recommendation and Plan  Planned Therapy Interventions (OT): activity tolerance training, functional balance retraining, occupation/activity based interventions, ROM/therapeutic exercise, strengthening exercise, transfer/mobility retraining, patient/caregiver education/training, neuromuscular control/coordination retraining, cognitive/visual perception retraining, edema control/reduction, BADL retraining, adaptive equipment training  Therapy Frequency (OT): 3 times/wk  Plan of Care Review  Plan of Care Reviewed With: patient  Outcome Summary: Pt agreeable to treatment. Pt performed BSC transfer with SBA-supervision. Pt requires SBA-supervision for toileting. Pt completed UB bathing with min A and LB bathing with mod A due to impaired functional use of LUE. Pt requires mod A for hair grooming and mod I for facial hygiene. Pt then tolerated pendulums in standing to LUE and AROM of all unaffected joints distal to shoulder from chair level. Pt remains limited by impaired functional use of LUE with self care and at risk for ROM loss of LUE  without continued therapeutic intervention. OT recommends continued treatment at 3x/week and d/c with home with HH OT/family assist.     Time Calculation:   Time Calculation- OT     Row Name 02/03/21 1438             Time Calculation- OT    OT Start Time  1007  -EDGAR      OT Stop Time  1034  -EDGAR      OT Time Calculation (min)  27 min  -      Total Timed Code Minutes- OT  27 minute(s)  -EDGAR      OT Received On  02/03/21  -EDGAR      OT - Next Appointment  02/04/21  -        User Key  (r) = Recorded By, (t) = Taken By, (c) = Cosigned By    Initials Name Provider Type    Nena Chaidez OT Occupational Therapist        Therapy Charges for Today     Code Description Service Date Service Provider Modifiers Qty    [id number] HC OT SELF CARE/MGMT/TRAIN EA 15 MIN 2/3/2021 Nena Ashby OT GO 2               Nena Ashby OT  2/3/2021

## 2021-02-03 NOTE — PLAN OF CARE
Goal Outcome Evaluation:  Plan of Care Reviewed With: patient  Progress: improving  Outcome Summary: Pt down 3lbs since yesterday. Continues on lasix gtt. Pt continues to c/o back, shoulder and leg pain, medicated per MAR. L shoulder dressing dry and intact. Up to BSC. Continue to monitor.

## 2021-02-04 LAB
ALBUMIN SERPL-MCNC: 2.3 G/DL (ref 3.5–5.2)
ALBUMIN SERPL-MCNC: 2.4 G/DL (ref 3.5–5.2)
ANION GAP SERPL CALCULATED.3IONS-SCNC: 10.6 MMOL/L (ref 5–15)
ANION GAP SERPL CALCULATED.3IONS-SCNC: 5 MMOL/L (ref 5–15)
BACTERIA SPEC ANAEROBE CULT: NORMAL
BASOPHILS # BLD AUTO: 0.03 10*3/MM3 (ref 0–0.2)
BASOPHILS NFR BLD AUTO: 0.4 % (ref 0–1.5)
BUN SERPL-MCNC: 33 MG/DL (ref 6–20)
BUN SERPL-MCNC: 34 MG/DL (ref 6–20)
BUN/CREAT SERPL: 42.3 (ref 7–25)
BUN/CREAT SERPL: 43.6 (ref 7–25)
CALCIUM SPEC-SCNC: 8.1 MG/DL (ref 8.6–10.5)
CALCIUM SPEC-SCNC: 8.3 MG/DL (ref 8.6–10.5)
CHLORIDE SERPL-SCNC: 95 MMOL/L (ref 98–107)
CHLORIDE SERPL-SCNC: 95 MMOL/L (ref 98–107)
CO2 SERPL-SCNC: 27.4 MMOL/L (ref 22–29)
CO2 SERPL-SCNC: 30 MMOL/L (ref 22–29)
CREAT SERPL-MCNC: 0.78 MG/DL (ref 0.57–1)
CREAT SERPL-MCNC: 0.78 MG/DL (ref 0.57–1)
DEPRECATED RDW RBC AUTO: 51.3 FL (ref 37–54)
EOSINOPHIL # BLD AUTO: 0.02 10*3/MM3 (ref 0–0.4)
EOSINOPHIL NFR BLD AUTO: 0.3 % (ref 0.3–6.2)
ERYTHROCYTE [DISTWIDTH] IN BLOOD BY AUTOMATED COUNT: 14.7 % (ref 12.3–15.4)
GFR SERPL CREATININE-BSD FRML MDRD: 76 ML/MIN/1.73
GFR SERPL CREATININE-BSD FRML MDRD: 76 ML/MIN/1.73
GLUCOSE BLDC GLUCOMTR-MCNC: 165 MG/DL (ref 70–130)
GLUCOSE BLDC GLUCOMTR-MCNC: 173 MG/DL (ref 70–130)
GLUCOSE BLDC GLUCOMTR-MCNC: 181 MG/DL (ref 70–130)
GLUCOSE BLDC GLUCOMTR-MCNC: 198 MG/DL (ref 70–130)
GLUCOSE SERPL-MCNC: 135 MG/DL (ref 65–99)
GLUCOSE SERPL-MCNC: 171 MG/DL (ref 65–99)
HCT VFR BLD AUTO: 23.4 % (ref 34–46.6)
HGB BLD-MCNC: 7.6 G/DL (ref 12–15.9)
IMM GRANULOCYTES # BLD AUTO: 0.04 10*3/MM3 (ref 0–0.05)
IMM GRANULOCYTES NFR BLD AUTO: 0.6 % (ref 0–0.5)
LYMPHOCYTES # BLD AUTO: 1.83 10*3/MM3 (ref 0.7–3.1)
LYMPHOCYTES NFR BLD AUTO: 26.1 % (ref 19.6–45.3)
MAGNESIUM SERPL-MCNC: 1.6 MG/DL (ref 1.6–2.6)
MCH RBC QN AUTO: 31 PG (ref 26.6–33)
MCHC RBC AUTO-ENTMCNC: 32.5 G/DL (ref 31.5–35.7)
MCV RBC AUTO: 95.5 FL (ref 79–97)
MONOCYTES # BLD AUTO: 0.43 10*3/MM3 (ref 0.1–0.9)
MONOCYTES NFR BLD AUTO: 6.1 % (ref 5–12)
NEUTROPHILS NFR BLD AUTO: 4.66 10*3/MM3 (ref 1.7–7)
NEUTROPHILS NFR BLD AUTO: 66.5 % (ref 42.7–76)
NRBC BLD AUTO-RTO: 0 /100 WBC (ref 0–0.2)
PHOSPHATE SERPL-MCNC: 3.8 MG/DL (ref 2.5–4.5)
PHOSPHATE SERPL-MCNC: 3.9 MG/DL (ref 2.5–4.5)
PLATELET # BLD AUTO: 207 10*3/MM3 (ref 140–450)
PMV BLD AUTO: 8.8 FL (ref 6–12)
POTASSIUM SERPL-SCNC: 4.2 MMOL/L (ref 3.5–5.2)
POTASSIUM SERPL-SCNC: 4.3 MMOL/L (ref 3.5–5.2)
RBC # BLD AUTO: 2.45 10*6/MM3 (ref 3.77–5.28)
SODIUM SERPL-SCNC: 130 MMOL/L (ref 136–145)
SODIUM SERPL-SCNC: 133 MMOL/L (ref 136–145)
WBC # BLD AUTO: 7.01 10*3/MM3 (ref 3.4–10.8)

## 2021-02-04 PROCEDURE — 25010000002 FUROSEMIDE PER 20 MG: Performed by: INTERNAL MEDICINE

## 2021-02-04 PROCEDURE — 97110 THERAPEUTIC EXERCISES: CPT

## 2021-02-04 PROCEDURE — 63710000001 INSULIN LISPRO (HUMAN) PER 5 UNITS: Performed by: HOSPITALIST

## 2021-02-04 PROCEDURE — 63710000001 INSULIN GLARGINE PER 5 UNITS: Performed by: HOSPITALIST

## 2021-02-04 PROCEDURE — 80069 RENAL FUNCTION PANEL: CPT | Performed by: NURSE PRACTITIONER

## 2021-02-04 PROCEDURE — 83735 ASSAY OF MAGNESIUM: CPT | Performed by: NURSE PRACTITIONER

## 2021-02-04 PROCEDURE — 25010000003 CEFAZOLIN IN DEXTROSE 2-4 GM/100ML-% SOLUTION: Performed by: ORTHOPAEDIC SURGERY

## 2021-02-04 PROCEDURE — 82962 GLUCOSE BLOOD TEST: CPT

## 2021-02-04 PROCEDURE — 85025 COMPLETE CBC W/AUTO DIFF WBC: CPT | Performed by: INTERNAL MEDICINE

## 2021-02-04 PROCEDURE — 63710000001 INSULIN LISPRO (HUMAN) PER 5 UNITS: Performed by: ORTHOPAEDIC SURGERY

## 2021-02-04 PROCEDURE — 25010000003 CEFAZOLIN IN DEXTROSE 2-4 GM/100ML-% SOLUTION: Performed by: INTERNAL MEDICINE

## 2021-02-04 RX ORDER — FUROSEMIDE 40 MG/1
40 TABLET ORAL
Status: DISCONTINUED | OUTPATIENT
Start: 2021-02-05 | End: 2021-02-05 | Stop reason: HOSPADM

## 2021-02-04 RX ADMIN — INSULIN LISPRO 3 UNITS: 100 INJECTION, SOLUTION INTRAVENOUS; SUBCUTANEOUS at 09:17

## 2021-02-04 RX ADMIN — FUROSEMIDE 40 MG/HR: 10 INJECTION, SOLUTION INTRAVENOUS at 19:35

## 2021-02-04 RX ADMIN — OXYCODONE HYDROCHLORIDE AND ACETAMINOPHEN 1 TABLET: 7.5; 325 TABLET ORAL at 09:26

## 2021-02-04 RX ADMIN — INSULIN LISPRO 2 UNITS: 100 INJECTION, SOLUTION INTRAVENOUS; SUBCUTANEOUS at 16:57

## 2021-02-04 RX ADMIN — INSULIN LISPRO 3 UNITS: 100 INJECTION, SOLUTION INTRAVENOUS; SUBCUTANEOUS at 11:49

## 2021-02-04 RX ADMIN — SODIUM CHLORIDE, PRESERVATIVE FREE 10 ML: 5 INJECTION INTRAVENOUS at 09:17

## 2021-02-04 RX ADMIN — FUROSEMIDE 40 MG/HR: 10 INJECTION, SOLUTION INTRAVENOUS at 11:05

## 2021-02-04 RX ADMIN — LOSARTAN POTASSIUM 100 MG: 100 TABLET, FILM COATED ORAL at 09:16

## 2021-02-04 RX ADMIN — INSULIN LISPRO 3 UNITS: 100 INJECTION, SOLUTION INTRAVENOUS; SUBCUTANEOUS at 17:03

## 2021-02-04 RX ADMIN — FUROSEMIDE 40 MG/HR: 10 INJECTION, SOLUTION INTRAVENOUS at 13:53

## 2021-02-04 RX ADMIN — OXYCODONE HYDROCHLORIDE AND ACETAMINOPHEN 1 TABLET: 7.5; 325 TABLET ORAL at 18:16

## 2021-02-04 RX ADMIN — SODIUM CHLORIDE, PRESERVATIVE FREE 10 ML: 5 INJECTION INTRAVENOUS at 20:43

## 2021-02-04 RX ADMIN — INSULIN LISPRO 2 UNITS: 100 INJECTION, SOLUTION INTRAVENOUS; SUBCUTANEOUS at 09:16

## 2021-02-04 RX ADMIN — OXYCODONE HYDROCHLORIDE AND ACETAMINOPHEN 1 TABLET: 7.5; 325 TABLET ORAL at 13:53

## 2021-02-04 RX ADMIN — OXYCODONE HYDROCHLORIDE AND ACETAMINOPHEN 1 TABLET: 7.5; 325 TABLET ORAL at 03:59

## 2021-02-04 RX ADMIN — VENLAFAXINE HYDROCHLORIDE 75 MG: 75 TABLET ORAL at 20:42

## 2021-02-04 RX ADMIN — FUROSEMIDE 20 MG/HR: 10 INJECTION, SOLUTION INTRAVENOUS at 02:44

## 2021-02-04 RX ADMIN — CEFAZOLIN SODIUM 2 G: 2 INJECTION, SOLUTION INTRAVENOUS at 00:07

## 2021-02-04 RX ADMIN — SODIUM CHLORIDE, PRESERVATIVE FREE 10 ML: 5 INJECTION INTRAVENOUS at 20:42

## 2021-02-04 RX ADMIN — FUROSEMIDE 40 MG/HR: 10 INJECTION, SOLUTION INTRAVENOUS at 16:35

## 2021-02-04 RX ADMIN — LEVOTHYROXINE SODIUM 88 MCG: 88 TABLET ORAL at 09:16

## 2021-02-04 RX ADMIN — OXYCODONE HYDROCHLORIDE AND ACETAMINOPHEN 1 TABLET: 7.5; 325 TABLET ORAL at 22:09

## 2021-02-04 RX ADMIN — CEFAZOLIN SODIUM 2 G: 2 INJECTION, SOLUTION INTRAVENOUS at 16:57

## 2021-02-04 RX ADMIN — CEFAZOLIN SODIUM 2 G: 2 INJECTION, SOLUTION INTRAVENOUS at 09:16

## 2021-02-04 RX ADMIN — FUROSEMIDE 40 MG/HR: 10 INJECTION, SOLUTION INTRAVENOUS at 22:29

## 2021-02-04 RX ADMIN — VENLAFAXINE HYDROCHLORIDE 75 MG: 75 TABLET ORAL at 09:16

## 2021-02-04 RX ADMIN — INSULIN GLARGINE 18 UNITS: 100 INJECTION, SOLUTION SUBCUTANEOUS at 20:43

## 2021-02-04 RX ADMIN — INSULIN LISPRO 2 UNITS: 100 INJECTION, SOLUTION INTRAVENOUS; SUBCUTANEOUS at 11:48

## 2021-02-04 RX ADMIN — FUROSEMIDE 20 MG/HR: 10 INJECTION, SOLUTION INTRAVENOUS at 07:40

## 2021-02-04 NOTE — PLAN OF CARE
Goal Outcome Evaluation:  Plan of Care Reviewed With: patient  Progress: improving  Outcome Summary: Pt alert and oriented. Vital signs stable. Patient is tolerating her fluid restriction well and remaining compliant with restriction. Patient continues on IV Lasix gtt. Voiding well and frequently. Had a BM this shift. Continues IV abt. No adverse reactions noted. Complaint of shoulder and kaiden leg pain. PRN medication given x2 both with positive effects. Dressing remains c/d/i to left shoulder. [place] lower ext edema improving. Patient has been resting comfortably in her recliner all shift. Call light in reach.

## 2021-02-04 NOTE — PROGRESS NOTES
" LOS: 11 days   Patient Care Team:  Dea Martinez MD as PCP - General (Family Medicine)    Chief Complaint: hyponatremia     Subjective     History of Present Illness  This is a 55 y.o. year old female admitted for septic arthritis of the left shoulder with MSSA bacteremia. Na 135 on admission and dropped to 125. She has anasarca related to liver cirrhosis due to METZGER. Currently on lasix drip with 3 liters UOP in last 24 hours.    Sodium today, 133    Subjective  She is feeling better today  Reports edema is improved    Reports good UOP  Denies sob or chest pain     History taken from: patient chart    Objective     Vital Sign Min/Max for last 24 hours  Temp  Min: 98.1 °F (36.7 °C)  Max: 98.8 °F (37.1 °C)   BP  Min: 143/73  Max: 166/83   Pulse  Min: 72  Max: 99   Resp  Min: 16  Max: 18   SpO2  Min: 97 %  Max: 100 %   No data recorded   Weight  Min: 141 kg (311 lb 8.2 oz)  Max: 141 kg (311 lb 8.2 oz)     Flowsheet Rows      First Filed Value   Admission Height  170.2 cm (67\") Documented at 01/24/2021 0734   Admission Weight  127 kg (280 lb) Documented at 01/24/2021 0734          I/O this shift:  In: 240 [P.O.:240]  Out: 400 [Urine:400]  I/O last 3 completed shifts:  In: 1280 [P.O.:960; I.V.:220; IV Piggyback:100]  Out: 3700 [Urine:3700]    Objective:  Vital signs: (most recent): Blood pressure 166/83, pulse 83, temperature 98.1 °F (36.7 °C), temperature source Oral, resp. rate 18, height 170.2 cm (67.01\"), weight (!) 141 kg (311 lb 8.2 oz), SpO2 98 %.            Physical Examination: General appearance she is alert, well appearing, and in no distress  Mental status - alert, oriented to person, place, and time  Eyes - pupils equal and reactive, extraocular eye movements intact  Chest - clear to auscultation, no wheezes, rales or rhonchi, symmetric air entry  Heart - normal rate, regular rhythm, normal S1, S2, no murmurs, rubs, clicks or gallops  Abdomen - soft, nontender, nondistended, no masses or " organomegaly  Neurological - alert, oriented, normal speech, no focal findings or movement disorder noted  Extremities - improved edema in upper and lower extremities. 2 + now in lower extremities. No cyanosis.     DATA:  Radiology and Labs:  The following labs independently reviewed by me, additional AM labs ordered  Interval notes, chart personally reviewed by me.   New problems include hypoalbuminemia      Risk/ complexity of medical care/ medical decision making:  High:  Chronic illness with severe exacerbation or progression on lasix drip with need for close monitoring of fluid/electrolyte balance        Results Review:     I reviewed the patient's new clinical results.    WBC WBC   Date Value Ref Range Status   02/04/2021 7.01 3.40 - 10.80 10*3/mm3 Final   02/03/2021 6.93 3.40 - 10.80 10*3/mm3 Final   02/02/2021 8.49 3.40 - 10.80 10*3/mm3 Final      HGB Hemoglobin   Date Value Ref Range Status   02/04/2021 7.6 (L) 12.0 - 15.9 g/dL Final   02/03/2021 7.5 (L) 12.0 - 15.9 g/dL Final   02/02/2021 7.6 (L) 12.0 - 15.9 g/dL Final      HCT Hematocrit   Date Value Ref Range Status   02/04/2021 23.4 (L) 34.0 - 46.6 % Final   02/03/2021 23.1 (L) 34.0 - 46.6 % Final   02/02/2021 23.2 (L) 34.0 - 46.6 % Final      Platlets No results found for: LABPLAT   MCV MCV   Date Value Ref Range Status   02/04/2021 95.5 79.0 - 97.0 fL Final   02/03/2021 91.7 79.0 - 97.0 fL Final   02/02/2021 92.4 79.0 - 97.0 fL Final          Sodium Sodium   Date Value Ref Range Status   02/04/2021 133 (L) 136 - 145 mmol/L Final   02/03/2021 130 (L) 136 - 145 mmol/L Final   02/03/2021 131 (L) 136 - 145 mmol/L Final   02/03/2021 128 (L) 136 - 145 mmol/L Final   02/02/2021 125 (L) 136 - 145 mmol/L Final      Potassium Potassium   Date Value Ref Range Status   02/04/2021 4.3 3.5 - 5.2 mmol/L Final   02/03/2021 4.2 3.5 - 5.2 mmol/L Final   02/03/2021 4.5 3.5 - 5.2 mmol/L Final   02/03/2021 4.1 3.5 - 5.2 mmol/L Final   02/02/2021 3.9 3.5 - 5.2 mmol/L Final       Chloride Chloride   Date Value Ref Range Status   02/04/2021 95 (L) 98 - 107 mmol/L Final   02/03/2021 95 (L) 98 - 107 mmol/L Final   02/03/2021 94 (L) 98 - 107 mmol/L Final   02/03/2021 94 (L) 98 - 107 mmol/L Final   02/02/2021 90 (L) 98 - 107 mmol/L Final      CO2 CO2   Date Value Ref Range Status   02/04/2021 27.4 22.0 - 29.0 mmol/L Final   02/03/2021 30.0 (H) 22.0 - 29.0 mmol/L Final   02/03/2021 29.8 (H) 22.0 - 29.0 mmol/L Final   02/03/2021 27.4 22.0 - 29.0 mmol/L Final   02/02/2021 27.0 22.0 - 29.0 mmol/L Final      BUN BUN   Date Value Ref Range Status   02/04/2021 34 (H) 6 - 20 mg/dL Final   02/03/2021 33 (H) 6 - 20 mg/dL Final   02/03/2021 34 (H) 6 - 20 mg/dL Final   02/03/2021 36 (H) 6 - 20 mg/dL Final   02/02/2021 39 (H) 6 - 20 mg/dL Final      Creatinine Creatinine   Date Value Ref Range Status   02/04/2021 0.78 0.57 - 1.00 mg/dL Final   02/03/2021 0.78 0.57 - 1.00 mg/dL Final   02/03/2021 0.89 0.57 - 1.00 mg/dL Final   02/03/2021 0.88 0.57 - 1.00 mg/dL Final   02/02/2021 0.91 0.57 - 1.00 mg/dL Final      Calcium Calcium   Date Value Ref Range Status   02/04/2021 8.3 (L) 8.6 - 10.5 mg/dL Final   02/03/2021 8.1 (L) 8.6 - 10.5 mg/dL Final   02/03/2021 8.0 (L) 8.6 - 10.5 mg/dL Final   02/03/2021 7.8 (L) 8.6 - 10.5 mg/dL Final   02/02/2021 7.7 (L) 8.6 - 10.5 mg/dL Final      PO4 No results found for: CAPO4   Albumin Albumin   Date Value Ref Range Status   02/04/2021 2.40 (L) 3.50 - 5.20 g/dL Final   02/03/2021 2.30 (L) 3.50 - 5.20 g/dL Final   02/03/2021 2.10 (L) 3.50 - 5.20 g/dL Final   02/03/2021 2.10 (L) 3.50 - 5.20 g/dL Final      Magnesium Magnesium   Date Value Ref Range Status   02/04/2021 1.6 1.6 - 2.6 mg/dL Final   02/03/2021 1.8 1.6 - 2.6 mg/dL Final      Uric Acid Uric Acid   Date Value Ref Range Status   02/02/2021 5.5 2.4 - 5.7 mg/dL Final        Medication Review:     Current Facility-Administered Medications:   •  acetaminophen (TYLENOL) tablet 650 mg, 650 mg, Oral, Q4H PRN, Sweet,  Juaquin Rodriguez II, MD, 650 mg at 01/26/21 1704  •  ceFAZolin in dextrose (ANCEF) IVPB solution 2 g, 2 g, Intravenous, Q8H, Juaquin Richardson II, MD, 2 g at 02/04/21 0916  •  cyclobenzaprine (FLEXERIL) tablet 10 mg, 10 mg, Oral, TID PRN, Juaquin Richardson II, MD  •  dextrose (D50W) 25 g/ 50mL Intravenous Solution 25 g, 25 g, Intravenous, Q15 Min PRN, Juaquin Richardson II, MD  •  dextrose (GLUTOSE) oral gel 15 g, 15 g, Oral, Q15 Min PRN, Juaquin Richardson II, MD  •  furosemide (LASIX) 100 mg in sodium chloride 0.9 % 100 mL infusion, 40 mg/hr, Intravenous, Continuous, Kermit Schneider MD, Last Rate: 40 mL/hr at 02/04/21 1105, 40 mg/hr at 02/04/21 1105  •  glucagon (human recombinant) (GLUCAGEN DIAGNOSTIC) injection 1 mg, 1 mg, Subcutaneous, Q15 Min PRN, Juaquin Richardson II, MD  •  HYDROmorphone (DILAUDID) injection 0.5 mg, 0.5 mg, Intravenous, Q3H PRN, Juaquin Richardson II, MD, 0.5 mg at 01/25/21 0746  •  insulin glargine (LANTUS, SEMGLEE) injection 18 Units, 18 Units, Subcutaneous, Nightly, Richie Veloz MD, 18 Units at 02/03/21 2040  •  insulin lispro (humaLOG, ADMELOG) injection 0-9 Units, 0-9 Units, Subcutaneous, TID AC, Juaquin Richardson II, MD, 2 Units at 02/04/21 1148  •  insulin lispro (humaLOG, ADMELOG) injection 3 Units, 3 Units, Subcutaneous, TID With Meals, Richie Veloz MD, 3 Units at 02/04/21 1149  •  levothyroxine (SYNTHROID, LEVOTHROID) tablet 88 mcg, 88 mcg, Oral, Daily, Juaquin Richardson II, MD, 88 mcg at 02/04/21 0916  •  losartan (COZAAR) tablet 100 mg, 100 mg, Oral, Q24H, Juaquin Richardson II, MD, 100 mg at 02/04/21 0916  •  Magnesium Sulfate 2 gram Bolus, followed by 8 gram infusion (total Mg dose 10 grams)- Mg less than or equal to 1mg/dL, 2 g, Intravenous, PRN **OR** Magnesium Sulfate 2 gram / 50mL Infusion (GIVE X 3 BAGS TO EQUAL 6GM TOTAL DOSE) - Mg 1.1 - 1.5 mg/dl, 2 g, Intravenous, PRN **OR** Magnesium Sulfate 4  gram infusion- Mg 1.6-1.9 mg/dL, 4 g, Intravenous, PRN, Jayme Godwin, APRN  •  ondansetron (ZOFRAN) tablet 4 mg, 4 mg, Oral, Q6H PRN, Juaquin Richardson II, MD  •  oxyCODONE-acetaminophen (PERCOCET) 7.5-325 MG per tablet 1 tablet, 1 tablet, Oral, Q4H PRN, Juaquin Richardson II, MD, 1 tablet at 02/04/21 0926  •  potassium chloride (K-DUR,KLOR-CON) ER tablet 40 mEq, 40 mEq, Oral, PRN, Jayme Godwin, APRN  •  potassium chloride (KLOR-CON) packet 40 mEq, 40 mEq, Oral, PRN, Jayme Godwin, APRN  •  sodium chloride 0.9 % flush 10 mL, 10 mL, Intravenous, Q12H, Juaquin Richardson II, MD, 10 mL at 02/04/21 0917  •  sodium chloride 0.9 % flush 10 mL, 10 mL, Intravenous, PRN, Juaquin Richardson II, MD  •  sodium chloride 0.9 % flush 10 mL, 10 mL, Intravenous, Q12H, Dianne Porras MD, 10 mL at 02/04/21 0917  •  sodium chloride 0.9 % flush 10 mL, 10 mL, Intravenous, PRN, Dianne Porras MD, 10 mL at 02/01/21 1748  •  venlafaxine (EFFEXOR) tablet 75 mg, 75 mg, Oral, BID, Juaquin Richardson II, MD, 75 mg at 02/04/21 0916    ASSESSMENT:   LINDSEY, has resolved since admission  CKD II  Hyponatremia improved  Anasarca, improving   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  Sepsis bacteremia from septic arthritis of left shoulder, MSSA  Obesity   KRISTY  Anemia of chronic disease, worsening   LATOYA  HTN  DM     PLAN:   Cr remains stable on Lasix drip with 3 liters UOP in last 24 hours  Asking for increase in PO intake slightly, sodium improved, will discuss with Dr. Schneider   Continue to monitor F&E balance while lasix drip  Monitor Hgb, transfuse for Hgb less than 7     Continue to monitor electrolytes and volume closely, avoid IV contrast and nephrotoxic medications   Replace lytes PRN  Follow up labs in AM    Jayme Godwin NITESH  02/04/21  12:24 EST      I have personally seen and examined the patient, agree with the above note, findings, exam, assessment and plan.    Labs, medications and chart  reviewed.      Vitals as per APRN note  Patient awake, alert, no acute distress  HEENT exam nonfocal, no JVD.  Chest clear bilaterally  CV RRR, no murmurs, abdomen soft nontender.  Decreased, 2+ edema      She is doing well today.  Sodium has improved up to 133  Urine output starting to  a bit more, 3 L urine output last 24 hours  Still volume overloaded on exam, since renal function is fairly stable we will continue drip for another 24 hours  We will plan to transition to oral diuretics tomorrow at which point she will probably be stable for discharge home if okay with LHA  Can ease up on fluid restriction just a bit  Follow-up a.m. labs    Kermit Schneider MD  [facility]  [phone]

## 2021-02-04 NOTE — PLAN OF CARE
Goal Outcome Evaluation:  Plan of Care Reviewed With: patient  Progress: improving  Outcome Summary: The patient is agreeable to PT this date. The patient continues to c/o pain in L quadriceps region but decreased pain after increased activity. The patient has increased independence with functional trasnfer and completed chair<>BSC and STS transfer with SBA/S. The patient was able to tolerate exercise progression to standing ther ex this date x5 reps. Anticipate discharge home with assist and HHPT. Will continue to progress patient has tolerated and assess response to treatment.    Patient was intermittently wearing a face mask during this therapy encounter. Therapist used appropriate personal protective equipment including eye protection, mask, and gloves.  Mask used was standard procedure mask. Appropriate PPE was worn during the entire therapy session. Hand hygiene was completed before and after therapy session. Patient is not in enhanced droplet precautions.

## 2021-02-04 NOTE — PLAN OF CARE
Goal Outcome Evaluation:     Progress: improving  Outcome Summary: VSS; pt complains of pain mainly in the left shoulder; pt on IV lasix for fluid overload; daily weight; monitor Is and Os

## 2021-02-04 NOTE — PAYOR COMM NOTE
"Stefani Lee (55 y.o. Female)     PLEASE SEE ATTACHED CLINICAL REVIEW.     REF#[id number]    PLEASE CALL  OR  [phone] WITH INPT CONTINUED STAY AUTH.    THANK YOU    CLARISA MULLER LPN CCP    Date of Birth Social Security Number Address Home Phone MRN    1965  [id number]  [facility] [phone] [id number]    [facility] Marital Status          None        Admission Date Admission Type Admitting Provider Attending Provider Department, Room/Bed    1/24/21 Emergency Armen Florian MD Nguyen, Minh Loc, MD [address], [address]    Discharge Date Discharge Disposition Discharge Destination                       Attending Provider: Richie Veloz MD    Allergies: Tramadol    Isolation: None   Infection: None   Code Status: CPR    Ht: 170.2 cm (67.01\")   Wt: 141 kg (311 lb 8.2 oz)    Admission Cmt: None   Principal Problem: Sepsis without acute organ dysfunction (CMS/HCC) [A41.9]                 Active Insurance as of 1/24/2021     Primary Coverage     Payor Plan Insurance Group Employer/Plan Group    [employer] EMPLOYEE [id number]     Payor Plan Address Payor Plan Phone Number Payor Plan Fax Number Effective Dates    [address] [phone]  1/1/2018 - None Entered    [place] [address]       Subscriber Name Subscriber Birth Date Member ID       STEFANI LEE 1965 [id number]                 Emergency Contacts      (Rel.) Home Phone Work Phone Mobile Phone    Alessia Waite (ED RN) (Daughter) -- -- [phone]    JF LEE (Daughter) [phone] -- --            Oxygen Therapy (last day)     Date/Time   SpO2   Device (Oxygen Therapy)   Flow (L/min)   Oxygen Concentration (%)   ETCO2 (mmHg)    02/04/21 1335   --   room air   --   --   --    02/04/21 1035   --   room air   --   --   --    02/04/21 0727   98   room air   --   --   --    02/04/21 0400   98   room air  "  --   --   --    02/04/21 0005   --   room air   --   --   --    02/03/21 2309   97   room air   --   --   --    02/03/21 2000   --   room air   --   --   --    02/03/21 1908   100   room air   --   --   --    02/03/21 1448   98   --   --   --   --    02/03/21 1403   --   room air   --   --   --    02/03/21 0007   --   room air   --   --   --              Intake & Output (last day)       02/03 0701 - 02/04 0700 02/04 0701 - 02/05 0700    P.O. 960 240    I.V. (mL/kg) 220 (1.6)     IV Piggyback 100     Total Intake(mL/kg) 1280 (9.1) 240 (1.7)    Urine (mL/kg/hr) 2900 (0.9) 400 (0.4)    Stool 0 0    Total Output 2900 400    Net -1620 -160          Urine Unmeasured Occurrence 1 x 1 x    Stool Unmeasured Occurrence 4 x 2 x        Lines, Drains & Airways    Active LDAs     Name:   Placement date:   Placement time:   Site:   Days:    PICC Single Lumen 02/01/21 Right Basilic   02/01/21    1705    Basilic   2         Inactive LDAs     Name:   Placement date:   Placement time:   Removal date:   Removal time:   Site:   Days:    [REMOVED] Peripheral IV 01/24/21 0726 Left;Upper Arm   01/24/21    0726    01/26/21    0314    Arm   1    [REMOVED] Peripheral IV 01/24/21 Right Hand   01/24/21    --    01/25/21    0030    Hand   1    [REMOVED] Peripheral IV 01/26/21 0444 Anterior;Left;Upper Arm   01/26/21    0444    01/27/21    0749    Arm   1    [REMOVED] Peripheral IV 01/27/21 0937 Left Hand   01/27/21    0937    02/01/21    1757    Hand   5    [REMOVED] Peripheral IV 01/30/21 0923 Anterior;Right    01/30/21    0923    02/01/21    1758    -- shoulder   2    [REMOVED] ETT    01/30/21    0956 created via procedure documentation    01/30/21    1027     less than 1    [REMOVED] Supraglottic Airway 4   01/26/21    1415 created via procedure documentation    01/26/21    1548     less than 1                   Physician Progress Notes (last 24 hours) (Notes from 02/03/21 1355 through 02/04/21 1355)      Richie Veloz MD at 02/04/21 1322               Name: Stefani Bhandari ADMIT: 2021   : 1965  PCP: Dea Martinez MD    MRN: [id number] LOS: 11 days   AGE/SEX: 55 y.o. female  ROOM: [facility]     Subjective   Subjective    No new complaints.  She was requesting adjustment in her fluid restriction.  She feels like swelling is improved and discomfort from swelling is less.    Review of Systems   Constitutional: Negative for fever.   Respiratory: Negative for cough and shortness of breath.    Cardiovascular: Negative for chest pain.   Gastrointestinal: Negative for abdominal pain.     Objective   Objective   Vital Signs  Temp:  [98.1 °F (36.7 °C)-98.8 °F (37.1 °C)] 98.1 °F (36.7 °C)  Heart Rate:  [72-99] 83  Resp:  [16-18] 18  BP: (143-166)/(73-83) 166/83  SpO2:  [97 %-100 %] 98 %  on   ;   Device (Oxygen Therapy): room air  Body mass index is 48.78 kg/m².    Physical Exam  Vitals signs and nursing note reviewed.   Constitutional:       General: She is not in acute distress.     Appearance: She is obese.   HENT:      Head: Normocephalic and atraumatic.   Cardiovascular:      Rate and Rhythm: Normal rate and regular rhythm.   Pulmonary:      Effort: Pulmonary effort is normal. No respiratory distress.      Breath sounds: Normal breath sounds.   Abdominal:      General: Bowel sounds are normal.      Palpations: Abdomen is soft.      Tenderness: There is no abdominal tenderness. There is no guarding or rebound.   Musculoskeletal:      Right lower leg: Edema present.      Left lower leg: Edema present.      Comments: 2+   Skin:     General: Skin is warm and dry.   Neurological:      General: No focal deficit present.      Mental Status: She is alert and oriented to person, place, and time.   Psychiatric:         Mood and Affect: Mood normal.         Behavior: Behavior normal.     Results Review  I reviewed the patient's new clinical results.  Results from last 7 days   Lab Units 21  0413 21  0559 21  0404 21  0735   WBC  10*3/mm3 7.01 6.93 8.49 10.20   HEMOGLOBIN g/dL 7.6* 7.5* 7.6* 8.5*   PLATELETS 10*3/mm3 207 211 173 152     Results from last 7 days   Lab Units 02/04/21 0413 02/03/21 2338 02/03/21 1855 02/03/21  0559   SODIUM mmol/L 133* 130* 131* 128*   POTASSIUM mmol/L 4.3 4.2 4.5 4.1   CHLORIDE mmol/L 95* 95* 94* 94*   CO2 mmol/L 27.4 30.0* 29.8* 27.4   BUN mg/dL 34* 33* 34* 36*   CREATININE mg/dL 0.78 0.78 0.89 0.88   GLUCOSE mg/dL 171* 135* 199* 156*     Estimated Creatinine Clearance: 118.7 mL/min (by C-G formula based on SCr of 0.78 mg/dL).    Results from last 7 days   Lab Units 02/04/21 0413 02/03/21 2338 02/03/21 1855 02/03/21  0559 01/30/21  0412   ALBUMIN g/dL 2.40* 2.30* 2.10* 2.10* 2.00*   BILIRUBIN mg/dL  --   --   --   --  0.7   ALK PHOS U/L  --   --   --   --  110   AST (SGOT) U/L  --   --   --   --  27   ALT (SGPT) U/L  --   --   --   --  10     Results from last 7 days   Lab Units 02/04/21 0413 02/03/21 2338 02/03/21 1855 02/03/21  0559   CALCIUM mg/dL 8.3* 8.1* 8.0* 7.8*   ALBUMIN g/dL 2.40* 2.30* 2.10* 2.10*   MAGNESIUM mg/dL 1.6  --   --  1.8   PHOSPHORUS mg/dL 3.8 3.9 3.5 3.8       COVID19   Date Value Ref Range Status   01/29/2021 Not Detected Not Detected - Ref. Range Final   01/24/2021 Not Detected Not Detected - Ref. Range Final     Glucose   Date/Time Value Ref Range Status   02/04/2021 1110 181 (H) 70 - 130 mg/dL Final   02/04/2021 0555 165 (H) 70 - 130 mg/dL Final   02/03/2021 1959 204 (H) 70 - 130 mg/dL Final   02/03/2021 1621 121 70 - 130 mg/dL Final   02/03/2021 1106 162 (H) 70 - 130 mg/dL Final   02/03/2021 0533 158 (H) 70 - 130 mg/dL Final   02/02/2021 1946 243 (H) 70 - 130 mg/dL Final     Scheduled Meds  ceFAZolin, 2 g, Intravenous, Q8H  insulin glargine, 18 Units, Subcutaneous, Nightly  insulin lispro, 0-9 Units, Subcutaneous, TID AC  insulin lispro, 3 Units, Subcutaneous, TID With Meals  levothyroxine, 88 mcg, Oral, Daily  losartan, 100 mg, Oral, Q24H  sodium chloride, 10 mL,  Intravenous, Q12H  sodium chloride, 10 mL, Intravenous, Q12H  venlafaxine, 75 mg, Oral, BID    Continuous Infusions  furosemide (LASIX) 100 mg in 100mL NS infusion, 40 mg/hr, Last Rate: 40 mg/hr (02/04/21 1105)    PRN Meds  •  acetaminophen  •  cyclobenzaprine  •  dextrose  •  dextrose  •  glucagon (human recombinant)  •  HYDROmorphone  •  magnesium sulfate **OR** magnesium sulfate **OR** magnesium sulfate  •  ondansetron  •  oxyCODONE-acetaminophen  •  potassium chloride  •  potassium chloride  •  sodium chloride  •  sodium chloride    furosemide (LASIX) 100 mg in 100mL NS infusion, 40 mg/hr, Last Rate: 40 mg/hr (02/04/21 1105)    Diet  Diet Regular; Consistent Carbohydrate, Daily Fluid Restriction; 1000 mL Fluid Per Day       Intake/Output Summary (Last 24 hours) at 2/4/2021 1327  Last data filed at 2/4/2021 1104  Gross per 24 hour   Intake 1290 ml   Output 2000 ml   Net -710 ml    Weight change: -0.54 kg (-1 lb 3 oz)     Assessment/Plan     Active Hospital Problems    Diagnosis  POA   • **Sepsis without acute organ dysfunction (CMS/HCC) [A41.9]  Yes   • Anasarca [R60.1]  No   • Septic arthritis of shoulder, left (CMS/HCC) [M00.9]  Yes   • MSSA bacteremia [R78.81, B95.61]  Yes   • 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 (CMS/HCC) [K75.81, K74.60]  Yes   • History of COVID-19 [Z86.16]  Yes   • LINDSEY (acute kidney injury) (CMS/HCC) [N17.9]  Yes   • Hyponatremia [E87.1]  Yes   • Pulmonary nodules [R91.8]  Yes   • Elevated liver function tests [R79.89]  Yes   • Abnormal CT of the chest [R93.89]  Yes   • Thrombocytopenia (CMS/HCC) [D69.6]  Yes   • Other specified hypothyroidism [E03.8]  Yes   • Mixed hyperlipidemia [E78.2]  Yes   • Lumbar degenerative disc disease [M51.36]  Yes   • Obesity, Class III, BMI 40-49.9 (morbid obesity) (CMS/HCC) [E66.01]  Yes   • Sleep apnea [G47.30]  Yes   • Essential hypertension [I10]  Yes   • Diabetes mellitus type 2 in obese (CMS/HCC) [E11.69, E66.9]  Yes   • Anxiety and depression [F41.9,  F32.9]  Yes      Resolved Hospital Problems   No resolved problems to display.     55 y.o. female admitted with Sepsis without acute organ dysfunction (CMS/HCC).    · Septic Arthritis Left Shoulder/Sepsis with MSSA Bacteremia  · sepsis present on admission; blood cx with MSSA.  Follow-up 1/26/2021 blood cultures no growth  · TTE with no valvular abnormalities-no ROBERT thought necessary per ID  · continue on IV cefazolin for 6 weeks minimum.  · MRI L shoulder showed septic arthritis-attempted aspiration unsuccessful. 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 Cirrhosis  · noted on abdominal CT scan  · has splenomegaly and TCP likely from portal HTN  · has relatively mild coagulopathy  · no e/o ascites or PSE  · will need GI follow up likely as outpatient when acute issues have resolved  · Anasarca/Hyponatremia  · Appreciate nephrology.  Now on furosemide drip, fluid restriction  · Switch to IV scheduled per nephrology  · Hypothyroidism  · continue on levothyroxine  · SANTOS/RLL Pulmonary Nodules  · unchanged  · f/u non-contrasted chest CT in 1 year  · Anemia  · Hgb has drifted down the past couple days-likely multifactorial with inflammation and postop   · no signs of acute bleeding or hemodynamic changes  · Continue to monitor  · Morbid Obesity  · Body mass index is 48.78 kg/m².   · SCDs for DVT prophylaxis.  · Full code.  · Discussed with patient.  · Anticipate discharge home timing yet to be determined.    Richie Veloz MD  [facility]  02/04/21  13:27 EST    I wore protective equipment throughout this patient encounter including a face mask, gloves, and protective eyewear.  Hand hygiene was performed before donning protective equipment and after removal when  "leaving the room.      Electronically signed by Richie Veloz MD at 02/04/21 1330     Jayme Godwin APRN at 02/04/21 1224           LOS: 11 days   Patient Care Team:  Dea Martinez MD as PCP - General (Family Medicine)    Chief Complaint: hyponatremia     Subjective     History of Present Illness  This is a 55 y.o. year old female admitted for septic arthritis of the left shoulder with MSSA bacteremia. Na 135 on admission and dropped to 125. She has anasarca related to liver cirrhosis due to METZGER. Currently on lasix drip with 3 liters UOP in last 24 hours.    Sodium today, 133    Subjective  She is feeling better today  Reports edema is improved    Reports good UOP  Denies sob or chest pain     History taken from: patient chart    Objective     Vital Sign Min/Max for last 24 hours  Temp  Min: 98.1 °F (36.7 °C)  Max: 98.8 °F (37.1 °C)   BP  Min: 143/73  Max: 166/83   Pulse  Min: 72  Max: 99   Resp  Min: 16  Max: 18   SpO2  Min: 97 %  Max: 100 %   No data recorded   Weight  Min: 141 kg (311 lb 8.2 oz)  Max: 141 kg (311 lb 8.2 oz)     Flowsheet Rows      First Filed Value   Admission Height  170.2 cm (67\") Documented at 01/24/2021 0734   Admission Weight  127 kg (280 lb) Documented at 01/24/2021 0734          I/O this shift:  In: 240 [P.O.:240]  Out: 400 [Urine:400]  I/O last 3 completed shifts:  In: 1280 [P.O.:960; I.V.:220; IV Piggyback:100]  Out: 3700 [Urine:3700]    Objective:  Vital signs: (most recent): Blood pressure 166/83, pulse 83, temperature 98.1 °F (36.7 °C), temperature source Oral, resp. rate 18, height 170.2 cm (67.01\"), weight (!) 141 kg (311 lb 8.2 oz), SpO2 98 %.            Physical Examination: General appearance she is alert, well appearing, and in no distress  Mental status - alert, oriented to person, place, and time  Eyes - pupils equal and reactive, extraocular eye movements intact  Chest - clear to auscultation, no wheezes, rales or rhonchi, symmetric air entry  Heart - normal " rate, regular rhythm, normal S1, S2, no murmurs, rubs, clicks or gallops  Abdomen - soft, nontender, nondistended, no masses or organomegaly  Neurological - alert, oriented, normal speech, no focal findings or movement disorder noted  Extremities - improved edema in upper and lower extremities. 2 + now in lower extremities. No cyanosis.     DATA:  Radiology and Labs:  The following labs independently reviewed by me, additional AM labs ordered  Interval notes, chart personally reviewed by me.   New problems include hypoalbuminemia      Risk/ complexity of medical care/ medical decision making:  High:  Chronic illness with severe exacerbation or progression on lasix drip with need for close monitoring of fluid/electrolyte balance        Results Review:     I reviewed the patient's new clinical results.    WBC WBC   Date Value Ref Range Status   02/04/2021 7.01 3.40 - 10.80 10*3/mm3 Final   02/03/2021 6.93 3.40 - 10.80 10*3/mm3 Final   02/02/2021 8.49 3.40 - 10.80 10*3/mm3 Final      HGB Hemoglobin   Date Value Ref Range Status   02/04/2021 7.6 (L) 12.0 - 15.9 g/dL Final   02/03/2021 7.5 (L) 12.0 - 15.9 g/dL Final   02/02/2021 7.6 (L) 12.0 - 15.9 g/dL Final      HCT Hematocrit   Date Value Ref Range Status   02/04/2021 23.4 (L) 34.0 - 46.6 % Final   02/03/2021 23.1 (L) 34.0 - 46.6 % Final   02/02/2021 23.2 (L) 34.0 - 46.6 % Final      Platlets No results found for: LABPLAT   MCV MCV   Date Value Ref Range Status   02/04/2021 95.5 79.0 - 97.0 fL Final   02/03/2021 91.7 79.0 - 97.0 fL Final   02/02/2021 92.4 79.0 - 97.0 fL Final          Sodium Sodium   Date Value Ref Range Status   02/04/2021 133 (L) 136 - 145 mmol/L Final   02/03/2021 130 (L) 136 - 145 mmol/L Final   02/03/2021 131 (L) 136 - 145 mmol/L Final   02/03/2021 128 (L) 136 - 145 mmol/L Final   02/02/2021 125 (L) 136 - 145 mmol/L Final      Potassium Potassium   Date Value Ref Range Status   02/04/2021 4.3 3.5 - 5.2 mmol/L Final   02/03/2021 4.2 3.5 - 5.2  mmol/L Final   02/03/2021 4.5 3.5 - 5.2 mmol/L Final   02/03/2021 4.1 3.5 - 5.2 mmol/L Final   02/02/2021 3.9 3.5 - 5.2 mmol/L Final      Chloride Chloride   Date Value Ref Range Status   02/04/2021 95 (L) 98 - 107 mmol/L Final   02/03/2021 95 (L) 98 - 107 mmol/L Final   02/03/2021 94 (L) 98 - 107 mmol/L Final   02/03/2021 94 (L) 98 - 107 mmol/L Final   02/02/2021 90 (L) 98 - 107 mmol/L Final      CO2 CO2   Date Value Ref Range Status   02/04/2021 27.4 22.0 - 29.0 mmol/L Final   02/03/2021 30.0 (H) 22.0 - 29.0 mmol/L Final   02/03/2021 29.8 (H) 22.0 - 29.0 mmol/L Final   02/03/2021 27.4 22.0 - 29.0 mmol/L Final   02/02/2021 27.0 22.0 - 29.0 mmol/L Final      BUN BUN   Date Value Ref Range Status   02/04/2021 34 (H) 6 - 20 mg/dL Final   02/03/2021 33 (H) 6 - 20 mg/dL Final   02/03/2021 34 (H) 6 - 20 mg/dL Final   02/03/2021 36 (H) 6 - 20 mg/dL Final   02/02/2021 39 (H) 6 - 20 mg/dL Final      Creatinine Creatinine   Date Value Ref Range Status   02/04/2021 0.78 0.57 - 1.00 mg/dL Final   02/03/2021 0.78 0.57 - 1.00 mg/dL Final   02/03/2021 0.89 0.57 - 1.00 mg/dL Final   02/03/2021 0.88 0.57 - 1.00 mg/dL Final   02/02/2021 0.91 0.57 - 1.00 mg/dL Final      Calcium Calcium   Date Value Ref Range Status   02/04/2021 8.3 (L) 8.6 - 10.5 mg/dL Final   02/03/2021 8.1 (L) 8.6 - 10.5 mg/dL Final   02/03/2021 8.0 (L) 8.6 - 10.5 mg/dL Final   02/03/2021 7.8 (L) 8.6 - 10.5 mg/dL Final   02/02/2021 7.7 (L) 8.6 - 10.5 mg/dL Final      PO4 No results found for: CAPO4   Albumin Albumin   Date Value Ref Range Status   02/04/2021 2.40 (L) 3.50 - 5.20 g/dL Final   02/03/2021 2.30 (L) 3.50 - 5.20 g/dL Final   02/03/2021 2.10 (L) 3.50 - 5.20 g/dL Final   02/03/2021 2.10 (L) 3.50 - 5.20 g/dL Final      Magnesium Magnesium   Date Value Ref Range Status   02/04/2021 1.6 1.6 - 2.6 mg/dL Final   02/03/2021 1.8 1.6 - 2.6 mg/dL Final      Uric Acid Uric Acid   Date Value Ref Range Status   02/02/2021 5.5 2.4 - 5.7 mg/dL Final        Medication  Review:     Current Facility-Administered Medications:   •  acetaminophen (TYLENOL) tablet 650 mg, 650 mg, Oral, Q4H PRN, Juaquin Richardson II, MD, 650 mg at 01/26/21 1704  •  ceFAZolin in dextrose (ANCEF) IVPB solution 2 g, 2 g, Intravenous, Q8H, Juaquin Richardson II, MD, 2 g at 02/04/21 0916  •  cyclobenzaprine (FLEXERIL) tablet 10 mg, 10 mg, Oral, TID PRN, Juaquin Richardson II, MD  •  dextrose (D50W) 25 g/ 50mL Intravenous Solution 25 g, 25 g, Intravenous, Q15 Min PRN, Juaquin Richardson II, MD  •  dextrose (GLUTOSE) oral gel 15 g, 15 g, Oral, Q15 Min PRN, Juaquin Richardson II, MD  •  furosemide (LASIX) 100 mg in sodium chloride 0.9 % 100 mL infusion, 40 mg/hr, Intravenous, Continuous, Kermit Schneider MD, Last Rate: 40 mL/hr at 02/04/21 1105, 40 mg/hr at 02/04/21 1105  •  glucagon (human recombinant) (GLUCAGEN DIAGNOSTIC) injection 1 mg, 1 mg, Subcutaneous, Q15 Min PRN, Juaquin Richardson II, MD  •  HYDROmorphone (DILAUDID) injection 0.5 mg, 0.5 mg, Intravenous, Q3H PRN, Juaquin Richardson II, MD, 0.5 mg at 01/25/21 0746  •  insulin glargine (LANTUS, SEMGLEE) injection 18 Units, 18 Units, Subcutaneous, Nightly, Richie Veloz MD, 18 Units at 02/03/21 2040  •  insulin lispro (humaLOG, ADMELOG) injection 0-9 Units, 0-9 Units, Subcutaneous, TID AC, Juaquin Richardson II, MD, 2 Units at 02/04/21 1148  •  insulin lispro (humaLOG, ADMELOG) injection 3 Units, 3 Units, Subcutaneous, TID With Meals, Richie Veloz MD, 3 Units at 02/04/21 1149  •  levothyroxine (SYNTHROID, LEVOTHROID) tablet 88 mcg, 88 mcg, Oral, Daily, Juaquin Richardson II, MD, 88 mcg at 02/04/21 0916  •  losartan (COZAAR) tablet 100 mg, 100 mg, Oral, Q24H, Juaquin Richardson II, MD, 100 mg at 02/04/21 0916  •  Magnesium Sulfate 2 gram Bolus, followed by 8 gram infusion (total Mg dose 10 grams)- Mg less than or equal to 1mg/dL, 2 g, Intravenous, PRN **OR** Magnesium Sulfate 2  gram / 50mL Infusion (GIVE X 3 BAGS TO EQUAL 6GM TOTAL DOSE) - Mg 1.1 - 1.5 mg/dl, 2 g, Intravenous, PRN **OR** Magnesium Sulfate 4 gram infusion- Mg 1.6-1.9 mg/dL, 4 g, Intravenous, PRN, Prestigiakevino, Jayme, APRN  •  ondansetron (ZOFRAN) tablet 4 mg, 4 mg, Oral, Q6H PRN, Juaquin Richardson II, MD  •  oxyCODONE-acetaminophen (PERCOCET) 7.5-325 MG per tablet 1 tablet, 1 tablet, Oral, Q4H PRN, Juaquin Richardson II, MD, 1 tablet at 02/04/21 0926  •  potassium chloride (K-DUR,KLOR-CON) ER tablet 40 mEq, 40 mEq, Oral, PRN, PrestigiaJayme michelle, APRN  •  potassium chloride (KLOR-CON) packet 40 mEq, 40 mEq, Oral, PRN, EstheriaJayme michelle, APRN  •  sodium chloride 0.9 % flush 10 mL, 10 mL, Intravenous, Q12H, Juaquin Richardson II, MD, 10 mL at 02/04/21 0917  •  sodium chloride 0.9 % flush 10 mL, 10 mL, Intravenous, PRN, Juaquin Richardson II, MD  •  sodium chloride 0.9 % flush 10 mL, 10 mL, Intravenous, Q12H, Dianne Porras MD, 10 mL at 02/04/21 0917  •  sodium chloride 0.9 % flush 10 mL, 10 mL, Intravenous, PRN, Dianne Porras MD, 10 mL at 02/01/21 1748  •  venlafaxine (EFFEXOR) tablet 75 mg, 75 mg, Oral, BID, Juaquin Richardson II, MD, 75 mg at 02/04/21 0916    ASSESSMENT:   LINDSEY, has resolved since admission  CKD II  Hyponatremia improved  Anasarca, improving   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  Sepsis bacteremia from septic arthritis of left shoulder, MSSA  Obesity   KRISTY  Anemia of chronic disease, worsening   LATOYA  HTN  DM     PLAN:   Cr remains stable on Lasix drip with 3 liters UOP in last 24 hours  Asking for increase in PO intake slightly, sodium improved, will discuss with Dr. Schneider   Continue to monitor F&E balance while lasix drip  Monitor Hgb, transfuse for Hgb less than 7     Continue to monitor electrolytes and volume closely, avoid IV contrast and nephrotoxic medications   Replace lytes PRN  Follow up labs in [facility]  02/04/21  12:24  EST        Electronically signed by Jayme Godwin APRN at 02/04/21 1319       Consult Notes (last 24 hours) (Notes from 02/03/21 1355 through 02/04/21 1355)    No notes of this type exist for this encounter.

## 2021-02-04 NOTE — THERAPY TREATMENT NOTE
Patient Name: Stefani Bhandari  : 1965    MRN: [id number]                              Today's Date: 2021       Admit Date: 2021    Visit Dx:     ICD-10-CM ICD-9-CM   1. Sepsis without acute organ dysfunction, due to unspecified organism (CMS/HCC)  A41.9 038.9     995.91   2. LINDSEY (acute kidney injury) (CMS/HCC)  N17.9 584.9   3. Hyponatremia  E87.1 276.1   4. Hyperglycemia  R73.9 790.29     Patient Active Problem List   Diagnosis   • Essential hypertension   • Sleep apnea   • Anxiety and depression   • Diabetes mellitus type 2 in obese (CMS/HCC)   • Obesity, Class III, BMI 40-49.9 (morbid obesity) (CMS/HCC)   • Other specified hypothyroidism   • Mixed hyperlipidemia   • Lumbar degenerative disc disease   • Thrombocytopenia (CMS/HCC)   • Tongue lesion   • Pulmonary nodules   • Abnormal CT of the chest   • Elevated liver function tests   • COVID-19 virus infection   • Sepsis without acute organ dysfunction (CMS/HCC)   • History of COVID-19   • LINDSEY (acute kidney injury) (CMS/HCC)   • Hyponatremia   • Septic arthritis of shoulder, left (CMS/HCC)   • MSSA bacteremia   • 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 (CMS/HCC)   • Anasarca     Past Medical History:   Diagnosis Date   • Anxiety    • DDD (degenerative disc disease), lumbar    • Depression    • Diabetes mellitus (CMS/HCC)    • Elevated LFTs    • NICK (generalized anxiety disorder)    • History of epilepsy     as a child-Abstracted from pt records   • Hyperlipidemia    • Hypertension    • Hypothyroidism    • MDD (major depressive disorder)    • Morbid obesity (CMS/HCC)    • OA (osteoarthritis)    • Obesity      Past Surgical History:   Procedure Laterality Date   • HYSTERECTOMY     • INCISION AND DRAINAGE SHOULDER Left 2021    Procedure: INCISION AND DRAINAGE SHOULDER;  Surgeon: Juaquin Richardson II, MD;  Location: [facility] OR;  Service: Orthopedics;  Laterality: Left;   • TUBAL ABDOMINAL LIGATION       General Information     Row  Name 02/04/21 1025          Physical Therapy Time and Intention    Document Type  therapy note (daily note)  -CB     Mode of Treatment  physical therapy  -CB     Row Name 02/04/21 1025          General Information    Patient Profile Reviewed  yes  -CB       User Key  (r) = Recorded By, (t) = Taken By, (c) = Cosigned By    Initials Name Provider Type    Bessy Morales Physical Therapist        Mobility     Row Name 02/04/21 1025          Bed Mobility    Comment (Bed Mobility)  pt up in chair  -CB     Row Name 02/04/21 1025          Transfers    Comment (Transfers)  pt completes transfer chair<>BSC with SBA/S  -CB     Row Name 02/04/21 1025          Bed-Chair Transfer    Bed-Chair [place] (Transfers)  standby assist;supervision;verbal cues  -CB     Row Name 02/04/21 1025          Sit-Stand Transfer    Sit-Stand [place] (Transfers)  standby assist;nonverbal cues (demo/gesture);verbal cues  -CB     Assistive Device (Sit-Stand Transfers)  walker, front-wheeled  -CB       User Key  (r) = Recorded By, (t) = Taken By, (c) = Cosigned By    Initials Name Provider Type    Bessy Morales Physical Therapist        Obj/Interventions     Row Name 02/04/21 1026          Motor Skills    Therapeutic Exercise  -- seated ther ex for LAQ, marching, DF/PF x10 reps each and standing ther ex for HR x5 reps  -CB     Row Name 02/04/21 1026          Balance    Static Sitting Balance  WFL  -CB     Static Standing Balance  WFL  -CB     Dynamic Standing Balance  WFL  -CB       User Key  (r) = Recorded By, (t) = Taken By, (c) = Cosigned By    Initials Name Provider Type    Bessy Morales Physical Therapist        Goals/Plan    No documentation.       Clinical Impression     Row Name 02/04/21 1027          Pain    Additional Documentation  Pain Scale: Numbers Pre/Post-Treatment (Group)  -CB     Row Name 02/04/21 1027          Pain Scale: Numbers Pre/Post-Treatment    Pretreatment Pain Rating  6/10  -CB     Posttreatment Pain  Rating  4/10  -CB     Pain Location - Side  Left  -CB     Pain Location  -- quad region  -CB     Pain Intervention(s)  Repositioned;Ambulation/increased activity  -CB     Row Name 02/04/21 1027          Plan of Care Review    Progress  improving  -CB     Outcome Summary  The patient is agreeable to PT this date. The patient continues to c/o pain in L quadriceps region but decreased pain after increased activity. The patient has increased independence with functional trasnfer and completed chair<>BSC and STS transfer with SBA/S. The patient was able to tolerate exercise progression to standing ther ex this date x5 reps. Anticipate discharge home with assist and HHPT. Will continue to progress patient has tolerated and assess response to treatment.  -CB     Row Name 02/04/21 1027          Positioning and Restraints    Pre-Treatment Position  sitting in chair/recliner  -CB     Post Treatment Position  chair  -CB     In Chair  reclined;call light within reach;encouraged to call for assist  -CB       User Key  (r) = Recorded By, (t) = Taken By, (c) = Cosigned By    Initials Name Provider Type    CB Bessy Nix Physical Therapist        Outcome Measures     Row Name 02/04/21 1031          How much help from another person do you currently need...    Turning from your back to your side while in flat bed without using bedrails?  3  -CB     Moving from lying on back to sitting on the side of a flat bed without bedrails?  3  -CB     Moving to and from a bed to a chair (including a wheelchair)?  3  -CB     Standing up from a chair using your arms (e.g., wheelchair, bedside chair)?  3  -CB     Climbing 3-5 steps with a railing?  3  -CB     To walk in hospital room?  3  -CB     AM-PAC 6 Clicks Score (PT)  18  -CB     Row Name 02/04/21 1031          Functional Assessment    Outcome Measure Options  AM-PAC 6 Clicks Basic Mobility (PT)  -CB       User Key  (r) = Recorded By, (t) = Taken By, (c) = Cosigned By    Initials Name  Provider Type    CB Bessy Nix Physical Therapist        Physical Therapy Education                 Title: PT OT SLP Therapies (Done)     Topic: Physical Therapy (Done)     Point: Mobility training (Done)     Learning Progress Summary           Patient Acceptance, E,TB, VU,NR by CB at 2/4/2021 1031    Acceptance, E,TB, VU,NR by CB at 2/3/2021 1013    Acceptance, E,TB,D, VU,NR by SV at 2/2/2021 1015    Eager, E,TB,D, VU,NR by SV at 2/1/2021 1506    Acceptance, E,TB, VU,NR by LB at 1/31/2021 1152                   Point: Home exercise program (Done)     Learning Progress Summary           Patient Acceptance, E,TB, VU,NR by CB at 2/4/2021 1031    Acceptance, E,TB, VU,NR by CB at 2/3/2021 1013    Acceptance, E,TB,D, VU,NR by SV at 2/2/2021 1015    Eager, E,TB,D, VU,NR by SV at 2/1/2021 1506    Acceptance, E,TB, VU,NR by LB at 1/31/2021 1152                   Point: Body mechanics (Done)     Learning Progress Summary           Patient Acceptance, E,TB, VU,NR by CB at 2/4/2021 1031    Acceptance, E,TB, VU,NR by CB at 2/3/2021 1013    Acceptance, E,TB,D, VU,NR by SV at 2/2/2021 1015    Eager, E,TB,D, VU,NR by SV at 2/1/2021 1506    Acceptance, E,TB, VU,NR by LB at 1/31/2021 1152                   Point: Precautions (Done)     Learning Progress Summary           Patient Acceptance, E,TB, VU,NR by CB at 2/4/2021 1031    Acceptance, E,TB, VU,NR by CB at 2/3/2021 1013    Acceptance, E,TB,D, VU,NR by SV at 2/2/2021 1015    Eager, E,TB,D, VU,NR by SV at 2/1/2021 1506    Acceptance, E,TB, VU,NR by LB at 1/31/2021 1152                               User Key     Initials Effective Dates Name Provider Type Discipline    SV 04/03/18 -  Doris Lutz, PT Physical Therapist PT    LB 08/09/20 -  Vania Zazueta, FRANTZ Physical Therapist PT    CB 12/30/20 -  Bessy Nix Physical Therapist PT              PT Recommendation and Plan     Plan of Care Reviewed With: patient  Progress: improving  Outcome Summary: The patient is agreeable  to PT this date. The patient continues to c/o pain in L quadriceps region but decreased pain after increased activity. The patient has increased independence with functional trasnfer and completed chair<>BSC and STS transfer with SBA/S. The patient was able to tolerate exercise progression to standing ther ex this date x5 reps. Anticipate discharge home with assist and HHPT. Will continue to progress patient has tolerated and assess response to treatment.     Time Calculation:   PT Charges     Row Name 02/04/21 1031             Time Calculation    Start Time  0958  -CB      Stop Time  1017  -CB      Time Calculation (min)  19 min  -CB      PT Received On  02/04/21  -CB      PT - Next Appointment  02/05/21  -CB         Time Calculation- PT    Total Timed Code Minutes- PT  18 minute(s)  -CB        User Key  (r) = Recorded By, (t) = Taken By, (c) = Cosigned By    Initials Name Provider Type    CB Bessy Nix Physical Therapist        Therapy Charges for Today     Code Description Service Date Service Provider Modifiers Qty    26402184876 HC PT THER PROC EA 15 MIN 2/3/2021 Bessy Nix GP 2    18049435772 HC PT THER PROC EA 15 MIN 2/4/2021 Bessy Nix GP 1          PT G-Codes  Outcome Measure Options: AM-PAC 6 Clicks Basic Mobility (PT)  AM-PAC 6 Clicks Score (PT): 18  AM-PAC 6 Clicks Score (OT): 14    Bessy Nix  2/4/2021

## 2021-02-04 NOTE — PROGRESS NOTES
Name: Stefani Bhandari ADMIT: 2021   : 1965  PCP: Dea Martinez MD    MRN: [id number] LOS: 11 days   AGE/SEX: 55 y.o. female  ROOM: [facility]     Subjective   Subjective    No new complaints.  She was requesting adjustment in her fluid restriction.  She feels like swelling is improved and discomfort from swelling is less.    Review of Systems   Constitutional: Negative for fever.   Respiratory: Negative for cough and shortness of breath.    Cardiovascular: Negative for chest pain.   Gastrointestinal: Negative for abdominal pain.      Objective   Objective   Vital Signs  Temp:  [98.1 °F (36.7 °C)-98.8 °F (37.1 °C)] 98.1 °F (36.7 °C)  Heart Rate:  [72-99] 83  Resp:  [16-18] 18  BP: (143-166)/(73-83) 166/83  SpO2:  [97 %-100 %] 98 %  on   ;   Device (Oxygen Therapy): room air  Body mass index is 48.78 kg/m².    Physical Exam  Vitals signs and nursing note reviewed.   Constitutional:       General: She is not in acute distress.     Appearance: She is obese.   HENT:      Head: Normocephalic and atraumatic.   Cardiovascular:      Rate and Rhythm: Normal rate and regular rhythm.   Pulmonary:      Effort: Pulmonary effort is normal. No respiratory distress.      Breath sounds: Normal breath sounds.   Abdominal:      General: Bowel sounds are normal.      Palpations: Abdomen is soft.      Tenderness: There is no abdominal tenderness. There is no guarding or rebound.   Musculoskeletal:      Right lower leg: Edema present.      Left lower leg: Edema present.      Comments: 2+   Skin:     General: Skin is warm and dry.   Neurological:      General: No focal deficit present.      Mental Status: She is alert and oriented to person, place, and time.   Psychiatric:         Mood and Affect: Mood normal.         Behavior: Behavior normal.     Results Review  I reviewed the patient's new clinical results.  Results from last 7 days   Lab Units 21  0413 21  0559 21  0404 21  0735   WBC 10*3/mm3  7.01 6.93 8.49 10.20   HEMOGLOBIN g/dL 7.6* 7.5* 7.6* 8.5*   PLATELETS 10*3/mm3 207 211 173 152     Results from last 7 days   Lab Units 02/04/21 0413 02/03/21 2338 02/03/21 1855 02/03/21  0559   SODIUM mmol/L 133* 130* 131* 128*   POTASSIUM mmol/L 4.3 4.2 4.5 4.1   CHLORIDE mmol/L 95* 95* 94* 94*   CO2 mmol/L 27.4 30.0* 29.8* 27.4   BUN mg/dL 34* 33* 34* 36*   CREATININE mg/dL 0.78 0.78 0.89 0.88   GLUCOSE mg/dL 171* 135* 199* 156*     Estimated Creatinine Clearance: 118.7 mL/min (by C-G formula based on SCr of 0.78 mg/dL).    Results from last 7 days   Lab Units 02/04/21 0413 02/03/21 2338 02/03/21 1855 02/03/21  0559 01/30/21  0412   ALBUMIN g/dL 2.40* 2.30* 2.10* 2.10* 2.00*   BILIRUBIN mg/dL  --   --   --   --  0.7   ALK PHOS U/L  --   --   --   --  110   AST (SGOT) U/L  --   --   --   --  27   ALT (SGPT) U/L  --   --   --   --  10     Results from last 7 days   Lab Units 02/04/21 0413 02/03/21 2338 02/03/21 1855 02/03/21  0559   CALCIUM mg/dL 8.3* 8.1* 8.0* 7.8*   ALBUMIN g/dL 2.40* 2.30* 2.10* 2.10*   MAGNESIUM mg/dL 1.6  --   --  1.8   PHOSPHORUS mg/dL 3.8 3.9 3.5 3.8       COVID19   Date Value Ref Range Status   01/29/2021 Not Detected Not Detected - Ref. Range Final   01/24/2021 Not Detected Not Detected - Ref. Range Final     Glucose   Date/Time Value Ref Range Status   02/04/2021 1110 181 (H) 70 - 130 mg/dL Final   02/04/2021 0555 165 (H) 70 - 130 mg/dL Final   02/03/2021 1959 204 (H) 70 - 130 mg/dL Final   02/03/2021 1621 121 70 - 130 mg/dL Final   02/03/2021 1106 162 (H) 70 - 130 mg/dL Final   02/03/2021 0533 158 (H) 70 - 130 mg/dL Final   02/02/2021 1946 243 (H) 70 - 130 mg/dL Final     Scheduled Meds  ceFAZolin, 2 g, Intravenous, Q8H  insulin glargine, 18 Units, Subcutaneous, Nightly  insulin lispro, 0-9 Units, Subcutaneous, TID AC  insulin lispro, 3 Units, Subcutaneous, TID With Meals  levothyroxine, 88 mcg, Oral, Daily  losartan, 100 mg, Oral, Q24H  sodium chloride, 10 mL, Intravenous,  Q12H  sodium chloride, 10 mL, Intravenous, Q12H  venlafaxine, 75 mg, Oral, BID    Continuous Infusions  furosemide (LASIX) 100 mg in 100mL NS infusion, 40 mg/hr, Last Rate: 40 mg/hr (02/04/21 1105)    PRN Meds  •  acetaminophen  •  cyclobenzaprine  •  dextrose  •  dextrose  •  glucagon (human recombinant)  •  HYDROmorphone  •  magnesium sulfate **OR** magnesium sulfate **OR** magnesium sulfate  •  ondansetron  •  oxyCODONE-acetaminophen  •  potassium chloride  •  potassium chloride  •  sodium chloride  •  sodium chloride    furosemide (LASIX) 100 mg in 100mL NS infusion, 40 mg/hr, Last Rate: 40 mg/hr (02/04/21 1105)    Diet  Diet Regular; Consistent Carbohydrate, Daily Fluid Restriction; 1000 mL Fluid Per Day       Intake/Output Summary (Last 24 hours) at 2/4/2021 1327  Last data filed at 2/4/2021 1104  Gross per 24 hour   Intake 1290 ml   Output 2000 ml   Net -710 ml    Weight change: -0.54 kg (-1 lb 3 oz)      Assessment/Plan     Active Hospital Problems    Diagnosis  POA   • **Sepsis without acute organ dysfunction (CMS/HCC) [A41.9]  Yes   • Anasarca [R60.1]  No   • Septic arthritis of shoulder, left (CMS/HCC) [M00.9]  Yes   • MSSA bacteremia [R78.81, B95.61]  Yes   • 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 (CMS/HCC) [K75.81, K74.60]  Yes   • History of COVID-19 [Z86.16]  Yes   • LINDSEY (acute kidney injury) (CMS/HCC) [N17.9]  Yes   • Hyponatremia [E87.1]  Yes   • Pulmonary nodules [R91.8]  Yes   • Elevated liver function tests [R79.89]  Yes   • Abnormal CT of the chest [R93.89]  Yes   • Thrombocytopenia (CMS/HCC) [D69.6]  Yes   • Other specified hypothyroidism [E03.8]  Yes   • Mixed hyperlipidemia [E78.2]  Yes   • Lumbar degenerative disc disease [M51.36]  Yes   • Obesity, Class III, BMI 40-49.9 (morbid obesity) (CMS/HCC) [E66.01]  Yes   • Sleep apnea [G47.30]  Yes   • Essential hypertension [I10]  Yes   • Diabetes mellitus type 2 in obese (CMS/HCC) [E11.69, E66.9]  Yes   • Anxiety and depression [F41.9, F32.9]  Yes         Resolved Hospital Problems   No resolved problems to display.     55 y.o. female admitted with Sepsis without acute organ dysfunction (CMS/HCC).    · Septic Arthritis Left Shoulder/Sepsis with MSSA Bacteremia  · sepsis present on admission; blood cx with MSSA.  Follow-up 1/26/2021 blood cultures no growth  · TTE with no valvular abnormalities-no ROBERT thought necessary per ID  · continue on IV cefazolin for 6 weeks minimum.  · MRI L shoulder showed septic arthritis-attempted aspiration unsuccessful. 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 Cirrhosis  · noted on abdominal CT scan  · has splenomegaly and TCP likely from portal HTN  · has relatively mild coagulopathy  · no e/o ascites or PSE  · will need GI follow up likely as outpatient when acute issues have resolved  · Anasarca/Hyponatremia  · Appreciate nephrology.  Now on furosemide drip, fluid restriction  · Switch to IV scheduled per nephrology  · Hypothyroidism  · continue on levothyroxine  · SANTOS/RLL Pulmonary Nodules  · unchanged  · f/u non-contrasted chest CT in 1 year  · Anemia  · Hgb has drifted down the past couple days-likely multifactorial with inflammation and postop   · no signs of acute bleeding or hemodynamic changes  · Continue to monitor  · Morbid Obesity  · Body mass index is 48.78 kg/m².   · SCDs for DVT prophylaxis.  · Full code.  · Discussed with patient.  · Anticipate discharge home timing yet to be determined.    Richie Veloz MD  [facility]  02/04/21  13:27 EST    I wore protective equipment throughout this patient encounter including a face mask, gloves, and protective eyewear.  Hand hygiene was performed before donning protective equipment and after removal when leaving the  room.

## 2021-02-05 ENCOUNTER — READMISSION MANAGEMENT (OUTPATIENT)
Dept: CALL CENTER | Facility: HOSPITAL | Age: 56
End: 2021-02-05

## 2021-02-05 VITALS
DIASTOLIC BLOOD PRESSURE: 83 MMHG | OXYGEN SATURATION: 98 % | HEIGHT: 67 IN | BODY MASS INDEX: 45.99 KG/M2 | HEART RATE: 82 BPM | WEIGHT: 293 LBS | TEMPERATURE: 98.8 F | RESPIRATION RATE: 18 BRPM | SYSTOLIC BLOOD PRESSURE: 151 MMHG

## 2021-02-05 LAB
ALBUMIN SERPL-MCNC: 2.2 G/DL (ref 3.5–5.2)
ANION GAP SERPL CALCULATED.3IONS-SCNC: 5.3 MMOL/L (ref 5–15)
BASOPHILS # BLD AUTO: 0.03 10*3/MM3 (ref 0–0.2)
BASOPHILS NFR BLD AUTO: 0.5 % (ref 0–1.5)
BUN SERPL-MCNC: 33 MG/DL (ref 6–20)
BUN/CREAT SERPL: 38.8 (ref 7–25)
CALCIUM SPEC-SCNC: 8.4 MG/DL (ref 8.6–10.5)
CHLORIDE SERPL-SCNC: 97 MMOL/L (ref 98–107)
CO2 SERPL-SCNC: 29.7 MMOL/L (ref 22–29)
CREAT SERPL-MCNC: 0.85 MG/DL (ref 0.57–1)
DEPRECATED RDW RBC AUTO: 47.6 FL (ref 37–54)
EOSINOPHIL # BLD AUTO: 0.02 10*3/MM3 (ref 0–0.4)
EOSINOPHIL NFR BLD AUTO: 0.3 % (ref 0.3–6.2)
ERYTHROCYTE [DISTWIDTH] IN BLOOD BY AUTOMATED COUNT: 14.4 % (ref 12.3–15.4)
GFR SERPL CREATININE-BSD FRML MDRD: 69 ML/MIN/1.73
GLUCOSE BLDC GLUCOMTR-MCNC: 169 MG/DL (ref 70–130)
GLUCOSE BLDC GLUCOMTR-MCNC: 172 MG/DL (ref 70–130)
GLUCOSE SERPL-MCNC: 171 MG/DL (ref 65–99)
HCT VFR BLD AUTO: 22.4 % (ref 34–46.6)
HGB BLD-MCNC: 7.3 G/DL (ref 12–15.9)
IMM GRANULOCYTES # BLD AUTO: 0.02 10*3/MM3 (ref 0–0.05)
IMM GRANULOCYTES NFR BLD AUTO: 0.3 % (ref 0–0.5)
LYMPHOCYTES # BLD AUTO: 1.77 10*3/MM3 (ref 0.7–3.1)
LYMPHOCYTES NFR BLD AUTO: 27.6 % (ref 19.6–45.3)
MAGNESIUM SERPL-MCNC: 1.5 MG/DL (ref 1.6–2.6)
MCH RBC QN AUTO: 30.3 PG (ref 26.6–33)
MCHC RBC AUTO-ENTMCNC: 32.6 G/DL (ref 31.5–35.7)
MCV RBC AUTO: 92.9 FL (ref 79–97)
MONOCYTES # BLD AUTO: 0.36 10*3/MM3 (ref 0.1–0.9)
MONOCYTES NFR BLD AUTO: 5.6 % (ref 5–12)
NEUTROPHILS NFR BLD AUTO: 4.21 10*3/MM3 (ref 1.7–7)
NEUTROPHILS NFR BLD AUTO: 65.7 % (ref 42.7–76)
NRBC BLD AUTO-RTO: 0 /100 WBC (ref 0–0.2)
PHOSPHATE SERPL-MCNC: 4.2 MG/DL (ref 2.5–4.5)
PLATELET # BLD AUTO: 232 10*3/MM3 (ref 140–450)
PMV BLD AUTO: 9 FL (ref 6–12)
POTASSIUM SERPL-SCNC: 4 MMOL/L (ref 3.5–5.2)
RBC # BLD AUTO: 2.41 10*6/MM3 (ref 3.77–5.28)
SODIUM SERPL-SCNC: 132 MMOL/L (ref 136–145)
WBC # BLD AUTO: 6.41 10*3/MM3 (ref 3.4–10.8)

## 2021-02-05 PROCEDURE — 63710000001 INSULIN LISPRO (HUMAN) PER 5 UNITS: Performed by: HOSPITALIST

## 2021-02-05 PROCEDURE — 25010000002 FUROSEMIDE PER 20 MG: Performed by: INTERNAL MEDICINE

## 2021-02-05 PROCEDURE — 25010000003 CEFAZOLIN IN DEXTROSE 2-4 GM/100ML-% SOLUTION: Performed by: INTERNAL MEDICINE

## 2021-02-05 PROCEDURE — 85025 COMPLETE CBC W/AUTO DIFF WBC: CPT | Performed by: INTERNAL MEDICINE

## 2021-02-05 PROCEDURE — 80069 RENAL FUNCTION PANEL: CPT | Performed by: NURSE PRACTITIONER

## 2021-02-05 PROCEDURE — 97110 THERAPEUTIC EXERCISES: CPT

## 2021-02-05 PROCEDURE — 82962 GLUCOSE BLOOD TEST: CPT

## 2021-02-05 PROCEDURE — 63710000001 INSULIN LISPRO (HUMAN) PER 5 UNITS: Performed by: ORTHOPAEDIC SURGERY

## 2021-02-05 PROCEDURE — 83735 ASSAY OF MAGNESIUM: CPT | Performed by: NURSE PRACTITIONER

## 2021-02-05 RX ORDER — LOSARTAN POTASSIUM 100 MG/1
100 TABLET ORAL
Qty: 30 TABLET | Refills: 0 | Status: SHIPPED | OUTPATIENT
Start: 2021-02-06 | End: 2021-02-19 | Stop reason: SDUPTHER

## 2021-02-05 RX ORDER — FUROSEMIDE 40 MG/1
40 TABLET ORAL 2 TIMES DAILY
Qty: 60 TABLET | Refills: 0 | Status: SHIPPED | OUTPATIENT
Start: 2021-02-05 | End: 2021-02-19 | Stop reason: SDUPTHER

## 2021-02-05 RX ORDER — CEFAZOLIN SODIUM 2 G/100ML
2 INJECTION, SOLUTION INTRAVENOUS EVERY 8 HOURS
Qty: 9000 ML | Refills: 1 | Status: SHIPPED | OUTPATIENT
Start: 2021-02-05 | End: 2021-03-12

## 2021-02-05 RX ADMIN — INSULIN LISPRO 2 UNITS: 100 INJECTION, SOLUTION INTRAVENOUS; SUBCUTANEOUS at 09:25

## 2021-02-05 RX ADMIN — CEFAZOLIN SODIUM 2 G: 2 INJECTION, SOLUTION INTRAVENOUS at 01:38

## 2021-02-05 RX ADMIN — LEVOTHYROXINE SODIUM 88 MCG: 88 TABLET ORAL at 09:24

## 2021-02-05 RX ADMIN — FUROSEMIDE 40 MG/HR: 10 INJECTION, SOLUTION INTRAVENOUS at 01:38

## 2021-02-05 RX ADMIN — FUROSEMIDE 40 MG/HR: 10 INJECTION, SOLUTION INTRAVENOUS at 05:29

## 2021-02-05 RX ADMIN — OXYCODONE HYDROCHLORIDE AND ACETAMINOPHEN 1 TABLET: 7.5; 325 TABLET ORAL at 02:32

## 2021-02-05 RX ADMIN — SODIUM CHLORIDE, PRESERVATIVE FREE 10 ML: 5 INJECTION INTRAVENOUS at 09:25

## 2021-02-05 RX ADMIN — LOSARTAN POTASSIUM 100 MG: 100 TABLET, FILM COATED ORAL at 09:24

## 2021-02-05 RX ADMIN — VENLAFAXINE HYDROCHLORIDE 75 MG: 75 TABLET ORAL at 09:24

## 2021-02-05 RX ADMIN — SODIUM CHLORIDE, PRESERVATIVE FREE 10 ML: 5 INJECTION INTRAVENOUS at 09:24

## 2021-02-05 RX ADMIN — FUROSEMIDE 40 MG: 40 TABLET ORAL at 09:24

## 2021-02-05 RX ADMIN — INSULIN LISPRO 2 UNITS: 100 INJECTION, SOLUTION INTRAVENOUS; SUBCUTANEOUS at 12:23

## 2021-02-05 RX ADMIN — CEFAZOLIN SODIUM 2 G: 2 INJECTION, SOLUTION INTRAVENOUS at 09:24

## 2021-02-05 RX ADMIN — INSULIN LISPRO 3 UNITS: 100 INJECTION, SOLUTION INTRAVENOUS; SUBCUTANEOUS at 12:24

## 2021-02-05 RX ADMIN — INSULIN LISPRO 3 UNITS: 100 INJECTION, SOLUTION INTRAVENOUS; SUBCUTANEOUS at 09:24

## 2021-02-05 RX ADMIN — OXYCODONE HYDROCHLORIDE AND ACETAMINOPHEN 1 TABLET: 7.5; 325 TABLET ORAL at 09:27

## 2021-02-05 NOTE — PROGRESS NOTES
Case Management Discharge Note      Final Note: Home via daughter with [facility]/infusion, 3 in 1 commode and rolling walker.    Provided Post Acute Provider List?: Yes  Post Acute Provider List: Home Health  Delivered To: Patient  Method of Delivery: In person    Selected Continued Care - Discharged on 2/5/2021 Admission date: 1/24/2021 - Discharge disposition: Home or Self Care    Destination    No services have been selected for the patient.              Durable Medical Equipment    No services have been selected for the patient.              Dialysis/Infusion    No services have been selected for the patient.              Home Medical Care Coordination complete    Service Provider Selected Services Address Phone Fax Patient Preferred    [facility] [place]  [facility] [address] [place] [id number] [phone] --          Therapy    No services have been selected for the patient.              Community Resources    No services have been selected for the patient.                  Transportation Services  Private: Car    Final Discharge Disposition Code: 06 - home with home health care

## 2021-02-05 NOTE — THERAPY TREATMENT NOTE
Patient Name: Stefani Bhandari  : 1965    MRN: [id number]                              Today's Date: 2021       Admit Date: 2021    Visit Dx:     ICD-10-CM ICD-9-CM   1. Sepsis without acute organ dysfunction, due to unspecified organism (CMS/HCC)  A41.9 038.9     995.91   2. LINDSEY (acute kidney injury) (CMS/HCC)  N17.9 584.9   3. Hyponatremia  E87.1 276.1   4. Hyperglycemia  R73.9 790.29   5. Pulmonary nodules  R91.8 793.19     Patient Active Problem List   Diagnosis   • Essential hypertension   • Sleep apnea   • Anxiety and depression   • Diabetes mellitus type 2 in obese (CMS/HCC)   • Obesity, Class III, BMI 40-49.9 (morbid obesity) (CMS/HCC)   • Other specified hypothyroidism   • Mixed hyperlipidemia   • Lumbar degenerative disc disease   • Thrombocytopenia (CMS/HCC)   • Tongue lesion   • Pulmonary nodules   • Abnormal CT of the chest   • Elevated liver function tests   • COVID-19 virus infection   • Sepsis without acute organ dysfunction (CMS/HCC)   • History of COVID-19   • LINDSEY (acute kidney injury) (CMS/HCC)   • Hyponatremia   • Septic arthritis of shoulder, left (CMS/HCC)   • MSSA bacteremia   • 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 (CMS/HCC)   • Anasarca     Past Medical History:   Diagnosis Date   • Anxiety    • DDD (degenerative disc disease), lumbar    • Depression    • Diabetes mellitus (CMS/HCC)    • Elevated LFTs    • NICK (generalized anxiety disorder)    • History of epilepsy     as a child-Abstracted from pt records   • Hyperlipidemia    • Hypertension    • Hypothyroidism    • MDD (major depressive disorder)    • Morbid obesity (CMS/HCC)    • OA (osteoarthritis)    • Obesity      Past Surgical History:   Procedure Laterality Date   • HYSTERECTOMY     • INCISION AND DRAINAGE SHOULDER Left 2021    Procedure: INCISION AND DRAINAGE SHOULDER;  Surgeon: Juaquin Richardson II, MD;  Location: [facility];  Service: Orthopedics;  Laterality: Left;   • TUBAL ABDOMINAL LIGATION   1996     General Information     Row Name 02/05/21 1221          Physical Therapy Time and Intention    Document Type  therapy note (daily note);discharge evaluation/summary  -CB     Mode of Treatment  physical therapy  -CB     Row Name 02/05/21 1221          General Information    Patient Profile Reviewed  yes  -CB     Existing Precautions/Restrictions  fall  -CB     Row Name 02/05/21 1221          Cognition    Orientation Status (Cognition)  oriented x 4  -CB       User Key  (r) = Recorded By, (t) = Taken By, (c) = Cosigned By    Initials Name Provider Type    CB Bessy Nix Physical Therapist        Mobility     Row Name 02/05/21 1222          Bed Mobility    Comment (Bed Mobility)  in chair  -CB     Row Name 02/05/21 1222          Bed-Chair Transfer    Bed-Chair Callaway (Transfers)  standby assist;supervision;verbal cues  -CB     Assistive Device (Bed-Chair Transfers)  walker, front-wheeled  -CB     Row Name 02/05/21 1222          Sit-Stand Transfer    Sit-Stand [place] (Transfers)  standby assist;nonverbal cues (demo/gesture);verbal cues  -CB     Assistive Device (Sit-Stand Transfers)  walker, front-wheeled  -CB     Row Name 02/05/21 1222          Gait/Stairs (Locomotion)    Callaway Level (Gait)  contact guard;nonverbal cues (demo/gesture);verbal cues  -CB     Assistive Device (Gait)  walker, front-wheeled  -CB     Distance in Feet (Gait)  25ft  -CB     Deviations/Abnormal Patterns (Gait)  billy decreased;gait speed decreased  -CB     Comment (Gait/Stairs)  gait belt donned  -CB       User Key  (r) = Recorded By, (t) = Taken By, (c) = Cosigned By    Initials Name Provider Type    CB Bessy Nix Physical Therapist        Obj/Interventions     Row Name 02/05/21 1222          Motor Skills    Therapeutic Exercise  -- seated ther ex LAQ, marching, DF/PF 10 reps  -CB       User Key  (r) = Recorded By, (t) = Taken By, (c) = Cosigned By    Initials Name Provider Type    CB Bessy Nix Physical  Therapist        Goals/Plan    No documentation.       Clinical Impression     Row Name 02/05/21 1222          Pain Scale: Numbers Pre/Post-Treatment    Pretreatment Pain Rating  3/10  -CB     Posttreatment Pain Rating  3/10  -CB     Pain Location - Side  Left  -CB     Pain Location  -- quad region  -CB     Row Name 02/05/21 1222          Plan of Care Review    Plan of Care Reviewed With  patient  -CB     Progress  improving  -CB     Outcome Summary  The patient is agreeable to PT and wants to try to ambulate this date. The patient continues to only be able to ambulate 20ft using Rwx with CGA. The patient is able to complete STS with SBA. The patient would beneift from home health PT at discharge.  -CB     Row Name 02/05/21 1222          Positioning and Restraints    Pre-Treatment Position  sitting in chair/recliner  -CB     Post Treatment Position  chair  -CB     In Chair  notified nsg;reclined;call light within reach;encouraged to call for assist;exit alarm on  -CB       User Key  (r) = Recorded By, (t) = Taken By, (c) = Cosigned By    Initials Name Provider Type    Bessy Morales Physical Therapist        Outcome Measures     Row Name 02/05/21 1224          How much help from another person do you currently need...    Turning from your back to your side while in flat bed without using bedrails?  3  -CB     Moving from lying on back to sitting on the side of a flat bed without bedrails?  3  -CB     Moving to and from a bed to a chair (including a wheelchair)?  3  -CB     Standing up from a chair using your arms (e.g., wheelchair, bedside chair)?  3  -CB     Climbing 3-5 steps with a railing?  3  -CB     To walk in hospital room?  3  -CB     AM-PAC 6 Clicks Score (PT)  18  -CB     Row Name 02/05/21 1224          Functional Assessment    Outcome Measure Options  AM-PAC 6 Clicks Basic Mobility (PT)  -CB       User Key  (r) = Recorded By, (t) = Taken By, (c) = Cosigned By    Initials Name Provider Type    RAY Nix  Bessy Physical Therapist        Physical Therapy Education                 Title: PT OT SLP Therapies (Done)     Topic: Physical Therapy (Done)     Point: Mobility training (Done)     Learning Progress Summary           Patient Acceptance, E,TB, VU,NR by CB at 2/5/2021 1224    Acceptance, E,TB, VU,NR by CB at 2/4/2021 1031    Acceptance, E,TB, VU,NR by CB at 2/3/2021 1013    Acceptance, E,TB,D, VU,NR by SV at 2/2/2021 1015    Eager, E,TB,D, VU,NR by SV at 2/1/2021 1506    Acceptance, E,TB, VU,NR by LB at 1/31/2021 1152                   Point: Home exercise program (Done)     Learning Progress Summary           Patient Acceptance, E,TB, VU,NR by CB at 2/5/2021 1224    Acceptance, E,TB, VU,NR by CB at 2/4/2021 1031    Acceptance, E,TB, VU,NR by CB at 2/3/2021 1013    Acceptance, E,TB,D, VU,NR by SV at 2/2/2021 1015    Eager, E,TB,D, VU,NR by SV at 2/1/2021 1506    Acceptance, E,TB, VU,NR by LB at 1/31/2021 1152                   Point: Body mechanics (Done)     Learning Progress Summary           Patient Acceptance, E,TB, VU,NR by CB at 2/5/2021 1224    Acceptance, E,TB, VU,NR by CB at 2/4/2021 1031    Acceptance, E,TB, VU,NR by CB at 2/3/2021 1013    Acceptance, E,TB,D, VU,NR by SV at 2/2/2021 1015    Eager, E,TB,D, VU,NR by SV at 2/1/2021 1506    Acceptance, E,TB, VU,NR by LB at 1/31/2021 1152                   Point: Precautions (Done)     Learning Progress Summary           Patient Acceptance, E,TB, VU,NR by CB at 2/5/2021 1224    Acceptance, E,TB, VU,NR by CB at 2/4/2021 1031    Acceptance, E,TB, VU,NR by CB at 2/3/2021 1013    Acceptance, E,TB,D, VU,NR by SV at 2/2/2021 1015    Eager, E,TB,D, VU,NR by SV at 2/1/2021 1506    Acceptance, E,TB, VU,NR by LB at 1/31/2021 1152                               User Key     Initials Effective Dates Name Provider Type Discipline    SV 04/03/18 -  Doris Lutz, PT Physical Therapist PT    LB 08/09/20 -  Vania Zazueta, PT Physical Therapist PT    CB 12/30/20 -  Boyd,  Bessy Physical Therapist PT              PT Recommendation and Plan     Plan of Care Reviewed With: patient  Progress: improving  Outcome Summary: The patient is agreeable to PT and wants to try to ambulate this date. The patient continues to only be able to ambulate 20ft using Rwx with CGA. The patient is able to complete STS with SBA. The patient would beneift from home health PT at discharge.     Time Calculation:   PT Charges     Row Name 02/05/21 1225             Time Calculation    Start Time  1146  -CB      Stop Time  1157  -CB      Time Calculation (min)  11 min  -CB      PT Received On  02/05/21  -CB         Time Calculation- PT    Total Timed Code Minutes- PT  10 minute(s)  -CB        User Key  (r) = Recorded By, (t) = Taken By, (c) = Cosigned By    Initials Name Provider Type    Bessy Morales Physical Therapist        Therapy Charges for Today     Code Description Service Date Service Provider Modifiers Qty    49310670193 HC PT THER PROC EA 15 MIN 2/4/2021 Bessy Nix GP 1    56809575174 HC PT THER PROC EA 15 MIN 2/5/2021 Bessy Nix GP 1          PT G-Codes  Outcome Measure Options: AM-PAC 6 Clicks Basic Mobility (PT)  AM-PAC 6 Clicks Score (PT): 18  AM-PAC 6 Clicks Score (OT): 14    Bessy Nix  2/5/2021

## 2021-02-05 NOTE — PLAN OF CARE
Problem: Adult Inpatient Plan of Care  Goal: Plan of Care Review  Outcome: Met  Goal: Patient-Specific Goal (Individualized)  Outcome: Met  Goal: Absence of Hospital-Acquired Illness or Injury  Outcome: Met  Intervention: Identify and Manage Fall Risk  Description: Perform standard risk assessment with a validated tool or comprehensive approach appropriate to the patient on admission; reassess fall risk frequently, with change in status or transfer to another level of care.  Communicate fall injury risk to interprofessional healthcare team.  Determine need for increased observation, equipment and environmental modification, such as low bed and signage, as well as supportive, nonskid footwear.  Adjust safety measures to individual developmental age, stage and identified risk factors.  Reinforce the importance of safety and physical activity with patient and family.  Perform regular intentional rounding to assess need for position change, pain assessment, personal needs, including assistance with toileting.  Intervention: Prevent Skin Injury  Description: Assess skin risk on admission and at regular intervals throughout hospital stay.  Keep all areas of skin (especially folds) clean and dry.  Maintain adequate skin hydration.  Relieve and redistribute pressure and protect bony prominences; implement measures based on patient-specific risk factors.  Match turning and repositioning schedule to clinical condition.  Encourage weight shift frequently; assist with reposition if unable to complete independently.  Float heels off bed. Avoid pressure on the Achilles tendon.  Keep skin free from extended contact with medical devices.  Use aids (e.g., slide boards, mechanical lift) during transfer.  Intervention: Prevent and Manage VTE (venous thromboembolism) Risk  Description: Assess for VTE risk.  Encourage/assist with early ambulation.  Initiate and maintain compression or other therapy, as indicated based on identified risk  in accordance with organizational protocol/provider order.  Encourage both active and passive leg exercises while in bed, if unable to ambulate.  Intervention: Prevent Infection  Description: Maintain skin and mucous membrane integrity; promote hand, oral and pulmonary hygiene.  Optimize fluid balance, nutrition, sleep and glycemic control to maximize infection resistance.  Identify potential sources of infection early to prevent or mitigate progression of infection (e.g., wound, lines, devices).  Evaluate ongoing need for invasive devices; remove promptly when no longer indicated.  Goal: Optimal Comfort and Wellbeing  Outcome: Met  Intervention: Provide Person-Centered Care  Description: Use a family-focused approach to care.  Develop trust and rapport by proactively providing information, encouraging questions, addressing concerns and offering reassurance.  Acknowledge emotional response to hospitalization.  Recognize and utilize personal coping strategies.  Honor spiritual and cultural preferences.  Goal: Readiness for Transition of Care  Outcome: Met

## 2021-02-05 NOTE — DISCHARGE SUMMARY
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 (CMS/[facility]) [K75.81, K74.60]  Yes   • History of COVID-19 [Z86.16]  Yes   • LINDSEY (acute kidney injury) (CMS/[facility]) [N17.9]  Yes   • Hyponatremia [E87.1]  Yes   • Pulmonary nodules [R91.8]  Yes   • Elevated liver function tests [R79.89]  Yes   • Abnormal CT of the chest [R93.89]  Yes   • Thrombocytopenia (CMS/[facility]) [D69.6]  Yes   • Other specified hypothyroidism [E03.8]  Yes   • Mixed hyperlipidemia [E78.2]  Yes   • Lumbar degenerative disc disease [M51.36]  Yes   • Obesity, Class III, BMI 40-49.9 (morbid obesity) (CMS/[facility]) [E66.01]  Yes   • Sleep apnea [G47.30]  Yes   • Essential hypertension [I10]  Yes   • Diabetes mellitus type 2 in obese (CMS/[facility]) [E11.69, E66.9]  Yes   • Anxiety and depression [F41.9, F32.9]  Yes      Resolved Hospital Problems   No resolved problems to display.     Procedure(s):  INCISION AND DRAINAGE SHOULDER     Consults     Date and Time Order Name Status Description    2/2/2021 0814 Inpatient Nephrology Consult Completed     1/26/2021 1723 Inpatient Neurosurgery Consult Completed     1/26/2021 1723 Inpatient Orthopedic Surgery Consult Completed     1/25/2021 1628 Inpatient  Anesthesiology Physician Consult      1/25/2021 0032 Inpatient Infectious Diseases Consult Completed     1/24/2021 1038 [facility] (on-call MD unless specified) Details Completed         Hospital Course  Please see history and physical for details. Patient is a 55 y.o. female presented with shoulder and back pain.  She was septic and cultures grew MSSA.  MRI of the left shoulder showed septic arthritis and  she underwent unsuccessful aspiration and subsequently had I&D on 1/30/2021.  Antibiotics were managed by ID.  Follow-up blood cultures on 1/26/2021 showed no growth.  TTE did not show any valvular abnormalities and ID did not recommend ROBERT.  She also had L5-S1 discitis/myositis seen on MRI.  Neurosurgery did not recommend any surgical intervention.    She was noted on imaging to have METZGER and possibly cirrhosis and splenomegaly was noted.  Recommend she follow-up with GI as an outpatien once acute issues have resolved.    She also has left upper lobe, right lower lobe pulmonary nodules that are unchanged.  She will need to have a follow-up CT chest in a year.    She developed anasarca and hyponatremia requiring IV diuretics.  She has improvement in her fluid status and diuretics have been switched to p.o.      Hemoglobin has not been low but stable likely multifactorial from inflammation, postop blood loss.  There is no evidence of acute bleeding or hemodynamic changes.  She will continue to have weekly blood draws with antibiotics and hemoglobin can be monitored with this.    I discussed the patient's findings and my recommendations with patient and nursing staff and Dr. Schneider.    Condition on Discharge: Improved.  Shoulder pain is improved.  She would like to go home today.  She declined skilled nursing facility.    Temp:  [98.4 °F (36.9 °C)-98.8 °F (37.1 °C)] 98.8 °F (37.1 °C)  Heart Rate:  [82-93] 82  Resp:  [18] 18  BP: (137-172)/(66-84) 151/83  Body mass index is 48.01 kg/m².    Physical Exam  Vitals signs and nursing note reviewed.   Constitutional:       General: She is not in acute distress.  HENT:      Head: Normocephalic and atraumatic.   Cardiovascular:      Rate and Rhythm: Normal rate and regular rhythm.   Pulmonary:      Effort: Pulmonary effort is normal. No respiratory distress.      Breath sounds: Normal breath sounds.   Abdominal:      General: Bowel sounds are normal.      Palpations: Abdomen  is soft.      Tenderness: There is no abdominal tenderness. There is no guarding or rebound.   Musculoskeletal:         General: Swelling present.   Skin:     General: Skin is warm and dry.   Neurological:      General: No focal deficit present.      Mental Status: She is alert and oriented to person, place, and time.   Psychiatric:         Mood and Affect: Mood normal.         Behavior: Behavior normal.     While in the room and during my examination of the patient I wore gloves, mask, eye protection.  I washed my hands before and after this patient encounter.     Disposition: Home or Self Care       Discharge Medications      New Medications      Instructions Start Date   ceFAZolin in dextrose 2-4 GM/100ML-% solution IVPB  Commonly known as: ANCEF   2 g, Intravenous, Every 8 Hours      furosemide 40 MG tablet  Commonly known as: LASIX   40 mg, Oral, 2 Times Daily      losartan 100 MG tablet  Commonly known as: COZAAR   100 mg, Oral, Every 24 Hours Scheduled   Start Date: February 6, 2021        Changes to Medications      Instructions Start Date   metFORMIN 500 MG tablet  Commonly known as: GLUCOPHAGE  What changed:   · how much to take  · how to take this  · when to take this  · additional instructions   1,000 mg, Oral, 2 times daily         Continue These Medications      Instructions Start Date   Belbuca 150 MCG film  Generic drug: Buprenorphine HCl   No dose, route, or frequency recorded.      cholecalciferol 25 MCG (1000 UT) tablet  Commonly known as: VITAMIN D3   2,000 Units, Oral, Daily      cyclobenzaprine 10 MG tablet  Commonly known as: FLEXERIL   TAKE 1 TABLET BY MOUTH THREE TIMES DAILY AS NEEDED FOR MUSCLE SPASMS      diclofenac 75 MG EC tablet  Commonly known as: VOLTAREN   TAKE 1 TABLET BY MOUTH TWICE DAILY      ezetimibe 10 MG tablet  Commonly known as: ZETIA   10 mg, Oral, Daily      levothyroxine 88 MCG tablet  Commonly known as: SYNTHROID, LEVOTHROID   88 mcg, Oral, Daily      methylPREDNISolone  4 MG dose pack  Commonly known as: MEDROL   Take as directed on package instructions.      simvastatin 80 MG tablet  Commonly known as: ZOCOR   80 mg, Oral, Nightly      venlafaxine 75 MG tablet  Commonly known as: EFFEXOR   75 mg, Oral, 2 Times Daily         Stop These Medications    losartan-hydrochlorothiazide 100-25 MG per tablet  Commonly known as: HYZAAR           Diet Instructions     Diet: Regular, Consistent Carbohydrate      Discharge Diet:  Regular  Consistent Carbohydrate       Fluid Restriction per day: 1500 mL Fluid         Activity Instructions     Activity as Tolerated           Additional Instructions for the Follow-ups that You Need to Schedule     Ambulatory Referral to Home Health   As directed      1.  Cefazolin 2 g IV every 8 hours x6 to 8 weeks.  Preliminary antibiotic stop date March 12     2.  Please monitor weekly CBC with differential and creatinine and fax the results to the infectious disease clinic at [phone]    Order Comments: 1.  Cefazolin 2 g IV every 8 hours x6 to 8 weeks.  Preliminary antibiotic stop date March 12   2.  Please monitor weekly CBC with differential and creatinine and fax the results to the infectious disease clinic at [phone]     Face to Face Visit Date: 2/5/2021    Follow-up provider for Plan of Care?: I treated the patient in an acute care facility and will not continue treatment after discharge.    Follow-up provider: JOHNNIE PELLETIER [[id number]]    Reason/Clinical Findings: sepsis    Describe mobility limitations that make leaving home difficult: sepsis    Nursing/Therapeutic Services Requested: Physical Therapy Skilled Nursing    Skilled nursing orders: Infusion therapy    Frequency: 1 Week 1         Call MD for problems / concerns.   As directed      CT Chest Without Contrast   Feb 05, 2022         Contact information for follow-up providers     Jovanny Gibbons MD. Schedule an appointment as soon as possible for a visit in 3 week(s).    Specialty:  Neurosurgery  Contact information:  [address]  [facility] [address]  [place] [address]  [phone]             Juaquin Richardson II, MD. Schedule an appointment as soon as possible for a visit in 3 week(s).    Specialty: Orthopedic Surgery  Contact information:  [address]  [facility] [address]  [place] [address]  [phone]             Dianne Porras MD. Go on 3/12/2021.    Specialty: Infectious Diseases  Why: Appointment March 12th @ 11:20.  1.  Cefazolin 2 g IV every 8 hours x6 to 8 weeks.  Preliminary antibiotic stop date March 12  2.  Please monitor weekly CBC with differential and creatinine and fax the results to the infectious disease clinic at [phone]  Contact information:  [address]  [facility] [address]  [place] [address]  [phone]             Dea Martinez MD .    Specialty: Family Medicine  Contact information:  [address] [place] [id number] [phone]             [facility] GASTROENTEROLOGY Follow up.    Specialty: Gastroenterology  Why: Follow-up [place]  Contact information:  [address]  [facility] [address]  [facility] [address] [phone]  Additional information:  Phone Number: [phone]                 Contact information for after-discharge care     Home Medical Care     [facility] [place] .    Service: Home Health Services  Contact information:  [address] [facility] [address]  [facility] [address] [phone]                               Richie Veloz MD  02/05/21  11:47 EST    Discharge time spent greater than 30 minutes.

## 2021-02-05 NOTE — PROGRESS NOTES
Continued Stay Note  [facility]     Patient Name: Stefani Bhandari  MRN: [id number]  Today's Date: 2/5/2021    Admit Date: 1/24/2021    Discharge Plan     Row Name 02/05/21 1153       Plan    Plan  Home with [facility]/Infusion with rolling walker and 3 in 1 commode through [facility]    Patient/Family in Agreement with Plan  yes    Plan Comments  CCP noted discharge orders. Asked Bambi/hospitalist liasion to ask Dr Veloz for 3 in one commode and rolling walker orders. Notifed [facility]. Notified [facility]. Patient to return home via daughter. Shoshana Ryder RN/CCP        Discharge Codes    No documentation.       Expected Discharge Date and Time     Expected Discharge Date Expected Discharge Time    Feb 5, 2021             Amber Ryder

## 2021-02-05 NOTE — PLAN OF CARE
Goal Outcome Evaluation:  Plan of Care Reviewed With: patient  Progress: improving  Outcome Summary: The patient is agreeable to PT and wants to try to ambulate this date. The patient continues to only be able to ambulate 20ft using Rwx with CGA. The patient is able to complete STS with SBA. The patient would beneift from home health PT at discharge.    Patient was wearing a face mask during this therapy encounter. Therapist used appropriate personal protective equipment including eye protection, mask, and gloves.  Mask used was standard procedure mask. Appropriate PPE was worn during the entire therapy session. Hand hygiene was completed before and after therapy session. Patient is not in enhanced droplet precautions.

## 2021-02-05 NOTE — PLAN OF CARE
Goal Outcome Evaluation:  Plan of Care Reviewed With: patient  Progress: improving  Outcome Summary: Patient has been resting comfortbaly this shift. Tolerating 1500ml fluid restriction well. Continues on IV Lasix and tolerating well via PICC line. Voiding well. Strict I/O continue. Continues IV antibiotic with no adverse reactions noted. Dressing remains intact to left shoulder. No complaints of pain, pt states it is just starting to itch around dressing. Kaiden lower ext edema is decreasing. Patient states her legs feel much better and that it is easier to ambulate and lift ext now that they are less edematous. Patient has been resting comfortably in her recliner per her request. Call light in reach. Has been treated with prn pain medication x2 this shift. Education provided that pain medication will be discontinued this day.

## 2021-02-05 NOTE — OUTREACH NOTE
Prep Survey      Responses   [facility] facility patient discharged from?  [place]   Is LACE score < 7 ?  No   Emergency Room discharge w/ pulse ox?  No   Eligibility  [facility]   Date of Admission  01/24/21   Date of Discharge  02/05/21   Discharge Disposition  Home-Health Care [facility]   Discharge diagnosis  sepsis, septic arthritis I & D left shoulder, METZGER, anasarca, hyponatremia   Does the patient have one of the following disease processes/diagnoses(primary or secondary)?  Sepsis   Does the patient have Home health ordered?  Yes   What is the Home health agency?   [facility], [facility]   Is there a DME ordered?  Yes   What DME was ordered?  3 in 1 commode, rolling walker from [facility]   Prep survey completed?  Yes          Rona Thompson RN

## 2021-02-05 NOTE — PROGRESS NOTES
" LOS: 12 days   Patient Care Team:  Dea Martinez MD as PCP - General (Family Medicine)    Chief Complaint: hyponatremia     Subjective     Back Pain      This is a 55 y.o. year old female admitted for septic arthritis of the left shoulder with MSSA bacteremia. Na 135 on admission and dropped to 125. She has anasarca related to liver cirrhosis due to METZGER.   Switch to oral Lasix today, urine output continues to be brisk  Weight down another 5 pounds  Edema still present but improved      Subjective  She is feeling better today  Reports edema is improved    Reports good UOP  Denies sob or chest pain     History taken from: patient chart    Objective     Vital Sign Min/Max for last 24 hours  Temp  Min: 98.4 °F (36.9 °C)  Max: 98.8 °F (37.1 °C)   BP  Min: 137/66  Max: 172/84   Pulse  Min: 82  Max: 93   Resp  Min: 18  Max: 18   SpO2  Min: 93 %  Max: 98 %   No data recorded   Weight  Min: 139 kg (306 lb 9.6 oz)  Max: 139 kg (306 lb 9.6 oz)     Flowsheet Rows      First Filed Value   Admission Height  170.2 cm (67\") Documented at 01/24/2021 0734   Admission Weight  127 kg (280 lb) Documented at 01/24/2021 0734          No intake/output data recorded.  I/O last 3 completed shifts:  In: 2080 [P.O.:1760; I.V.:220; IV Piggyback:100]  Out: 3800 [Urine:3800]    Objective:  Vital signs: (most recent): Blood pressure 151/83, pulse 82, temperature 98.8 °F (37.1 °C), temperature source Oral, resp. rate 18, height 170.2 cm (67.01\"), weight (!) 139 kg (306 lb 9.6 oz), SpO2 98 %.            Physical Examination: General appearance she is alert, well appearing, and in no distress  Mental status - alert, oriented to person, place, and time  Eyes - pupils equal and reactive, extraocular eye movements intact  Chest - clear to auscultation, no wheezes, rales or rhonchi, symmetric air entry  Heart - normal rate, regular rhythm, normal S1, S2, no murmurs, rubs, clicks or gallops  Abdomen - soft, nontender, nondistended, no masses or " organomegaly  Neurological - alert, oriented, normal speech, no focal findings or movement disorder noted  Extremities - improved edema in upper and lower extremities. 2 + now in lower extremities. No cyanosis.     DATA:  Radiology and Labs:  The following labs independently reviewed by me, additional AM labs ordered  Interval notes, chart personally reviewed by me.      Moderate risk    Results Review:     I reviewed the patient's new clinical results.    WBC WBC   Date Value Ref Range Status   02/05/2021 6.41 3.40 - 10.80 10*3/mm3 Final   02/04/2021 7.01 3.40 - 10.80 10*3/mm3 Final   02/03/2021 6.93 3.40 - 10.80 10*3/mm3 Final      HGB Hemoglobin   Date Value Ref Range Status   02/05/2021 7.3 (L) 12.0 - 15.9 g/dL Final   02/04/2021 7.6 (L) 12.0 - 15.9 g/dL Final   02/03/2021 7.5 (L) 12.0 - 15.9 g/dL Final      HCT Hematocrit   Date Value Ref Range Status   02/05/2021 22.4 (L) 34.0 - 46.6 % Final   02/04/2021 23.4 (L) 34.0 - 46.6 % Final   02/03/2021 23.1 (L) 34.0 - 46.6 % Final      Platlets No results found for: LABPLAT   MCV MCV   Date Value Ref Range Status   02/05/2021 92.9 79.0 - 97.0 fL Final   02/04/2021 95.5 79.0 - 97.0 fL Final   02/03/2021 91.7 79.0 - 97.0 fL Final          Sodium Sodium   Date Value Ref Range Status   02/05/2021 132 (L) 136 - 145 mmol/L Final   02/04/2021 133 (L) 136 - 145 mmol/L Final   02/03/2021 130 (L) 136 - 145 mmol/L Final   02/03/2021 131 (L) 136 - 145 mmol/L Final   02/03/2021 128 (L) 136 - 145 mmol/L Final      Potassium Potassium   Date Value Ref Range Status   02/05/2021 4.0 3.5 - 5.2 mmol/L Final   02/04/2021 4.3 3.5 - 5.2 mmol/L Final   02/03/2021 4.2 3.5 - 5.2 mmol/L Final   02/03/2021 4.5 3.5 - 5.2 mmol/L Final   02/03/2021 4.1 3.5 - 5.2 mmol/L Final      Chloride Chloride   Date Value Ref Range Status   02/05/2021 97 (L) 98 - 107 mmol/L Final   02/04/2021 95 (L) 98 - 107 mmol/L Final   02/03/2021 95 (L) 98 - 107 mmol/L Final   02/03/2021 94 (L) 98 - 107 mmol/L Final    02/03/2021 94 (L) 98 - 107 mmol/L Final      CO2 CO2   Date Value Ref Range Status   02/05/2021 29.7 (H) 22.0 - 29.0 mmol/L Final   02/04/2021 27.4 22.0 - 29.0 mmol/L Final   02/03/2021 30.0 (H) 22.0 - 29.0 mmol/L Final   02/03/2021 29.8 (H) 22.0 - 29.0 mmol/L Final   02/03/2021 27.4 22.0 - 29.0 mmol/L Final      BUN BUN   Date Value Ref Range Status   02/05/2021 33 (H) 6 - 20 mg/dL Final   02/04/2021 34 (H) 6 - 20 mg/dL Final   02/03/2021 33 (H) 6 - 20 mg/dL Final   02/03/2021 34 (H) 6 - 20 mg/dL Final   02/03/2021 36 (H) 6 - 20 mg/dL Final      Creatinine Creatinine   Date Value Ref Range Status   02/05/2021 0.85 0.57 - 1.00 mg/dL Final   02/04/2021 0.78 0.57 - 1.00 mg/dL Final   02/03/2021 0.78 0.57 - 1.00 mg/dL Final   02/03/2021 0.89 0.57 - 1.00 mg/dL Final   02/03/2021 0.88 0.57 - 1.00 mg/dL Final      Calcium Calcium   Date Value Ref Range Status   02/05/2021 8.4 (L) 8.6 - 10.5 mg/dL Final   02/04/2021 8.3 (L) 8.6 - 10.5 mg/dL Final   02/03/2021 8.1 (L) 8.6 - 10.5 mg/dL Final   02/03/2021 8.0 (L) 8.6 - 10.5 mg/dL Final   02/03/2021 7.8 (L) 8.6 - 10.5 mg/dL Final      PO4 No results found for: CAPO4   Albumin Albumin   Date Value Ref Range Status   02/05/2021 2.20 (L) 3.50 - 5.20 g/dL Final   02/04/2021 2.40 (L) 3.50 - 5.20 g/dL Final   02/03/2021 2.30 (L) 3.50 - 5.20 g/dL Final   02/03/2021 2.10 (L) 3.50 - 5.20 g/dL Final   02/03/2021 2.10 (L) 3.50 - 5.20 g/dL Final      Magnesium Magnesium   Date Value Ref Range Status   02/05/2021 1.5 (L) 1.6 - 2.6 mg/dL Final   02/04/2021 1.6 1.6 - 2.6 mg/dL Final   02/03/2021 1.8 1.6 - 2.6 mg/dL Final      Uric Acid No results found for: URICACID     Medication Review:     Current Facility-Administered Medications:   •  acetaminophen (TYLENOL) tablet 650 mg, 650 mg, Oral, Q4H PRN, Juaquin Richardson II, MD, 650 mg at 01/26/21 1704  •  ceFAZolin in dextrose (ANCEF) IVPB solution 2 g, 2 g, Intravenous, Q8H, Dianne Porras MD, 2 g at 02/05/21 0924  •  cyclobenzaprine  (FLEXERIL) tablet 10 mg, 10 mg, Oral, TID PRN, Juaquin Richardson II, MD  •  dextrose (D50W) 25 g/ 50mL Intravenous Solution 25 g, 25 g, Intravenous, Q15 Min PRN, Juaquin Richardson II, MD  •  dextrose (GLUTOSE) oral gel 15 g, 15 g, Oral, Q15 Min PRN, Juaquin Richardson II, MD  •  furosemide (LASIX) tablet 40 mg, 40 mg, Oral, BID, Kermit Schneider MD, 40 mg at 02/05/21 0924  •  glucagon (human recombinant) (GLUCAGEN DIAGNOSTIC) injection 1 mg, 1 mg, Subcutaneous, Q15 Min PRN, Juaquin Richardson II, MD  •  HYDROmorphone (DILAUDID) injection 0.5 mg, 0.5 mg, Intravenous, Q3H PRN, Juaquin Richardson II, MD, 0.5 mg at 01/25/21 0746  •  insulin glargine (LANTUS, SEMGLEE) injection 18 Units, 18 Units, Subcutaneous, Nightly, Richie Veloz MD, 18 Units at 02/04/21 2043  •  insulin lispro (humaLOG, ADMELOG) injection 0-9 Units, 0-9 Units, Subcutaneous, TID AC, Juaquin Richardson II, MD, 2 Units at 02/05/21 0925  •  insulin lispro (humaLOG, ADMELOG) injection 3 Units, 3 Units, Subcutaneous, TID With Meals, Richie Veloz MD, 3 Units at 02/05/21 0924  •  levothyroxine (SYNTHROID, LEVOTHROID) tablet 88 mcg, 88 mcg, Oral, Daily, Juaquin Richardson II, MD, 88 mcg at 02/05/21 0924  •  losartan (COZAAR) tablet 100 mg, 100 mg, Oral, Q24H, Juaquin Richardson II, MD, 100 mg at 02/05/21 0924  •  Magnesium Sulfate 2 gram Bolus, followed by 8 gram infusion (total Mg dose 10 grams)- Mg less than or equal to 1mg/dL, 2 g, Intravenous, PRN **OR** Magnesium Sulfate 2 gram / 50mL Infusion (GIVE X 3 BAGS TO EQUAL 6GM TOTAL DOSE) - Mg 1.1 - 1.5 mg/dl, 2 g, Intravenous, PRN **OR** Magnesium Sulfate 4 gram infusion- Mg 1.6-1.9 mg/dL, 4 g, Intravenous, PRN, Jayme Godwin APRN  •  ondansetron (ZOFRAN) tablet 4 mg, 4 mg, Oral, Q6H PRN, Juaquin Richardson II, MD  •  oxyCODONE-acetaminophen (PERCOCET) 7.5-325 MG per tablet 1 tablet, 1 tablet, Oral, Q4H PRN, Sweet, Juaquin  Michael BAKER MD, 1 tablet at 02/05/21 0927  •  potassium chloride (K-DUR,KLOR-CON) ER tablet 40 mEq, 40 mEq, Oral, PRN, Prestigiacomo, Jayme, APRN  •  potassium chloride (KLOR-CON) packet 40 mEq, 40 mEq, Oral, PRN, Prestigiacomo, Jayme, APRN  •  sodium chloride 0.9 % flush 10 mL, 10 mL, Intravenous, Q12H, Juaquin Richardson II, MD, 10 mL at 02/05/21 0925  •  sodium chloride 0.9 % flush 10 mL, 10 mL, Intravenous, PRN, Juaquin Richardson II, MD  •  sodium chloride 0.9 % flush 10 mL, 10 mL, Intravenous, Q12H, Dianne Porras MD, 10 mL at 02/05/21 0924  •  sodium chloride 0.9 % flush 10 mL, 10 mL, Intravenous, PRN, Dianne Porras MD, 10 mL at 02/01/21 1748  •  venlafaxine (EFFEXOR) tablet 75 mg, 75 mg, Oral, BID, Juaquin Richardson II, MD, 75 mg at 02/05/21 0924    ASSESSMENT:   LINDSEY, has resolved since admission  Hyponatremia improved, stable today at 132  Anasarca, improving   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  Sepsis bacteremia from septic arthritis of left shoulder, MSSA  Obesity   KRISTY  Anemia of chronic disease, worsening   LATOYA  HTN  DM       PLAN:   Renal function stable, sodium level steady at 132  Volume status continues to improve, weight down another 5 pounds overnight  Transition to oral Lasix today  Patient advised to continue fluid and salt restriction when she goes home  BP stable, continue ARB  We will repeat chemistries in about a week, this can be done with her primary care physician  Stable for discharge home today from renal standpoint      Kermit Schneider MD  02/05/21  11:06 EST      Kermit Schneider MD  [facility]  [phone]

## 2021-02-08 ENCOUNTER — APPOINTMENT (OUTPATIENT)
Dept: GENERAL RADIOLOGY | Facility: HOSPITAL | Age: 56
End: 2021-02-08

## 2021-02-08 ENCOUNTER — LAB REQUISITION (OUTPATIENT)
Dept: LAB | Facility: HOSPITAL | Age: 56
End: 2021-02-08

## 2021-02-08 ENCOUNTER — READMISSION MANAGEMENT (OUTPATIENT)
Dept: CALL CENTER | Facility: HOSPITAL | Age: 56
End: 2021-02-08

## 2021-02-08 ENCOUNTER — HOSPITAL ENCOUNTER (OUTPATIENT)
Facility: HOSPITAL | Age: 56
Discharge: HOME | End: 2021-02-13
Attending: EMERGENCY MEDICINE | Admitting: INTERNAL MEDICINE

## 2021-02-08 ENCOUNTER — TRANSITIONAL CARE MANAGEMENT TELEPHONE ENCOUNTER (OUTPATIENT)
Dept: CALL CENTER | Facility: HOSPITAL | Age: 56
End: 2021-02-08

## 2021-02-08 DIAGNOSIS — R93.2 ABNORMAL CT SCAN, LIVER: ICD-10-CM

## 2021-02-08 DIAGNOSIS — Z00.00 ENCOUNTER FOR GENERAL ADULT MEDICAL EXAMINATION WITHOUT ABNORMAL FINDINGS: ICD-10-CM

## 2021-02-08 DIAGNOSIS — D64.9 ANEMIA, UNSPECIFIED TYPE: ICD-10-CM

## 2021-02-08 DIAGNOSIS — K62.5 RECTAL BLEEDING: ICD-10-CM

## 2021-02-08 DIAGNOSIS — D64.9 SYMPTOMATIC ANEMIA: Primary | ICD-10-CM

## 2021-02-08 LAB
ABO GROUP BLD: NORMAL
ALBUMIN SERPL-MCNC: 2.3 G/DL (ref 3.5–5.2)
ALBUMIN/GLOB SERPL: 0.5 G/DL
ALP SERPL-CCNC: 93 U/L (ref 39–117)
ALT SERPL W P-5'-P-CCNC: 5 U/L (ref 1–33)
ANION GAP SERPL CALCULATED.3IONS-SCNC: 7.5 MMOL/L (ref 5–15)
AST SERPL-CCNC: 16 U/L (ref 1–32)
BASOPHILS # BLD AUTO: 0.02 10*3/MM3 (ref 0–0.2)
BASOPHILS # BLD AUTO: 0.02 10*3/MM3 (ref 0–0.2)
BASOPHILS NFR BLD AUTO: 0.5 % (ref 0–1.5)
BASOPHILS NFR BLD AUTO: 0.5 % (ref 0–1.5)
BILIRUB SERPL-MCNC: 0.3 MG/DL (ref 0–1.2)
BLD GP AB SCN SERPL QL: NEGATIVE
BUN SERPL-MCNC: 27 MG/DL (ref 6–20)
BUN/CREAT SERPL: 32.5 (ref 7–25)
CALCIUM SPEC-SCNC: 8.2 MG/DL (ref 8.6–10.5)
CHLORIDE SERPL-SCNC: 100 MMOL/L (ref 98–107)
CO2 SERPL-SCNC: 28.5 MMOL/L (ref 22–29)
CREAT SERPL-MCNC: 0.83 MG/DL (ref 0.57–1)
CREAT SERPL-MCNC: 0.99 MG/DL (ref 0.57–1)
D-LACTATE SERPL-SCNC: 1.9 MMOL/L (ref 0.5–2)
DEPRECATED RDW RBC AUTO: 47.3 FL (ref 37–54)
DEPRECATED RDW RBC AUTO: 48.3 FL (ref 37–54)
EOSINOPHIL # BLD AUTO: 0.06 10*3/MM3 (ref 0–0.4)
EOSINOPHIL # BLD AUTO: 0.07 10*3/MM3 (ref 0–0.4)
EOSINOPHIL NFR BLD AUTO: 1.4 % (ref 0.3–6.2)
EOSINOPHIL NFR BLD AUTO: 1.6 % (ref 0.3–6.2)
ERYTHROCYTE [DISTWIDTH] IN BLOOD BY AUTOMATED COUNT: 14.4 % (ref 12.3–15.4)
ERYTHROCYTE [DISTWIDTH] IN BLOOD BY AUTOMATED COUNT: 14.4 % (ref 12.3–15.4)
EXPIRATION DATE: ABNORMAL
FECAL OCCULT BLOOD SCREEN, POC: POSITIVE
GFR SERPL CREATININE-BSD FRML MDRD: 58 ML/MIN/1.73
GFR SERPL CREATININE-BSD FRML MDRD: 71 ML/MIN/1.73
GLOBULIN UR ELPH-MCNC: 4.8 GM/DL
GLUCOSE SERPL-MCNC: 173 MG/DL (ref 65–99)
HCT VFR BLD AUTO: 20.9 % (ref 34–46.6)
HCT VFR BLD AUTO: 20.9 % (ref 34–46.6)
HGB BLD-MCNC: 6.8 G/DL (ref 12–15.9)
HGB BLD-MCNC: 6.9 G/DL (ref 12–15.9)
IMM GRANULOCYTES # BLD AUTO: 0.01 10*3/MM3 (ref 0–0.05)
IMM GRANULOCYTES # BLD AUTO: 0.01 10*3/MM3 (ref 0–0.05)
IMM GRANULOCYTES NFR BLD AUTO: 0.2 % (ref 0–0.5)
IMM GRANULOCYTES NFR BLD AUTO: 0.2 % (ref 0–0.5)
INR PPP: 1.33 (ref 0.9–1.1)
LIPASE SERPL-CCNC: 112 U/L (ref 13–60)
LYMPHOCYTES # BLD AUTO: 1.18 10*3/MM3 (ref 0.7–3.1)
LYMPHOCYTES # BLD AUTO: 1.27 10*3/MM3 (ref 0.7–3.1)
LYMPHOCYTES NFR BLD AUTO: 27.3 % (ref 19.6–45.3)
LYMPHOCYTES NFR BLD AUTO: 30.6 % (ref 19.6–45.3)
Lab: 154
MCH RBC QN AUTO: 30.2 PG (ref 26.6–33)
MCH RBC QN AUTO: 30.7 PG (ref 26.6–33)
MCHC RBC AUTO-ENTMCNC: 32.5 G/DL (ref 31.5–35.7)
MCHC RBC AUTO-ENTMCNC: 33 G/DL (ref 31.5–35.7)
MCV RBC AUTO: 92.9 FL (ref 79–97)
MCV RBC AUTO: 92.9 FL (ref 79–97)
MONOCYTES # BLD AUTO: 0.26 10*3/MM3 (ref 0.1–0.9)
MONOCYTES # BLD AUTO: 0.36 10*3/MM3 (ref 0.1–0.9)
MONOCYTES NFR BLD AUTO: 6 % (ref 5–12)
MONOCYTES NFR BLD AUTO: 8.7 % (ref 5–12)
NEGATIVE CONTROL: NEGATIVE
NEUTROPHILS NFR BLD AUTO: 2.43 10*3/MM3 (ref 1.7–7)
NEUTROPHILS NFR BLD AUTO: 2.78 10*3/MM3 (ref 1.7–7)
NEUTROPHILS NFR BLD AUTO: 58.6 % (ref 42.7–76)
NEUTROPHILS NFR BLD AUTO: 64.4 % (ref 42.7–76)
NRBC BLD AUTO-RTO: 0 /100 WBC (ref 0–0.2)
NRBC BLD AUTO-RTO: 0 /100 WBC (ref 0–0.2)
PLATELET # BLD AUTO: 167 10*3/MM3 (ref 140–450)
PLATELET # BLD AUTO: 180 10*3/MM3 (ref 140–450)
PMV BLD AUTO: 8.3 FL (ref 6–12)
PMV BLD AUTO: 8.8 FL (ref 6–12)
POSITIVE CONTROL: POSITIVE
POTASSIUM SERPL-SCNC: 3.7 MMOL/L (ref 3.5–5.2)
PROT SERPL-MCNC: 7.1 G/DL (ref 6–8.5)
PROTHROMBIN TIME: 16.2 SECONDS (ref 11.7–14.2)
RBC # BLD AUTO: 2.25 10*6/MM3 (ref 3.77–5.28)
RBC # BLD AUTO: 2.25 10*6/MM3 (ref 3.77–5.28)
RH BLD: POSITIVE
SODIUM SERPL-SCNC: 136 MMOL/L (ref 136–145)
T&S EXPIRATION DATE: NORMAL
WBC # BLD AUTO: 4.15 10*3/MM3 (ref 3.4–10.8)
WBC # BLD AUTO: 4.32 10*3/MM3 (ref 3.4–10.8)

## 2021-02-08 PROCEDURE — U0004 COV-19 TEST NON-CDC HGH THRU: HCPCS | Performed by: EMERGENCY MEDICINE

## 2021-02-08 PROCEDURE — 86900 BLOOD TYPING SEROLOGIC ABO: CPT

## 2021-02-08 PROCEDURE — G0378 HOSPITAL OBSERVATION PER HR: HCPCS

## 2021-02-08 PROCEDURE — 83605 ASSAY OF LACTIC ACID: CPT | Performed by: EMERGENCY MEDICINE

## 2021-02-08 PROCEDURE — 85025 COMPLETE CBC W/AUTO DIFF WBC: CPT | Performed by: INTERNAL MEDICINE

## 2021-02-08 PROCEDURE — 99284 EMERGENCY DEPT VISIT MOD MDM: CPT

## 2021-02-08 PROCEDURE — 86901 BLOOD TYPING SEROLOGIC RH(D): CPT | Performed by: EMERGENCY MEDICINE

## 2021-02-08 PROCEDURE — P9016 RBC LEUKOCYTES REDUCED: HCPCS

## 2021-02-08 PROCEDURE — 80053 COMPREHEN METABOLIC PANEL: CPT | Performed by: EMERGENCY MEDICINE

## 2021-02-08 PROCEDURE — 82565 ASSAY OF CREATININE: CPT | Performed by: INTERNAL MEDICINE

## 2021-02-08 PROCEDURE — 86923 COMPATIBILITY TEST ELECTRIC: CPT

## 2021-02-08 PROCEDURE — 86901 BLOOD TYPING SEROLOGIC RH(D): CPT

## 2021-02-08 PROCEDURE — 36430 TRANSFUSION BLD/BLD COMPNT: CPT

## 2021-02-08 PROCEDURE — 86850 RBC ANTIBODY SCREEN: CPT | Performed by: EMERGENCY MEDICINE

## 2021-02-08 PROCEDURE — 86900 BLOOD TYPING SEROLOGIC ABO: CPT | Performed by: EMERGENCY MEDICINE

## 2021-02-08 PROCEDURE — 85610 PROTHROMBIN TIME: CPT | Performed by: EMERGENCY MEDICINE

## 2021-02-08 PROCEDURE — 71045 X-RAY EXAM CHEST 1 VIEW: CPT

## 2021-02-08 PROCEDURE — C9803 HOPD COVID-19 SPEC COLLECT: HCPCS

## 2021-02-08 PROCEDURE — 85025 COMPLETE CBC W/AUTO DIFF WBC: CPT | Performed by: EMERGENCY MEDICINE

## 2021-02-08 PROCEDURE — 82270 OCCULT BLOOD FECES: CPT | Performed by: EMERGENCY MEDICINE

## 2021-02-08 PROCEDURE — 83690 ASSAY OF LIPASE: CPT | Performed by: EMERGENCY MEDICINE

## 2021-02-08 RX ORDER — ONDANSETRON 2 MG/ML
4 INJECTION INTRAMUSCULAR; INTRAVENOUS EVERY 6 HOURS PRN
Status: DISCONTINUED | OUTPATIENT
Start: 2021-02-08 | End: 2021-02-13 | Stop reason: HOSPADM

## 2021-02-08 RX ORDER — CYCLOBENZAPRINE HCL 10 MG
10 TABLET ORAL 3 TIMES DAILY PRN
Status: DISCONTINUED | OUTPATIENT
Start: 2021-02-08 | End: 2021-02-13 | Stop reason: HOSPADM

## 2021-02-08 RX ORDER — SODIUM CHLORIDE 0.9 % (FLUSH) 0.9 %
10 SYRINGE (ML) INJECTION EVERY 12 HOURS SCHEDULED
Status: DISCONTINUED | OUTPATIENT
Start: 2021-02-09 | End: 2021-02-13 | Stop reason: HOSPADM

## 2021-02-08 RX ORDER — LOSARTAN POTASSIUM 100 MG/1
100 TABLET ORAL
Status: DISCONTINUED | OUTPATIENT
Start: 2021-02-09 | End: 2021-02-13 | Stop reason: HOSPADM

## 2021-02-08 RX ORDER — NITROGLYCERIN 0.4 MG/1
0.4 TABLET SUBLINGUAL
Status: DISCONTINUED | OUTPATIENT
Start: 2021-02-08 | End: 2021-02-13 | Stop reason: HOSPADM

## 2021-02-08 RX ORDER — ACETAMINOPHEN 650 MG/1
650 SUPPOSITORY RECTAL EVERY 4 HOURS PRN
Status: DISCONTINUED | OUTPATIENT
Start: 2021-02-08 | End: 2021-02-13 | Stop reason: HOSPADM

## 2021-02-08 RX ORDER — MELATONIN
2000 DAILY
Status: DISCONTINUED | OUTPATIENT
Start: 2021-02-09 | End: 2021-02-13 | Stop reason: HOSPADM

## 2021-02-08 RX ORDER — VENLAFAXINE 75 MG/1
75 TABLET ORAL 2 TIMES DAILY
Status: DISCONTINUED | OUTPATIENT
Start: 2021-02-09 | End: 2021-02-13 | Stop reason: HOSPADM

## 2021-02-08 RX ORDER — BUPRENORPHINE AND NALOXONE 8; 2 MG/1; MG/1
1 FILM, SOLUBLE BUCCAL; SUBLINGUAL DAILY
Status: DISCONTINUED | OUTPATIENT
Start: 2021-02-09 | End: 2021-02-13 | Stop reason: HOSPADM

## 2021-02-08 RX ORDER — DEXTROSE MONOHYDRATE 25 G/50ML
25 INJECTION, SOLUTION INTRAVENOUS
Status: DISCONTINUED | OUTPATIENT
Start: 2021-02-08 | End: 2021-02-13 | Stop reason: HOSPADM

## 2021-02-08 RX ORDER — INSULIN LISPRO 100 [IU]/ML
0-7 INJECTION, SOLUTION INTRAVENOUS; SUBCUTANEOUS
Status: DISCONTINUED | OUTPATIENT
Start: 2021-02-09 | End: 2021-02-13 | Stop reason: HOSPADM

## 2021-02-08 RX ORDER — SODIUM CHLORIDE 0.9 % (FLUSH) 0.9 %
10 SYRINGE (ML) INJECTION AS NEEDED
Status: DISCONTINUED | OUTPATIENT
Start: 2021-02-08 | End: 2021-02-13 | Stop reason: HOSPADM

## 2021-02-08 RX ORDER — FUROSEMIDE 40 MG/1
40 TABLET ORAL 2 TIMES DAILY
Status: DISCONTINUED | OUTPATIENT
Start: 2021-02-09 | End: 2021-02-13 | Stop reason: HOSPADM

## 2021-02-08 RX ORDER — CEFAZOLIN SODIUM 2 G/100ML
2 INJECTION, SOLUTION INTRAVENOUS EVERY 8 HOURS
Status: DISCONTINUED | OUTPATIENT
Start: 2021-02-09 | End: 2021-02-13 | Stop reason: HOSPADM

## 2021-02-08 RX ORDER — ATORVASTATIN CALCIUM 20 MG/1
40 TABLET, FILM COATED ORAL DAILY
Status: DISCONTINUED | OUTPATIENT
Start: 2021-02-09 | End: 2021-02-13 | Stop reason: HOSPADM

## 2021-02-08 RX ORDER — ACETAMINOPHEN 325 MG/1
650 TABLET ORAL EVERY 4 HOURS PRN
Status: DISCONTINUED | OUTPATIENT
Start: 2021-02-08 | End: 2021-02-13 | Stop reason: HOSPADM

## 2021-02-08 RX ORDER — NICOTINE POLACRILEX 4 MG
15 LOZENGE BUCCAL
Status: DISCONTINUED | OUTPATIENT
Start: 2021-02-08 | End: 2021-02-13 | Stop reason: HOSPADM

## 2021-02-08 RX ORDER — ACETAMINOPHEN 160 MG/5ML
650 SOLUTION ORAL EVERY 4 HOURS PRN
Status: DISCONTINUED | OUTPATIENT
Start: 2021-02-08 | End: 2021-02-13 | Stop reason: HOSPADM

## 2021-02-08 RX ORDER — LEVOTHYROXINE SODIUM 88 UG/1
88 TABLET ORAL
Status: DISCONTINUED | OUTPATIENT
Start: 2021-02-09 | End: 2021-02-13 | Stop reason: HOSPADM

## 2021-02-08 NOTE — PROGRESS NOTES
I received a call regarding critical hemoglobin level of 6.9,  it looks like hgb was steadily decreasing during her hospital admission. Pt states she feels fine just tired. Denies any bleeding in her stool or from her surgical incision. I advised her to go to the ER for evaluation. Pt and her daughter voiced understanding and stated they will bring her to the ER. ER notified.

## 2021-02-08 NOTE — PAYOR COMM NOTE
"Stefani Lee (55 y.o. Female)     PLEASE SEE ATTACHED DC SUMMARY    REF#[id number]    THANK YOU    CLARISA HIGGINSNIRMALA [facility]    Date of Birth Social Security Number Address Home Phone MRN    1965  [id number]  [facility] [address] [phone] [id number]    Buddhism Marital Status          None        Admission Date Admission Type Admitting Provider Attending Provider Department, Room/Bed    1/24/21 Emergency Armen Florian MD  [address], [address]1    Discharge Date Discharge Disposition Discharge Destination        2/5/2021 Home or Self Care              Attending Provider: (none)   Allergies: Tramadol    Isolation: None   Infection: None   Code Status: Prior    Ht: 170.2 cm (67.01\")   Wt: 139 kg (306 lb 9.6 oz)    Admission Cmt: None   Principal Problem: Sepsis without acute organ dysfunction (CMS/HCC) [A41.9]                 Active Insurance as of 1/24/2021     Primary Coverage     Payor Plan Insurance Group Employer/Plan Group    [facility] [employer] CR[employer] [facility] [employer] EMPLOYEE [id number]     Payor Plan Address Payor Plan Phone Number Payor Plan Fax Number Effective Dates    [address] [phone]  1/1/2018 - None Entered    [place] [address]       Subscriber Name Subscriber Birth Date Member ID       STEFANI LEE 1965 [id number]                 Emergency Contacts      (Rel.) Home Phone Work Phone Mobile Phone    Alessia Waite (ED RN) (Daughter) -- -- [phone]    JF LEE (Daughter) [phone] -- --               Discharge Summary      Richie Veloz MD at 02/05/21 [address] [place] [address] [facility]    Date of Discharge:  2/5/2021    PCP: Dea Martinez MD    Discharge Diagnosis:   Active Hospital Problems    Diagnosis  POA   • **Sepsis without acute organ dysfunction (CMS/HCC) [A41.9]  Yes   • Anasarca [R60.1]  No   • Septic arthritis of shoulder, left " (CMS/HCC) [M00.9]  Yes   • MSSA bacteremia [R78.81, B95.61]  Yes   • 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 (CMS/[facility]) [K75.81, K74.60]  Yes   • History of COVID-19 [Z86.16]  Yes   • LINDSEY (acute kidney injury) (CMS/[facility]) [N17.9]  Yes   • Hyponatremia [E87.1]  Yes   • Pulmonary nodules [R91.8]  Yes   • Elevated liver function tests [R79.89]  Yes   • Abnormal CT of the chest [R93.89]  Yes   • Thrombocytopenia (CMS/HCC) [D69.6]  Yes   • Other specified hypothyroidism [E03.8]  Yes   • Mixed hyperlipidemia [E78.2]  Yes   • Lumbar degenerative disc disease [M51.36]  Yes   • Obesity, Class III, BMI 40-49.9 (morbid obesity) (CMS/[facility]) [E66.01]  Yes   • Sleep apnea [G47.30]  Yes   • Essential hypertension [I10]  Yes   • Diabetes mellitus type 2 in obese (CMS/[facility]) [E11.69, E66.9]  Yes   • Anxiety and depression [F41.9, F32.9]  Yes      Resolved Hospital Problems   No resolved problems to display.     Procedure(s):  INCISION AND DRAINAGE SHOULDER     Consults     Date and Time Order Name Status Description    2/2/2021 0814 Inpatient Nephrology Consult Completed     1/26/2021 1723 Inpatient Neurosurgery Consult Completed     1/26/2021 1723 Inpatient Orthopedic Surgery Consult Completed     1/25/2021 1628 Inpatient  Anesthesiology Physician Consult      1/25/2021 0032 Inpatient Infectious Diseases Consult Completed     1/24/2021 1038 [facility] (on-call MD unless specified) Details Completed         Hospital Course  Please see history and physical for details. Patient is a 55 y.o. female presented with shoulder and back pain.  She was septic and cultures grew MSSA.  MRI of the left shoulder showed septic arthritis and she underwent unsuccessful aspiration and subsequently had I&D on 1/30/2021.  Antibiotics were managed by ID.  Follow-up blood cultures on 1/26/2021 showed no growth.  TTE did not show any valvular abnormalities and ID did not recommend ROBERT.  She also had L5-S1 discitis/myositis seen on MRI.  Neurosurgery did not recommend any  surgical intervention.    She was noted on imaging to have METZGER and possibly cirrhosis and splenomegaly was noted.  Recommend she follow-up with GI as an outpatien once acute issues have resolved.    She also has left upper lobe, right lower lobe pulmonary nodules that are unchanged.  She will need to have a follow-up CT chest in a year.    She developed anasarca and hyponatremia requiring IV diuretics.  She has improvement in her fluid status and diuretics have been switched to p.o.      Hemoglobin has not been low but stable likely multifactorial from inflammation, postop blood loss.  There is no evidence of acute bleeding or hemodynamic changes.  She will continue to have weekly blood draws with antibiotics and hemoglobin can be monitored with this.    I discussed the patient's findings and my recommendations with patient and nursing staff and Dr. Schneider.    Condition on Discharge: Improved.  Shoulder pain is improved.  She would like to go home today.  She declined skilled nursing facility.    Temp:  [98.4 °F (36.9 °C)-98.8 °F (37.1 °C)] 98.8 °F (37.1 °C)  Heart Rate:  [82-93] 82  Resp:  [18] 18  BP: (137-172)/(66-84) 151/83  Body mass index is 48.01 kg/m².    Physical Exam  Vitals signs and nursing note reviewed.   Constitutional:       General: She is not in acute distress.  HENT:      Head: Normocephalic and atraumatic.   Cardiovascular:      Rate and Rhythm: Normal rate and regular rhythm.   Pulmonary:      Effort: Pulmonary effort is normal. No respiratory distress.      Breath sounds: Normal breath sounds.   Abdominal:      General: Bowel sounds are normal.      Palpations: Abdomen is soft.      Tenderness: There is no abdominal tenderness. There is no guarding or rebound.   Musculoskeletal:         General: Swelling present.   Skin:     General: Skin is warm and dry.   Neurological:      General: No focal deficit present.      Mental Status: She is alert and oriented to person, place, and time.      Psychiatric:         Mood and Affect: Mood normal.         Behavior: Behavior normal.     While in the room and during my examination of the patient I wore gloves, mask, eye protection.  I washed my hands before and after this patient encounter.     Disposition: Home or Self Care       Discharge Medications      New Medications      Instructions Start Date   ceFAZolin in dextrose 2-4 GM/100ML-% solution IVPB  Commonly known as: ANCEF   2 g, Intravenous, Every 8 Hours      furosemide 40 MG tablet  Commonly known as: LASIX   40 mg, Oral, 2 Times Daily      losartan 100 MG tablet  Commonly known as: COZAAR   100 mg, Oral, Every 24 Hours Scheduled   Start Date: February 6, 2021        Changes to Medications      Instructions Start Date   metFORMIN 500 MG tablet  Commonly known as: GLUCOPHAGE  What changed:   · how much to take  · how to take this  · when to take this  · additional instructions   1,000 mg, Oral, 2 times daily         Continue These Medications      Instructions Start Date   Belbuca 150 MCG film  Generic drug: Buprenorphine HCl   No dose, route, or frequency recorded.      cholecalciferol 25 MCG (1000 UT) tablet  Commonly known as: VITAMIN D3   2,000 Units, Oral, Daily      cyclobenzaprine 10 MG tablet  Commonly known as: FLEXERIL   TAKE 1 TABLET BY MOUTH THREE TIMES DAILY AS NEEDED FOR MUSCLE SPASMS      diclofenac 75 MG EC tablet  Commonly known as: VOLTAREN   TAKE 1 TABLET BY MOUTH TWICE DAILY      ezetimibe 10 MG tablet  Commonly known as: ZETIA   10 mg, Oral, Daily      levothyroxine 88 MCG tablet  Commonly known as: SYNTHROID, LEVOTHROID   88 mcg, Oral, Daily      methylPREDNISolone 4 MG dose pack  Commonly known as: MEDROL   Take as directed on package instructions.      simvastatin 80 MG tablet  Commonly known as: ZOCOR   80 mg, Oral, Nightly      venlafaxine 75 MG tablet  Commonly known as: EFFEXOR   75 mg, Oral, 2 Times Daily         Stop These Medications    losartan-hydrochlorothiazide  100-25 MG per tablet  Commonly known as: HYZAAR           Diet Instructions     Diet: Regular, Consistent Carbohydrate      Discharge Diet:  Regular  Consistent Carbohydrate       Fluid Restriction per day: 1500 mL Fluid         Activity Instructions     Activity as Tolerated           Additional Instructions for the Follow-ups that You Need to Schedule     Ambulatory Referral to Home Health   As directed      1.  Cefazolin 2 g IV every 8 hours x6 to 8 weeks.  Preliminary antibiotic stop date March 12     2.  Please monitor weekly CBC with differential and creatinine and fax the results to the infectious disease clinic at [phone]    Order Comments: 1.  Cefazolin 2 g IV every 8 hours x6 to 8 weeks.  Preliminary antibiotic stop date March 12   2.  Please monitor weekly CBC with differential and creatinine and fax the results to the infectious disease clinic at [phone]     Face to Face Visit Date: 2/5/2021    Follow-up provider for Plan of Care?: I treated the patient in an acute care facility and will not continue treatment after discharge.    Follow-up provider: JOHNNIE PELLETIER [[id number]]    Reason/Clinical Findings: sepsis    Describe mobility limitations that make leaving home difficult: sepsis    Nursing/Therapeutic Services Requested: Physical Therapy Skilled Nursing    Skilled nursing orders: Infusion therapy    Frequency: 1 Week 1         Call MD for problems / concerns.   As directed      CT Chest Without Contrast   Feb 05, 2022         Contact information for follow-up providers     Jovanny Gibbons MD. Schedule an appointment as soon as possible for a visit in 3 week(s).    Specialty: Neurosurgery  Contact information:  [address]  [facility] [address]  [facility] [address] [phone]             Juaquin Richardson II, MD. Schedule an appointment as soon as possible for a visit in 3 week(s).    Specialty: Orthopedic Surgery  Contact information:  [address]  [facility] [address]  [facility]  [address]  [phone]             Dianne Porras MD. Go on 3/12/2021.    Specialty: Infectious Diseases  Why: Appointment March 12th @ 11:20.  1.  Cefazolin 2 g IV every 8 hours x6 to 8 weeks.  Preliminary antibiotic stop date March 12  2.  Please monitor weekly CBC with differential and creatinine and fax the results to the infectious disease clinic at [phone]  Contact information:  [address]  [facility] [address]  [facility] [address]  [phone]             Dea Martinez MD .    Specialty: Family Medicine  Contact information:  [address] [facility] [address]  [phone]             [facility] GASTROENTEROLOGY Follow up.    Specialty: Gastroenterology  Why: Follow-up METZGER  Contact information:  [address] [facility] [address]  [facility] [address] [phone]  Additional information:  Phone Number: [phone]                 Contact information for after-discharge care     Home Medical Care     [facility] .    Service: Home Health Services  Contact information:  [address] [facility] [address]  [facility] [address] [phone]                               Richie Veloz MD  02/05/21  11:47 EST    Discharge time spent greater than 30 minutes.      Electronically signed by Richie Veloz MD at 02/05/21 4539

## 2021-02-08 NOTE — OUTREACH NOTE
Call Center TCM Note      Responses   [facility] patient discharged from?  [place]   Does the patient have one of the following disease processes/diagnoses(primary or secondary)?  Sepsis   TCM attempt successful?  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  Yes   Is the patient having any side effects they believe may be caused by any medication additions or changes?  No   Does the patient have all medications related to this admission filled (includes all antibiotics, inhalers, nebulizers,steroids,etc.)  Yes   Is the patient taking all medications as directed (includes completed medication regime)?  Yes   Does the patient have a primary care provider?   Yes   Comments regarding PCP  PCP APPOINTMENT IS NOT UNTIL MAY. SOONER APPOINTMENT SCHEDULED, BUT SOONEST AVAILABLE IN [facility] WAS 3/17   Does the patient have an appointment with their PCP within 7 days of discharge?  Greater than 7 days   Urgent appointment interventions  Facilitated patient appointment   Has the patient kept scheduled appointments due by today?  N/A   What is the Home health agency?   [facility], [facility] infusion   Has home health visited the patient within 72 hours of discharge?  Yes   What DME was ordered?  3 in 1 commode, rolling walker from [address]   Has all DME been delivered?  No   DME comments  PATIENT STATES DME IS SUPPOSED TO BE DELIVERED TODAY   Did the patient receive a copy of their discharge instructions?  Yes   Nursing interventions  Reviewed instructions with patient   What is the patient's perception of their health status since discharge?  Improving   Nursing interventions  Nurse provided patient education   Is the patient/caregiver able to teach back Sepsis?  S - Shivering,fever or very cold, E - Extreme pain or generalized discomfort (worst ever,especially abdomen), P - Pale or  discolored skin, S - Sleepy, difficult to arouse,confused, S - Short of breath, I -   I feel like I might die-a feeling of hopelessness   Nursing interventions  Nurse provided reassurance to patient   Is patient/caregiver able to teach back steps to recovery at home?  Set small, achievable goals for return to baseline health, Rest and regain strength, Make a list of questions for PCP appoinment   Is the patient/caregiver able to teach back signs and symptoms of worsening condition:  Fever, Hyperthermia, Rapid heart rate (>90), Shortness of breath/rapid respiratory rate, Altered mental status(confusion/coma), Edema, High blood glucose without diabetes   If the patient is a current smoker, are they able to teach back resources for cessation?  Not a smoker   Is the patient/caregiver able to teach back the hierarchy of who to call/visit for symptoms/problems? PCP, Specialist, Home health nurse, Urgent Care, ED, 911  Yes   TCM call completed?  Yes          Deepti Goodman LPN    2/8/2021, 12:23 EST

## 2021-02-09 ENCOUNTER — APPOINTMENT (OUTPATIENT)
Dept: CT IMAGING | Facility: HOSPITAL | Age: 56
End: 2021-02-09

## 2021-02-09 LAB
ANION GAP SERPL CALCULATED.3IONS-SCNC: 7.4 MMOL/L (ref 5–15)
BASOPHILS # BLD AUTO: 0.03 10*3/MM3 (ref 0–0.2)
BASOPHILS NFR BLD AUTO: 0.6 % (ref 0–1.5)
BH BB BLOOD EXPIRATION DATE: NORMAL
BH BB BLOOD TYPE BARCODE: 6200
BH BB DISPENSE STATUS: NORMAL
BH BB PRODUCT CODE: NORMAL
BH BB UNIT NUMBER: NORMAL
BUN SERPL-MCNC: 24 MG/DL (ref 6–20)
BUN/CREAT SERPL: 32.9 (ref 7–25)
CALCIUM SPEC-SCNC: 8.4 MG/DL (ref 8.6–10.5)
CHLORIDE SERPL-SCNC: 99 MMOL/L (ref 98–107)
CO2 SERPL-SCNC: 27.6 MMOL/L (ref 22–29)
CREAT SERPL-MCNC: 0.73 MG/DL (ref 0.57–1)
CROSSMATCH INTERPRETATION: NORMAL
DEPRECATED RDW RBC AUTO: 48.7 FL (ref 37–54)
EOSINOPHIL # BLD AUTO: 0.07 10*3/MM3 (ref 0–0.4)
EOSINOPHIL NFR BLD AUTO: 1.5 % (ref 0.3–6.2)
ERYTHROCYTE [DISTWIDTH] IN BLOOD BY AUTOMATED COUNT: 14.4 % (ref 12.3–15.4)
FERRITIN SERPL-MCNC: 233 NG/ML (ref 13–150)
FOLATE SERPL-MCNC: 5.89 NG/ML (ref 4.78–24.2)
GFR SERPL CREATININE-BSD FRML MDRD: 82 ML/MIN/1.73
GLUCOSE BLDC GLUCOMTR-MCNC: 114 MG/DL (ref 70–130)
GLUCOSE BLDC GLUCOMTR-MCNC: 140 MG/DL (ref 70–130)
GLUCOSE BLDC GLUCOMTR-MCNC: 175 MG/DL (ref 70–130)
GLUCOSE BLDC GLUCOMTR-MCNC: 79 MG/DL (ref 70–130)
GLUCOSE BLDC GLUCOMTR-MCNC: 85 MG/DL (ref 70–130)
GLUCOSE BLDC GLUCOMTR-MCNC: 96 MG/DL (ref 70–130)
GLUCOSE SERPL-MCNC: 95 MG/DL (ref 65–99)
HCT VFR BLD AUTO: 22.9 % (ref 34–46.6)
HCT VFR BLD AUTO: 22.9 % (ref 34–46.6)
HCT VFR BLD AUTO: 24 % (ref 34–46.6)
HCT VFR BLD AUTO: 24.3 % (ref 34–46.6)
HGB BLD-MCNC: 7.6 G/DL (ref 12–15.9)
HGB BLD-MCNC: 7.6 G/DL (ref 12–15.9)
HGB BLD-MCNC: 7.7 G/DL (ref 12–15.9)
HGB BLD-MCNC: 8 G/DL (ref 12–15.9)
IMM GRANULOCYTES # BLD AUTO: 0.02 10*3/MM3 (ref 0–0.05)
IMM GRANULOCYTES NFR BLD AUTO: 0.4 % (ref 0–0.5)
INR PPP: 1.3 (ref 0.9–1.1)
IRON 24H UR-MRATE: 32 MCG/DL (ref 37–145)
IRON SATN MFR SERPL: 14 % (ref 20–50)
LIPASE SERPL-CCNC: 117 U/L (ref 13–60)
LYMPHOCYTES # BLD AUTO: 1.26 10*3/MM3 (ref 0.7–3.1)
LYMPHOCYTES NFR BLD AUTO: 26.9 % (ref 19.6–45.3)
MCH RBC QN AUTO: 31.3 PG (ref 26.6–33)
MCHC RBC AUTO-ENTMCNC: 33.2 G/DL (ref 31.5–35.7)
MCV RBC AUTO: 94.2 FL (ref 79–97)
MONOCYTES # BLD AUTO: 0.33 10*3/MM3 (ref 0.1–0.9)
MONOCYTES NFR BLD AUTO: 7 % (ref 5–12)
NEUTROPHILS NFR BLD AUTO: 2.98 10*3/MM3 (ref 1.7–7)
NEUTROPHILS NFR BLD AUTO: 63.6 % (ref 42.7–76)
NRBC BLD AUTO-RTO: 0 /100 WBC (ref 0–0.2)
PLATELET # BLD AUTO: 175 10*3/MM3 (ref 140–450)
PMV BLD AUTO: 8.6 FL (ref 6–12)
POTASSIUM SERPL-SCNC: 3.8 MMOL/L (ref 3.5–5.2)
PROTHROMBIN TIME: 15.9 SECONDS (ref 11.7–14.2)
RBC # BLD AUTO: 2.43 10*6/MM3 (ref 3.77–5.28)
RETICS # AUTO: 0.07 10*6/MM3 (ref 0.02–0.13)
RETICS/RBC NFR AUTO: 2.85 % (ref 0.7–1.9)
SARS-COV-2 ORF1AB RESP QL NAA+PROBE: NOT DETECTED
SODIUM SERPL-SCNC: 134 MMOL/L (ref 136–145)
TIBC SERPL-MCNC: 226 MCG/DL (ref 298–536)
TRANSFERRIN SERPL-MCNC: 152 MG/DL (ref 200–360)
TSH SERPL DL<=0.05 MIU/L-ACNC: 5.87 UIU/ML (ref 0.27–4.2)
UNIT  ABO: NORMAL
UNIT  RH: NORMAL
VIT B12 BLD-MCNC: 1510 PG/ML (ref 211–946)
WBC # BLD AUTO: 4.69 10*3/MM3 (ref 3.4–10.8)

## 2021-02-09 PROCEDURE — 85014 HEMATOCRIT: CPT | Performed by: NURSE PRACTITIONER

## 2021-02-09 PROCEDURE — 82962 GLUCOSE BLOOD TEST: CPT

## 2021-02-09 PROCEDURE — 83690 ASSAY OF LIPASE: CPT | Performed by: INTERNAL MEDICINE

## 2021-02-09 PROCEDURE — 80048 BASIC METABOLIC PNL TOTAL CA: CPT | Performed by: NURSE PRACTITIONER

## 2021-02-09 PROCEDURE — 85045 AUTOMATED RETICULOCYTE COUNT: CPT | Performed by: INTERNAL MEDICINE

## 2021-02-09 PROCEDURE — G0378 HOSPITAL OBSERVATION PER HR: HCPCS

## 2021-02-09 PROCEDURE — 74177 CT ABD & PELVIS W/CONTRAST: CPT

## 2021-02-09 PROCEDURE — 85610 PROTHROMBIN TIME: CPT | Performed by: NURSE PRACTITIONER

## 2021-02-09 PROCEDURE — 82728 ASSAY OF FERRITIN: CPT | Performed by: INTERNAL MEDICINE

## 2021-02-09 PROCEDURE — 99244 OFF/OP CNSLTJ NEW/EST MOD 40: CPT | Performed by: INTERNAL MEDICINE

## 2021-02-09 PROCEDURE — 83540 ASSAY OF IRON: CPT | Performed by: INTERNAL MEDICINE

## 2021-02-09 PROCEDURE — 25010000003 CEFAZOLIN IN DEXTROSE 2-4 GM/100ML-% SOLUTION: Performed by: NURSE PRACTITIONER

## 2021-02-09 PROCEDURE — 85018 HEMOGLOBIN: CPT | Performed by: NURSE PRACTITIONER

## 2021-02-09 PROCEDURE — 82746 ASSAY OF FOLIC ACID SERUM: CPT | Performed by: INTERNAL MEDICINE

## 2021-02-09 PROCEDURE — 82607 VITAMIN B-12: CPT | Performed by: INTERNAL MEDICINE

## 2021-02-09 PROCEDURE — 96365 THER/PROPH/DIAG IV INF INIT: CPT

## 2021-02-09 PROCEDURE — 25010000002 IOPAMIDOL 61 % SOLUTION: Performed by: INTERNAL MEDICINE

## 2021-02-09 PROCEDURE — 85025 COMPLETE CBC W/AUTO DIFF WBC: CPT | Performed by: NURSE PRACTITIONER

## 2021-02-09 PROCEDURE — 84443 ASSAY THYROID STIM HORMONE: CPT | Performed by: NURSE PRACTITIONER

## 2021-02-09 PROCEDURE — 84466 ASSAY OF TRANSFERRIN: CPT | Performed by: INTERNAL MEDICINE

## 2021-02-09 RX ADMIN — SODIUM CHLORIDE, PRESERVATIVE FREE 10 ML: 5 INJECTION INTRAVENOUS at 00:48

## 2021-02-09 RX ADMIN — FUROSEMIDE 40 MG: 40 TABLET ORAL at 20:19

## 2021-02-09 RX ADMIN — CEFAZOLIN SODIUM 2 G: 2 INJECTION, SOLUTION INTRAVENOUS at 07:41

## 2021-02-09 RX ADMIN — IOPAMIDOL 100 ML: 612 INJECTION, SOLUTION INTRAVENOUS at 09:30

## 2021-02-09 RX ADMIN — ATORVASTATIN CALCIUM 40 MG: 20 TABLET, FILM COATED ORAL at 07:52

## 2021-02-09 RX ADMIN — VENLAFAXINE HYDROCHLORIDE 75 MG: 75 TABLET ORAL at 00:49

## 2021-02-09 RX ADMIN — VENLAFAXINE HYDROCHLORIDE 75 MG: 75 TABLET ORAL at 20:19

## 2021-02-09 RX ADMIN — CEFAZOLIN SODIUM 2 G: 2 INJECTION, SOLUTION INTRAVENOUS at 16:34

## 2021-02-09 RX ADMIN — Medication 2000 UNITS: at 07:51

## 2021-02-09 RX ADMIN — ACETAMINOPHEN 650 MG: 325 TABLET, FILM COATED ORAL at 00:47

## 2021-02-09 RX ADMIN — CEFAZOLIN SODIUM 2 G: 2 INJECTION, SOLUTION INTRAVENOUS at 00:47

## 2021-02-09 RX ADMIN — SODIUM CHLORIDE, PRESERVATIVE FREE 10 ML: 5 INJECTION INTRAVENOUS at 07:56

## 2021-02-09 RX ADMIN — LEVOTHYROXINE SODIUM 88 MCG: 0.09 TABLET ORAL at 05:48

## 2021-02-09 RX ADMIN — FUROSEMIDE 40 MG: 40 TABLET ORAL at 07:52

## 2021-02-09 RX ADMIN — VENLAFAXINE HYDROCHLORIDE 75 MG: 75 TABLET ORAL at 07:52

## 2021-02-09 RX ADMIN — LOSARTAN POTASSIUM 100 MG: 100 TABLET, FILM COATED ORAL at 07:52

## 2021-02-09 NOTE — PAYOR COMM NOTE
"Stefani Lee (55 y.o. Female)     ATTN: INITIAL CLINICALS TO REVIEW FOR INPATIENT ADMIT: [id number]    PLEASE REPLY TO UR DEPT: NOLVIA DEE RN/CCP; [phone],  [phone]          Date of Birth Social Security Number Address Home Phone MRN    1965  [address] [facility] [address] [phone] [id number]    Uats[place] Marital Status          None        Admission Date Admission Type Admitting Provider Attending Provider Department, Room/Bed    2/8/21 Emergency Kate Owens MD Reddy, Rahul Kandada, MD [address], E654/1    Discharge Date Discharge Disposition Discharge Destination                       Attending Provider: Norman Jurado MD    Allergies: Tramadol    Isolation: Spore   Infection: C.difficile (rule out) (02/09/21)   Code Status: CPR    Ht: 170.2 cm (67\")   Wt: 139 kg (306 lb 11.2 oz)    Admission Cmt: None   Principal Problem: Symptomatic anemia [D64.9]                 Active Insurance as of 2/8/2021     Primary Coverage     Payor Plan Insurance Group Employer/Plan Group    [employer] [facility] [employer] EMPLOYEE [id number]     Payor Plan Address Payor Plan Phone Number Payor Plan Fax Number Effective Dates    [address] [phone]  1/1/2018 - None Entered    [place] [address]       Subscriber Name Subscriber Birth Date Member ID       STEFANI LEE 1965 [id number]                 Emergency Contacts      (Rel.) Home Phone Work Phone Mobile Phone    Alessia Waite (ED RN) (Daughter) -- -- [phone]    JF LEE (Daughter) [phone] -- --               History & Physical      Cindy Torres APRN at 02/08/21 3562     Attestation signed by Kate Owens MD at 02/09/21 8390    I personally interviewed and examined the patient I agree with Ms. Torres history and physical and assessment and plan.  History.  A pleasant 55 years old white female with a past history of " obesity/hypertension/type 2 diabetes/anxiety/depression/obstructive sleep apnea/degenerative disc disease of the lumbar spine/epilepsy.  She was discharged from the hospital 3 days ago with a diagnosis of left shoulder septic arthritis and MSSA bacteremia status post surgical drainage and she is currently on cefazolin.  Patient was called today by Dr. Porras infectious disease secondary to low hemoglobin from her routine blood work and was asked to come to the emergency department.  When reviewing the chart the patient had a baseline hemoglobin of 11.6 in January 24, 2021 and today's hemoglobin is 6.9.  Patient does report weakness and fatigability.  She also reported a bowel movement today that was loose associated with fresh bright red blood but no melena.  She did describe lower abdominal pain.  She does describe chills but no fever.  No heartburn no dysphagia no odynophagia.  Other review of systems significant for no chest pain/no palpitation/no shortness of breath/no cough/no wheeze/no hemoptysis/no dysuria/no hematuria/no dizziness/no loss of consciousness/no seizures/no focal neurological symptoms.  In the emergency department her CBC was normal except a hemoglobin of 6.9.  Lactate was normal.  Lipase mildly elevated.  PT/INR mildly elevated.  CMP was normal except elevated blood sugar.  Covid was negative.  Chest x-ray atelectasis versus infiltrate in the left lung base.  Patient was subsequently admitted.  Physical examination.  Temperature 100.3.  Pulse of 79.  Respiratory rate of 18.  Blood pressure 168/86.  And O2 sats of 97% on room air.  General.  Middle-aged female.  Obese.  Alert oriented x3 no apparent pain distress or diaphoresis normal mood and affect  Eyes.  Pupils equal round and reactive intact extraocular musculature no pallor no jaundice normal conjunctivae and lids   ENT.  Moist mucous membrane.  Neck.  Supple no JVD no carotid bruit no lymphadenopathy no thyromegaly.  Cardiovascular regular  rate and rhythm grade 2 systolic murmur.  Chest.  Clear to auscultation bilaterally with no added sounds.  Abdomen.  Soft lax obese no tenderness no organomegaly no guarding no rebound and positive lower laparotomy scar  Extremities.  No clubbing cyanosis.  There is +1 to +2 bilateral lower extremity edema.    CNS.  No acute focal neurological deficits.    Musculoskeletal.  The dressed wound of the left shoulder.  Diagnostics and labs.  All noted.  Assessment and plan.  1.  Severe symptomatic anemia associated with lower GI bleed.  Given that the patient has loose bowel movement and has been on antibiotic will need to rule out C. difficile colitis and other GI infection.  Will check CT scan of the abdomen and pelvis.  Will most likely need colonoscopy.  Will consult GI.  Will monitor H&H and CBC.  Will place n.p.o. after midnight.  Will work-up the anemia.  Patient has received a single unit of packed RBCs in the emergency department.  Will transfuse to keep the hemoglobin more than 8.  2.  Recent history of left shoulder septic arthritis leading to   MSSA bacteremia.  Currently on cefazolin IV and tell 3/12/2021 per ID consultation.  We will continue the same.  3.  Type 2 diabetes.  Recent A1c on January 2021 was 7.3.  Will hold Metformin at this time and place on sliding scale insulin hypoglycemia protocol.  4.  History of hypertension.  Blood pressure is on the high side.  Patient mildly volume overloaded.  We will continue and resume the Lasix and losartan and monitor blood pressure.  No clinical evidence of angina or congestive heart failure.  5.  Dyslipidemia.  We will continue Lipitor.  6.  Elevated lipase.  Clinically no evidence of pancreatitis.  Will monitor.  7.  History of anxiety and depression.  Continue Effexor.  8.  Hypothyroidism.  We will continue current replacement and check TSH.  9.  VTE prophylaxis with sequential compression devices.    Discussed my findings and plan of treatment with the  patient.                      Patient Name:  Stefani Bhandari  YOB: 1965  MRN:  [id number]  Admit Date:  2/8/2021  Patient Care Team:  Dea Martinez MD as PCP - General (Family Medicine)      Subjective   History Present Illness     Chief Complaint   Patient presents with   • Abnormal Lab     History of Present Illness   Mrs. Bhandari is a 55-year-old female with history of hypertension, anxiety and depression, type 2 diabetes, obesity, hyperlipidemia, elevated liver function test, septic arthritis, MSSA bacteremia who presents to the emergency room with symptomatic anemia.  Patient actually had routine lab work drawn due to her receiving IV antibiotics at home.  She was called by Dr. Porras with infectious disease and told her hemoglobin was low and to come to the emergency room.  Patient denied to Dr. Porras any rectal bleeding or emesis, however she states after she talked to Dr. Porras she did have a bowel movement with some blood in it.  She does states she has been more tired and fatigued, no shortness of breath, chest pain, cough.  She denies any fever at home.  She has been taking her cefazolin every 8 hours as directed and has not missed any doses.  She has no other complaints at this time.  Patient was actually just discharged to the hospital 3 days ago after a 2-week stay where she was found to have MSSA bacteremia with septic arthritis.  She was also started on Lasix at that time and states she has had some mild swelling in her feet that is much improved over the last few days.  In the emergency room patient was found to have glucose 173, sodium 136, potassium 3.7, creatinine 0.83, BUN 27, calcium 8.2, ALT 5, AST 16, albumin 2.3, lipase elevated 112, INR 1.33, white blood cell 4.32, hemoglobin 6.8, hematocrit 20.9, platelets 167.  Patient hemoglobin on 1/24/2021 was 11.6, has gradually decreased since then, patient has not noted any overt signs of bleeding until today where she noticed some  scant amount of blood in her stool.    Review of Systems   Constitutional: Positive for fatigue. Negative for appetite change and fever.   HENT: Negative for nosebleeds and trouble swallowing.    Eyes: Negative for photophobia, redness and visual disturbance.   Respiratory: Negative for cough, chest tightness, shortness of breath and wheezing.    Cardiovascular: Negative for chest pain, palpitations and leg swelling.   Gastrointestinal: Negative for abdominal distention, abdominal pain, nausea and vomiting.   Endocrine: Negative.    Genitourinary: Negative.    Musculoskeletal: Negative for gait problem and joint swelling.   Skin: Negative.    Neurological: Negative for dizziness, seizures, speech difficulty, light-headedness and headaches.   Hematological: Negative.    Psychiatric/Behavioral: Negative for behavioral problems and confusion.        Personal History     Past Medical History:   Diagnosis Date   • Anxiety    • DDD (degenerative disc disease), lumbar    • Depression    • Diabetes mellitus (CMS/HCC)    • Elevated LFTs    • NICK (generalized anxiety disorder)    • History of epilepsy     as a child-Abstracted from pt records   • Hyperlipidemia    • Hypertension    • Hypothyroidism    • MDD (major depressive disorder)    • Morbid obesity (CMS/HCC)    • OA (osteoarthritis)    • Obesity      Past Surgical History:   Procedure Laterality Date   • HYSTERECTOMY  2007   • INCISION AND DRAINAGE SHOULDER Left 1/30/2021    Procedure: INCISION AND DRAINAGE SHOULDER;  Surgeon: Juaquin Richardson II, MD;  Location: [facility];  Service: Orthopedics;  Laterality: Left;   • TUBAL ABDOMINAL LIGATION  1996     Family History   Problem Relation Age of Onset   • Obesity Mother    • Diabetes Mother    • Hypertension Mother    • Sleep apnea Mother    • Asthma Mother    • Hypertension Father    • Heart attack Father    • Diabetes Sister    • Hypertension Sister    • Heart attack Sister    • Diabetes Brother    •  "Hypertension Brother    • Heart attack Brother    • No Known Problems Other    • Breast cancer Paternal Cousin    • Malig Hyperthermia Neg Hx      Social History     Tobacco Use   • Smoking status: Former Smoker     Years: 20.00   • Smokeless tobacco: Never Used   • Tobacco comment: 1 pack per 2 days   Substance Use Topics   • Alcohol use: Not Currently     Frequency: Never     Comment: one glass of wine q6 months   • Drug use: Never     No current facility-administered medications on file prior to encounter.      Current Outpatient Medications on File Prior to Encounter   Medication Sig Dispense Refill   • Buprenorphine HCl (Belbuca) 150 MCG film      • ceFAZolin in dextrose (ANCEF) 2-4 GM/100ML-% solution IVPB Infuse 100 mL into a venous catheter Every 8 (Eight) Hours for 107 doses. Indications: Bacteria in the Blood 9000 mL 1   • cholecalciferol (VITAMIN D3) 25 MCG (1000 UT) tablet Take 2,000 Units by mouth Daily.     • cyclobenzaprine (FLEXERIL) 10 MG tablet TAKE 1 TABLET BY MOUTH THREE TIMES DAILY AS NEEDED FOR MUSCLE SPASMS 270 tablet 0   • diclofenac (VOLTAREN) 75 MG EC tablet TAKE 1 TABLET BY MOUTH TWICE DAILY 180 tablet 0   • ezetimibe (ZETIA) 10 MG tablet Take 1 tablet by mouth Daily. 90 tablet 1   • furosemide (LASIX) 40 MG tablet Take 1 tablet by mouth 2 (Two) Times a Day. 60 tablet 0   • levothyroxine (SYNTHROID, LEVOTHROID) 88 MCG tablet TAKE 1 TABLET BY MOUTH DAILY 30 tablet 3   • losartan (COZAAR) 100 MG tablet Take 1 tablet by mouth Daily. 30 tablet 0   • metFORMIN (GLUCOPHAGE) 500 MG tablet Take 2 tablets by mouth 2 (two) times a day. 270 tablet 1   • methylPREDNISolone (MEDROL) 4 MG dose pack Take as directed on package instructions. 1 each 0   • simvastatin (ZOCOR) 80 MG tablet TAKE 1 TABLET BY MOUTH EVERY NIGHT 90 tablet 0   • venlafaxine (EFFEXOR) 75 MG tablet Take 1 tablet by mouth 2 (Two) Times a Day. 180 tablet 0     Allergies   Allergen Reactions   • Tramadol Anaphylaxis     \"seizure like " "behavior\"       Objective    Objective     Vital Signs  Temp:  [98.1 °F (36.7 °C)-99.1 °F (37.3 °C)] 98.1 °F (36.7 °C)  Heart Rate:  [82-89] 82  Resp:  [17-20] 18  BP: (153-180)/(67-98) 180/98  SpO2:  [98 %-100 %] 100 %  on   ;   Device (Oxygen Therapy): room air  Body mass index is 47.61 kg/m².    Physical Exam  Vitals signs and nursing note reviewed.   Constitutional:       General: She is not in acute distress.     Appearance: She is well-developed.      Comments: Patient does have PICC line in right upper arm.   HENT:      Head: Normocephalic.   Neck:      Musculoskeletal: Normal range of motion.      Vascular: No JVD.   Cardiovascular:      Rate and Rhythm: Normal rate and regular rhythm.      Comments: Normal sinus rhythm on the monitor my exam with heart rate 82.  No shortness of breath her chest pain reported.  Pulmonary:      Effort: Pulmonary effort is normal.      Breath sounds: Normal breath sounds.      Comments: Lung sounds clear, sats 97% on room air during my exam  Abdominal:      General: Bowel sounds are normal. There is no distension.      Palpations: Abdomen is soft.      Tenderness: There is no abdominal tenderness.   Musculoskeletal: Normal range of motion.      Right lower leg: No edema.      Left lower leg: No edema.   Skin:     General: Skin is warm and dry.      Capillary Refill: Capillary refill takes less than 2 seconds.   Neurological:      General: No focal deficit present.      Mental Status: She is alert and oriented to person, place, and time.   Psychiatric:         Attention and Perception: Attention normal.         Mood and Affect: Mood normal.         Speech: Speech normal.         Behavior: Behavior normal.         Thought Content: Thought content normal.         Cognition and Memory: Cognition normal.         Judgment: Judgment normal.         Results Review:  I reviewed the patient's new clinical results.  I reviewed the patient's new imaging results and agree with the " interpretation.  I reviewed the patient's other test results and agree with the interpretation  I personally viewed and interpreted the patient's EKG/Telemetry data  Discussed with ED provider.    Lab Results (last 24 hours)     Procedure Component Value Units Date/Time    CBC & Differential [[id number]]  (Abnormal) Collected: 02/08/21 1430    Specimen: Blood Updated: 02/08/21 1708    Narrative:      The following orders were created for panel order CBC & Differential.  Procedure                               Abnormality         Status                     ---------                               -----------         ------                     CBC Auto Differential[[id number]]        Abnormal            Final result                 Please view results for these tests on the individual orders.    Creatinine, Serum [[id number]]  (Abnormal) Collected: 02/08/21 1430    Specimen: Blood Updated: 02/08/21 1721     Creatinine 0.99 mg/dL      eGFR Non African Amer 58 mL/min/1.73     Narrative:      GFR Normal >60  Chronic Kidney Disease <60  Kidney Failure <15      CBC Auto Differential [[id number]]  (Abnormal) Collected: 02/08/21 1430    Specimen: Blood Updated: 02/08/21 1708     WBC 4.15 10*3/mm3      RBC 2.25 10*6/mm3      Hemoglobin 6.9 g/dL      Hematocrit 20.9 %      MCV 92.9 fL      MCH 30.7 pg      MCHC 33.0 g/dL      RDW 14.4 %      RDW-SD 48.3 fl      MPV 8.8 fL      Platelets 180 10*3/mm3      Neutrophil % 58.6 %      Lymphocyte % 30.6 %      Monocyte % 8.7 %      Eosinophil % 1.4 %      Basophil % 0.5 %      Immature Grans % 0.2 %      Neutrophils, Absolute 2.43 10*3/mm3      Lymphocytes, Absolute 1.27 10*3/mm3      Monocytes, Absolute 0.36 10*3/mm3      Eosinophils, Absolute 0.06 10*3/mm3      Basophils, Absolute 0.02 10*3/mm3      Immature Grans, Absolute 0.01 10*3/mm3      nRBC 0.0 /100 WBC     POCT Occult Blood Stool [[id number]]  (Abnormal) Collected: 02/08/21 1942    Specimen: Stool from Per Rectum Updated:  02/08/21 1943     Fecal Occult Blood Positive     Lot Number [id number]     Expiration Date 06/30/2021     Positive Control Positive     Negative Control Negative    CBC & Differential [[id number]]  (Abnormal) Collected: 02/08/21 2020    Specimen: Blood Updated: 02/08/21 2036    Narrative:      The following orders were created for panel order CBC & Differential.  Procedure                               Abnormality         Status                     ---------                               -----------         ------                     CBC Auto Differential[[id number]]        Abnormal            Final result                 Please view results for these tests on the individual orders.    Lactic Acid, Plasma [[id number]]  (Normal) Collected: 02/08/21 2020    Specimen: Blood Updated: 02/08/21 2050     Lactate 1.9 mmol/L     CBC Auto Differential [[id number]]  (Abnormal) Collected: 02/08/21 2020    Specimen: Blood Updated: 02/08/21 2036     WBC 4.32 10*3/mm3      RBC 2.25 10*6/mm3      Hemoglobin 6.8 g/dL      Hematocrit 20.9 %      MCV 92.9 fL      MCH 30.2 pg      MCHC 32.5 g/dL      RDW 14.4 %      RDW-SD 47.3 fl      MPV 8.3 fL      Platelets 167 10*3/mm3      Neutrophil % 64.4 %      Lymphocyte % 27.3 %      Monocyte % 6.0 %      Eosinophil % 1.6 %      Basophil % 0.5 %      Immature Grans % 0.2 %      Neutrophils, Absolute 2.78 10*3/mm3      Lymphocytes, Absolute 1.18 10*3/mm3      Monocytes, Absolute 0.26 10*3/mm3      Eosinophils, Absolute 0.07 10*3/mm3      Basophils, Absolute 0.02 10*3/mm3      Immature Grans, Absolute 0.01 10*3/mm3      nRBC 0.0 /100 WBC     Comprehensive Metabolic Panel [[id number]]  (Abnormal) Collected: 02/08/21 2021    Specimen: Blood Updated: 02/08/21 2101     Glucose 173 mg/dL      BUN 27 mg/dL      Creatinine 0.83 mg/dL      Sodium 136 mmol/L      Potassium 3.7 mmol/L      Chloride 100 mmol/L      CO2 28.5 mmol/L      Calcium 8.2 mg/dL      Total Protein 7.1 g/dL      Albumin 2.30 g/dL      ALT  (SGPT) 5 U/L      AST (SGOT) 16 U/L      Alkaline Phosphatase 93 U/L      Total Bilirubin 0.3 mg/dL      eGFR Non African Amer 71 mL/min/1.73      Globulin 4.8 gm/dL      A/G Ratio 0.5 g/dL      BUN/Creatinine Ratio 32.5     Anion Gap 7.5 mmol/L     Narrative:      GFR Normal >60  Chronic Kidney Disease <60  Kidney Failure <15      Protime-INR [[id number]]  (Abnormal) Collected: 02/08/21 2021    Specimen: Blood Updated: 02/08/21 2046     Protime 16.2 Seconds      INR 1.33    Lipase [[id number]]  (Abnormal) Collected: 02/08/21 2021    Specimen: Blood Updated: 02/08/21 2059     Lipase 112 U/L     COVID PRE-OP / PRE-PROCEDURE SCREENING ORDER (NO ISOLATION) - Swab, Nasopharynx [[id number]] Collected: 02/08/21 2025    Specimen: Swab from Nasopharynx Updated: 02/08/21 2034    Narrative:      The following orders were created for panel order COVID PRE-OP / PRE-PROCEDURE SCREENING ORDER (NO ISOLATION) - Swab, Nasopharynx.  Procedure                               Abnormality         Status                     ---------                               -----------         ------                     COVID-19,APTIMA PANTHER,...[[id number]]                      In process                   Please view results for these tests on the individual orders.    COVID-19,APTIMA PANTHER,[facility] IN-HOUSE, NP/OP SWAB IN UTM/VTM/[facility] TRANSPORT MEDIA,24 HR TAT - Swab, Nasopharynx [[id number]] Collected: 02/08/21 2025    Specimen: Swab from Nasopharynx Updated: 02/08/21 2034        Imaging Results (Last 24 Hours)     Procedure Component Value Units Date/Time    XR Chest 1 View [[id number]] Collected: 02/08/21 2011     Updated: 02/08/21 2017    Narrative:      XR CHEST 1 VW-     HISTORY: Female who is 55 years-old,  PICC placement     TECHNIQUE: Frontal view of the chest     COMPARISON: 1/24/2021     FINDINGS: Right PICC extends to the superior vena cava. The heart size  is borderline. Pulmonary vasculature is unremarkable. Small atelectasis  or  infiltrate left mid to lower lung, follow-up can characterize  resolution. No pleural effusion, or pneumothorax. No acute osseous  process.       Impression:      Right PICC extends to the superior vena cava. Small  atelectasis or infiltrate left mid to lower lung.     This report was finalized on 2/8/2021 8:14 PM by Dr. Armand Vazquez M.D.             Results for orders placed during the hospital encounter of 01/24/21   Adult Transthoracic Echo Complete W/ Cont if Necessary Per Protocol    Narrative · Estimated left ventricular EF = 63% Left ventricular systolic function   is normal.  · Left ventricular diastolic function was normal.  · Mild tricuspid valve regurgitation is present. Estimated right   ventricular systolic pressure from tricuspid regurgitation is normal (<35   mmHg). Calculated right ventricular systolic pressure from tricuspid   regurgitation is 26 mmHg.  · There is turbulent flow through the left ventricular outflow tract.   There is a 43 mmHg gradient with Valsalva. This appears to be due to the   hyperdynamic looking function of the left ventricle.          No orders to display        Assessment/Plan     Active Hospital Problems    Diagnosis POA   • **Symptomatic anemia [D64.9] Yes   • Septic arthritis of shoulder, left (CMS/HCC) [M00.9] Yes   • MSSA bacteremia [R78.81, B95.61] Yes   • Pulmonary nodules [R91.8] Yes   • Elevated liver function tests [R79.89] Yes   • Mixed hyperlipidemia [E78.2] Yes   • Obesity, Class III, BMI 40-49.9 (morbid obesity) (CMS/HCC) [E66.01] Yes   • Essential hypertension [I10] Yes   • Anxiety and depression [F41.9, F32.9] Yes   • Diabetes mellitus type 2 in obese (CMS/[facility]) [E11.69, E66.9] Yes     Mrs. Bhandari is a 55-year-old female with history of hypertension, anxiety and depression, type 2 diabetes, obesity, hyperlipidemia, elevated liver function test, septic arthritis, MSSA bacteremia who presents to the emergency room with symptomatic anemia.    Symptomatic  anemia  -Patient started on 1 unit of blood in the emergency room  -Serial H&H every 8 hours  -Consult gastroenterology, patient did see red blood in her stool this afternoon  -N.p.o. after midnight  -Telemetry unit for monitoring    History of septic arthritis/MSSA bacteremia/discitis  -Patient currently has PICC line in right upper arm  -Continue cefazolin every 8 hours as directed by infectious disease, patient to finish on March 12.    Type 2 diabetes  -Accu-Cheks before meals and at bedtime with correctional dose insulin  -Hold Metformin  -A1c on 1/25/2020 was 7.3    Hyperlipidemia/hypertension/anxiety/depression  -Chronic conditions stable at this time, will continue home meds      · I discussed the patient's findings and my recommendations with patient and ED provider.    VTE Prophylaxis - SCDs.  Code Status - Full code.       Cindy Torres APRLAURIE  [facility]  02/08/21  23:02 EST      Electronically signed by Kate Owens MD at 02/09/21 0154          Emergency Department Notes      Degonda, Janet, RN at 02/08/21 1919        Sent here by Dr Porras for Hemoglobin 6.9- states has been trending down. Being treated by Dr Porras for MSSA of the shoulder     Patient was placed in face mask during first look triage.  Patient was wearing a face mask throughout encounter.  I wore personal protective equipment throughout the encounter.  Hand hygiene was performed before and after patient encounter.         Electronically signed by Degonda, Janet, RN at 02/08/21 1920     Alexandro Hanna MD at 02/08/21 1924      Procedure Orders    1. Critical Care [[id number]] ordered by Alexandro Hanna MD                EMERGENCY DEPARTMENT ENCOUNTER  Patient was placed in face mask in first look and the following protective measures were taken unless additional measures were taken and documented below in the ED course. Patient was wearing facemask when I entered the room and throughout our encounter. I  wore full protective equipment throughout this patient encounter including a face mask, and gloves. Hand hygiene was performed before donning protective equipment and after removal when leaving the room.    Room Number:  E654/1  Date of encounter:  2/9/2021  PCP: Dea Martinez MD    HPI:  Context: Stefani Bhandari is a 55 y.o. female who presents to the ED c/o chief complaint of low hemoglobin.  Patient states she was recently hospitalized for septic arthritis of her left shoulder and had surgery and is currently receiving IV antibiotics through PICC line.  She received a call from Dr. Porras this afternoon telling her to come to the emergency department secondary to a critically low hemoglobin.  Patient states she has not noted vomiting or bloody bowel movements until just prior to arrival.  She states she had loose stools prior to arrival that were blood-tinged.  She denies upper abdominal pain but has noted intermittent lower abdominal cramping for a week.  Patient denies shortness of air, dyspnea on exertion but has noted some mild fatigue.  She thought the fatigue was secondary to the recent hospitalization.    MEDICAL HISTORY REVIEW  Reviewed in Epic.  Patient was advised by Dr. Dianne Porras come to the emergency department today.  She was received a call today that patient's hemoglobin was 6.9.  Patient denied to Dr. Porras of any bleeding or stool or bleeding from the surgical incision.  Patient was discharged from the hospital 3 days ago for sepsis secondary to septic arthritis of the left shoulder, anasarca, liver cirrhosis secondary to Garcia thrombocytopenia.  Patient's hemoglobin 3 days ago was 7.6.  Patient's hemoglobin was 8.89 days ago and 9.5 12 days ago.    PAST MEDICAL HISTORY  Active Ambulatory Problems     Diagnosis Date Noted   • Essential hypertension 01/22/2020   • Sleep apnea 01/22/2020   • Anxiety and depression 01/22/2020   • Diabetes mellitus type 2 in obese (CMS/[facility]) 01/22/2020   • Obesity,  Class III, BMI 40-49.9 (morbid obesity) (CMS/[facility]) 01/29/2020   • Other specified hypothyroidism 04/06/2020   • Mixed hyperlipidemia 04/06/2020   • Lumbar degenerative disc disease 04/06/2020   • Thrombocytopenia (CMS/[facility]) 07/15/2020   • Tongue lesion 07/15/2020   • Pulmonary nodules 01/15/2021   • Abnormal CT of the chest 01/15/2021   • Elevated liver function tests 01/15/2021   • COVID-19 virus infection 01/15/2021   • Sepsis without acute organ dysfunction (CMS/[facility]) 01/24/2021   • History of COVID-19 01/24/2021   • LINDSEY (acute kidney injury) (CMS/[facility]) 01/24/2021   • Hyponatremia 01/24/2021   • Septic arthritis of shoulder, left (CMS/[facility]) 01/28/2021   • MSSA bacteremia 01/28/2021   • 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 (CMS/[facility]) 01/28/2021   • Anasarca 01/31/2021     Resolved Ambulatory Problems     Diagnosis Date Noted   • Snoring 01/22/2020   • Multiple joint pain 01/22/2020     Past Medical History:   Diagnosis Date   • Anxiety    • DDD (degenerative disc disease), lumbar    • Depression    • Diabetes mellitus (CMS/[facility])    • Elevated LFTs    • NICK (generalized anxiety disorder)    • History of epilepsy    • Hyperlipidemia    • Hypertension    • Hypothyroidism    • MDD (major depressive disorder)    • Morbid obesity (CMS/[facility])    • OA (osteoarthritis)    • Obesity        PAST SURGICAL HISTORY  Past Surgical History:   Procedure Laterality Date   • HYSTERECTOMY  2007   • INCISION AND DRAINAGE SHOULDER Left 1/30/2021    Procedure: INCISION AND DRAINAGE SHOULDER;  Surgeon: Juaquin Richardson II, MD;  Location: [facility];  Service: Orthopedics;  Laterality: Left;   • TUBAL ABDOMINAL LIGATION  1996       FAMILY HISTORY  Family History   Problem Relation Age of Onset   • Obesity Mother    • Diabetes Mother    • Hypertension Mother    • Sleep apnea Mother    • Asthma Mother    • Hypertension Father    • Heart attack Father    • Diabetes Sister    • Hypertension Sister    • Heart attack Sister    • Diabetes  Brother    • Hypertension Brother    • Heart attack Brother    • No Known Problems Other    • Breast cancer Paternal Cousin    • Malig Hyperthermia Neg Hx        SOCIAL HISTORY  Social History     Socioeconomic History   • Marital status:      Spouse name: Not on file   • Number of children: Not on file   • Years of education: Not on file   • Highest education level: Not on file   Tobacco Use   • Smoking status: Former Smoker     Years: 20.00   • Smokeless tobacco: Never Used   • Tobacco comment: 1 pack per 2 days   Substance and Sexual Activity   • Alcohol use: Not Currently     Frequency: Never     Comment: one glass of wine q6 months   • Drug use: Never   • Sexual activity: Defer       ALLERGIES  Tramadol    The patient's allergies have been reviewed    REVIEW OF SYSTEMS  All systems reviewed and negative except for those discussed in HPI.     PHYSICAL EXAM  I have reviewed the triage vital signs and nursing notes.  ED Triage Vitals [02/08/21 1920]   Temp Heart Rate Resp BP SpO2   98.7 °F (37.1 °C) 87 17 180/80 100 %      Temp src Heart Rate Source Patient Position BP Location FiO2 (%)   Tympanic -- -- -- --       General: Mild distress  HENT: NCAT, PERRL, Nares patent  Eyes: no scleral icterus, pallor noted  Neck: trachea midline, no ROM limitations  CV: regular rhythm, regular rate  Respiratory: normal effort, CTAB  Abdomen: soft, nondistended, nontender to palpation, no rebound tenderness, no guarding or rigidity  : Positive tone, no hemorrhoids, stool reveals bloody mucus that is heme positive  Musculoskeletal: no deformity  Neuro: alert, moves all extremities, follows commands  Skin: warm, dry.  Moderate pallor of the skin    LAB RESULTS  Recent Results (from the past 24 hour(s))   Creatinine, Serum    Collection Time: 02/08/21  2:30 PM    Specimen: Blood   Result Value Ref Range    Creatinine 0.99 0.57 - 1.00 mg/dL    eGFR Non African Amer 58 (L) >60 mL/min/1.73   CBC Auto Differential     Collection Time: 02/08/21  2:30 PM    Specimen: Blood   Result Value Ref Range    WBC 4.15 3.40 - 10.80 10*3/mm3    RBC 2.25 (L) 3.77 - 5.28 10*6/mm3    Hemoglobin 6.9 (C) 12.0 - 15.9 g/dL    Hematocrit 20.9 (C) 34.0 - 46.6 %    MCV 92.9 79.0 - 97.0 fL    MCH 30.7 26.6 - 33.0 pg    MCHC 33.0 31.5 - 35.7 g/dL    RDW 14.4 12.3 - 15.4 %    RDW-SD 48.3 37.0 - 54.0 fl    MPV 8.8 6.0 - 12.0 fL    Platelets 180 140 - 450 10*3/mm3    Neutrophil % 58.6 42.7 - 76.0 %    Lymphocyte % 30.6 19.6 - 45.3 %    Monocyte % 8.7 5.0 - 12.0 %    Eosinophil % 1.4 0.3 - 6.2 %    Basophil % 0.5 0.0 - 1.5 %    Immature Grans % 0.2 0.0 - 0.5 %    Neutrophils, Absolute 2.43 1.70 - 7.00 10*3/mm3    Lymphocytes, Absolute 1.27 0.70 - 3.10 10*3/mm3    Monocytes, Absolute 0.36 0.10 - 0.90 10*3/mm3    Eosinophils, Absolute 0.06 0.00 - 0.40 10*3/mm3    Basophils, Absolute 0.02 0.00 - 0.20 10*3/mm3    Immature Grans, Absolute 0.01 0.00 - 0.05 10*3/mm3    nRBC 0.0 0.0 - 0.2 /100 WBC   POCT Occult Blood Stool    Collection Time: 02/08/21  7:42 PM    Specimen: Per Rectum; Stool   Result Value Ref Range    Fecal Occult Blood Positive (A) Negative    Lot Number [id number]     Expiration Date 06/30/2021     Positive Control Positive Positive    Negative Control Negative Negative   Lactic Acid, Plasma    Collection Time: 02/08/21  8:20 PM    Specimen: Blood   Result Value Ref Range    Lactate 1.9 0.5 - 2.0 mmol/L   CBC Auto Differential    Collection Time: 02/08/21  8:20 PM    Specimen: Blood   Result Value Ref Range    WBC 4.32 3.40 - 10.80 10*3/mm3    RBC 2.25 (L) 3.77 - 5.28 10*6/mm3    Hemoglobin 6.8 (C) 12.0 - 15.9 g/dL    Hematocrit 20.9 (C) 34.0 - 46.6 %    MCV 92.9 79.0 - 97.0 fL    MCH 30.2 26.6 - 33.0 pg    MCHC 32.5 31.5 - 35.7 g/dL    RDW 14.4 12.3 - 15.4 %    RDW-SD 47.3 37.0 - 54.0 fl    MPV 8.3 6.0 - 12.0 fL    Platelets 167 140 - 450 10*3/mm3    Neutrophil % 64.4 42.7 - 76.0 %    Lymphocyte % 27.3 19.6 - 45.3 %    Monocyte % 6.0 5.0 - 12.0 %     Eosinophil % 1.6 0.3 - 6.2 %    Basophil % 0.5 0.0 - 1.5 %    Immature Grans % 0.2 0.0 - 0.5 %    Neutrophils, Absolute 2.78 1.70 - 7.00 10*3/mm3    Lymphocytes, Absolute 1.18 0.70 - 3.10 10*3/mm3    Monocytes, Absolute 0.26 0.10 - 0.90 10*3/mm3    Eosinophils, Absolute 0.07 0.00 - 0.40 10*3/mm3    Basophils, Absolute 0.02 0.00 - 0.20 10*3/mm3    Immature Grans, Absolute 0.01 0.00 - 0.05 10*3/mm3    nRBC 0.0 0.0 - 0.2 /100 WBC   Comprehensive Metabolic Panel    Collection Time: 02/08/21  8:21 PM    Specimen: Blood   Result Value Ref Range    Glucose 173 (H) 65 - 99 mg/dL    BUN 27 (H) 6 - 20 mg/dL    Creatinine 0.83 0.57 - 1.00 mg/dL    Sodium 136 136 - 145 mmol/L    Potassium 3.7 3.5 - 5.2 mmol/L    Chloride 100 98 - 107 mmol/L    CO2 28.5 22.0 - 29.0 mmol/L    Calcium 8.2 (L) 8.6 - 10.5 mg/dL    Total Protein 7.1 6.0 - 8.5 g/dL    Albumin 2.30 (L) 3.50 - 5.20 g/dL    ALT (SGPT) 5 1 - 33 U/L    AST (SGOT) 16 1 - 32 U/L    Alkaline Phosphatase 93 39 - 117 U/L    Total Bilirubin 0.3 0.0 - 1.2 mg/dL    eGFR Non African Amer 71 >60 mL/min/1.73    Globulin 4.8 gm/dL    A/G Ratio 0.5 g/dL    BUN/Creatinine Ratio 32.5 (H) 7.0 - 25.0    Anion Gap 7.5 5.0 - 15.0 mmol/L   Protime-INR    Collection Time: 02/08/21  8:21 PM    Specimen: Blood   Result Value Ref Range    Protime 16.2 (H) 11.7 - 14.2 Seconds    INR 1.33 (H) 0.90 - 1.10   Lipase    Collection Time: 02/08/21  8:21 PM    Specimen: Blood   Result Value Ref Range    Lipase 112 (H) 13 - 60 U/L   Type & Screen    Collection Time: 02/08/21  8:21 PM    Specimen: Blood   Result Value Ref Range    ABO Type A     RH type Positive     Antibody Screen Negative     T&S Expiration Date 2/11/2021 11:59:59 PM    COVID-19,[facility], NP/OP SWAB IN UTM/VTM/SALINE TRANSPORT MEDIA,24 HR TAT - Swab, Nasopharynx    Collection Time: 02/08/21  8:25 PM    Specimen: Nasopharynx; Swab   Result Value Ref Range    COVID19 Not Detected Not Detected - Ref. Range   Prepare RBC, 1  Units    Collection Time: 02/08/21  9:35 PM   Result Value Ref Range    Product Code [id number]     Unit Number [id number]     UNIT  ABO A     UNIT  RH POS     Crossmatch Interpretation Compatible     Dispense Status IS     Blood Expiration Date 202103082359     Blood Type Barcode 6200        I ordered the above labs and reviewed the results.    RADIOLOGY  Xr Chest 1 View    Result Date: 2/8/2021  XR CHEST 1 VW-  HISTORY: Female who is 55 years-old,  PICC placement  TECHNIQUE: Frontal view of the chest  COMPARISON: 1/24/2021  FINDINGS: Right PICC extends to the superior vena cava. The heart size is borderline. Pulmonary vasculature is unremarkable. Small atelectasis or infiltrate left mid to lower lung, follow-up can characterize resolution. No pleural effusion, or pneumothorax. No acute osseous process.      Right PICC extends to the superior vena cava. Small atelectasis or infiltrate left mid to lower lung.  This report was finalized on 2/8/2021 8:14 PM by Dr. Armand Vazquez M.D.        I ordered the above noted radiological studies. I reviewed the images and results. I agree with the radiologist interpretation.    PROCEDURES  Critical Care  Performed by: Alexandro Hanna MD  Authorized by: Alexandro Hanna MD     Critical care provider statement:     Critical care time (minutes):  35    Critical care time was exclusive of:  Separately billable procedures and treating other patients    Critical care was necessary to treat or prevent imminent or life-threatening deterioration of the following conditions:  Circulatory failure    Critical care was time spent personally by me on the following activities:  Development of treatment plan with patient or surrogate, discussions with primary provider, evaluation of patient's response to treatment, examination of patient, interpretation of cardiac output measurements, obtaining history from patient or surrogate, ordering and review of laboratory studies, ordering  and review of radiographic studies, pulse oximetry, re-evaluation of patient's condition and review of old charts        MEDICATIONS GIVEN IN ER  Medications   sodium chloride 0.9 % flush 10 mL (has no administration in time range)   sodium chloride 0.9 % flush 10 mL (10 mL Intravenous Given 2/9/21 0048)   sodium chloride 0.9 % flush 10 mL (has no administration in time range)   dextrose (GLUTOSE) oral gel 15 g (has no administration in time range)   dextrose (D50W) 25 g/ 50mL Intravenous Solution 25 g (has no administration in time range)   glucagon (human recombinant) (GLUCAGEN DIAGNOSTIC) injection 1 mg (has no administration in time range)   nitroglycerin (NITROSTAT) SL tablet 0.4 mg (has no administration in time range)   acetaminophen (TYLENOL) tablet 650 mg (650 mg Oral Given 2/9/21 0047)     Or   acetaminophen (TYLENOL) 160 MG/5ML solution 650 mg ( Oral Not Given:  See Alt 2/9/21 0047)     Or   acetaminophen (TYLENOL) suppository 650 mg ( Rectal Not Given:  See Alt 2/9/21 0047)   insulin lispro (humaLOG, ADMELOG) injection 0-7 Units (has no administration in time range)   ondansetron (ZOFRAN) injection 4 mg (has no administration in time range)   buprenorphine-naloxone (SUBOXONE) 8-2 MG film 1 film (has no administration in time range)   ceFAZolin in dextrose (ANCEF) IVPB solution 2 g (2 g Intravenous New Bag 2/9/21 0047)   cholecalciferol (VITAMIN D3) tablet 2,000 Units (has no administration in time range)   cyclobenzaprine (FLEXERIL) tablet 10 mg (has no administration in time range)   furosemide (LASIX) tablet 40 mg (has no administration in time range)   levothyroxine (SYNTHROID, LEVOTHROID) tablet 88 mcg (has no administration in time range)   losartan (COZAAR) tablet 100 mg (has no administration in time range)   atorvastatin (LIPITOR) tablet 40 mg (has no administration in time range)   venlafaxine (EFFEXOR) tablet 75 mg (75 mg Oral Given 2/9/21 0049)       PROGRESS, DATA ANALYSIS, CONSULTS, AND  MEDICAL DECISION MAKING  A complete history and physical exam have been performed.  All available laboratory and imaging results have been reviewed by myself prior to disposition.    MDM  After the initial H&P, I discussed pertinent information from history and physical exam with patient/family.  Discussed differential diagnosis.  Discussed plan for ED evaluation/work-up/treatment.  All questions answered.  Patient/family is agreeable with plan.  ED Course as of Feb 09 0158   Mon Feb 08, 2021 2100 Hemoglobin(!!): 6.8 [DE]   2100 Hematocrit(!!): 20.9 [DE]   2100 Lipase(!): 112 [DE]   2100 INR(!): 1.33 [DE]   2100 Fecal Occult Blood(!): Positive [DE]   2125 I have updated patient the results of her blood work this evening.  She understands that she needs to be readmitted to the hospital to find of the cause of her bleeding.  We will be transfusing a unit of blood this evening.  I do have a call out to the hospitalist.    [DE]   2131 I discussed the case with Dr. Owens [facility].  He is admit patient to the hospital as an observation, telemetry bed.    [DE]      ED Course User Index  [DE] Alexandro Hanna MD       AS OF 01:58 EST VITALS:    BP - 168/86  HR - 79  TEMP - 100.3 °F (37.9 °C) (Oral)  O2 SATS - 97%    DIAGNOSIS  Final diagnoses:   Symptomatic anemia   Rectal bleeding         DISPOSITION  ADMISSION    Discussed treatment plan and reason for admission with pt/family and admitting physician.  Pt/family voiced understanding of the plan for admission for further testing/treatment as needed.           Alexandro Hanna MD  02/09/21 0159      Electronically signed by Alexandro Hanna MD at 02/09/21 0159     Stacey Mcdonnell, RN at 02/08/21 0611          Nursing report ED to floor  Stefani Bhandari  55 y.o.  female    HPI (triage note):   Chief Complaint   Patient presents with   • Abnormal Lab       Admitting doctor:   Kate Owens MD    Admitting diagnosis:   The primary encounter diagnosis was Symptomatic  "anemia. A diagnosis of Rectal bleeding was also pertinent to this visit.    Code status:   Current Code Status     Date Active Code Status Order ID Comments User Context       Prior    Advance Care Planning Activity          Allergies:   Tramadol    Weight:       02/08/21 1932   Weight: (!) 138 kg (304 lb)       Most recent vitals:   Vitals:    02/08/21 1920 02/08/21 1932 02/08/21 2123 02/08/21 2147   BP: 180/80 153/72 157/75 158/67   Pulse: 87 89  83   Resp: 17 20  18   Temp: 98.7 °F (37.1 °C)  99 °F (37.2 °C)    TempSrc: Tympanic  Oral    SpO2: 100% 100% 98% 99%   Weight:  (!) 138 kg (304 lb)     Height:  170.2 cm (67\")         Active LDAs/IV Access:   Lines, Drains & Airways    Active LDAs     Name:   Placement date:   Placement time:   Site:   Days:    PICC Single Lumen 02/01/21 Right Basilic   02/01/21    1705    Basilic   7                Labs (abnormal labs have a star):   Labs Reviewed   COMPREHENSIVE METABOLIC PANEL - Abnormal; Notable for the following components:       Result Value    Glucose 173 (*)     BUN 27 (*)     Calcium 8.2 (*)     Albumin 2.30 (*)     BUN/Creatinine Ratio 32.5 (*)     All other components within normal limits    Narrative:     GFR Normal >60  Chronic Kidney Disease <60  Kidney Failure <15     PROTIME-INR - Abnormal; Notable for the following components:    Protime 16.2 (*)     INR 1.33 (*)     All other components within normal limits   LIPASE - Abnormal; Notable for the following components:    Lipase 112 (*)     All other components within normal limits   CBC WITH AUTO DIFFERENTIAL - Abnormal; Notable for the following components:    RBC 2.25 (*)     Hemoglobin 6.8 (*)     Hematocrit 20.9 (*)     All other components within normal limits   POCT OCCULT BLOOD STOOL (ED ONLY) - Abnormal; Notable for the following components:    Fecal Occult Blood Positive (*)     All other components within normal limits   LACTIC ACID, PLASMA - Normal   COVID PRE-OP / PRE-PROCEDURE SCREENING ORDER " (NO ISOLATION)    Narrative:     The following orders were created for panel order COVID PRE-OP / PRE-PROCEDURE SCREENING ORDER (NO ISOLATION) - Swab, Nasopharynx.  Procedure                               Abnormality         Status                     ---------                               -----------         ------                     COVID-19,[facility] PANTHER,...[[id number]]                      In process                   Please view results for these tests on the individual orders.   COVID-19,[facility],NP/OP SWAB IN UTM/VTM/SALINE TRANSPORT MEDIA,24 HR TAT   TYPE AND SCREEN   PREPARE RBC   CBC AND DIFFERENTIAL    Narrative:     The following orders were created for panel order CBC & Differential.  Procedure                               Abnormality         Status                     ---------                               -----------         ------                     CBC Auto Differential[[id number]]        Abnormal            Final result                 Please view results for these tests on the individual orders.       EKG:   No orders to display       Meds given in ED:   Medications   sodium chloride 0.9 % flush 10 mL (has no administration in time range)       Imaging results:  Xr Chest 1 View    Result Date: 2/8/2021  Right PICC extends to the superior vena cava. Small atelectasis or infiltrate left mid to lower lung.  This report was finalized on 2/8/2021 8:14 PM by Dr. Armand Vazquez M.D.        Ambulatory status:   - up with assistance     Social issues:   Social History     Socioeconomic History   • Marital status:      Spouse name: Not on file   • Number of children: Not on file   • Years of education: Not on file   • Highest education level: Not on file   Tobacco Use   • Smoking status: Former Smoker     Years: 20.00   • Smokeless tobacco: Never Used   • Tobacco comment: 1 pack per 2 days   Substance and Sexual Activity   • Alcohol use: Not Currently     Frequency: Never       Comment: one glass of wine q6 months   • Drug use: Never   • Sexual activity: Defer          Stacey Mcdonnell RN  02/08/21 2151      Electronically signed by Stacey Mcdonnell RN at 02/08/21 2151       {Outbreak/Travel/Exposure Documentation......;  Question Available Choices Patient Response   Outbreak Screen: Do you currently have a new onset of the following symptoms?        Fever/Chils, Cough, Shortness of air, Loss of taste or smell, No, Unknown  No (02/08/21 1918)   Outbreak Screen: In the last 14 days, have you had contact with anyone who is ill, has show any of the symptoms listed above and/or has been diagnosis with the 2019 Novel Coronavirus? This includes any immediate household members but excludes any patients with whom you have been in contact within your normal work duties wearing proper PPE, if you are a healthcare worker.  Yes, No, Unknown              No (02/08/21 1918)   Outbreak Screen: Who was notified?    Free text  (not recorded)   Travel Screen: Have you traveled in the last month? If so, to what country have you traveled? If [place] what state? Yes, No, Unknown  List of all countries  List of all States No (02/08/21 1918)  (not recorded)  (not recorded)   Infection Risk: Do you currently have the following symptoms?  (If cough is selected, the Tuberculosis Screen is performed.) Cough, Fever, Rash, No No (02/08/21 1918)   Tuberculosis Screen: Do you have any of the following Tuberculosis Risks?  · Have you lived or spent time with anyone who had or may have TB?  · Have you lived in or visited any of the following areas for more than one month: [place], [place], [place], [place] or [place], the [place] or [place]?  · Do you have HIV/AIDS?  · Have you lived in or worked in a nursing home, homeless shelter, correctional facility, or substance abuse treatment facility?   · No    If Yes do you have any of the following symptoms? Yes responses display to the right    If Yes,  symptoms listed are:  Cough greater than or equal to 3 weeks, Loss of appetite, Unexplained weight loss, Night sweats, Bloody sputum or hemoptysis, Hoarseness, Fever, Fatigue, Chest pain, No (not recorded)  (not recorded)   Exposure Screen: Have you been exposed to any of these contagious diseases in the last month? Measles, Chickenpox, Meningitis, Pertussis, Whooping Cough, No No (02/08/21 1918)       Vital Signs (last day)     Date/Time   Temp   Temp src   Pulse   Resp   BP   Patient Position   SpO2    02/09/21 1300   97.5 (36.4)   Oral   87   18   173/98   Sitting   --    02/09/21 0743   99.3 (37.4)   Oral   81   18   151/69   Sitting   99    02/09/21 0414   --   --   --   --   169/87   Standing   --    02/09/21 0413   --   --   --   --   165/97   Sitting   --    02/09/21 0408   98.3 (36.8)   Oral   80   18   165/85   Lying   95    02/09/21 0048   --   --   79   --   --   --   --    02/09/21 0040   100.3 (37.9)   Oral   82   18   168/86   --   97    02/08/21 2232   98.1 (36.7)   Oral   82   18   180/98   --   100    02/08/21 2159   99.1 (37.3)   Oral   82   18   164/83   --   99    02/08/21 2147   --   --   83   18   158/67   --   99    02/08/21 2123   99 (37.2)   Oral   --   --   157/75   --   98    02/08/21 1932   --   --   89   20   153/72   --   100    02/08/21 1920   98.7 (37.1)   Tympanic   87   17   180/80   --   100              Oxygen Therapy (last day)     Date/Time   SpO2   Device (Oxygen Therapy)   Flow (L/min)   Oxygen Concentration (%)   ETCO2 (mmHg)    02/09/21 1434   --   room air   --   --   --    02/09/21 0744   --   room air   --   --   --    02/09/21 0743   99   room air   --   --   --    02/09/21 0408   95   room air   --   --   --    02/09/21 0040   97   room air   --   --   --    02/08/21 2300   --   room air   --   --   --    02/08/21 2232   100   room air   --   --   --    02/08/21 2159 99   room air   --   --   --    02/08/21 2147   99   --   --   --   --    02/08/21 2123   98   --    --   --   --    02/08/21 1932   100   room air   --   --   --    02/08/21 1920   100   room air   --   --   --              Intake & Output (last day)       02/08 0701 - 02/09 0700 02/09 0701 - 02/10 0700    P.O.  1100    Blood 300     IV Piggyback  100    Total Intake(mL/kg) 300 (2.2) 1200 (8.6)    Urine (mL/kg/hr) 350     Stool 0     Total Output 350     Net -50 +1200          Urine Unmeasured Occurrence 1 x     Stool Unmeasured Occurrence 1 x         Lines, Drains & Airways    Active LDAs     Name:   Placement date:   Placement time:   Site:   Days:    PICC Single Lumen 02/01/21 Right Basilic   02/01/21 1705    Basilic   7                Blood Administration Record (From admission, onward)    Completed transfusions     Ordered     Start    02/08/21 2038  Transfuse RBC Infuse Each Unit Over: 2H  Transfusion     Released Time Blood Unit Number Status   02/08/21 2120   21  [id number]  [id number] Completed 02/09/21 0044       02/08/21 2038                Lab Results (last 48 hours)     Procedure Component Value Units Date/Time    POC Glucose Once [[id number]]  (Normal) Collected: 02/09/21 1123    Specimen: Blood Updated: 02/09/21 1130     Glucose 114 mg/dL     Vitamin B12 [[id number]]  (Abnormal) Collected: 02/09/21 0441    Specimen: Blood Updated: 02/09/21 0620     Vitamin B-12 1,510 pg/mL     Narrative:      Results may be falsely increased if patient taking Biotin.      Folate [[id number]]  (Normal) Collected: 02/09/21 0441    Specimen: Blood Updated: 02/09/21 0620     Folate 5.89 ng/mL     Narrative:      Results may be falsely increased if patient taking Biotin.      TSH [[id number]]  (Abnormal) Collected: 02/09/21 0442    Specimen: Blood Updated: 02/09/21 0612     TSH 5.870 uIU/mL     Ferritin [[id number]]  (Abnormal) Collected: 02/09/21 0442    Specimen: Blood Updated: 02/09/21 0612     Ferritin 233.00 ng/mL     Narrative:      Results may be falsely decreased if patient taking Biotin.      Basic Metabolic  Panel [[id number]]  (Abnormal) Collected: 02/09/21 0442    Specimen: Blood Updated: 02/09/21 0612     Glucose 95 mg/dL      BUN 24 mg/dL      Creatinine 0.73 mg/dL      Sodium 134 mmol/L      Potassium 3.8 mmol/L      Chloride 99 mmol/L      CO2 27.6 mmol/L      Calcium 8.4 mg/dL      eGFR Non African Amer 82 mL/min/1.73      BUN/Creatinine Ratio 32.9     Anion Gap 7.4 mmol/L     Narrative:      GFR Normal >60  Chronic Kidney Disease <60  Kidney Failure <15      Iron Profile [[id number]]  (Abnormal) Collected: 02/09/21 0442    Specimen: Blood Updated: 02/09/21 0605     Iron 32 mcg/dL      Iron Saturation 14 %      Transferrin 152 mg/dL      TIBC 226 mcg/dL     Lipase [[id number]]  (Abnormal) Collected: 02/09/21 0442    Specimen: Blood Updated: 02/09/21 0605     Lipase 117 U/L     POC Glucose Once [[id number]]  (Normal) Collected: 02/09/21 0547    Specimen: Blood Updated: 02/09/21 0548     Glucose 96 mg/dL     Protime-INR [[id number]]  (Abnormal) Collected: 02/09/21 0441    Specimen: Blood Updated: 02/09/21 0547     Protime 15.9 Seconds      INR 1.30    Hemoglobin & Hematocrit, Blood [[id number]]  (Abnormal) Collected: 02/09/21 0442    Specimen: Blood Updated: 02/09/21 0505     Hemoglobin 7.7 g/dL      Hematocrit 24.0 %     Hemoglobin & Hematocrit, Blood [[id number]]  (Abnormal) Collected: 02/09/21 0223    Specimen: Blood Updated: 02/09/21 0257     Hemoglobin 7.6 g/dL      Hematocrit 22.9 %     CBC Auto Differential [[id number]]  (Abnormal) Collected: 02/09/21 0223    Specimen: Blood Updated: 02/09/21 0257     WBC 4.69 10*3/mm3      RBC 2.43 10*6/mm3      Hemoglobin 7.6 g/dL      Hematocrit 22.9 %      MCV 94.2 fL      MCH 31.3 pg      MCHC 33.2 g/dL      RDW 14.4 %      RDW-SD 48.7 fl      MPV 8.6 fL      Platelets 175 10*3/mm3      Neutrophil % 63.6 %      Lymphocyte % 26.9 %      Monocyte % 7.0 %      Eosinophil % 1.5 %      Basophil % 0.6 %      Immature Grans % 0.4 %      Neutrophils, Absolute 2.98 10*3/mm3       Lymphocytes, Absolute 1.26 10*3/mm3      Monocytes, Absolute 0.33 10*3/mm3      Eosinophils, Absolute 0.07 10*3/mm3      Basophils, Absolute 0.03 10*3/mm3      Immature Grans, Absolute 0.02 10*3/mm3      nRBC 0.0 /100 WBC     Reticulocytes [[id number]]  (Abnormal) Collected: 02/09/21 0223    Specimen: Blood Updated: 02/09/21 0257     Reticulocyte % 2.85 %      Reticulocyte Absolute 0.0693 10*6/mm3     POC Glucose Once [[id number]]  (Abnormal) Collected: 02/09/21 0216    Specimen: Blood Updated: 02/09/21 0218     Glucose 175 mg/dL     COVID PRE-OP / PRE-PROCEDURE SCREENING ORDER (NO ISOLATION) - Swab, Nasopharynx [[id number]]  (Normal) Collected: 02/08/21 2025    Specimen: Swab from Nasopharynx Updated: 02/09/21 0030    Narrative:      The following orders were created for panel order COVID PRE-OP / PRE-PROCEDURE SCREENING ORDER (NO ISOLATION) - Swab, Nasopharynx.  Procedure                               Abnormality         Status                     ---------                               -----------         ------                     COVID-19,APTIMA PANTHER,...[[id number]]  Normal              Final result                 Please view results for these tests on the individual orders.    COVID-19,APTIMA PANTHER,[facility], NP/OP SWAB IN UTM/VTM/SALINE TRANSPORT MEDIA,24 HR TAT - Swab, Nasopharynx [[id number]]  (Normal) Collected: 02/08/21 2025    Specimen: Swab from Nasopharynx Updated: 02/09/21 0030     COVID19 Not Detected    Narrative:      Fact sheet for providers: https://www.fda.gov/media/354782/download     Fact sheet for patients: https://www.fda.gov/media/286322/download    Test performed by RT PCR.    Comprehensive Metabolic Panel [[id number]]  (Abnormal) Collected: 02/08/21 2021    Specimen: Blood Updated: 02/08/21 2101     Glucose 173 mg/dL      BUN 27 mg/dL      Creatinine 0.83 mg/dL      Sodium 136 mmol/L      Potassium 3.7 mmol/L      Chloride 100 mmol/L      CO2 28.5 mmol/L      Calcium 8.2 mg/dL       Total Protein 7.1 g/dL      Albumin 2.30 g/dL      ALT (SGPT) 5 U/L      AST (SGOT) 16 U/L      Alkaline Phosphatase 93 U/L      Total Bilirubin 0.3 mg/dL      eGFR Non African Amer 71 mL/min/1.73      Globulin 4.8 gm/dL      A/G Ratio 0.5 g/dL      BUN/Creatinine Ratio 32.5     Anion Gap 7.5 mmol/L     Narrative:      GFR Normal >60  Chronic Kidney Disease <60  Kidney Failure <15      Lipase [[id number]]  (Abnormal) Collected: 02/08/21 2021    Specimen: Blood Updated: 02/08/21 2059     Lipase 112 U/L     Lactic Acid, Plasma [[id number]]  (Normal) Collected: 02/08/21 2020    Specimen: Blood Updated: 02/08/21 2050     Lactate 1.9 mmol/L     Protime-INR [[id number]]  (Abnormal) Collected: 02/08/21 2021    Specimen: Blood Updated: 02/08/21 2046     Protime 16.2 Seconds      INR 1.33    CBC & Differential [[id number]]  (Abnormal) Collected: 02/08/21 2020    Specimen: Blood Updated: 02/08/21 2036    Narrative:      The following orders were created for panel order CBC & Differential.  Procedure                               Abnormality         Status                     ---------                               -----------         ------                     CBC Auto Differential[[id number]]        Abnormal            Final result                 Please view results for these tests on the individual orders.    CBC Auto Differential [[id number]]  (Abnormal) Collected: 02/08/21 2020    Specimen: Blood Updated: 02/08/21 2036     WBC 4.32 10*3/mm3      RBC 2.25 10*6/mm3      Hemoglobin 6.8 g/dL      Hematocrit 20.9 %      MCV 92.9 fL      MCH 30.2 pg      MCHC 32.5 g/dL      RDW 14.4 %      RDW-SD 47.3 fl      MPV 8.3 fL      Platelets 167 10*3/mm3      Neutrophil % 64.4 %      Lymphocyte % 27.3 %      Monocyte % 6.0 %      Eosinophil % 1.6 %      Basophil % 0.5 %      Immature Grans % 0.2 %      Neutrophils, Absolute 2.78 10*3/mm3      Lymphocytes, Absolute 1.18 10*3/mm3      Monocytes, Absolute 0.26 10*3/mm3      Eosinophils,  Absolute 0.07 10*3/mm3      Basophils, Absolute 0.02 10*3/mm3      Immature Grans, Absolute 0.01 10*3/mm3      nRBC 0.0 /100 WBC     POCT Occult Blood Stool [[id number]]  (Abnormal) Collected: 02/08/21 1942    Specimen: Stool from Per Rectum Updated: 02/08/21 1943     Fecal Occult Blood Positive     Lot Number [id number]     Expiration Date 06/30/2021     Positive Control Positive     Negative Control Negative        Imaging Results (Last 48 Hours)     Procedure Component Value Units Date/Time    CT Abdomen Pelvis With Contrast [[id number]] Collected: 02/09/21 0910     Updated: 02/09/21 0933    Narrative:      CT ABDOMEN PELVIS W CONTRAST-     CLINICAL HISTORY: Low abdomen pain. GI bleeding. Follow-up hepatomegaly  seen on CT scan of the chest.     TECHNIQUE: Spiral CT images were acquired through the abdomen and pelvis  with IV contrast only and were reconstructed in 3 mm thick axial slices.     Radiation dose reduction techniques were utilized, including automated  exposure control and exposure modulation based on body size.     COMPARISON: CT chest dated 01/24/2021     FINDINGS: As shown on the CT chest, the liver is mildly enlarged. It  measures 22.5 cm in greatest cephalocaudad dimension. The liver margins  are lobulated consistent with cirrhosis. No focal hepatic lesions are  identified. There is no bile duct dilatation. Mild splenomegaly is also  present. The spleen measures 17.7 cm in greatest transverse diameter.  Mildly enlarged tortuous venous collateral vessels are noted along the  course of the splenic vein. Recanalization of the umbilical vein is also  present. Small esophageal varices are present. The findings are  consistent with portal hypertension. A few borderline-enlarged lymph  nodes in the upper abdomen are again noted, and are unchanged and are  likely benign based on size criteria. The pancreas and kidneys and  adrenal glands appear within normal limits. There is a small amount of  ascites adjacent to  the liver and spleen and also in the pelvis that is  new. The stomach and small and large bowel are unremarkable. The uterus  is absent. Images through the lung bases demonstrate patchy linear  atelectasis in the left lower lobe that appears slightly more prominent.  There is also a small ill-defined nodular opacity in the posterior  aspect of the right lower lobe adjacent to the pleural surface that is  new, and is most likely due to atelectasis or focal infiltrate. There is  mild patchy stranding of the subcutaneous fat throughout the abdomen and  pelvis that is new, and is consistent with anasarca.       Impression:      Mild hepatomegaly with cirrhotic liver morphology and  evidence of mild portal hypertension as described. Small amount of  ascites in the abdomen and pelvis that is new since the preceding CT  scan dated 01/24/2021. Worsening patchy atelectasis in the left lower  lobe. Small focal pleural-based nodular opacity in the right lower lobe  that is new and is consistent with infiltrate or atelectasis. Mild  splenomegaly.     This report was finalized on 2/9/2021 9:30 AM by Dr. James Vera M.D.       XR Chest 1 View [[id number]] Collected: 02/08/21 2011     Updated: 02/08/21 2017    Narrative:      XR CHEST 1 VW-     HISTORY: Female who is 55 years-old,  PICC placement     TECHNIQUE: Frontal view of the chest     COMPARISON: 1/24/2021     FINDINGS: Right PICC extends to the superior vena cava. The heart size  is borderline. Pulmonary vasculature is unremarkable. Small atelectasis  or infiltrate left mid to lower lung, follow-up can characterize  resolution. No pleural effusion, or pneumothorax. No acute osseous  process.       Impression:      Right PICC extends to the superior vena cava. Small  atelectasis or infiltrate left mid to lower lung.     This report was finalized on 2/8/2021 8:14 PM by Dr. Armand Vazquez M.D.           Orders (last 24 hrs)      Start     Ordered    02/11/21 0157  Auto  Discontinue GI Panel in 48 Hours if not Collected  ONCE GI PANEL      02/09/21 0157    02/11/21 0157  Auto Discontinue in 48 Hours if not Collected  ONCE CDIFF      02/09/21 0157    02/10/21 0600  Hepatitis Panel, Acute  Morning Draw      02/09/21 1038    02/10/21 0600  Comprehensive Metabolic Panel  Morning Draw      02/09/21 1038    02/10/21 0600  Protime-INR  Morning Draw      02/09/21 1038    02/10/21 0600  AFP Tumor Marker  Morning Draw      02/09/21 1038    02/10/21 0600  Mitochondrial Antibodies, M2  Morning Draw      02/09/21 1038    02/10/21 0600  Protein Electrophoresis, Total  Morning Draw      02/09/21 1038    02/10/21 0600  Anti-Smooth Muscle Antibody Titer  Morning Draw      02/09/21 1038    02/09/21 1314  Inpatient Admission  Once      02/09/21 1313    02/09/21 1131  POC Glucose Once  Once      02/09/21 1123    02/09/21 1039  Advance Diet As Tolerated  Until Discontinued      02/09/21 1038    02/09/21 1039  Diet Clear Liquid  Diet Effective 0500     Comments: Clear liquids are acceptable up to 2 hours before procedure start time (including prep and meds)    02/09/21 1038    02/09/21 0930  iopamidol (ISOVUE-300) 61 % injection 100 mL  Once in Imaging      02/09/21 0837    02/09/21 0900  buprenorphine-naloxone (SUBOXONE) 8-2 MG film 1 film  Daily      02/08/21 2307    02/09/21 0900  cholecalciferol (VITAMIN D3) tablet 2,000 Units  Daily      02/08/21 2307 02/09/21 0900  furosemide (LASIX) tablet 40 mg  2 Times Daily      02/08/21 2307 02/09/21 0900  losartan (COZAAR) tablet 100 mg  Every 24 Hours Scheduled      02/08/21 2307 02/09/21 0900  atorvastatin (LIPITOR) tablet 40 mg  Daily      02/08/21 2307 02/09/21 0730  insulin lispro (humaLOG, ADMELOG) injection 0-7 Units  3 Times Daily Before Meals      02/08/21 2304    02/09/21 0700  POC Glucose 4x Daily AC & at Bedtime  4 Times Daily Before Meals & at Bedtime     Comments: If bedtime blood glucose is greater than 350 mg/dl, call MD.       02/08/21 2304 02/09/21 0600  Basic Metabolic Panel  Morning Draw      02/08/21 2304    02/09/21 0600  CBC Auto Differential  Morning Draw      02/08/21 2304    02/09/21 0600  Protime-INR  Morning Draw      02/08/21 2304    02/09/21 0600  TSH  Morning Draw      02/08/21 2304    02/09/21 0600  levothyroxine (SYNTHROID, LEVOTHROID) tablet 88 mcg  Every Early Morning      02/08/21 2307    02/09/21 0600  Iron Profile  Morning Draw      02/09/21 0156    02/09/21 0600  Ferritin  Morning Draw      02/09/21 0156    02/09/21 0600  Vitamin B12  Morning Draw      02/09/21 0156    02/09/21 0600  Folate  Morning Draw      02/09/21 0156    02/09/21 0600  Lipase  Morning Draw      02/09/21 0156    02/09/21 0549  POC Glucose Once  Once      02/09/21 0547    02/09/21 0219  POC Glucose Once  Once      02/09/21 0216    02/09/21 0159  Critical Care  Once     Comments: This order was created via procedure documentation    02/09/21 0158    02/09/21 0157  Gastrointestinal Panel, PCR - Stool, Per Rectum  Once      02/09/21 0157    02/09/21 0157  Clostridium Difficile Toxin - Stool, Per Rectum  Once      02/09/21 0157    02/09/21 0157  Patient Isolation Contact Spore  Continuous     Comments: Isolation Precautions Per Clostridium Difficile (CDI) Infection Control Policy / Process    02/09/21 0157    02/09/21 0157  Clostridium Difficile Toxin, PCR - Stool, Per Rectum  PROCEDURE ONCE      02/09/21 0157    02/09/21 0156  Occult Blood X 1, Stool - Stool, Per Rectum  Once      02/09/21 0156    02/09/21 0156  Reticulocytes  Once      02/09/21 0156    02/09/21 0155  CT Abdomen Pelvis With Contrast  1 Time Imaging      02/09/21 0156    02/09/21 0001  NPO Diet  Diet Effective Midnight,   Status:  Canceled      02/08/21 2308    02/09/21 0000  Vital Signs  Every 4 Hours      02/08/21 2304    02/09/21 0000  sodium chloride 0.9 % flush 10 mL  Every 12 Hours Scheduled      02/08/21 2304    02/09/21 0000  Hemoglobin & Hematocrit, Blood  Every 8 Hours       02/08/21 2304 02/09/21 0000  ceFAZolin in dextrose (ANCEF) IVPB solution 2 g  Every 8 Hours      02/08/21 2307 02/09/21 0000  venlafaxine (EFFEXOR) tablet 75 mg  2 Times Daily      02/08/21 2307 02/08/21 2308  Diet Clear Liquid  Diet Effective Now,   Status:  Canceled      02/08/21 2308 02/08/21 2306  cyclobenzaprine (FLEXERIL) tablet 10 mg  3 Times Daily PRN      02/08/21 2307    02/08/21 2304  ondansetron (ZOFRAN) injection 4 mg  Every 6 Hours PRN      02/08/21 2304    02/08/21 2304  acetaminophen (TYLENOL) tablet 650 mg  Every 4 Hours PRN      02/08/21 2304    02/08/21 2304  acetaminophen (TYLENOL) 160 MG/5ML solution 650 mg  Every 4 Hours PRN      02/08/21 2304 02/08/21 2304  acetaminophen (TYLENOL) suppository 650 mg  Every 4 Hours PRN      02/08/21 2304    02/08/21 2304  Code Status and Medical Interventions:  Continuous      02/08/21 2304 02/08/21 2304  Intake & Output  Every Shift      02/08/21 2304 02/08/21 2304  Weigh Patient  Once      02/08/21 2304 02/08/21 2304  Oxygen Therapy- Nasal Cannula; Titrate for SPO2: 90% - 95%  Continuous      02/08/21 2304 02/08/21 2304  Insert Peripheral IV  Once      02/08/21 2304 02/08/21 2304  Saline Lock & Maintain IV Access  Continuous,   Status:  Canceled      02/08/21 2304 02/08/21 2304  Do NOT Hold Basal or Correction Scale Insulin When Patient is NPO, Hold Scheduled Mealtime (Bolus) Insulin if NPO  Continuous      02/08/21 2304 02/08/21 2304  Follow [facility] Hypoglycemia Standing Orders For Blood Glucose Less Than 70 mg/dL  Until Discontinued     Comments: ALERT PATIENT - NOT NPO & CAN SAFELY SWALLOW  Administer 4 oz Fruit Juice OR 4 oz Regular Soda OR 8 oz Milk OR 15-30 grams (1 tube) of Glucose Gel.  Recheck Blood Glucose Approximately 15 Minutes After Ingestion, Repeat Treatment & Continue to Recheck Blood Sugar Approximately Every 15 Minutes Until Blood Glucose is 70 or Higher.  Once Blood Glucose is 70 or Higher & if It Will Be  More Than 60 Minutes Until Next Meal, Provide Appropriate Snack (Including Carbohydrate Food) Based on Meal Plan Order. Give Meal Tray As Soon As Possible.    PATIENT HAS IV ACCESS - UNRESPONSIVE, NPO OR UNABLE TO SAFELY SWALLOW  Administer 25g (50ml) D50W IV Push.  Recheck Blood Glucose Approximately 15 Minutes After Administration, if Blood Glucose Remains Less Than 70, Repeat Treatment   Recheck Blood Glucose Approximately 15 Minutes After 2nd Administration, if Blood Glucose Remains Less Than 70 After 2nd Dose of D50W, Contact Provider for Further Treatment Orders & Consider Adding IVF With D5W for Maintenance    PATIENT WITHOUT IV ACCESS - UNRESPONSIVE, NPO OR UNABLE TO SAFELY SWALLOW  Administer 1mg Glucagon SQ & Establish IV Access.  Turn Patient on Side - Nausea / Vomiting May Occur.  Recheck Blood Glucose Approximately 15 Minutes After Administration.  If Blood Glucose Remains Less Than 70, Administer 25g D50W IV Push (50ml).  Recheck Blood Glucose Approximately 15 Minutes After Administration of D50W, if Blood Glucose Remains Less Than 70, Contact Provider for Further Treatment Orders & Consider Adding IVF With D5 for Maintenance    Document Event & Patient Response to Interventions in EMR, Document Medications on MAR  Notify Provider if Hypoglycemia Treatment Needed    02/08/21 2304 02/08/21 2304  Place Sequential Compression Device  Once      02/08/21 2304 02/08/21 2304  Maintain Sequential Compression Device  Continuous      02/08/21 2304 02/08/21 2304  Telemetry - Maintain IV Access  Continuous      02/08/21 2304 02/08/21 2304  Continuous Cardiac Monitoring  Continuous      02/08/21 2304 02/08/21 2304  May Be Off Telemetry for Tests  Continuous      02/08/21 2304 02/08/21 2304  ACLS Protocol For Life Threatening Dysrhythmias (Unless Code Status Indicates Otherwise)  Continuous      02/08/21 2304    02/08/21 2304  Notify Provider if ACLS Protocol Activated  Until Discontinued       02/08/21 2304 02/08/21 2304  Pulse Oximetry, Continuous  Continuous      02/08/21 2304 02/08/21 2304  Diet Regular; Cardiac, Consistent Carbohydrate, Renal  Diet Effective Now,   Status:  Canceled      02/08/21 2304 02/08/21 2304  Inpatient Gastroenterology Consult  Once     Specialty:  Gastroenterology  Provider:  Vania Arita MD    02/08/21 2304 02/08/21 2303  Telemetry - Pulse Oximetry  Continuous PRN,   Status:  Canceled     Comments: If Patient Develops Unresponsiveness, Acute Dyspnea, Cyanosis or Suspected Hypoxemia Start Continuous Pulse Ox Monitoring, Apply Oxygen & Notify Provider    02/08/21 2304 02/08/21 2303  nitroglycerin (NITROSTAT) SL tablet 0.4 mg  Every 5 Minutes PRN      02/08/21 2304 02/08/21 2303  dextrose (D50W) 25 g/ 50mL Intravenous Solution 25 g  Every 15 Minutes PRN      02/08/21 2304 02/08/21 2303  glucagon (human recombinant) (GLUCAGEN DIAGNOSTIC) injection 1 mg  Every 15 Minutes PRN      02/08/21 2304 02/08/21 2303  sodium chloride 0.9 % flush 10 mL  As Needed      02/08/21 2304 02/08/21 2303  dextrose (GLUTOSE) oral gel 15 g  Every 15 Minutes PRN      02/08/21 2304 02/08/21 2133  Initiate Observation Status  Once      02/08/21 2132 02/08/21 2116  [facility] (on-call MD unless specified) Details  Once     Specialty:  Hospitalist  Provider:  (Not yet assigned)    02/08/21 2115 02/08/21 2039  Verify Informed Consent  Once      02/08/21 2038 02/08/21 2039  Prepare RBC, 1 Units  Blood - Once      02/08/21 2038 02/08/21 2038  Transfuse RBC Infuse Each Unit Over: 2H  Transfusion      02/08/21 2038 02/08/21 2017  COVID PRE-OP / PRE-PROCEDURE SCREENING ORDER (NO ISOLATION) - Swab, Nasopharynx  Once      02/08/21 2016 02/08/21 2017  COVID-19,APTIMA PANTHER,[facility], NP/OP SWAB IN UTM/VTM/SALINE TRANSPORT MEDIA,24 HR TAT - Swab, Nasopharynx  PROCEDURE ONCE      02/08/21 2016    02/08/21 1943  POCT Occult Blood Stool  Once      02/08/21 1942     02/08/21 1941  Protime-INR  Once      02/08/21 1940    02/08/21 1941  Lactic Acid, Plasma  Once      02/08/21 1940    02/08/21 1941  Lipase  Once      02/08/21 1940 02/08/21 1941  Monitor Blood Pressure  Per Hospital Policy      02/08/21 1940    02/08/21 1941  Orthostatic Vitals  Once      02/08/21 1940    02/08/21 1941  Cardiac Monitoring  Once,   Status:  Canceled      02/08/21 1940    02/08/21 1941  Pulse Oximetry, Continuous  Continuous,   Status:  Canceled      02/08/21 1940    02/08/21 1941  Type & Screen  Once      02/08/21 1940 02/08/21 1940  CBC & Differential  Once      02/08/21 1940 02/08/21 1940  Comprehensive Metabolic Panel  Once      02/08/21 1940 02/08/21 1940  XR Chest 1 View  1 Time Imaging      02/08/21 1940 02/08/21 1940  Insert peripheral IV  Once      02/08/21 1940    02/08/21 1940  CBC Auto Differential  PROCEDURE ONCE      02/08/21 1940 02/08/21 1939  sodium chloride 0.9 % flush 10 mL  As Needed      02/08/21 1940    Unscheduled  Oxygen Therapy- Nasal Cannula; Titrate for SPO2: 90% - 95%  Continuous PRN     Comments: If Patient Develops Unresponsiveness, Acute Dyspnea, Cyanosis or Suspected Hypoxemia Start Continuous Pulse Ox Monitoring, Apply Oxygen & Notify Provider    02/08/21 2304    Unscheduled  ECG 12 Lead  As Needed     Comments: Nurse to Release if Patient Expericences Acute Chest Pain or Dysrhythmias    02/08/21 2304    Unscheduled  Potassium  As Needed     Comments: For Ventricular Arrhythmias      02/08/21 2304    Unscheduled  Magnesium  As Needed     Comments: For Ventricular Arrhythmias      02/08/21 2304    Unscheduled  Troponin  As Needed     Comments: For Chest Pain      02/08/21 2304    Unscheduled  Digoxin Level  As Needed     Comments: For Atrial Arrhythmias      02/08/21 2304    Unscheduled  Blood Gas, Arterial -  As Needed     Comments: Per O2 PolicyNotify Physician      02/08/21 2304    Unscheduled  Up With Assistance  As Needed      02/08/21 2304                  Consult Notes (last 48 hours) (Notes from 02/07/21 1454 through 02/09/21 1454)      Jaylen Mandujano MD at 02/09/21 1029      Consult Orders    1. Inpatient Gastroenterology Consult [[id number]] ordered by Cindy Torres APRN at 02/08/21 2308               [facility]  Initial Inpatient Consult Note    Referring Provider: Ms. Torres    Reason for Consultation: Rectal bleeding    Subjective     History of present illness:    55 y.o. female with no significant GI past medical history, no previous colonoscopy in her lifetime, with a history of hypertension, depression, type 2 diabetes, obesity, hyperlipidemia, septic arthritis, bacteremia, admitted with rectal bleeding and anemia.  Rectal bleeding described as small volume intermittent usually blood on the toilet paper.  She was receiving IV antibiotics at home for bacteremia and was called by the infectious disease specialist as her hemoglobin came back quite low.  Told to come to the emergency room.  At that time she also described intermittent rectal bleeding.  She is weak fatigue short of air.  No chest pain.  No fevers at home.  Recent admission with MSSA bacteremia with septic arthritis.  She has received antibiotics.  Hemoglobin usually runs between 11 and 12 today it was 6.8.  No melena.  No hematemesis    Past Medical History:  Past Medical History:   Diagnosis Date   • Anxiety    • DDD (degenerative disc disease), lumbar    • Depression    • Diabetes mellitus (CMS/HCC)    • Elevated LFTs    • NICK (generalized anxiety disorder)    • History of epilepsy     as a child-Abstracted from pt records   • Hyperlipidemia    • Hypertension    • Hypothyroidism    • MDD (major depressive disorder)    • Morbid obesity (CMS/HCC)    • OA (osteoarthritis)    • Obesity      Past Surgical History:  Past Surgical History:   Procedure Laterality Date   • HYSTERECTOMY  2007   • INCISION AND DRAINAGE SHOULDER Left 1/30/2021    Procedure:  INCISION AND DRAINAGE SHOULDER;  Surgeon: Juaquin Richardson II, MD;  Location: [facility];  Service: Orthopedics;  Laterality: Left;   • TUBAL ABDOMINAL LIGATION  1996      Social History:   Social History     Tobacco Use   • Smoking status: Former Smoker     Years: 20.00   • Smokeless tobacco: Never Used   • Tobacco comment: 1 pack per 2 days   Substance Use Topics   • Alcohol use: Not Currently     Frequency: Never     Comment: one glass of wine q6 months      Family History:  Family History   Problem Relation Age of Onset   • Obesity Mother    • Diabetes Mother    • Hypertension Mother    • Sleep apnea Mother    • Asthma Mother    • Hypertension Father    • Heart attack Father    • Diabetes Sister    • Hypertension Sister    • Heart attack Sister    • Diabetes Brother    • Hypertension Brother    • Heart attack Brother    • No Known Problems Other    • Breast cancer Paternal Cousin    • Malig Hyperthermia Neg Hx        Home Meds:  Medications Prior to Admission   Medication Sig Dispense Refill Last Dose   • Buprenorphine HCl (Belbuca) 150 MCG film       • ceFAZolin in dextrose (ANCEF) 2-4 GM/100ML-% solution IVPB Infuse 100 mL into a venous catheter Every 8 (Eight) Hours for 107 doses. Indications: Bacteria in the Blood 9000 mL 1    • cholecalciferol (VITAMIN D3) 25 MCG (1000 UT) tablet Take 2,000 Units by mouth Daily.      • cyclobenzaprine (FLEXERIL) 10 MG tablet TAKE 1 TABLET BY MOUTH THREE TIMES DAILY AS NEEDED FOR MUSCLE SPASMS 270 tablet 0    • diclofenac (VOLTAREN) 75 MG EC tablet TAKE 1 TABLET BY MOUTH TWICE DAILY 180 tablet 0    • ezetimibe (ZETIA) 10 MG tablet Take 1 tablet by mouth Daily. 90 tablet 1    • furosemide (LASIX) 40 MG tablet Take 1 tablet by mouth 2 (Two) Times a Day. 60 tablet 0    • levothyroxine (SYNTHROID, LEVOTHROID) 88 MCG tablet TAKE 1 TABLET BY MOUTH DAILY 30 tablet 3    • losartan (COZAAR) 100 MG tablet Take 1 tablet by mouth Daily. 30 tablet 0    • metFORMIN  "(GLUCOPHAGE) 500 MG tablet Take 2 tablets by mouth 2 (two) times a day. 270 tablet 1    • methylPREDNISolone (MEDROL) 4 MG dose pack Take as directed on package instructions. 1 each 0    • simvastatin (ZOCOR) 80 MG tablet TAKE 1 TABLET BY MOUTH EVERY NIGHT 90 tablet 0    • venlafaxine (EFFEXOR) 75 MG tablet Take 1 tablet by mouth 2 (Two) Times a Day. 180 tablet 0      Current Meds:   atorvastatin, 40 mg, Oral, Daily  buprenorphine-naloxone, 1 film, Sublingual, Daily  ceFAZolin in dextrose, 2 g, Intravenous, Q8H  cholecalciferol, 2,000 Units, Oral, Daily  furosemide, 40 mg, Oral, BID  insulin lispro, 0-7 Units, Subcutaneous, TID AC  levothyroxine, 88 mcg, Oral, Q AM  losartan, 100 mg, Oral, Q24H  sodium chloride, 10 mL, Intravenous, Q12H  venlafaxine, 75 mg, Oral, BID      Allergies:  Allergies   Allergen Reactions   • Tramadol Anaphylaxis     \"seizure like behavior\"     Review of Systems  She has weakness of fatigue all other systems reviewed and negative     Objective     Vital Signs  Temp:  [98.1 °F (36.7 °C)-100.3 °F (37.9 °C)] 99.3 °F (37.4 °C)  Heart Rate:  [79-89] 81  Resp:  [17-20] 18  BP: (151-180)/(67-98) 151/69  Physical Exam:  General Appearance:    Alert, cooperative, in no acute distress   Head:    Normocephalic, without obvious abnormality, atraumatic   Eyes:          conjunctivae and sclerae normal, no   icterus   Throat:   no thrush, oral mucosa moist   Neck:   Supple, no adenopathy   Lungs:     Clear to auscultation bilaterally    Heart:    Regular rhythm and normal rate    Chest Wall:    No abnormalities observed   Abdomen:     Soft, nondistended, nontender; normal bowel sounds   Extremities:   no edema, no redness   Skin:   No bruising or rash   Psychiatric:  normal mood and insight     Results Review:   I reviewed the patient's new clinical results.    Results from last 7 days   Lab Units 02/09/21  0442 02/09/21  0223 02/08/21  2020 02/08/21  1430   WBC 10*3/mm3  --  4.69 4.32 4.15   HEMOGLOBIN " g/dL 7.7* 7.6*  7.6* 6.8* 6.9*   HEMATOCRIT % 24.0* 22.9*  22.9* 20.9* 20.9*   PLATELETS 10*3/mm3  --  175 167 180     Results from last 7 days   Lab Units 02/09/21  0442 02/08/21 2021 02/08/21  1430 02/05/21  0529   SODIUM mmol/L 134* 136  --  132*   POTASSIUM mmol/L 3.8 3.7  --  4.0   CHLORIDE mmol/L 99 100  --  97*   CO2 mmol/L 27.6 28.5  --  29.7*   BUN mg/dL 24* 27*  --  33*   CREATININE mg/dL 0.73 0.83 0.99 0.85   CALCIUM mg/dL 8.4* 8.2*  --  8.4*   BILIRUBIN mg/dL  --  0.3  --   --    ALK PHOS U/L  --  93  --   --    ALT (SGPT) U/L  --  5  --   --    AST (SGOT) U/L  --  16  --   --    GLUCOSE mg/dL 95 173*  --  171*     Results from last 7 days   Lab Units 02/09/21  0441 02/08/21 2021   INR  1.30* 1.33*     Lab Results   Lab Value Date/Time    LIPASE 117 (H) 02/09/2021 0442    LIPASE 112 (H) 02/08/2021 2021       Radiology:Study Result    CT ABDOMEN PELVIS W CONTRAST-     CLINICAL HISTORY: Low abdomen pain. GI bleeding. Follow-up hepatomegaly  seen on CT scan of the chest.     TECHNIQUE: Spiral CT images were acquired through the abdomen and pelvis  with IV contrast only and were reconstructed in 3 mm thick axial slices.     Radiation dose reduction techniques were utilized, including automated  exposure control and exposure modulation based on body size.     COMPARISON: CT chest dated 01/24/2021     FINDINGS: As shown on the CT chest, the liver is mildly enlarged. It  measures 22.5 cm in greatest cephalocaudad dimension. The liver margins  are lobulated consistent with cirrhosis. No focal hepatic lesions are  identified. There is no bile duct dilatation. Mild splenomegaly is also  present. The spleen measures 17.7 cm in greatest transverse diameter.  Mildly enlarged tortuous venous collateral vessels are noted along the  course of the splenic vein. Recanalization of the umbilical vein is also  present. Small esophageal varices are present. The findings are  consistent with portal hypertension. A few  borderline-enlarged lymph  nodes in the upper abdomen are again noted, and are unchanged and are  likely benign based on size criteria. The pancreas and kidneys and  adrenal glands appear within normal limits. There is a small amount of  ascites adjacent to the liver and spleen and also in the pelvis that is  new. The stomach and small and large bowel are unremarkable. The uterus  is absent. Images through the lung bases demonstrate patchy linear  atelectasis in the left lower lobe that appears slightly more prominent.  There is also a small ill-defined nodular opacity in the posterior  aspect of the right lower lobe adjacent to the pleural surface that is  new, and is most likely due to atelectasis or focal infiltrate. There is  mild patchy stranding of the subcutaneous fat throughout the abdomen and  pelvis that is new, and is consistent with anasarca.     IMPRESSION:  Mild hepatomegaly with cirrhotic liver morphology and  evidence of mild portal hypertension as described. Small amount of  ascites in the abdomen and pelvis that is new since the preceding CT  scan dated 01/24/2021. Worsening patchy atelectasis in the left lower  lobe. Small focal pleural-based nodular opacity in the right lower lobe  that is new and is consistent with infiltrate or atelectasis. Mild  splenomegaly.       Assessment/Plan   Patient Active Problem List   Diagnosis   • Essential hypertension   • Sleep apnea   • Anxiety and depression   • Diabetes mellitus type 2 in obese (CMS/HCC)   • Obesity, Class III, BMI 40-49.9 (morbid obesity) (CMS/HCC)   • Other specified hypothyroidism   • Mixed hyperlipidemia   • Lumbar degenerative disc disease   • Thrombocytopenia (CMS/HCC)   • Tongue lesion   • Pulmonary nodules   • Abnormal CT of the chest   • Elevated liver function tests   • COVID-19 virus infection   • Sepsis without acute organ dysfunction (CMS/HCC)   • History of COVID-19   • LINDSEY (acute kidney injury) (CMS/HCC)   • Hyponatremia   •  Septic arthritis of shoulder, left (CMS/HCC)   • MSSA bacteremia   • 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 Anemia  7. Intermittent rectal bleeding  8. Left shoulder septic arthritis leading to bacteremia  9. Abnormal imaging see above (hepatomegaly, possible esophageal varices, splenomegaly)  10. Hypertension  11. Anxiety  12. Depression    Plan:  · Awaiting stool for C. difficile and GI PCR  · If the stool studies are normal based on findings on CAT scan of possible portal hypertension along with rectal bleeding and anemia she will require EGD and colonoscopy before discharge  · With a normal bilirubin and platelet count I think it is highly unlikely she has cirrhosis, I will run an acute hepatitis profile to be complete  · Follow hemoglobin  · Await  results of stool studies  · Begin clear liquids tomorrow in anticipation of possibly colonoscopy and EGD February 11, 2021      I discussed the patients findings and my recommendations with patient and nursing staff.    Jaylen Mandujano MD      Electronically signed by Jaylen Mandujano MD at 02/09/21 [facility]         Padmini Kennedy RN      Case Management   Progress Notes   Signed   Date of Service:  02/09/21 0830   Creation Time:  02/09/21 0830            Signed             Show:Clear all  []Manual[x]Template[]Copied    Added by:  [x]Padmini Kennedy, RN    [][facility] for details  Continued Stay Note  [facility]     Patient Name: Stefani Bhandari                 MRN: [id number]  Today's Date: 2/9/2021                       Admit Date: 2/8/2021          Discharge Plan      Row Name 02/09/21 0826           Plan     Plan  Plan home with [facility] DARION Kennedy RN     Patient/Family in Agreement with Plan  yes     Plan Comments  FACE SHEET VERIFIED.  Spoke with pt at bedside.  Pt's PCP is Dr. Dea Martinez.  Pt lives in a single story house with her daughter  ( Katlyn Bhandari [phone]),  her  and their four children.  Pt is independent with ADLs.   Pt has a glucometer and rollator for home use.  Pt has ordered a 3:1 commode chair.   Pt gets prescriptions at [facility]  (3  & [facility]).  Pt denies any issues affording medications.  Pt is current with [facility]. [facility] called to follow.  Pt has not been in SNF.  Pt states her daughter will transport her home.  Pt denies any other needs.  Plan home with [facility].  CATHRYN Kennedy RN          Discharge Codes    No documentation.                  Padmini Kennedy, RN

## 2021-02-09 NOTE — ED NOTES
"Nursing report ED to floor  Stefani Bhandari  55 y.o.  female    HPI (triage note):   Chief Complaint   Patient presents with   • Abnormal Lab       Admitting doctor:   Kate Owens MD    Admitting diagnosis:   The primary encounter diagnosis was Symptomatic anemia. A diagnosis of Rectal bleeding was also pertinent to this visit.    Code status:   Current Code Status     Date Active Code Status Order ID Comments User Context       Prior    Advance Care Planning Activity          Allergies:   Tramadol    Weight:       02/08/21 1932   Weight: (!) 138 kg (304 lb)       Most recent vitals:   Vitals:    02/08/21 1920 02/08/21 1932 02/08/21 2123 02/08/21 2147   BP: 180/80 153/72 157/75 158/67   Pulse: 87 89  83   Resp: 17 20  18   Temp: 98.7 °F (37.1 °C)  99 °F (37.2 °C)    TempSrc: Tympanic  Oral    SpO2: 100% 100% 98% 99%   Weight:  (!) 138 kg (304 lb)     Height:  170.2 cm (67\")         Active LDAs/IV Access:   Lines, Drains & Airways    Active LDAs     Name:   Placement date:   Placement time:   Site:   Days:    PICC Single Lumen 02/01/21 Right Basilic   02/01/21    1705    Basilic   7                Labs (abnormal labs have a star):   Labs Reviewed   COMPREHENSIVE METABOLIC PANEL - Abnormal; Notable for the following components:       Result Value    Glucose 173 (*)     BUN 27 (*)     Calcium 8.2 (*)     Albumin 2.30 (*)     BUN/Creatinine Ratio 32.5 (*)     All other components within normal limits    Narrative:     GFR Normal >60  Chronic Kidney Disease <60  Kidney Failure <15     PROTIME-INR - Abnormal; Notable for the following components:    Protime 16.2 (*)     INR 1.33 (*)     All other components within normal limits   LIPASE - Abnormal; Notable for the following components:    Lipase 112 (*)     All other components within normal limits   CBC WITH AUTO DIFFERENTIAL - Abnormal; Notable for the following components:    RBC 2.25 (*)     Hemoglobin 6.8 (*)     Hematocrit 20.9 (*)     All other components " within normal limits   POCT OCCULT BLOOD STOOL (ED ONLY) - Abnormal; Notable for the following components:    Fecal Occult Blood Positive (*)     All other components within normal limits   LACTIC ACID, PLASMA - Normal   COVID PRE-OP / PRE-PROCEDURE SCREENING ORDER (NO ISOLATION)    Narrative:     The following orders were created for panel order COVID PRE-OP / PRE-PROCEDURE SCREENING ORDER (NO ISOLATION) - Swab, Nasopharynx.  Procedure                               Abnormality         Status                     ---------                               -----------         ------                     COVID-19,APTIMA PANTHER,...[[id number]]                      In process                   Please view results for these tests on the individual orders.   COVID-19,APTIMA PANTHER,[facility] IN-HOUSE,NP/OP SWAB IN UTM/VTM/SALINE TRANSPORT MEDIA,24 HR TAT   TYPE AND SCREEN   PREPARE RBC   CBC AND DIFFERENTIAL    Narrative:     The following orders were created for panel order CBC & Differential.  Procedure                               Abnormality         Status                     ---------                               -----------         ------                     CBC Auto Differential[[id number]]        Abnormal            Final result                 Please view results for these tests on the individual orders.       EKG:   No orders to display       Meds given in ED:   Medications   sodium chloride 0.9 % flush 10 mL (has no administration in time range)       Imaging results:  Xr Chest 1 View    Result Date: 2/8/2021  Right PICC extends to the superior vena cava. Small atelectasis or infiltrate left mid to lower lung.  This report was finalized on 2/8/2021 8:14 PM by Dr. Armand Vazquez M.D.        Ambulatory status:   - up with assistance     Social issues:   Social History     Socioeconomic History   • Marital status:      Spouse name: Not on file   • Number of children: Not on file   • Years of education: Not on  file   • Highest education level: Not on file   Tobacco Use   • Smoking status: Former Smoker     Years: 20.00   • Smokeless tobacco: Never Used   • Tobacco comment: 1 pack per 2 days   Substance and Sexual Activity   • Alcohol use: Not Currently     Frequency: Never     Comment: one glass of wine q6 months   • Drug use: Never   • Sexual activity: Stacey Palacios RN  02/08/21 1819

## 2021-02-09 NOTE — ED TRIAGE NOTES
Sent here by Dr Porras for Hemoglobin 6.9- states has been trending down. Being treated by Dr Porras for MSSA of the shoulder     Patient was placed in face mask during first look triage.  Patient was wearing a face mask throughout encounter.  I wore personal protective equipment throughout the encounter.  Hand hygiene was performed before and after patient encounter.

## 2021-02-09 NOTE — OUTREACH NOTE
Sepsis Week 2 Survey      Responses   [facility] patient discharged from?  [place]   Does the patient have one of the following disease processes/diagnoses(primary or secondary)?  Sepsis   Week 2 attempt successful?  No   Revoke  Readmitted          Rona Thompson RN

## 2021-02-09 NOTE — PLAN OF CARE
Goal Outcome Evaluation:         Patient alert and oriented, on room air and sinus on monitor. Patient with chronic back pain- states she takes nothing at home for pain.  Patient up to chair most of the day. Dr Mandujano seen patient today. CLD with anticipation of EGD/colonoscopy on Feb 11. No acute distress noted, will continue to monitor.

## 2021-02-09 NOTE — H&P
Patient Name:  Stefani Bhandari  YOB: 1965  MRN:  [id number]  Admit Date:  2/8/2021  Patient Care Team:  Dea Martinez MD as PCP - General (Family Medicine)      Subjective   History Present Illness     Chief Complaint   Patient presents with   • Abnormal Lab     History of Present Illness   Mrs. Bhandari is a 55-year-old female with history of hypertension, anxiety and depression, type 2 diabetes, obesity, hyperlipidemia, elevated liver function test, septic arthritis, MSSA bacteremia who presents to the emergency room with symptomatic anemia.  Patient actually had routine lab work drawn due to her receiving IV antibiotics at home.  She was called by Dr. Porras with infectious disease and told her hemoglobin was low and to come to the emergency room.  Patient denied to Dr. Porras any rectal bleeding or emesis, however she states after she talked to Dr. Porras she did have a bowel movement with some blood in it.  She does states she has been more tired and fatigued, no shortness of breath, chest pain, cough.  She denies any fever at home.  She has been taking her cefazolin every 8 hours as directed and has not missed any doses.  She has no other complaints at this time.  Patient was actually just discharged to the hospital 3 days ago after a 2-week stay where she was found to have MSSA bacteremia with septic arthritis.  She was also started on Lasix at that time and states she has had some mild swelling in her feet that is much improved over the last few days.  In the emergency room patient was found to have glucose 173, sodium 136, potassium 3.7, creatinine 0.83, BUN 27, calcium 8.2, ALT 5, AST 16, albumin 2.3, lipase elevated 112, INR 1.33, white blood cell 4.32, hemoglobin 6.8, hematocrit 20.9, platelets 167.  Patient hemoglobin on 1/24/2021 was 11.6, has gradually decreased since then, patient has not noted any overt signs of bleeding until today where she noticed some scant amount of blood in  her stool.    Review of Systems   Constitutional: Positive for fatigue. Negative for appetite change and fever.   HENT: Negative for nosebleeds and trouble swallowing.    Eyes: Negative for photophobia, redness and visual disturbance.   Respiratory: Negative for cough, chest tightness, shortness of breath and wheezing.    Cardiovascular: Negative for chest pain, palpitations and leg swelling.   Gastrointestinal: Negative for abdominal distention, abdominal pain, nausea and vomiting.   Endocrine: Negative.    Genitourinary: Negative.    Musculoskeletal: Negative for gait problem and joint swelling.   Skin: Negative.    Neurological: Negative for dizziness, seizures, speech difficulty, light-headedness and headaches.   Hematological: Negative.    Psychiatric/Behavioral: Negative for behavioral problems and confusion.        Personal History     Past Medical History:   Diagnosis Date   • Anxiety    • DDD (degenerative disc disease), lumbar    • Depression    • Diabetes mellitus (CMS/HCC)    • Elevated LFTs    • NICK (generalized anxiety disorder)    • History of epilepsy     as a child-Abstracted from pt records   • Hyperlipidemia    • Hypertension    • Hypothyroidism    • MDD (major depressive disorder)    • Morbid obesity (CMS/HCC)    • OA (osteoarthritis)    • Obesity      Past Surgical History:   Procedure Laterality Date   • HYSTERECTOMY  2007   • INCISION AND DRAINAGE SHOULDER Left 1/30/2021    Procedure: INCISION AND DRAINAGE SHOULDER;  Surgeon: Juaquin Richardson II, MD;  Location: [facility];  Service: Orthopedics;  Laterality: Left;   • TUBAL ABDOMINAL LIGATION  1996     Family History   Problem Relation Age of Onset   • Obesity Mother    • Diabetes Mother    • Hypertension Mother    • Sleep apnea Mother    • Asthma Mother    • Hypertension Father    • Heart attack Father    • Diabetes Sister    • Hypertension Sister    • Heart attack Sister    • Diabetes Brother    • Hypertension Brother    • Heart  "attack Brother    • No Known Problems Other    • Breast cancer Paternal Cousin    • Malig Hyperthermia Neg Hx      Social History     Tobacco Use   • Smoking status: Former Smoker     Years: 20.00   • Smokeless tobacco: Never Used   • Tobacco comment: 1 pack per 2 days   Substance Use Topics   • Alcohol use: Not Currently     Frequency: Never     Comment: one glass of wine q6 months   • Drug use: Never     No current facility-administered medications on file prior to encounter.      Current Outpatient Medications on File Prior to Encounter   Medication Sig Dispense Refill   • Buprenorphine HCl (Belbuca) 150 MCG film      • ceFAZolin in dextrose (ANCEF) 2-4 GM/100ML-% solution IVPB Infuse 100 mL into a venous catheter Every 8 (Eight) Hours for 107 doses. Indications: Bacteria in the Blood 9000 mL 1   • cholecalciferol (VITAMIN D3) 25 MCG (1000 UT) tablet Take 2,000 Units by mouth Daily.     • cyclobenzaprine (FLEXERIL) 10 MG tablet TAKE 1 TABLET BY MOUTH THREE TIMES DAILY AS NEEDED FOR MUSCLE SPASMS 270 tablet 0   • diclofenac (VOLTAREN) 75 MG EC tablet TAKE 1 TABLET BY MOUTH TWICE DAILY 180 tablet 0   • ezetimibe (ZETIA) 10 MG tablet Take 1 tablet by mouth Daily. 90 tablet 1   • furosemide (LASIX) 40 MG tablet Take 1 tablet by mouth 2 (Two) Times a Day. 60 tablet 0   • levothyroxine (SYNTHROID, LEVOTHROID) 88 MCG tablet TAKE 1 TABLET BY MOUTH DAILY 30 tablet 3   • losartan (COZAAR) 100 MG tablet Take 1 tablet by mouth Daily. 30 tablet 0   • metFORMIN (GLUCOPHAGE) 500 MG tablet Take 2 tablets by mouth 2 (two) times a day. 270 tablet 1   • methylPREDNISolone (MEDROL) 4 MG dose pack Take as directed on package instructions. 1 each 0   • simvastatin (ZOCOR) 80 MG tablet TAKE 1 TABLET BY MOUTH EVERY NIGHT 90 tablet 0   • venlafaxine (EFFEXOR) 75 MG tablet Take 1 tablet by mouth 2 (Two) Times a Day. 180 tablet 0     Allergies   Allergen Reactions   • Tramadol Anaphylaxis     \"seizure like behavior\"       Objective    "   Objective     Vital Signs  Temp:  [98.1 °F (36.7 °C)-99.1 °F (37.3 °C)] 98.1 °F (36.7 °C)  Heart Rate:  [82-89] 82  Resp:  [17-20] 18  BP: (153-180)/(67-98) 180/98  SpO2:  [98 %-100 %] 100 %  on   ;   Device (Oxygen Therapy): room air  Body mass index is 47.61 kg/m².    Physical Exam  Vitals signs and nursing note reviewed.   Constitutional:       General: She is not in acute distress.     Appearance: She is well-developed.      Comments: Patient does have PICC line in right upper arm.   HENT:      Head: Normocephalic.   Neck:      Musculoskeletal: Normal range of motion.      Vascular: No JVD.   Cardiovascular:      Rate and Rhythm: Normal rate and regular rhythm.      Comments: Normal sinus rhythm on the monitor my exam with heart rate 82.  No shortness of breath her chest pain reported.  Pulmonary:      Effort: Pulmonary effort is normal.      Breath sounds: Normal breath sounds.      Comments: Lung sounds clear, sats 97% on room air during my exam  Abdominal:      General: Bowel sounds are normal. There is no distension.      Palpations: Abdomen is soft.      Tenderness: There is no abdominal tenderness.   Musculoskeletal: Normal range of motion.      Right lower leg: No edema.      Left lower leg: No edema.   Skin:     General: Skin is warm and dry.      Capillary Refill: Capillary refill takes less than 2 seconds.   Neurological:      General: No focal deficit present.      Mental Status: She is alert and oriented to person, place, and time.   Psychiatric:         Attention and Perception: Attention normal.         Mood and Affect: Mood normal.         Speech: Speech normal.         Behavior: Behavior normal.         Thought Content: Thought content normal.         Cognition and Memory: Cognition normal.         Judgment: Judgment normal.         Results Review:  I reviewed the patient's new clinical results.  I reviewed the patient's new imaging results and agree with the interpretation.  I reviewed the  patient's other test results and agree with the interpretation  I personally viewed and interpreted the patient's EKG/Telemetry data  Discussed with ED provider.    Lab Results (last 24 hours)     Procedure Component Value Units Date/Time    CBC & Differential [[id number]]  (Abnormal) Collected: 02/08/21 1430    Specimen: Blood Updated: 02/08/21 1708    Narrative:      The following orders were created for panel order CBC & Differential.  Procedure                               Abnormality         Status                     ---------                               -----------         ------                     CBC Auto Differential[[id number]]        Abnormal            Final result                 Please view results for these tests on the individual orders.    Creatinine, Serum [[id number]]  (Abnormal) Collected: 02/08/21 1430    Specimen: Blood Updated: 02/08/21 1721     Creatinine 0.99 mg/dL      eGFR Non African Amer 58 mL/min/1.73     Narrative:      GFR Normal >60  Chronic Kidney Disease <60  Kidney Failure <15      CBC Auto Differential [[id number]]  (Abnormal) Collected: 02/08/21 1430    Specimen: Blood Updated: 02/08/21 1708     WBC 4.15 10*3/mm3      RBC 2.25 10*6/mm3      Hemoglobin 6.9 g/dL      Hematocrit 20.9 %      MCV 92.9 fL      MCH 30.7 pg      MCHC 33.0 g/dL      RDW 14.4 %      RDW-SD 48.3 fl      MPV 8.8 fL      Platelets 180 10*3/mm3      Neutrophil % 58.6 %      Lymphocyte % 30.6 %      Monocyte % 8.7 %      Eosinophil % 1.4 %      Basophil % 0.5 %      Immature Grans % 0.2 %      Neutrophils, Absolute 2.43 10*3/mm3      Lymphocytes, Absolute 1.27 10*3/mm3      Monocytes, Absolute 0.36 10*3/mm3      Eosinophils, Absolute 0.06 10*3/mm3      Basophils, Absolute 0.02 10*3/mm3      Immature Grans, Absolute 0.01 10*3/mm3      nRBC 0.0 /100 WBC     POCT Occult Blood Stool [[id number]]  (Abnormal) Collected: 02/08/21 1942    Specimen: Stool from Per Rectum Updated: 02/08/21 1943     Fecal Occult Blood  Positive     Lot Number [id number]     Expiration Date 06/30/2021     Positive Control Positive     Negative Control Negative    CBC & Differential [[id number]]  (Abnormal) Collected: 02/08/21 2020    Specimen: Blood Updated: 02/08/21 2036    Narrative:      The following orders were created for panel order CBC & Differential.  Procedure                               Abnormality         Status                     ---------                               -----------         ------                     CBC Auto Differential[[id number]]        Abnormal            Final result                 Please view results for these tests on the individual orders.    Lactic Acid, Plasma [[id number]]  (Normal) Collected: 02/08/21 2020    Specimen: Blood Updated: 02/08/21 2050     Lactate 1.9 mmol/L     CBC Auto Differential [[id number]]  (Abnormal) Collected: 02/08/21 2020    Specimen: Blood Updated: 02/08/21 2036     WBC 4.32 10*3/mm3      RBC 2.25 10*6/mm3      Hemoglobin 6.8 g/dL      Hematocrit 20.9 %      MCV 92.9 fL      MCH 30.2 pg      MCHC 32.5 g/dL      RDW 14.4 %      RDW-SD 47.3 fl      MPV 8.3 fL      Platelets 167 10*3/mm3      Neutrophil % 64.4 %      Lymphocyte % 27.3 %      Monocyte % 6.0 %      Eosinophil % 1.6 %      Basophil % 0.5 %      Immature Grans % 0.2 %      Neutrophils, Absolute 2.78 10*3/mm3      Lymphocytes, Absolute 1.18 10*3/mm3      Monocytes, Absolute 0.26 10*3/mm3      Eosinophils, Absolute 0.07 10*3/mm3      Basophils, Absolute 0.02 10*3/mm3      Immature Grans, Absolute 0.01 10*3/mm3      nRBC 0.0 /100 WBC     Comprehensive Metabolic Panel [[id number]]  (Abnormal) Collected: 02/08/21 2021    Specimen: Blood Updated: 02/08/21 2101     Glucose 173 mg/dL      BUN 27 mg/dL      Creatinine 0.83 mg/dL      Sodium 136 mmol/L      Potassium 3.7 mmol/L      Chloride 100 mmol/L      CO2 28.5 mmol/L      Calcium 8.2 mg/dL      Total Protein 7.1 g/dL      Albumin 2.30 g/dL      ALT (SGPT) 5 U/L      AST (SGOT) 16 U/L       Alkaline Phosphatase 93 U/L      Total Bilirubin 0.3 mg/dL      eGFR Non African Amer 71 mL/min/1.73      Globulin 4.8 gm/dL      A/G Ratio 0.5 g/dL      BUN/Creatinine Ratio 32.5     Anion Gap 7.5 mmol/L     Narrative:      GFR Normal >60  Chronic Kidney Disease <60  Kidney Failure <15      Protime-INR [[id number]]  (Abnormal) Collected: 02/08/21 2021    Specimen: Blood Updated: 02/08/21 2046     Protime 16.2 Seconds      INR 1.33    Lipase [[id number]]  (Abnormal) Collected: 02/08/21 2021    Specimen: Blood Updated: 02/08/21 2059     Lipase 112 U/L     COVID PRE-OP / PRE-PROCEDURE SCREENING ORDER (NO ISOLATION) - Swab, Nasopharynx [[id number]] Collected: 02/08/21 2025    Specimen: Swab from Nasopharynx Updated: 02/08/21 2034    Narrative:      The following orders were created for panel order COVID PRE-OP / PRE-PROCEDURE SCREENING ORDER (NO ISOLATION) - Swab, Nasopharynx.  Procedure                               Abnormality         Status                     ---------                               -----------         ------                     COVID-19,APTIMA PANTHER,...[[id number]]                      In process                   Please view results for these tests on the individual orders.    COVID-19,APTIMA PANTHER,[facility] IN-HOUSE, NP/OP SWAB IN UTM/VTM/[facility] TRANSPORT MEDIA,24 HR TAT - Swab, Nasopharynx [[id number]] Collected: 02/08/21 2025    Specimen: Swab from Nasopharynx Updated: 02/08/21 2034          Imaging Results (Last 24 Hours)     Procedure Component Value Units Date/Time    XR Chest 1 View [[id number]] Collected: 02/08/21 2011     Updated: 02/08/21 2017    Narrative:      XR CHEST 1 VW-     HISTORY: Female who is 55 years-old,  PICC placement     TECHNIQUE: Frontal view of the chest     COMPARISON: 1/24/2021     FINDINGS: Right PICC extends to the superior vena cava. The heart size  is borderline. Pulmonary vasculature is unremarkable. Small atelectasis  or infiltrate left mid to lower lung, follow-up  can characterize  resolution. No pleural effusion, or pneumothorax. No acute osseous  process.       Impression:      Right PICC extends to the superior vena cava. Small  atelectasis or infiltrate left mid to lower lung.     This report was finalized on 2/8/2021 8:14 PM by Dr. Armand Vazquez M.D.             Results for orders placed during the hospital encounter of 01/24/21   Adult Transthoracic Echo Complete W/ Cont if Necessary Per Protocol    Narrative · Estimated left ventricular EF = 63% Left ventricular systolic function   is normal.  · Left ventricular diastolic function was normal.  · Mild tricuspid valve regurgitation is present. Estimated right   ventricular systolic pressure from tricuspid regurgitation is normal (<35   mmHg). Calculated right ventricular systolic pressure from tricuspid   regurgitation is 26 mmHg.  · There is turbulent flow through the left ventricular outflow tract.   There is a 43 mmHg gradient with Valsalva. This appears to be due to the   hyperdynamic looking function of the left ventricle.          No orders to display        Assessment/Plan     Active Hospital Problems    Diagnosis POA   • **Symptomatic anemia [D64.9] Yes   • Septic arthritis of shoulder, left (CMS/HCC) [M00.9] Yes   • MSSA bacteremia [R78.81, B95.61] Yes   • Pulmonary nodules [R91.8] Yes   • Elevated liver function tests [R79.89] Yes   • Mixed hyperlipidemia [E78.2] Yes   • Obesity, Class III, BMI 40-49.9 (morbid obesity) (CMS/HCC) [E66.01] Yes   • Essential hypertension [I10] Yes   • Anxiety and depression [F41.9, F32.9] Yes   • Diabetes mellitus type 2 in obese (CMS/HCC) [E11.69, E66.9] Yes     Mrs. Bhandari is a 55-year-old female with history of hypertension, anxiety and depression, type 2 diabetes, obesity, hyperlipidemia, elevated liver function test, septic arthritis, MSSA bacteremia who presents to the emergency room with symptomatic anemia.    Symptomatic anemia  -Patient started on 1 unit of blood  in the emergency room  -Serial H&H every 8 hours  -Consult gastroenterology, patient did see red blood in her stool this afternoon  -N.p.o. after midnight  -Telemetry unit for monitoring    History of septic arthritis/MSSA bacteremia/discitis  -Patient currently has PICC line in right upper arm  -Continue cefazolin every 8 hours as directed by infectious disease, patient to finish on March 12.    Type 2 diabetes  -Accu-Cheks before meals and at bedtime with correctional dose insulin  -Hold Metformin  -A1c on 1/25/2020 was 7.3    Hyperlipidemia/hypertension/anxiety/depression  -Chronic conditions stable at this time, will continue home meds      · I discussed the patient's findings and my recommendations with patient and ED provider.    VTE Prophylaxis - SCDs.  Code Status - Full code.       NITESH Cadet  [facility]  02/08/21  23:02 EST

## 2021-02-09 NOTE — PROGRESS NOTES
Name: Stefani Bhandari ADMIT: 2021   : 1965  PCP: Dea Martinez MD    MRN: [id number] LOS: 0 days   AGE/SEX: 55 y.o. female  ROOM: [facility]     Subjective   Subjective     Patient is sitting up a chair and has specific complaints.  Denies nausea, vomiting, abdominal pain, chest pain.       Objective   Objective   Vital Signs  Temp:  [97.5 °F (36.4 °C)-100.3 °F (37.9 °C)] 97.5 °F (36.4 °C)  Heart Rate:  [79-89] 87  Resp:  [17-20] 18  BP: (151-180)/(67-98) 173/98  SpO2:  [95 %-100 %] 99 %  on   ;   Device (Oxygen Therapy): room air  Body mass index is 48.04 kg/m².  Physical Exam    HEENT:  Atraumatic, normocephalic.  PERRLA.  Extraocular movements intact.  Conjunctivae pink.  Sclerae, no icterus.  Mucous membranes dry.    NECK:  Supple.  No JVD.  HEART:  Regular rate and rhythm.  Normal S1, S2.   LUNGS:  Fairly clear to auscultation anteriorly.  No wheezes.  No crackles.  ABDOMEN:   Soft, nontender.  Bowel sounds present.  No rebound.  No  guarding.  EXTREMITIES:  No cyanosis, clubbing, or edema.  Palpable pedal pulses.  NEURO:  Grossly nonfocal.  No facial asymmetry.  Good strength in all 4  extremities.  SKIN:  Warm and dry.  No evidence of rashes.      Results Review     I reviewed the patient's new clinical results.  Results from last 7 days   Lab Units 21  14321  0529   WBC 10*3/mm3  --  4.69 4.32 4.15 6.41   HEMOGLOBIN g/dL 7.7* 7.6*  7.6* 6.8* 6.9* 7.3*   PLATELETS 10*3/mm3  --  175 167 180 232     Results from last 7 days   Lab Units 21  14321  0529 21  0413   SODIUM mmol/L 134* 136  --  132* 133*   POTASSIUM mmol/L 3.8 3.7  --  4.0 4.3   CHLORIDE mmol/L 99 100  --  97* 95*   CO2 mmol/L 27.6 28.5  --  29.7* 27.4   BUN mg/dL 24* 27*  --  33* 34*   CREATININE mg/dL 0.73 0.83 0.99 0.85 0.78   GLUCOSE mg/dL 95 173*  --  171* 171*   Estimated Creatinine Clearance: 125.8 mL/min (by C-G  formula based on SCr of 0.73 mg/dL).  Results from last 7 days   Lab Units 02/08/21 2021 02/05/21  0529 02/04/21 0413 02/03/21  2338   ALBUMIN g/dL 2.30* 2.20* 2.40* 2.30*   BILIRUBIN mg/dL 0.3  --   --   --    ALK PHOS U/L 93  --   --   --    AST (SGOT) U/L 16  --   --   --    ALT (SGPT) U/L 5  --   --   --      Results from last 7 days   Lab Units 02/09/21  0442 02/08/21 2021 02/05/21  0529 02/04/21  0413 02/03/21  2338 02/03/21  1855 02/03/21  0559   CALCIUM mg/dL 8.4* 8.2* 8.4* 8.3* 8.1* 8.0* 7.8*   ALBUMIN g/dL  --  2.30* 2.20* 2.40* 2.30* 2.10* 2.10*   MAGNESIUM mg/dL  --   --  1.5* 1.6  --   --  1.8   PHOSPHORUS mg/dL  --   --  4.2 3.8 3.9 3.5 3.8     Results from last 7 days   Lab Units 02/08/21 2020   LACTATE mmol/L 1.9     COVID19   Date Value Ref Range Status   02/08/2021 Not Detected Not Detected - Ref. Range Final   01/29/2021 Not Detected Not Detected - Ref. Range Final     Glucose   Date/Time Value Ref Range Status   02/09/2021 1123 114 70 - 130 mg/dL Final   02/09/2021 0547 96 70 - 130 mg/dL Final   02/09/2021 0216 175 (H) 70 - 130 mg/dL Final       CT Abdomen Pelvis With Contrast  Narrative: CT ABDOMEN PELVIS W CONTRAST-     CLINICAL HISTORY: Low abdomen pain. GI bleeding. Follow-up hepatomegaly  seen on CT scan of the chest.     TECHNIQUE: Spiral CT images were acquired through the abdomen and pelvis  with IV contrast only and were reconstructed in 3 mm thick axial slices.     Radiation dose reduction techniques were utilized, including automated  exposure control and exposure modulation based on body size.     COMPARISON: CT chest dated 01/24/2021     FINDINGS: As shown on the CT chest, the liver is mildly enlarged. It  measures 22.5 cm in greatest cephalocaudad dimension. The liver margins  are lobulated consistent with cirrhosis. No focal hepatic lesions are  identified. There is no bile duct dilatation. Mild splenomegaly is also  present. The spleen measures 17.7 cm in greatest transverse  diameter.  Mildly enlarged tortuous venous collateral vessels are noted along the  course of the splenic vein. Recanalization of the umbilical vein is also  present. Small esophageal varices are present. The findings are  consistent with portal hypertension. A few borderline-enlarged lymph  nodes in the upper abdomen are again noted, and are unchanged and are  likely benign based on size criteria. The pancreas and kidneys and  adrenal glands appear within normal limits. There is a small amount of  ascites adjacent to the liver and spleen and also in the pelvis that is  new. The stomach and small and large bowel are unremarkable. The uterus  is absent. Images through the lung bases demonstrate patchy linear  atelectasis in the left lower lobe that appears slightly more prominent.  There is also a small ill-defined nodular opacity in the posterior  aspect of the right lower lobe adjacent to the pleural surface that is  new, and is most likely due to atelectasis or focal infiltrate. There is  mild patchy stranding of the subcutaneous fat throughout the abdomen and  pelvis that is new, and is consistent with anasarca.     Impression: Mild hepatomegaly with cirrhotic liver morphology and  evidence of mild portal hypertension as described. Small amount of  ascites in the abdomen and pelvis that is new since the preceding CT  scan dated 01/24/2021. Worsening patchy atelectasis in the left lower  lobe. Small focal pleural-based nodular opacity in the right lower lobe  that is new and is consistent with infiltrate or atelectasis. Mild  splenomegaly.     This report was finalized on 2/9/2021 9:30 AM by Dr. James Vera M.D.       Scheduled Medications  atorvastatin, 40 mg, Oral, Daily  buprenorphine-naloxone, 1 film, Sublingual, Daily  ceFAZolin in dextrose, 2 g, Intravenous, Q8H  cholecalciferol, 2,000 Units, Oral, Daily  furosemide, 40 mg, Oral, BID  insulin lispro, 0-7 Units, Subcutaneous, TID AC  levothyroxine, 88 mcg,  Oral, Q AM  losartan, 100 mg, Oral, Q24H  sodium chloride, 10 mL, Intravenous, Q12H  venlafaxine, 75 mg, Oral, BID    Infusions   Diet  Diet Clear Liquid       Assessment/Plan     Active Hospital Problems    Diagnosis  POA   • **Symptomatic anemia [D64.9]  Yes   • Septic arthritis of shoulder, left (CMS/HCC) [M00.9]  Yes   • MSSA bacteremia [R78.81, B95.61]  Yes   • Pulmonary nodules [R91.8]  Yes   • Elevated liver function tests [R79.89]  Yes   • Mixed hyperlipidemia [E78.2]  Yes   • Obesity, Class III, BMI 40-49.9 (morbid obesity) (CMS/[facility]) [E66.01]  Yes   • Essential hypertension [I10]  Yes   • Anxiety and depression [F41.9, F32.9]  Yes   • Diabetes mellitus type 2 in obese (CMS/[facility]) [E11.69, E66.9]  Yes      Resolved Hospital Problems   No resolved problems to display.       1. Symptomatic anemia, there is no evidence of any active bleed at this point.  GI consultation has been obtained and plan is for EGD and colonoscopy on Thursday.  2. Recent history of left shoulder septic arthritis leading to MSSA bacteremia, currently on IV cefazolin and will be continued.  3. Diabetes mellitus, continue with corrective dose insulin for the moment.    4. Hypertension, continue to monitor blood pressure closely.  Losartan and will be continued.  5. Hyperlipidemia on statins.    6. Hypothyroidism, on Synthroid.  7. On SCDs for DVT prophylaxis.      Norman Jurado MD  [facility]  02/09/21  16:39 EST      I wore protective equipment throughout this patient encounter including a face mask, gloves and protective eyewear.  Hand hygiene was performed before donning protective equipment and after removal when leaving the room.

## 2021-02-09 NOTE — ED PROVIDER NOTES
EMERGENCY DEPARTMENT ENCOUNTER  Patient was placed in face mask in first look and the following protective measures were taken unless additional measures were taken and documented below in the ED course. Patient was wearing facemask when I entered the room and throughout our encounter. I wore full protective equipment throughout this patient encounter including a face mask, and gloves. Hand hygiene was performed before donning protective equipment and after removal when leaving the room.    Room Number:  E654/1  Date of encounter:  2/9/2021  PCP: Dea Martinez MD    HPI:  Context: Stefani Bhandari is a 55 y.o. female who presents to the ED c/o chief complaint of low hemoglobin.  Patient states she was recently hospitalized for septic arthritis of her left shoulder and had surgery and is currently receiving IV antibiotics through PICC line.  She received a call from Dr. Porras this afternoon telling her to come to the emergency department secondary to a critically low hemoglobin.  Patient states she has not noted vomiting or bloody bowel movements until just prior to arrival.  She states she had loose stools prior to arrival that were blood-tinged.  She denies upper abdominal pain but has noted intermittent lower abdominal cramping for a week.  Patient denies shortness of air, dyspnea on exertion but has noted some mild fatigue.  She thought the fatigue was secondary to the recent hospitalization.    MEDICAL HISTORY REVIEW  Reviewed in Epic.  Patient was advised by Dr. Dianne Porras come to the emergency department today.  She was received a call today that patient's hemoglobin was 6.9.  Patient denied to Dr. Porras of any bleeding or stool or bleeding from the surgical incision.  Patient was discharged from the hospital 3 days ago for sepsis secondary to septic arthritis of the left shoulder, anasarca, liver cirrhosis secondary to Garcia thrombocytopenia.  Patient's hemoglobin 3 days ago was 7.6.  Patient's hemoglobin was  8.89 days ago and 9.5 12 days ago.    PAST MEDICAL HISTORY  Active Ambulatory Problems     Diagnosis Date Noted   • Essential hypertension 01/22/2020   • Sleep apnea 01/22/2020   • Anxiety and depression 01/22/2020   • Diabetes mellitus type 2 in obese (CMS/[facility]) 01/22/2020   • Obesity, Class III, BMI 40-49.9 (morbid obesity) (CMS/[facility]) 01/29/2020   • Other specified hypothyroidism 04/06/2020   • Mixed hyperlipidemia 04/06/2020   • Lumbar degenerative disc disease 04/06/2020   • Thrombocytopenia (CMS/[facility]) 07/15/2020   • Tongue lesion 07/15/2020   • Pulmonary nodules 01/15/2021   • Abnormal CT of the chest 01/15/2021   • Elevated liver function tests 01/15/2021   • COVID-19 virus infection 01/15/2021   • Sepsis without acute organ dysfunction (CMS/[facility]) 01/24/2021   • History of COVID-19 01/24/2021   • LINDSEY (acute kidney injury) (CMS/[facility]) 01/24/2021   • Hyponatremia 01/24/2021   • Septic arthritis of shoulder, left (CMS/[facility]) 01/28/2021   • MSSA bacteremia 01/28/2021   • 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 (CMS/[facility]) 01/28/2021   • Anasarca 01/31/2021     Resolved Ambulatory Problems     Diagnosis Date Noted   • Snoring 01/22/2020   • Multiple joint pain 01/22/2020     Past Medical History:   Diagnosis Date   • Anxiety    • DDD (degenerative disc disease), lumbar    • Depression    • Diabetes mellitus (CMS/[facility])    • Elevated LFTs    • NICK (generalized anxiety disorder)    • History of epilepsy    • Hyperlipidemia    • Hypertension    • Hypothyroidism    • MDD (major depressive disorder)    • Morbid obesity (CMS/[facility])    • OA (osteoarthritis)    • Obesity        PAST SURGICAL HISTORY  Past Surgical History:   Procedure Laterality Date   • HYSTERECTOMY  2007   • INCISION AND DRAINAGE SHOULDER Left 1/30/2021    Procedure: INCISION AND DRAINAGE SHOULDER;  Surgeon: Juaquin Richardson II, MD;  Location: [facility];  Service: Orthopedics;  Laterality: Left;   • TUBAL ABDOMINAL LIGATION  1996       FAMILY HISTORY  Family  History   Problem Relation Age of Onset   • Obesity Mother    • Diabetes Mother    • Hypertension Mother    • Sleep apnea Mother    • Asthma Mother    • Hypertension Father    • Heart attack Father    • Diabetes Sister    • Hypertension Sister    • Heart attack Sister    • Diabetes Brother    • Hypertension Brother    • Heart attack Brother    • No Known Problems Other    • Breast cancer Paternal Cousin    • Malig Hyperthermia Neg Hx        SOCIAL HISTORY  Social History     Socioeconomic History   • Marital status:      Spouse name: Not on file   • Number of children: Not on file   • Years of education: Not on file   • Highest education level: Not on file   Tobacco Use   • Smoking status: Former Smoker     Years: 20.00   • Smokeless tobacco: Never Used   • Tobacco comment: 1 pack per 2 days   Substance and Sexual Activity   • Alcohol use: Not Currently     Frequency: Never     Comment: one glass of wine q6 months   • Drug use: Never   • Sexual activity: Defer       ALLERGIES  Tramadol    The patient's allergies have been reviewed    REVIEW OF SYSTEMS  All systems reviewed and negative except for those discussed in HPI.     PHYSICAL EXAM  I have reviewed the triage vital signs and nursing notes.  ED Triage Vitals [02/08/21 1920]   Temp Heart Rate Resp BP SpO2   98.7 °F (37.1 °C) 87 17 180/80 100 %      Temp src Heart Rate Source Patient Position BP Location FiO2 (%)   Tympanic -- -- -- --       General: Mild distress  HENT: NCAT, PERRL, Nares patent  Eyes: no scleral icterus, pallor noted  Neck: trachea midline, no ROM limitations  CV: regular rhythm, regular rate  Respiratory: normal effort, CTAB  Abdomen: soft, nondistended, nontender to palpation, no rebound tenderness, no guarding or rigidity  : Positive tone, no hemorrhoids, stool reveals bloody mucus that is heme positive  Musculoskeletal: no deformity  Neuro: alert, moves all extremities, follows commands  Skin: warm, dry.  Moderate pallor of the  skin    LAB RESULTS  Recent Results (from the past 24 hour(s))   Creatinine, Serum    Collection Time: 02/08/21  2:30 PM    Specimen: Blood   Result Value Ref Range    Creatinine 0.99 0.57 - 1.00 mg/dL    eGFR Non African Amer 58 (L) >60 mL/min/1.73   CBC Auto Differential    Collection Time: 02/08/21  2:30 PM    Specimen: Blood   Result Value Ref Range    WBC 4.15 3.40 - 10.80 10*3/mm3    RBC 2.25 (L) 3.77 - 5.28 10*6/mm3    Hemoglobin 6.9 (C) 12.0 - 15.9 g/dL    Hematocrit 20.9 (C) 34.0 - 46.6 %    MCV 92.9 79.0 - 97.0 fL    MCH 30.7 26.6 - 33.0 pg    MCHC 33.0 31.5 - 35.7 g/dL    RDW 14.4 12.3 - 15.4 %    RDW-SD 48.3 37.0 - 54.0 fl    MPV 8.8 6.0 - 12.0 fL    Platelets 180 140 - 450 10*3/mm3    Neutrophil % 58.6 42.7 - 76.0 %    Lymphocyte % 30.6 19.6 - 45.3 %    Monocyte % 8.7 5.0 - 12.0 %    Eosinophil % 1.4 0.3 - 6.2 %    Basophil % 0.5 0.0 - 1.5 %    Immature Grans % 0.2 0.0 - 0.5 %    Neutrophils, Absolute 2.43 1.70 - 7.00 10*3/mm3    Lymphocytes, Absolute 1.27 0.70 - 3.10 10*3/mm3    Monocytes, Absolute 0.36 0.10 - 0.90 10*3/mm3    Eosinophils, Absolute 0.06 0.00 - 0.40 10*3/mm3    Basophils, Absolute 0.02 0.00 - 0.20 10*3/mm3    Immature Grans, Absolute 0.01 0.00 - 0.05 10*3/mm3    nRBC 0.0 0.0 - 0.2 /100 WBC   POCT Occult Blood Stool    Collection Time: 02/08/21  7:42 PM    Specimen: Per Rectum; Stool   Result Value Ref Range    Fecal Occult Blood Positive (A) Negative    Lot Number [id number]     Expiration Date 06/30/2021     Positive Control Positive Positive    Negative Control Negative Negative   Lactic Acid, Plasma    Collection Time: 02/08/21  8:20 PM    Specimen: Blood   Result Value Ref Range    Lactate 1.9 0.5 - 2.0 mmol/L   CBC Auto Differential    Collection Time: 02/08/21  8:20 PM    Specimen: Blood   Result Value Ref Range    WBC 4.32 3.40 - 10.80 10*3/mm3    RBC 2.25 (L) 3.77 - 5.28 10*6/mm3    Hemoglobin 6.8 (C) 12.0 - 15.9 g/dL    Hematocrit 20.9 (C) 34.0 - 46.6 %    MCV 92.9 79.0 - 97.0 fL     MCH 30.2 26.6 - 33.0 pg    MCHC 32.5 31.5 - 35.7 g/dL    RDW 14.4 12.3 - 15.4 %    RDW-SD 47.3 37.0 - 54.0 fl    MPV 8.3 6.0 - 12.0 fL    Platelets 167 140 - 450 10*3/mm3    Neutrophil % 64.4 42.7 - 76.0 %    Lymphocyte % 27.3 19.6 - 45.3 %    Monocyte % 6.0 5.0 - 12.0 %    Eosinophil % 1.6 0.3 - 6.2 %    Basophil % 0.5 0.0 - 1.5 %    Immature Grans % 0.2 0.0 - 0.5 %    Neutrophils, Absolute 2.78 1.70 - 7.00 10*3/mm3    Lymphocytes, Absolute 1.18 0.70 - 3.10 10*3/mm3    Monocytes, Absolute 0.26 0.10 - 0.90 10*3/mm3    Eosinophils, Absolute 0.07 0.00 - 0.40 10*3/mm3    Basophils, Absolute 0.02 0.00 - 0.20 10*3/mm3    Immature Grans, Absolute 0.01 0.00 - 0.05 10*3/mm3    nRBC 0.0 0.0 - 0.2 /100 WBC   Comprehensive Metabolic Panel    Collection Time: 02/08/21  8:21 PM    Specimen: Blood   Result Value Ref Range    Glucose 173 (H) 65 - 99 mg/dL    BUN 27 (H) 6 - 20 mg/dL    Creatinine 0.83 0.57 - 1.00 mg/dL    Sodium 136 136 - 145 mmol/L    Potassium 3.7 3.5 - 5.2 mmol/L    Chloride 100 98 - 107 mmol/L    CO2 28.5 22.0 - 29.0 mmol/L    Calcium 8.2 (L) 8.6 - 10.5 mg/dL    Total Protein 7.1 6.0 - 8.5 g/dL    Albumin 2.30 (L) 3.50 - 5.20 g/dL    ALT (SGPT) 5 1 - 33 U/L    AST (SGOT) 16 1 - 32 U/L    Alkaline Phosphatase 93 39 - 117 U/L    Total Bilirubin 0.3 0.0 - 1.2 mg/dL    eGFR Non African Amer 71 >60 mL/min/1.73    Globulin 4.8 gm/dL    A/G Ratio 0.5 g/dL    BUN/Creatinine Ratio 32.5 (H) 7.0 - 25.0    Anion Gap 7.5 5.0 - 15.0 mmol/L   Protime-INR    Collection Time: 02/08/21  8:21 PM    Specimen: Blood   Result Value Ref Range    Protime 16.2 (H) 11.7 - 14.2 Seconds    INR 1.33 (H) 0.90 - 1.10   Lipase    Collection Time: 02/08/21  8:21 PM    Specimen: Blood   Result Value Ref Range    Lipase 112 (H) 13 - 60 U/L   Type & Screen    Collection Time: 02/08/21  8:21 PM    Specimen: Blood   Result Value Ref Range    ABO Type A     RH type Positive     Antibody Screen Negative     T&S Expiration Date 2/11/2021 11:59:59 PM     COVID-19,APTIMA PANTHER,[facility] IN-HOUSE, NP/OP SWAB IN UTM/VTM/[facility] TRANSPORT MEDIA,24 HR TAT - Swab, Nasopharynx    Collection Time: 02/08/21  8:25 PM    Specimen: Nasopharynx; Swab   Result Value Ref Range    COVID19 Not Detected Not Detected - Ref. Range   Prepare RBC, 1 Units    Collection Time: 02/08/21  9:35 PM   Result Value Ref Range    Product Code U3037R39     Unit Number [id number]     UNIT  ABO A     UNIT  RH POS     Crossmatch Interpretation Compatible     Dispense Status IS     Blood Expiration Date 202103082359     Blood Type Barcode 6200        I ordered the above labs and reviewed the results.    RADIOLOGY  Xr Chest 1 View    Result Date: 2/8/2021  XR CHEST 1 VW-  HISTORY: Female who is 55 years-old,  PICC placement  TECHNIQUE: Frontal view of the chest  COMPARISON: 1/24/2021  FINDINGS: Right PICC extends to the superior vena cava. The heart size is borderline. Pulmonary vasculature is unremarkable. Small atelectasis or infiltrate left mid to lower lung, follow-up can characterize resolution. No pleural effusion, or pneumothorax. No acute osseous process.      Right PICC extends to the superior vena cava. Small atelectasis or infiltrate left mid to lower lung.  This report was finalized on 2/8/2021 8:14 PM by Dr. Armand Vazquez M.D.        I ordered the above noted radiological studies. I reviewed the images and results. I agree with the radiologist interpretation.    PROCEDURES  Critical Care  Performed by: Alexandro Hanna MD  Authorized by: Alexandro Hanna MD     Critical care provider statement:     Critical care time (minutes):  35    Critical care time was exclusive of:  Separately billable procedures and treating other patients    Critical care was necessary to treat or prevent imminent or life-threatening deterioration of the following conditions:  Circulatory failure    Critical care was time spent personally by me on the following activities:  Development of treatment plan  with patient or surrogate, discussions with primary provider, evaluation of patient's response to treatment, examination of patient, interpretation of cardiac output measurements, obtaining history from patient or surrogate, ordering and review of laboratory studies, ordering and review of radiographic studies, pulse oximetry, re-evaluation of patient's condition and review of old charts        MEDICATIONS GIVEN IN ER  Medications   sodium chloride 0.9 % flush 10 mL (has no administration in time range)   sodium chloride 0.9 % flush 10 mL (10 mL Intravenous Given 2/9/21 0048)   sodium chloride 0.9 % flush 10 mL (has no administration in time range)   dextrose (GLUTOSE) oral gel 15 g (has no administration in time range)   dextrose (D50W) 25 g/ 50mL Intravenous Solution 25 g (has no administration in time range)   glucagon (human recombinant) (GLUCAGEN DIAGNOSTIC) injection 1 mg (has no administration in time range)   nitroglycerin (NITROSTAT) SL tablet 0.4 mg (has no administration in time range)   acetaminophen (TYLENOL) tablet 650 mg (650 mg Oral Given 2/9/21 0047)     Or   acetaminophen (TYLENOL) 160 MG/5ML solution 650 mg ( Oral Not Given:  See Alt 2/9/21 0047)     Or   acetaminophen (TYLENOL) suppository 650 mg ( Rectal Not Given:  See Alt 2/9/21 0047)   insulin lispro (humaLOG, ADMELOG) injection 0-7 Units (has no administration in time range)   ondansetron (ZOFRAN) injection 4 mg (has no administration in time range)   buprenorphine-naloxone (SUBOXONE) 8-2 MG film 1 film (has no administration in time range)   ceFAZolin in dextrose (ANCEF) IVPB solution 2 g (2 g Intravenous New Bag 2/9/21 0047)   cholecalciferol (VITAMIN D3) tablet 2,000 Units (has no administration in time range)   cyclobenzaprine (FLEXERIL) tablet 10 mg (has no administration in time range)   furosemide (LASIX) tablet 40 mg (has no administration in time range)   levothyroxine (SYNTHROID, LEVOTHROID) tablet 88 mcg (has no administration in  time range)   losartan (COZAAR) tablet 100 mg (has no administration in time range)   atorvastatin (LIPITOR) tablet 40 mg (has no administration in time range)   venlafaxine (EFFEXOR) tablet 75 mg (75 mg Oral Given 2/9/21 0049)       PROGRESS, DATA ANALYSIS, CONSULTS, AND MEDICAL DECISION MAKING  A complete history and physical exam have been performed.  All available laboratory and imaging results have been reviewed by myself prior to disposition.    MDM  After the initial H&P, I discussed pertinent information from history and physical exam with patient/family.  Discussed differential diagnosis.  Discussed plan for ED evaluation/work-up/treatment.  All questions answered.  Patient/family is agreeable with plan.  ED Course as of Feb 09 0158   Mon Feb 08, 2021 2100 Hemoglobin(!!): 6.8 [DE]   2100 Hematocrit(!!): 20.9 [DE]   2100 Lipase(!): 112 [DE]   2100 INR(!): 1.33 [DE]   2100 Fecal Occult Blood(!): Positive [DE]   2125 I have updated patient the results of her blood work this evening.  She understands that she needs to be readmitted to the hospital to find of the cause of her bleeding.  We will be transfusing a unit of blood this evening.  I do have a call out to the hospitalist.    [DE]   2131 I discussed the case with Dr. Owens [facility].  He is admit patient to the hospital as an observation, telemetry bed.    [DE]      ED Course User Index  [DE] Alexandro Hanna MD       AS OF 01:58 EST VITALS:    BP - 168/86  HR - 79  TEMP - 100.3 °F (37.9 °C) (Oral)  O2 SATS - 97%    DIAGNOSIS  Final diagnoses:   Symptomatic anemia   Rectal bleeding         DISPOSITION  ADMISSION    Discussed treatment plan and reason for admission with pt/family and admitting physician.  Pt/family voiced understanding of the plan for admission for further testing/treatment as needed.           Alexandro Hanna MD  02/09/21 0159

## 2021-02-09 NOTE — PROGRESS NOTES
Discharge Planning Assessment  [facility]     Patient Name: Stefani Bhandari  MRN: [id number]  Today's Date: 2/9/2021    Admit Date: 2/8/2021    Discharge Needs Assessment     Row Name 02/09/21 0822       Living Environment    Lives With  child(dyan), adult    Name(s) of Who Lives With Patient  Daughter  ( Katlyn Bhandari [phone]), her  and their four children    Current Living Arrangements  home/apartment/condo    Primary Care Provided by  self    Provides Primary Care For  no one    Family Caregiver if Needed  child(dyan), adult    Family Caregiver Names  Daughter  ( Katlyn Bhandari [phone]) and Daughter  ( Alessia Waite)    Quality of Family Relationships  involved;supportive    Able to Return to Prior Arrangements  yes    Living Arrangement Comments  Pt lives in a single story house with her daughter  ( Katlyn Bhandari [phone]), her  and their four children.       Resource/Environmental Concerns    Resource/Environmental Concerns  none    Transportation Concerns  car, none       Transition Planning    Patient/Family Anticipates Transition to  home with family    Patient/Family Anticipated Services at Transition  home health care    Transportation Anticipated  family or friend will provide       Discharge Needs Assessment    Current Outpatient/Agency/Support Group  homecare agency    Equipment Currently Used at Home  glucometer;rollator    Concerns to be Addressed  denies needs/concerns at this time    Anticipated Changes Related to Illness  none    Equipment Needed After Discharge  glucometer;rollator        Discharge Plan     Row Name 02/09/21 0826       Plan    Plan  Plan home with [facility] DARION Kennedy RN    Patient/Family in Agreement with Plan  yes    Plan Comments  FACE SHEET VERIFIED.  Spoke with pt at bedside.  Pt's PCP is Dr. Dea Martinez.  Pt lives in a single story house with her daughter  ( Katlyn Bhandari [phone]), her  and their four children.  Pt is  independent with ADLs.   Pt has a glucometer and rollator for home use.  Pt has ordered a 3:1 commode chair.   Pt gets prescriptions at [email or web address]  ([address]  [facility]).  Pt denies any issues affording medications.  Pt is current with [facility]. [facility] called to follow.  Pt has not been in SNF.  Pt states her daughter will transport her home.  Pt denies any other needs.  Plan home with [facility].  CATHRYN Kennedy RN        Continued Care and Services - Admitted Since 2/8/2021     Home Medical Care     Service Provider Request Status Selected Services Address Phone Fax Patient Preferred    [facility]  Pending - Request Sent N/A [address] [place] [facility] [id number] [phone] --            Selected Continued Care - Prior Encounters Includes selections from prior encounters from 11/10/2020 to 2/9/2021    Discharged on 2/5/2021 Admission date: 1/24/2021 - Discharge disposition: Home or Self Care    Home Medical Care     Service Provider Selected Services Address Phone Fax Patient Preferred    [facility] [address] [facility] [address] [place] [address] [phone] --                      Demographic Summary     Row Name 02/09/21 0820       General Information    Admission Type  observation    Arrived From  emergency department    Referral Source  admission list    Reason for Consult  discharge planning    Preferred Language  English     Used During This Interaction  no        Functional Status     Row Name 02/09/21 0822       Functional Status    Usual Activity Tolerance  good    Current Activity Tolerance  moderate       Functional Status, IADL    Medications  independent    Meal Preparation  assistive person    Housekeeping  assistive person    Laundry  assistive person    Shopping  assistive person       Mental Status    General Appearance WDL  WDL       Mental Status Summary    Recent Changes in  Mental Status/Cognitive Functioning  no changes        Psychosocial    No documentation.       Abuse/Neglect    No documentation.       Legal    No documentation.       Substance Abuse    No documentation.       Patient Forms    No documentation.           Padmini Kennedy RN

## 2021-02-09 NOTE — PLAN OF CARE
Goal Outcome Evaluation:   Pt to floor vitals wnl. Room air. No complaints of nausea soa or pain. No complications will continue to monitor

## 2021-02-09 NOTE — DISCHARGE PLACEMENT REQUEST
"PaulinaeduardoStefani JOSE RAFAEL (55 y.o. Female)     Date of Birth Social Security Number Address Home Phone MRN    1965  [id number]  [facility] [address] [phone] [id number]    Mandaen Marital Status          None        Admission Date Admission Type Admitting Provider Attending Provider Department, Room/Bed    2/8/21 Emergency Kate Owens MD Reddy, Rahul Kandada, MD [address], E654/1    Discharge Date Discharge Disposition Discharge Destination                       Attending Provider: Norman Jurado MD    Allergies: Tramadol    Isolation: Spore   Infection: C.difficile (rule out) (02/09/21)   Code Status: CPR    Ht: 170.2 cm (67\")   Wt: 139 kg (306 lb 11.2 oz)    Admission Cmt: None   Principal Problem: Symptomatic anemia [D64.9]                 Active Insurance as of 2/8/2021     Primary Coverage     Payor Plan Insurance Group Employer/Plan Group    [employer] EMPLOYEE [id number]     Payor Plan Address Payor Plan Phone Number Payor Plan Fax Number Effective Dates    [address] [phone]  1/1/2018 - None Entered    [place] [id number]       Subscriber Name Subscriber Birth Date Member ID       STEFANI LEE 1965 [id number]                 Emergency Contacts      (Rel.) Home Phone Work Phone Mobile Phone    Alessia Waite (ED RN) (Daughter) -- -- [phone]    FJ LEE (Daughter) [phone] -- --              "

## 2021-02-09 NOTE — CONSULTS
[facility]  Initial Inpatient Consult Note    Referring Provider: Ms. Torres    Reason for Consultation: Rectal bleeding    Subjective     History of present illness:    55 y.o. female with no significant GI past medical history, no previous colonoscopy in her lifetime, with a history of hypertension, depression, type 2 diabetes, obesity, hyperlipidemia, septic arthritis, bacteremia, admitted with rectal bleeding and anemia.  Rectal bleeding described as small volume intermittent usually blood on the toilet paper.  She was receiving IV antibiotics at home for bacteremia and was called by the infectious disease specialist as her hemoglobin came back quite low.  Told to come to the emergency room.  At that time she also described intermittent rectal bleeding.  She is weak fatigue short of air.  No chest pain.  No fevers at home.  Recent admission with MSSA bacteremia with septic arthritis.  She has received antibiotics.  Hemoglobin usually runs between 11 and 12 today it was 6.8.  No melena.  No hematemesis    Past Medical History:  Past Medical History:   Diagnosis Date   • Anxiety    • DDD (degenerative disc disease), lumbar    • Depression    • Diabetes mellitus (CMS/HCC)    • Elevated LFTs    • NICK (generalized anxiety disorder)    • History of epilepsy     as a child-Abstracted from pt records   • Hyperlipidemia    • Hypertension    • Hypothyroidism    • MDD (major depressive disorder)    • Morbid obesity (CMS/HCC)    • OA (osteoarthritis)    • Obesity      Past Surgical History:  Past Surgical History:   Procedure Laterality Date   • HYSTERECTOMY  2007   • INCISION AND DRAINAGE SHOULDER Left 1/30/2021    Procedure: INCISION AND DRAINAGE SHOULDER;  Surgeon: Juaquin Richardson II, MD;  Location: [facility];  Service: Orthopedics;  Laterality: Left;   • TUBAL ABDOMINAL LIGATION  1996      Social History:   Social History     Tobacco Use   • Smoking status: Former Smoker     Years:  20.00   • Smokeless tobacco: Never Used   • Tobacco comment: 1 pack per 2 days   Substance Use Topics   • Alcohol use: Not Currently     Frequency: Never     Comment: one glass of wine q6 months      Family History:  Family History   Problem Relation Age of Onset   • Obesity Mother    • Diabetes Mother    • Hypertension Mother    • Sleep apnea Mother    • Asthma Mother    • Hypertension Father    • Heart attack Father    • Diabetes Sister    • Hypertension Sister    • Heart attack Sister    • Diabetes Brother    • Hypertension Brother    • Heart attack Brother    • No Known Problems Other    • Breast cancer Paternal Cousin    • Malig Hyperthermia Neg Hx        Home Meds:  Medications Prior to Admission   Medication Sig Dispense Refill Last Dose   • Buprenorphine HCl (Belbuca) 150 MCG film       • ceFAZolin in dextrose (ANCEF) 2-4 GM/100ML-% solution IVPB Infuse 100 mL into a venous catheter Every 8 (Eight) Hours for 107 doses. Indications: Bacteria in the Blood 9000 mL 1    • cholecalciferol (VITAMIN D3) 25 MCG (1000 UT) tablet Take 2,000 Units by mouth Daily.      • cyclobenzaprine (FLEXERIL) 10 MG tablet TAKE 1 TABLET BY MOUTH THREE TIMES DAILY AS NEEDED FOR MUSCLE SPASMS 270 tablet 0    • diclofenac (VOLTAREN) 75 MG EC tablet TAKE 1 TABLET BY MOUTH TWICE DAILY 180 tablet 0    • ezetimibe (ZETIA) 10 MG tablet Take 1 tablet by mouth Daily. 90 tablet 1    • furosemide (LASIX) 40 MG tablet Take 1 tablet by mouth 2 (Two) Times a Day. 60 tablet 0    • levothyroxine (SYNTHROID, LEVOTHROID) 88 MCG tablet TAKE 1 TABLET BY MOUTH DAILY 30 tablet 3    • losartan (COZAAR) 100 MG tablet Take 1 tablet by mouth Daily. 30 tablet 0    • metFORMIN (GLUCOPHAGE) 500 MG tablet Take 2 tablets by mouth 2 (two) times a day. 270 tablet 1    • methylPREDNISolone (MEDROL) 4 MG dose pack Take as directed on package instructions. 1 each 0    • simvastatin (ZOCOR) 80 MG tablet TAKE 1 TABLET BY MOUTH EVERY NIGHT 90 tablet 0    • venlafaxine  "(EFFEXOR) 75 MG tablet Take 1 tablet by mouth 2 (Two) Times a Day. 180 tablet 0      Current Meds:   atorvastatin, 40 mg, Oral, Daily  buprenorphine-naloxone, 1 film, Sublingual, Daily  ceFAZolin in dextrose, 2 g, Intravenous, Q8H  cholecalciferol, 2,000 Units, Oral, Daily  furosemide, 40 mg, Oral, BID  insulin lispro, 0-7 Units, Subcutaneous, TID AC  levothyroxine, 88 mcg, Oral, Q AM  losartan, 100 mg, Oral, Q24H  sodium chloride, 10 mL, Intravenous, Q12H  venlafaxine, 75 mg, Oral, BID      Allergies:  Allergies   Allergen Reactions   • Tramadol Anaphylaxis     \"seizure like behavior\"     Review of Systems  She has weakness of fatigue all other systems reviewed and negative     Objective     Vital Signs  Temp:  [98.1 °F (36.7 °C)-100.3 °F (37.9 °C)] 99.3 °F (37.4 °C)  Heart Rate:  [79-89] 81  Resp:  [17-20] 18  BP: (151-180)/(67-98) 151/69  Physical Exam:  General Appearance:    Alert, cooperative, in no acute distress   Head:    Normocephalic, without obvious abnormality, atraumatic   Eyes:          conjunctivae and sclerae normal, no   icterus   Throat:   no thrush, oral mucosa moist   Neck:   Supple, no adenopathy   Lungs:     Clear to auscultation bilaterally    Heart:    Regular rhythm and normal rate    Chest Wall:    No abnormalities observed   Abdomen:     Soft, nondistended, nontender; normal bowel sounds   Extremities:   no edema, no redness   Skin:   No bruising or rash   Psychiatric:  normal mood and insight     Results Review:   I reviewed the patient's new clinical results.    Results from last 7 days   Lab Units 02/09/21 0442 02/09/21 0223 02/08/21 2020 02/08/21  1430   WBC 10*3/mm3  --  4.69 4.32 4.15   HEMOGLOBIN g/dL 7.7* 7.6*  7.6* 6.8* 6.9*   HEMATOCRIT % 24.0* 22.9*  22.9* 20.9* 20.9*   PLATELETS 10*3/mm3  --  175 167 180     Results from last 7 days   Lab Units 02/09/21 0442 02/08/21 2021 02/08/21  1430 02/05/21  0529   SODIUM mmol/L 134* 136  --  132*   POTASSIUM mmol/L 3.8 3.7  --  " 4.0   CHLORIDE mmol/L 99 100  --  97*   CO2 mmol/L 27.6 28.5  --  29.7*   BUN mg/dL 24* 27*  --  33*   CREATININE mg/dL 0.73 0.83 0.99 0.85   CALCIUM mg/dL 8.4* 8.2*  --  8.4*   BILIRUBIN mg/dL  --  0.3  --   --    ALK PHOS U/L  --  93  --   --    ALT (SGPT) U/L  --  5  --   --    AST (SGOT) U/L  --  16  --   --    GLUCOSE mg/dL 95 173*  --  171*     Results from last 7 days   Lab Units 02/09/21  0441 02/08/21 2021   INR  1.30* 1.33*     Lab Results   Lab Value Date/Time    LIPASE 117 (H) 02/09/2021 0442    LIPASE 112 (H) 02/08/2021 2021       Radiology:Study Result    CT ABDOMEN PELVIS W CONTRAST-     CLINICAL HISTORY: Low abdomen pain. GI bleeding. Follow-up hepatomegaly  seen on CT scan of the chest.     TECHNIQUE: Spiral CT images were acquired through the abdomen and pelvis  with IV contrast only and were reconstructed in 3 mm thick axial slices.     Radiation dose reduction techniques were utilized, including automated  exposure control and exposure modulation based on body size.     COMPARISON: CT chest dated 01/24/2021     FINDINGS: As shown on the CT chest, the liver is mildly enlarged. It  measures 22.5 cm in greatest cephalocaudad dimension. The liver margins  are lobulated consistent with cirrhosis. No focal hepatic lesions are  identified. There is no bile duct dilatation. Mild splenomegaly is also  present. The spleen measures 17.7 cm in greatest transverse diameter.  Mildly enlarged tortuous venous collateral vessels are noted along the  course of the splenic vein. Recanalization of the umbilical vein is also  present. Small esophageal varices are present. The findings are  consistent with portal hypertension. A few borderline-enlarged lymph  nodes in the upper abdomen are again noted, and are unchanged and are  likely benign based on size criteria. The pancreas and kidneys and  adrenal glands appear within normal limits. There is a small amount of  ascites adjacent to the liver and spleen and also  in the pelvis that is  new. The stomach and small and large bowel are unremarkable. The uterus  is absent. Images through the lung bases demonstrate patchy linear  atelectasis in the left lower lobe that appears slightly more prominent.  There is also a small ill-defined nodular opacity in the posterior  aspect of the right lower lobe adjacent to the pleural surface that is  new, and is most likely due to atelectasis or focal infiltrate. There is  mild patchy stranding of the subcutaneous fat throughout the abdomen and  pelvis that is new, and is consistent with anasarca.     IMPRESSION:  Mild hepatomegaly with cirrhotic liver morphology and  evidence of mild portal hypertension as described. Small amount of  ascites in the abdomen and pelvis that is new since the preceding CT  scan dated 01/24/2021. Worsening patchy atelectasis in the left lower  lobe. Small focal pleural-based nodular opacity in the right lower lobe  that is new and is consistent with infiltrate or atelectasis. Mild  splenomegaly.       Assessment/Plan   Patient Active Problem List   Diagnosis   • Essential hypertension   • Sleep apnea   • Anxiety and depression   • Diabetes mellitus type 2 in obese (CMS/HCC)   • Obesity, Class III, BMI 40-49.9 (morbid obesity) (CMS/HCC)   • Other specified hypothyroidism   • Mixed hyperlipidemia   • Lumbar degenerative disc disease   • Thrombocytopenia (CMS/HCC)   • Tongue lesion   • Pulmonary nodules   • Abnormal CT of the chest   • Elevated liver function tests   • COVID-19 virus infection   • Sepsis without acute organ dysfunction (CMS/HCC)   • History of COVID-19   • LINDSEY (acute kidney injury) (CMS/HCC)   • Hyponatremia   • Septic arthritis of shoulder, left (CMS/HCC)   • MSSA bacteremia   • 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 Anemia  2. Intermittent rectal bleeding  3. Left shoulder septic arthritis leading to bacteremia  4. Abnormal imaging  see above (hepatomegaly, possible esophageal varices, splenomegaly)  5. Hypertension  6. Anxiety  7. Depression    Plan:  · Awaiting stool for C. difficile and GI PCR  · If the stool studies are normal based on findings on CAT scan of possible portal hypertension along with rectal bleeding and anemia she will require EGD and colonoscopy before discharge  · With a normal bilirubin and platelet count I think it is highly unlikely she has cirrhosis, I will run an acute hepatitis profile to be complete  · Follow hemoglobin  · Await  results of stool studies  · Begin clear liquids tomorrow in anticipation of possibly colonoscopy and EGD February 11, 2021      I discussed the patients findings and my recommendations with patient and nursing staff.    Jaylen Mandujano MD

## 2021-02-09 NOTE — PROGRESS NOTES
[facility] is current with home health and will continue to follow for D/C needs.  Sandra Rolon RN

## 2021-02-10 PROBLEM — K62.5 RECTAL BLEEDING: Status: ACTIVE | Noted: 2021-02-08

## 2021-02-10 PROBLEM — D64.9 ANEMIA: Status: ACTIVE | Noted: 2021-02-08

## 2021-02-10 PROBLEM — R93.2 ABNORMAL CT SCAN, LIVER: Status: ACTIVE | Noted: 2021-02-08

## 2021-02-10 LAB
ADV 40+41 DNA STL QL NAA+NON-PROBE: NOT DETECTED
ALBUMIN SERPL-MCNC: 2.6 G/DL (ref 3.5–5.2)
ALBUMIN/GLOB SERPL: 0.6 G/DL
ALP SERPL-CCNC: 95 U/L (ref 39–117)
ALPHA-FETOPROTEIN: 1.27 NG/ML (ref 0–8.3)
ALT SERPL W P-5'-P-CCNC: <5 U/L (ref 1–33)
ANION GAP SERPL CALCULATED.3IONS-SCNC: 6 MMOL/L (ref 5–15)
AST SERPL-CCNC: 21 U/L (ref 1–32)
ASTRO TYP 1-8 RNA STL QL NAA+NON-PROBE: NOT DETECTED
BASOPHILS # BLD AUTO: 0.03 10*3/MM3 (ref 0–0.2)
BASOPHILS NFR BLD AUTO: 0.6 % (ref 0–1.5)
BILIRUB SERPL-MCNC: 0.6 MG/DL (ref 0–1.2)
BUN SERPL-MCNC: 18 MG/DL (ref 6–20)
BUN/CREAT SERPL: 27.3 (ref 7–25)
C CAYETANENSIS DNA STL QL NAA+NON-PROBE: NOT DETECTED
C DIFF TOX GENS STL QL NAA+PROBE: POSITIVE
CALCIUM SPEC-SCNC: 8.6 MG/DL (ref 8.6–10.5)
CAMPY SP DNA.DIARRHEA STL QL NAA+PROBE: NOT DETECTED
CHLORIDE SERPL-SCNC: 95 MMOL/L (ref 98–107)
CO2 SERPL-SCNC: 29 MMOL/L (ref 22–29)
CREAT SERPL-MCNC: 0.66 MG/DL (ref 0.57–1)
CRYPTOSP STL CULT: NOT DETECTED
DEPRECATED RDW RBC AUTO: 49.8 FL (ref 37–54)
E HISTOLYT AG STL-ACNC: NOT DETECTED
EAEC PAA PLAS AGGR+AATA ST NAA+NON-PRB: NOT DETECTED
EC STX1 + STX2 GENES STL NAA+PROBE: NOT DETECTED
EOSINOPHIL # BLD AUTO: 0.11 10*3/MM3 (ref 0–0.4)
EOSINOPHIL NFR BLD AUTO: 2.3 % (ref 0.3–6.2)
EPEC EAE GENE STL QL NAA+NON-PROBE: NOT DETECTED
ERYTHROCYTE [DISTWIDTH] IN BLOOD BY AUTOMATED COUNT: 14.4 % (ref 12.3–15.4)
ETEC LTA+ST1A+ST1B TOX ST NAA+NON-PROBE: NOT DETECTED
G LAMBLIA DNA SPEC QL NAA+PROBE: NOT DETECTED
GFR SERPL CREATININE-BSD FRML MDRD: 93 ML/MIN/1.73
GLOBULIN UR ELPH-MCNC: 4.7 GM/DL
GLUCOSE BLDC GLUCOMTR-MCNC: 102 MG/DL (ref 70–130)
GLUCOSE BLDC GLUCOMTR-MCNC: 127 MG/DL (ref 70–130)
GLUCOSE BLDC GLUCOMTR-MCNC: 94 MG/DL (ref 70–130)
GLUCOSE BLDC GLUCOMTR-MCNC: 94 MG/DL (ref 70–130)
GLUCOSE SERPL-MCNC: 94 MG/DL (ref 65–99)
HAV IGM SERPL QL IA: NORMAL
HBV CORE IGM SERPL QL IA: NORMAL
HBV SURFACE AG SERPL QL IA: NORMAL
HCT VFR BLD AUTO: 22 % (ref 34–46.6)
HCT VFR BLD AUTO: 22.5 % (ref 34–46.6)
HCT VFR BLD AUTO: 23.3 % (ref 34–46.6)
HCT VFR BLD AUTO: 26.4 % (ref 34–46.6)
HCV AB SER DONR QL: NORMAL
HEMOCCULT STL QL: NEGATIVE
HGB BLD-MCNC: 7.3 G/DL (ref 12–15.9)
HGB BLD-MCNC: 7.4 G/DL (ref 12–15.9)
HGB BLD-MCNC: 7.6 G/DL (ref 12–15.9)
HGB BLD-MCNC: 8.6 G/DL (ref 12–15.9)
IMM GRANULOCYTES # BLD AUTO: 0.02 10*3/MM3 (ref 0–0.05)
IMM GRANULOCYTES NFR BLD AUTO: 0.4 % (ref 0–0.5)
INR PPP: 1.35 (ref 0.9–1.1)
LYMPHOCYTES # BLD AUTO: 1.55 10*3/MM3 (ref 0.7–3.1)
LYMPHOCYTES NFR BLD AUTO: 32 % (ref 19.6–45.3)
MCH RBC QN AUTO: 30.8 PG (ref 26.6–33)
MCHC RBC AUTO-ENTMCNC: 32.6 G/DL (ref 31.5–35.7)
MCV RBC AUTO: 94.6 FL (ref 79–97)
MONOCYTES # BLD AUTO: 0.34 10*3/MM3 (ref 0.1–0.9)
MONOCYTES NFR BLD AUTO: 7 % (ref 5–12)
NEUTROPHILS NFR BLD AUTO: 2.79 10*3/MM3 (ref 1.7–7)
NEUTROPHILS NFR BLD AUTO: 57.7 % (ref 42.7–76)
NOROVIRUS GI+II RNA STL QL NAA+NON-PROBE: NOT DETECTED
NRBC BLD AUTO-RTO: 0 /100 WBC (ref 0–0.2)
P SHIGELLOIDES DNA STL QL NAA+PROBE: NOT DETECTED
PLATELET # BLD AUTO: 150 10*3/MM3 (ref 140–450)
PMV BLD AUTO: 9 FL (ref 6–12)
POTASSIUM SERPL-SCNC: 4 MMOL/L (ref 3.5–5.2)
PROT SERPL-MCNC: 7.3 G/DL (ref 6–8.5)
PROTHROMBIN TIME: 16.5 SECONDS (ref 11.7–14.2)
RBC # BLD AUTO: 2.79 10*6/MM3 (ref 3.77–5.28)
RV RNA STL NAA+PROBE: NOT DETECTED
SALMONELLA DNA SPEC QL NAA+PROBE: NOT DETECTED
SAPO I+II+IV+V RNA STL QL NAA+NON-PROBE: NOT DETECTED
SHIGELLA SP+EIEC IPAH STL QL NAA+PROBE: NOT DETECTED
SODIUM SERPL-SCNC: 130 MMOL/L (ref 136–145)
V CHOLERAE DNA SPEC QL NAA+PROBE: NOT DETECTED
VIBRIO DNA SPEC NAA+PROBE: NOT DETECTED
WBC # BLD AUTO: 4.84 10*3/MM3 (ref 3.4–10.8)
YERSINIA STL CULT: NOT DETECTED

## 2021-02-10 PROCEDURE — 85014 HEMATOCRIT: CPT | Performed by: NURSE PRACTITIONER

## 2021-02-10 PROCEDURE — G0378 HOSPITAL OBSERVATION PER HR: HCPCS

## 2021-02-10 PROCEDURE — 85018 HEMOGLOBIN: CPT | Performed by: INTERNAL MEDICINE

## 2021-02-10 PROCEDURE — 80053 COMPREHEN METABOLIC PANEL: CPT | Performed by: INTERNAL MEDICINE

## 2021-02-10 PROCEDURE — 83516 IMMUNOASSAY NONANTIBODY: CPT | Performed by: INTERNAL MEDICINE

## 2021-02-10 PROCEDURE — 99214 OFFICE O/P EST MOD 30 MIN: CPT | Performed by: INTERNAL MEDICINE

## 2021-02-10 PROCEDURE — 82272 OCCULT BLD FECES 1-3 TESTS: CPT | Performed by: INTERNAL MEDICINE

## 2021-02-10 PROCEDURE — 85014 HEMATOCRIT: CPT | Performed by: INTERNAL MEDICINE

## 2021-02-10 PROCEDURE — 80074 ACUTE HEPATITIS PANEL: CPT | Performed by: INTERNAL MEDICINE

## 2021-02-10 PROCEDURE — 82105 ALPHA-FETOPROTEIN SERUM: CPT | Performed by: INTERNAL MEDICINE

## 2021-02-10 PROCEDURE — 85025 COMPLETE CBC W/AUTO DIFF WBC: CPT | Performed by: INTERNAL MEDICINE

## 2021-02-10 PROCEDURE — 84165 PROTEIN E-PHORESIS SERUM: CPT | Performed by: INTERNAL MEDICINE

## 2021-02-10 PROCEDURE — 85018 HEMOGLOBIN: CPT | Performed by: NURSE PRACTITIONER

## 2021-02-10 PROCEDURE — 0097U HC BIOFIRE FILMARRAY GI PANEL: CPT | Performed by: INTERNAL MEDICINE

## 2021-02-10 PROCEDURE — 25010000003 CEFAZOLIN IN DEXTROSE 2-4 GM/100ML-% SOLUTION: Performed by: NURSE PRACTITIONER

## 2021-02-10 PROCEDURE — 85610 PROTHROMBIN TIME: CPT | Performed by: INTERNAL MEDICINE

## 2021-02-10 PROCEDURE — 87493 C DIFF AMPLIFIED PROBE: CPT | Performed by: INTERNAL MEDICINE

## 2021-02-10 PROCEDURE — 82962 GLUCOSE BLOOD TEST: CPT

## 2021-02-10 RX ORDER — POLYETHYLENE GLYCOL 3350 17 G/17G
119 POWDER, FOR SOLUTION ORAL 2 TIMES DAILY
Status: DISPENSED | OUTPATIENT
Start: 2021-02-10 | End: 2021-02-11

## 2021-02-10 RX ADMIN — VENLAFAXINE HYDROCHLORIDE 75 MG: 75 TABLET ORAL at 08:32

## 2021-02-10 RX ADMIN — SODIUM CHLORIDE, PRESERVATIVE FREE 10 ML: 5 INJECTION INTRAVENOUS at 20:03

## 2021-02-10 RX ADMIN — ACETAMINOPHEN 650 MG: 325 TABLET, FILM COATED ORAL at 12:36

## 2021-02-10 RX ADMIN — FUROSEMIDE 40 MG: 40 TABLET ORAL at 20:03

## 2021-02-10 RX ADMIN — ATORVASTATIN CALCIUM 40 MG: 20 TABLET, FILM COATED ORAL at 08:31

## 2021-02-10 RX ADMIN — VANCOMYCIN 125 MG: KIT at 20:30

## 2021-02-10 RX ADMIN — LEVOTHYROXINE SODIUM 88 MCG: 0.09 TABLET ORAL at 05:21

## 2021-02-10 RX ADMIN — Medication 2000 UNITS: at 08:31

## 2021-02-10 RX ADMIN — ACETAMINOPHEN 650 MG: 325 TABLET, FILM COATED ORAL at 23:36

## 2021-02-10 RX ADMIN — SODIUM CHLORIDE, PRESERVATIVE FREE 10 ML: 5 INJECTION INTRAVENOUS at 08:32

## 2021-02-10 RX ADMIN — POLYETHYLENE GLYCOL 3350 119 G: 17 POWDER, FOR SOLUTION ORAL at 20:04

## 2021-02-10 RX ADMIN — CEFAZOLIN SODIUM 2 G: 2 INJECTION, SOLUTION INTRAVENOUS at 00:10

## 2021-02-10 RX ADMIN — VENLAFAXINE HYDROCHLORIDE 75 MG: 75 TABLET ORAL at 20:03

## 2021-02-10 RX ADMIN — CEFAZOLIN SODIUM 2 G: 2 INJECTION, SOLUTION INTRAVENOUS at 08:31

## 2021-02-10 RX ADMIN — LOSARTAN POTASSIUM 100 MG: 100 TABLET, FILM COATED ORAL at 08:32

## 2021-02-10 RX ADMIN — CEFAZOLIN SODIUM 2 G: 2 INJECTION, SOLUTION INTRAVENOUS at 16:20

## 2021-02-10 RX ADMIN — FUROSEMIDE 40 MG: 40 TABLET ORAL at 08:31

## 2021-02-10 NOTE — PLAN OF CARE
Goal Outcome Evaluation:         Patient alert and oriented. Patient on room air and sinus on monitor.Tylenol given for shoulder pain today. Dressing changed per nurse. Patient with colonoscopy and EGD scheduled for tomorrow at 10:46 am -consent and education signed and in chart. Patient up in the chair most of the day. Stool obtained for cultures and occult. No acute distress noted, will continue to monitor.

## 2021-02-10 NOTE — PROGRESS NOTES
Name: Stefani Bhandari ADMIT: 2021   : 1965  PCP: Dea Martinez MD    MRN: [id number] LOS: 1 days   AGE/SEX: 55 y.o. female  ROOM: [facility]     Subjective   Subjective     Patient is sitting up a chair and has specific complaints.  Denies nausea, vomiting, abdominal pain, chest pain.  No bowel movement since hospitalization.       Objective   Objective   Vital Signs  Temp:  [98.1 °F (36.7 °C)-99.6 °F (37.6 °C)] 98.1 °F (36.7 °C)  Heart Rate:  [84-90] 90  Resp:  [16-18] 16  BP: (144-168)/(70-86) 144/70  SpO2:  [95 %-100 %] 95 %  on   ;   Device (Oxygen Therapy): room air  Body mass index is 48.04 kg/m².  Physical Exam    HEENT:  Atraumatic, normocephalic.  PERRLA.  Extraocular movements intact.  Conjunctivae pink.  Sclerae, no icterus.  Mucous membranes dry.    NECK:  Supple.  No JVD.  HEART:  Regular rate and rhythm.  Normal S1, S2.   LUNGS:  Fairly clear to auscultation anteriorly.  No wheezes.  No crackles.  ABDOMEN:   Soft, nontender.  Bowel sounds present.  No rebound.  No  guarding.  EXTREMITIES:  No cyanosis, clubbing, or edema.  Palpable pedal pulses.  NEURO:  Grossly nonfocal.  No facial asymmetry.  Good strength in all 4  extremities.  SKIN:  Warm and dry.  No evidence of rashes.      Results Review     I reviewed the patient's new clinical results.  Results from last 7 days   Lab Units 02/10/21  1238 02/10/21  0531 02/10/21  0011 21  1725  21  0223 21  2020 21  1430   WBC 10*3/mm3  --  4.84  --   --   --  4.69 4.32 4.15   HEMOGLOBIN g/dL 7.4* 8.6* 7.6* 8.0*   < > 7.6*  7.6* 6.8* 6.9*   PLATELETS 10*3/mm3  --  150  --   --   --  175 167 180    < > = values in this interval not displayed.     Results from last 7 days   Lab Units 02/10/21  0531 21  0442 21  1430 21  05   SODIUM mmol/L 130* 134* 136  --  132*   POTASSIUM mmol/L 4.0 3.8 3.7  --  4.0   CHLORIDE mmol/L 95* 99 100  --  97*   CO2 mmol/L 29.0 27.6 28.5  --  29.7*   BUN  mg/dL 18 24* 27*  --  33*   CREATININE mg/dL 0.66 0.73 0.83 0.99 0.85   GLUCOSE mg/dL 94 95 173*  --  171*   Estimated Creatinine Clearance: 139.1 mL/min (by C-G formula based on SCr of 0.66 mg/dL).  Results from last 7 days   Lab Units 02/10/21  0531 02/08/21 2021 02/05/21  0529 02/04/21  0413   ALBUMIN g/dL 2.60* 2.30* 2.20* 2.40*   BILIRUBIN mg/dL 0.6 0.3  --   --    ALK PHOS U/L 95 93  --   --    AST (SGOT) U/L 21 16  --   --    ALT (SGPT) U/L <5 5  --   --      Results from last 7 days   Lab Units 02/10/21  0531 02/09/21  0442 02/08/21 2021 02/05/21  0529 02/04/21  0413 02/03/21  2338 02/03/21  1855   CALCIUM mg/dL 8.6 8.4* 8.2* 8.4* 8.3* 8.1* 8.0*   ALBUMIN g/dL 2.60*  --  2.30* 2.20* 2.40* 2.30* 2.10*   MAGNESIUM mg/dL  --   --   --  1.5* 1.6  --   --    PHOSPHORUS mg/dL  --   --   --  4.2 3.8 3.9 3.5     Results from last 7 days   Lab Units 02/08/21 2020   LACTATE mmol/L 1.9     COVID19   Date Value Ref Range Status   02/08/2021 Not Detected Not Detected - Ref. Range Final   01/29/2021 Not Detected Not Detected - Ref. Range Final     Glucose   Date/Time Value Ref Range Status   02/10/2021 1555 94 70 - 130 mg/dL Final   02/10/2021 1113 102 70 - 130 mg/dL Final   02/10/2021 0626 94 70 - 130 mg/dL Final   02/09/2021 2102 85 70 - 130 mg/dL Final   02/09/2021 1801 140 (H) 70 - 130 mg/dL Final   02/09/2021 1703 79 70 - 130 mg/dL Final   02/09/2021 1123 114 70 - 130 mg/dL Final       CT Abdomen Pelvis With Contrast  Narrative: CT ABDOMEN PELVIS W CONTRAST-     CLINICAL HISTORY: Low abdomen pain. GI bleeding. Follow-up hepatomegaly  seen on CT scan of the chest.     TECHNIQUE: Spiral CT images were acquired through the abdomen and pelvis  with IV contrast only and were reconstructed in 3 mm thick axial slices.     Radiation dose reduction techniques were utilized, including automated  exposure control and exposure modulation based on body size.     COMPARISON: CT chest dated 01/24/2021     FINDINGS: As shown on  the CT chest, the liver is mildly enlarged. It  measures 22.5 cm in greatest cephalocaudad dimension. The liver margins  are lobulated consistent with cirrhosis. No focal hepatic lesions are  identified. There is no bile duct dilatation. Mild splenomegaly is also  present. The spleen measures 17.7 cm in greatest transverse diameter.  Mildly enlarged tortuous venous collateral vessels are noted along the  course of the splenic vein. Recanalization of the umbilical vein is also  present. Small esophageal varices are present. The findings are  consistent with portal hypertension. A few borderline-enlarged lymph  nodes in the upper abdomen are again noted, and are unchanged and are  likely benign based on size criteria. The pancreas and kidneys and  adrenal glands appear within normal limits. There is a small amount of  ascites adjacent to the liver and spleen and also in the pelvis that is  new. The stomach and small and large bowel are unremarkable. The uterus  is absent. Images through the lung bases demonstrate patchy linear  atelectasis in the left lower lobe that appears slightly more prominent.  There is also a small ill-defined nodular opacity in the posterior  aspect of the right lower lobe adjacent to the pleural surface that is  new, and is most likely due to atelectasis or focal infiltrate. There is  mild patchy stranding of the subcutaneous fat throughout the abdomen and  pelvis that is new, and is consistent with anasarca.     Impression: Mild hepatomegaly with cirrhotic liver morphology and  evidence of mild portal hypertension as described. Small amount of  ascites in the abdomen and pelvis that is new since the preceding CT  scan dated 01/24/2021. Worsening patchy atelectasis in the left lower  lobe. Small focal pleural-based nodular opacity in the right lower lobe  that is new and is consistent with infiltrate or atelectasis. Mild  splenomegaly.     This report was finalized on 2/9/2021 9:30 AM by   James Vera M.D.       Scheduled Medications  atorvastatin, 40 mg, Oral, Daily  buprenorphine-naloxone, 1 film, Sublingual, Daily  ceFAZolin in dextrose, 2 g, Intravenous, Q8H  cholecalciferol, 2,000 Units, Oral, Daily  furosemide, 40 mg, Oral, BID  insulin lispro, 0-7 Units, Subcutaneous, TID AC  levothyroxine, 88 mcg, Oral, Q AM  losartan, 100 mg, Oral, Q24H  polyethylene glycol, 119 g, Oral, BID  sodium chloride, 10 mL, Intravenous, Q12H  venlafaxine, 75 mg, Oral, BID    Infusions   Diet  Diet Clear Liquid       Assessment/Plan     Active Hospital Problems    Diagnosis  POA   • **Symptomatic anemia [D64.9]  Yes   • Rectal bleeding [K62.5]  Unknown   • Anemia [D64.9]  Unknown   • Abnormal CT scan, liver [R93.2]  Unknown   • Septic arthritis of shoulder, left (CMS/HCC) [M00.9]  Yes   • MSSA bacteremia [R78.81, B95.61]  Yes   • Pulmonary nodules [R91.8]  Yes   • Elevated liver function tests [R79.89]  Yes   • Mixed hyperlipidemia [E78.2]  Yes   • Obesity, Class III, BMI 40-49.9 (morbid obesity) (CMS/[facility]) [E66.01]  Yes   • Essential hypertension [I10]  Yes   • Anxiety and depression [F41.9, F32.9]  Yes   • Diabetes mellitus type 2 in obese (CMS/[facility]) [E11.69, E66.9]  Yes      Resolved Hospital Problems   No resolved problems to display.       1. Symptomatic anemia, there is no evidence of any active bleed at this point.  GI consultation has been obtained and plan is for EGD and colonoscopy on Thursday.  Bowel prep will be initiated today.  2. Recent history of left shoulder septic arthritis leading to MSSA bacteremia, currently on IV cefazolin and will be continued.  3. Diabetes mellitus, continue with corrective dose insulin for the moment.    4. Hypertension, continue to monitor blood pressure closely.  Losartan and will be continued.  5. Hyperlipidemia on statins.    6. Hypothyroidism, on Synthroid.  7. On SCDs for DVT prophylaxis.      Norman Jurado MD  [facility]  02/10/21  16:56  EST      I wore protective equipment throughout this patient encounter including a face mask, gloves and protective eyewear.  Hand hygiene was performed before donning protective equipment and after removal when leaving the room.

## 2021-02-10 NOTE — NURSING NOTE
Nurse called Dr Jurado at 1752 to notify him of positive c-diff. MD returned called and  ordered vancomycin po 125 mg every 6 hours. Nurse also called Dr Bender at 1154 to notify him of stool results- no return call at this time.

## 2021-02-10 NOTE — PROGRESS NOTES
[facility]  Inpatient Progress Note    Reason for Follow Up: Rectal bleeding    Subjective     Interval History:   Discussed with RN, discussed with patient.  No stool studies submitted for C. difficile or GI panel by PCR.  Given the scan findings, anemia, and rectal bleeding we will proceed with upper and lower endoscopy as offered.  Patient voiced understanding and is amenable to this.    Current Facility-Administered Medications:   •  acetaminophen (TYLENOL) tablet 650 mg, 650 mg, Oral, Q4H PRN, 650 mg at 02/10/21 1236 **OR** acetaminophen (TYLENOL) 160 MG/5ML solution 650 mg, 650 mg, Oral, Q4H PRN **OR** acetaminophen (TYLENOL) suppository 650 mg, 650 mg, Rectal, Q4H PRN, Cindy Torres, APRN  •  atorvastatin (LIPITOR) tablet 40 mg, 40 mg, Oral, Daily, Cindy Torres, APRN, 40 mg at 02/10/21 0831  •  buprenorphine-naloxone (SUBOXONE) 8-2 MG film 1 film, 1 film, Sublingual, Daily, Cindy Torres, APRN  •  ceFAZolin in dextrose (ANCEF) IVPB solution 2 g, 2 g, Intravenous, Q8H, Cindy Torres APRN, Last Rate: 0 mL/hr at 02/09/21 0820, 2 g at 02/10/21 0831  •  cholecalciferol (VITAMIN D3) tablet 2,000 Units, 2,000 Units, Oral, Daily, Cindy Torres, APRN, 2,000 Units at 02/10/21 0831  •  cyclobenzaprine (FLEXERIL) tablet 10 mg, 10 mg, Oral, TID PRN, Cindy Torres, APRN  •  dextrose (D50W) 25 g/ 50mL Intravenous Solution 25 g, 25 g, Intravenous, Q15 Min PRN, Cindy Torres, APRN  •  dextrose (GLUTOSE) oral gel 15 g, 15 g, Oral, Q15 Min PRN, Cindy Torres, APRN  •  furosemide (LASIX) tablet 40 mg, 40 mg, Oral, BID, Cindy Torres, APRN, 40 mg at 02/10/21 0831  •  glucagon (human recombinant) (GLUCAGEN DIAGNOSTIC) injection 1 mg, 1 mg, Subcutaneous, Q15 Min PRN, Cindy Torres, NITESH  •  insulin lispro (humaLOG, ADMELOG) injection 0-7 Units, 0-7 Units, Subcutaneous, TID AC, Cindy Torres, NITESH  •  levothyroxine (SYNTHROID, LEVOTHROID)  tablet 88 mcg, 88 mcg, Oral, Q AM, Cindy Torres, APRN, 88 mcg at 02/10/21 0521  •  losartan (COZAAR) tablet 100 mg, 100 mg, Oral, Q24H, BrianLucienie M, APRN, 100 mg at 02/10/21 0832  •  nitroglycerin (NITROSTAT) SL tablet 0.4 mg, 0.4 mg, Sublingual, Q5 Min PRN, Cindy Torres, APRN  •  ondansetron (ZOFRAN) injection 4 mg, 4 mg, Intravenous, Q6H PRN, Cindy Torres, APRN  •  [COMPLETED] Insert peripheral IV, , , Once **AND** sodium chloride 0.9 % flush 10 mL, 10 mL, Intravenous, PRN, Alexandro Hanna MD  •  sodium chloride 0.9 % flush 10 mL, 10 mL, Intravenous, Q12H, Cindy Torres M, APRN, 10 mL at 02/10/21 0832  •  sodium chloride 0.9 % flush 10 mL, 10 mL, Intravenous, PRN, Cindy Torres M, APRN  •  venlafaxine (EFFEXOR) tablet 75 mg, 75 mg, Oral, BID, Cindy Torres, APRN, 75 mg at 02/10/21 0832  Review of Systems:    All systems were reviewed and negative except for:  Gastrointestinal: positive for  See HPI    Objective     Vital Signs  Temp:  [98.1 °F (36.7 °C)-99.6 °F (37.6 °C)] 98.1 °F (36.7 °C)  Heart Rate:  [84-90] 90  Resp:  [16-18] 16  BP: (144-168)/(70-86) 144/70  Body mass index is 48.04 kg/m².    Intake/Output Summary (Last 24 hours) at 2/10/2021 1542  Last data filed at 2/10/2021 1457  Gross per 24 hour   Intake 2000 ml   Output --   Net 2000 ml     I/O this shift:  In: 700 [P.O.:600; IV Piggyback:100]  Out: -      Physical Exam:   General: patient awake, alert and cooperative   Eyes: Normal lids and lashes, no scleral icterus   Neck: supple, normal ROM   Skin: warm and dry, not jaundiced   Cardiovascular: regular rhythm and rate, no murmurs auscultated   Pulm: clear to auscultation bilaterally, regular and unlabored   Abdomen: soft, nontender, nondistended; normal bowel sounds   Extremities: no rash or edema   Psychiatric: Normal mood and behavior; memory intact     Results Review:     I reviewed the patient's new clinical results.    Results from last 7 days   Lab  Units 02/10/21  1238 02/10/21  0531 02/10/21  0011  02/09/21  0223 02/08/21 2020   WBC 10*3/mm3  --  4.84  --   --  4.69 4.32   HEMOGLOBIN g/dL 7.4* 8.6* 7.6*   < > 7.6*  7.6* 6.8*   HEMATOCRIT % 22.5* 26.4* 23.3*   < > 22.9*  22.9* 20.9*   PLATELETS 10*3/mm3  --  150  --   --  175 167    < > = values in this interval not displayed.     Results from last 7 days   Lab Units 02/10/21  0531 02/09/21 0442 02/08/21 2021   SODIUM mmol/L 130* 134* 136   POTASSIUM mmol/L 4.0 3.8 3.7   CHLORIDE mmol/L 95* 99 100   CO2 mmol/L 29.0 27.6 28.5   BUN mg/dL 18 24* 27*   CREATININE mg/dL 0.66 0.73 0.83   CALCIUM mg/dL 8.6 8.4* 8.2*   BILIRUBIN mg/dL 0.6  --  0.3   ALK PHOS U/L 95  --  93   ALT (SGPT) U/L <5  --  5   AST (SGOT) U/L 21  --  16   GLUCOSE mg/dL 94 95 173*     Results from last 7 days   Lab Units 02/10/21  0531 02/09/21 0441 02/08/21 2021   INR  1.35* 1.30* 1.33*     Lab Results   Lab Value Date/Time    LIPASE 117 (H) 02/09/2021 0442    LIPASE 112 (H) 02/08/2021 2021       Radiology:  CT Abdomen Pelvis With Contrast   Final Result   Mild hepatomegaly with cirrhotic liver morphology and   evidence of mild portal hypertension as described. Small amount of   ascites in the abdomen and pelvis that is new since the preceding CT   scan dated 01/24/2021. Worsening patchy atelectasis in the left lower   lobe. Small focal pleural-based nodular opacity in the right lower lobe   that is new and is consistent with infiltrate or atelectasis. Mild   splenomegaly.       This report was finalized on 2/9/2021 9:30 AM by Dr. James Vera M.D.          XR Chest 1 View   Final Result   Right PICC extends to the superior vena cava. Small   atelectasis or infiltrate left mid to lower lung.       This report was finalized on 2/8/2021 8:14 PM by Dr. Armand Vazquez M.D.              Assessment/Plan     Patient Active Problem List   Diagnosis   • Essential hypertension   • Sleep apnea   • Anxiety and depression   • Diabetes  mellitus type 2 in obese (CMS/HCC)   • Obesity, Class III, BMI 40-49.9 (morbid obesity) (CMS/HCC)   • Other specified hypothyroidism   • Mixed hyperlipidemia   • Lumbar degenerative disc disease   • Thrombocytopenia (CMS/HCC)   • Tongue lesion   • Pulmonary nodules   • Abnormal CT of the chest   • Elevated liver function tests   • COVID-19 virus infection   • Sepsis without acute organ dysfunction (CMS/HCC)   • History of COVID-19   • LINDSEY (acute kidney injury) (CMS/HCC)   • Hyponatremia   • Septic arthritis of shoulder, left (CMS/HCC)   • MSSA bacteremia   • 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 Anemia  2. Rectal bleeding  3. Abnormal CT scan (hepatomegaly, possible esophageal varices, splenomegaly)  4. Hypertension  5. Anxiety  6. Depression      Plan:  · Schedule EGD and colonoscopy for tomorrow  · Initiate bowel prep today  I discussed the patients findings and my recommendations with patient and nursing staff.    Joshua Bender MD

## 2021-02-10 NOTE — PLAN OF CARE
Goal Outcome Evaluation:  Plan of Care Reviewed With: patient  Progress: no change  Outcome Summary: Patient is alert and oriented. BPs remains slightly elevated. Temp up to 99.6 oral overnight. IV abx continued. Up with assistance. Still no BM this shift, stool samples still needed. Plan for egd/colonoscopy 2/11. Slight pain to left shoulder, but does not want medication. Will continue to monitor closely.

## 2021-02-11 ENCOUNTER — ANESTHESIA (OUTPATIENT)
Dept: GASTROENTEROLOGY | Facility: HOSPITAL | Age: 56
End: 2021-02-11

## 2021-02-11 ENCOUNTER — ANESTHESIA EVENT (OUTPATIENT)
Dept: GASTROENTEROLOGY | Facility: HOSPITAL | Age: 56
End: 2021-02-11

## 2021-02-11 LAB
ACTIN IGG SERPL-ACNC: 28 UNITS (ref 0–19)
ALBUMIN SERPL ELPH-MCNC: 2.3 G/DL (ref 2.9–4.4)
ALBUMIN/GLOB SERPL: 0.5 {RATIO} (ref 0.7–1.7)
ALPHA1 GLOB SERPL ELPH-MCNC: 0.4 G/DL (ref 0–0.4)
ALPHA2 GLOB SERPL ELPH-MCNC: 0.8 G/DL (ref 0.4–1)
ANION GAP SERPL CALCULATED.3IONS-SCNC: 9.5 MMOL/L (ref 5–15)
B-GLOBULIN SERPL ELPH-MCNC: 1.1 G/DL (ref 0.7–1.3)
BASOPHILS # BLD AUTO: 0.03 10*3/MM3 (ref 0–0.2)
BASOPHILS NFR BLD AUTO: 0.6 % (ref 0–1.5)
BUN SERPL-MCNC: 17 MG/DL (ref 6–20)
BUN/CREAT SERPL: 25.8 (ref 7–25)
CALCIUM SPEC-SCNC: 8.5 MG/DL (ref 8.6–10.5)
CHLORIDE SERPL-SCNC: 93 MMOL/L (ref 98–107)
CO2 SERPL-SCNC: 25.5 MMOL/L (ref 22–29)
CREAT SERPL-MCNC: 0.66 MG/DL (ref 0.57–1)
DEPRECATED RDW RBC AUTO: 46.8 FL (ref 37–54)
EOSINOPHIL # BLD AUTO: 0.14 10*3/MM3 (ref 0–0.4)
EOSINOPHIL NFR BLD AUTO: 3 % (ref 0.3–6.2)
ERYTHROCYTE [DISTWIDTH] IN BLOOD BY AUTOMATED COUNT: 14 % (ref 12.3–15.4)
GAMMA GLOB SERPL ELPH-MCNC: 2.9 G/DL (ref 0.4–1.8)
GFR SERPL CREATININE-BSD FRML MDRD: 93 ML/MIN/1.73
GLOBULIN SER CALC-MCNC: 5.1 G/DL (ref 2.2–3.9)
GLUCOSE BLDC GLUCOMTR-MCNC: 104 MG/DL (ref 70–130)
GLUCOSE BLDC GLUCOMTR-MCNC: 106 MG/DL (ref 70–130)
GLUCOSE BLDC GLUCOMTR-MCNC: 113 MG/DL (ref 70–130)
GLUCOSE SERPL-MCNC: 98 MG/DL (ref 65–99)
HCT VFR BLD AUTO: 22.7 % (ref 34–46.6)
HCT VFR BLD AUTO: 22.7 % (ref 34–46.6)
HCT VFR BLD AUTO: 23.6 % (ref 34–46.6)
HCT VFR BLD AUTO: 24 % (ref 34–46.6)
HGB BLD-MCNC: 7.7 G/DL (ref 12–15.9)
IMM GRANULOCYTES # BLD AUTO: 0.02 10*3/MM3 (ref 0–0.05)
IMM GRANULOCYTES NFR BLD AUTO: 0.4 % (ref 0–0.5)
LABORATORY COMMENT REPORT: ABNORMAL
LYMPHOCYTES # BLD AUTO: 1.45 10*3/MM3 (ref 0.7–3.1)
LYMPHOCYTES NFR BLD AUTO: 31.4 % (ref 19.6–45.3)
M PROTEIN SERPL ELPH-MCNC: ABNORMAL G/DL
MCH RBC QN AUTO: 31.4 PG (ref 26.6–33)
MCHC RBC AUTO-ENTMCNC: 33.9 G/DL (ref 31.5–35.7)
MCV RBC AUTO: 92.7 FL (ref 79–97)
MITOCHONDRIA M2 IGG SER-ACNC: 24.4 UNITS (ref 0–20)
MONOCYTES # BLD AUTO: 0.43 10*3/MM3 (ref 0.1–0.9)
MONOCYTES NFR BLD AUTO: 9.3 % (ref 5–12)
NEUTROPHILS NFR BLD AUTO: 2.55 10*3/MM3 (ref 1.7–7)
NEUTROPHILS NFR BLD AUTO: 55.3 % (ref 42.7–76)
NRBC BLD AUTO-RTO: 0 /100 WBC (ref 0–0.2)
PLATELET # BLD AUTO: 144 10*3/MM3 (ref 140–450)
PMV BLD AUTO: 8.7 FL (ref 6–12)
POTASSIUM SERPL-SCNC: 3.9 MMOL/L (ref 3.5–5.2)
PROT SERPL-MCNC: 7.4 G/DL (ref 6–8.5)
RBC # BLD AUTO: 2.45 10*6/MM3 (ref 3.77–5.28)
SODIUM SERPL-SCNC: 128 MMOL/L (ref 136–145)
WBC # BLD AUTO: 4.62 10*3/MM3 (ref 3.4–10.8)

## 2021-02-11 PROCEDURE — 88342 IMHCHEM/IMCYTCHM 1ST ANTB: CPT | Performed by: INTERNAL MEDICINE

## 2021-02-11 PROCEDURE — G0378 HOSPITAL OBSERVATION PER HR: HCPCS

## 2021-02-11 PROCEDURE — 87081 CULTURE SCREEN ONLY: CPT | Performed by: INTERNAL MEDICINE

## 2021-02-11 PROCEDURE — 85025 COMPLETE CBC W/AUTO DIFF WBC: CPT | Performed by: INTERNAL MEDICINE

## 2021-02-11 PROCEDURE — 85018 HEMOGLOBIN: CPT | Performed by: INTERNAL MEDICINE

## 2021-02-11 PROCEDURE — S0260 H&P FOR SURGERY: HCPCS | Performed by: INTERNAL MEDICINE

## 2021-02-11 PROCEDURE — 43239 EGD BIOPSY SINGLE/MULTIPLE: CPT | Performed by: INTERNAL MEDICINE

## 2021-02-11 PROCEDURE — 85014 HEMATOCRIT: CPT | Performed by: INTERNAL MEDICINE

## 2021-02-11 PROCEDURE — 45378 DIAGNOSTIC COLONOSCOPY: CPT | Performed by: INTERNAL MEDICINE

## 2021-02-11 PROCEDURE — 25010000003 CEFAZOLIN IN DEXTROSE 2-4 GM/100ML-% SOLUTION: Performed by: NURSE PRACTITIONER

## 2021-02-11 PROCEDURE — 80048 BASIC METABOLIC PNL TOTAL CA: CPT | Performed by: INTERNAL MEDICINE

## 2021-02-11 PROCEDURE — 88305 TISSUE EXAM BY PATHOLOGIST: CPT | Performed by: INTERNAL MEDICINE

## 2021-02-11 PROCEDURE — 82962 GLUCOSE BLOOD TEST: CPT

## 2021-02-11 PROCEDURE — 25010000002 PROPOFOL 10 MG/ML EMULSION: Performed by: ANESTHESIOLOGY

## 2021-02-11 RX ORDER — LIDOCAINE HYDROCHLORIDE 20 MG/ML
INJECTION, SOLUTION INFILTRATION; PERINEURAL AS NEEDED
Status: DISCONTINUED | OUTPATIENT
Start: 2021-02-11 | End: 2021-02-11 | Stop reason: SURG

## 2021-02-11 RX ORDER — PROPOFOL 10 MG/ML
VIAL (ML) INTRAVENOUS CONTINUOUS PRN
Status: DISCONTINUED | OUTPATIENT
Start: 2021-02-11 | End: 2021-02-11 | Stop reason: SURG

## 2021-02-11 RX ORDER — PROPOFOL 10 MG/ML
VIAL (ML) INTRAVENOUS AS NEEDED
Status: DISCONTINUED | OUTPATIENT
Start: 2021-02-11 | End: 2021-02-11 | Stop reason: SURG

## 2021-02-11 RX ORDER — GLYCOPYRROLATE 0.2 MG/ML
INJECTION INTRAMUSCULAR; INTRAVENOUS AS NEEDED
Status: DISCONTINUED | OUTPATIENT
Start: 2021-02-11 | End: 2021-02-11 | Stop reason: SURG

## 2021-02-11 RX ORDER — SODIUM CHLORIDE 9 MG/ML
1000 INJECTION, SOLUTION INTRAVENOUS CONTINUOUS
Status: ACTIVE | OUTPATIENT
Start: 2021-02-11 | End: 2021-02-13

## 2021-02-11 RX ADMIN — CEFAZOLIN SODIUM 2 G: 2 INJECTION, SOLUTION INTRAVENOUS at 13:20

## 2021-02-11 RX ADMIN — ACETAMINOPHEN 650 MG: 325 TABLET, FILM COATED ORAL at 21:23

## 2021-02-11 RX ADMIN — CEFAZOLIN SODIUM 2 G: 2 INJECTION, SOLUTION INTRAVENOUS at 00:59

## 2021-02-11 RX ADMIN — PROPOFOL 100 MCG/KG/MIN: 10 INJECTION, EMULSION INTRAVENOUS at 11:21

## 2021-02-11 RX ADMIN — LOSARTAN POTASSIUM 100 MG: 100 TABLET, FILM COATED ORAL at 13:21

## 2021-02-11 RX ADMIN — ACETAMINOPHEN 650 MG: 325 TABLET, FILM COATED ORAL at 13:20

## 2021-02-11 RX ADMIN — GLYCOPYRROLATE 0.2 MG: 0.2 INJECTION INTRAMUSCULAR; INTRAVENOUS at 11:20

## 2021-02-11 RX ADMIN — VENLAFAXINE HYDROCHLORIDE 75 MG: 75 TABLET ORAL at 20:58

## 2021-02-11 RX ADMIN — VENLAFAXINE HYDROCHLORIDE 75 MG: 75 TABLET ORAL at 13:20

## 2021-02-11 RX ADMIN — VANCOMYCIN 125 MG: KIT at 23:46

## 2021-02-11 RX ADMIN — SODIUM CHLORIDE, PRESERVATIVE FREE 10 ML: 5 INJECTION INTRAVENOUS at 20:58

## 2021-02-11 RX ADMIN — FUROSEMIDE 40 MG: 40 TABLET ORAL at 20:58

## 2021-02-11 RX ADMIN — SODIUM CHLORIDE, PRESERVATIVE FREE 10 ML: 5 INJECTION INTRAVENOUS at 13:22

## 2021-02-11 RX ADMIN — VANCOMYCIN 125 MG: KIT at 18:38

## 2021-02-11 RX ADMIN — SODIUM CHLORIDE 1000 ML: 9 INJECTION, SOLUTION INTRAVENOUS at 09:59

## 2021-02-11 RX ADMIN — LEVOTHYROXINE SODIUM 88 MCG: 0.09 TABLET ORAL at 05:43

## 2021-02-11 RX ADMIN — FUROSEMIDE 40 MG: 40 TABLET ORAL at 13:21

## 2021-02-11 RX ADMIN — PROPOFOL 100 MG: 10 INJECTION, EMULSION INTRAVENOUS at 11:21

## 2021-02-11 RX ADMIN — VANCOMYCIN 125 MG: KIT at 06:38

## 2021-02-11 RX ADMIN — CEFAZOLIN SODIUM 2 G: 2 INJECTION, SOLUTION INTRAVENOUS at 20:58

## 2021-02-11 RX ADMIN — Medication 2000 UNITS: at 13:21

## 2021-02-11 RX ADMIN — VANCOMYCIN 125 MG: KIT at 13:21

## 2021-02-11 RX ADMIN — ATORVASTATIN CALCIUM 40 MG: 20 TABLET, FILM COATED ORAL at 13:21

## 2021-02-11 RX ADMIN — VANCOMYCIN 125 MG: KIT at 00:59

## 2021-02-11 RX ADMIN — LIDOCAINE HYDROCHLORIDE 60 MG: 20 INJECTION, SOLUTION INFILTRATION; PERINEURAL at 11:20

## 2021-02-11 NOTE — PLAN OF CARE
Goal Outcome Evaluation:     Progress: no change  Outcome Summary: Bowel prep ten well, last bm watery yellow, small specks of stool noted, denies abd pain, or nausea, voiding at same time with bowel prep, received lasix last night, and tylenol for left shoulder pain. hgb 7.3 at 2000 2/10, called to [facility] [place], no new orders, 0400 lab including h&H drawn, awaiting results, No adverse effects from iv a/b, no blood noted in stool. nad, ctm, nsr on monitor.

## 2021-02-11 NOTE — ANESTHESIA PREPROCEDURE EVALUATION
Anesthesia Evaluation     Patient summary reviewed and Nursing notes reviewed   NPO Solid Status: > 8 hours  NPO Liquid Status: > 4 hours           Airway   Mallampati: I  TM distance: >3 FB  Neck ROM: full  No difficulty expected  Dental - normal exam     Pulmonary - normal exam   (+) pneumonia , a smoker Former, sleep apnea,     ROS comment: covid 19 in November,small infiltrate or atelectasis left lung  Cardiovascular - normal exam    (+) hypertension, hyperlipidemia,       Neuro/Psych  (+) psychiatric history Anxiety and Depression,     GI/Hepatic/Renal/Endo    (+) obesity, morbid obesity, GI bleeding , hepatitis, liver disease, renal disease, diabetes mellitus type 2,     Musculoskeletal     Abdominal  - normal exam    Bowel sounds: normal.   Substance History - negative use     OB/GYN negative ob/gyn ROS         Other   arthritis,                    Anesthesia Plan    ASA 3     MAC       Anesthetic plan, all risks, benefits, and alternatives have been provided, discussed and informed consent has been obtained with: patient.

## 2021-02-11 NOTE — H&P
[facility]  Pre Procedure History & Physical    Chief Complaint:   Anemia, rectal bleeding, abnormal CT findings    Subjective     HPI:   This 55-year-old female presents the endoscopy suite for upper and lower endoscopic evaluations.  She had CT scan findings showing hepatomegaly, splenomegaly, and possible esophageal varices.  She also has a history of anemia as well as rectal bleeding.  No clear history of previous colonoscopic evaluation.    Past Medical History:   Past Medical History:   Diagnosis Date   • Anxiety    • DDD (degenerative disc disease), lumbar    • Depression    • Diabetes mellitus (CMS/HCC)    • Elevated LFTs    • NICK (generalized anxiety disorder)    • History of epilepsy     as a child-Abstracted from pt records   • Hyperlipidemia    • Hypertension    • Hypothyroidism    • MDD (major depressive disorder)    • Morbid obesity (CMS/HCC)    • OA (osteoarthritis)    • Obesity    • Sleep apnea        Past Surgical History:  Past Surgical History:   Procedure Laterality Date   • HYSTERECTOMY  2007   • INCISION AND DRAINAGE SHOULDER Left 1/30/2021    Procedure: INCISION AND DRAINAGE SHOULDER;  Surgeon: Juaquin Richardson II, MD;  Location: [facility];  Service: Orthopedics;  Laterality: Left;   • TUBAL ABDOMINAL LIGATION  1996       Family History:  Family History   Problem Relation Age of Onset   • Obesity Mother    • Diabetes Mother    • Hypertension Mother    • Sleep apnea Mother    • Asthma Mother    • Hypertension Father    • Heart attack Father    • Diabetes Sister    • Hypertension Sister    • Heart attack Sister    • Diabetes Brother    • Hypertension Brother    • Heart attack Brother    • No Known Problems Other    • Breast cancer Paternal Cousin    • Malig Hyperthermia Neg Hx        Social History:   reports that she has quit smoking. She quit after 20.00 years of use. She has never used smokeless tobacco. She reports previous alcohol use. She reports that  "she does not use drugs.    Medications:   Medications Prior to Admission   Medication Sig Dispense Refill Last Dose   • Buprenorphine HCl (Belbuca) 150 MCG film       • ceFAZolin in dextrose (ANCEF) 2-4 GM/100ML-% solution IVPB Infuse 100 mL into a venous catheter Every 8 (Eight) Hours for 107 doses. Indications: Bacteria in the Blood 9000 mL 1    • cholecalciferol (VITAMIN D3) 25 MCG (1000 UT) tablet Take 2,000 Units by mouth Daily.      • cyclobenzaprine (FLEXERIL) 10 MG tablet TAKE 1 TABLET BY MOUTH THREE TIMES DAILY AS NEEDED FOR MUSCLE SPASMS 270 tablet 0    • diclofenac (VOLTAREN) 75 MG EC tablet TAKE 1 TABLET BY MOUTH TWICE DAILY 180 tablet 0    • ezetimibe (ZETIA) 10 MG tablet Take 1 tablet by mouth Daily. 90 tablet 1    • furosemide (LASIX) 40 MG tablet Take 1 tablet by mouth 2 (Two) Times a Day. 60 tablet 0    • levothyroxine (SYNTHROID, LEVOTHROID) 88 MCG tablet TAKE 1 TABLET BY MOUTH DAILY 30 tablet 3    • losartan (COZAAR) 100 MG tablet Take 1 tablet by mouth Daily. 30 tablet 0    • metFORMIN (GLUCOPHAGE) 500 MG tablet Take 2 tablets by mouth 2 (two) times a day. 270 tablet 1    • methylPREDNISolone (MEDROL) 4 MG dose pack Take as directed on package instructions. 1 each 0    • simvastatin (ZOCOR) 80 MG tablet TAKE 1 TABLET BY MOUTH EVERY NIGHT 90 tablet 0    • venlafaxine (EFFEXOR) 75 MG tablet Take 1 tablet by mouth 2 (Two) Times a Day. 180 tablet 0        Allergies:  Tramadol    ROS:    Pertinent items are noted in HPI, all other systems reviewed and negative     Objective     Blood pressure 138/66, pulse 82, temperature 99.1 °F (37.3 °C), temperature source Oral, resp. rate 16, height 170.2 cm (67\"), weight (!) 139 kg (306 lb 11.2 oz), SpO2 95 %.    Physical Exam   Constitutional: Pt is oriented to person, place, and time and well-developed, well-nourished, and in no distress.   Mouth/Throat: Oropharynx is clear and moist.   Neck: Normal range of motion.   Cardiovascular: Normal rate, regular rhythm " and normal heart sounds.    Pulmonary/Chest: Effort normal and breath sounds normal.   Abdominal: Soft. Nontender  Skin: Skin is warm and dry.   Psychiatric: Mood, memory, affect and judgment normal.     Assessment/Plan     Diagnosis:  Anemia  Rectal bleeding  Abnormal CT findings    Anticipated Surgical Procedure:  EGD, colonoscopy    The risks, benefits, and alternatives of this procedure have been discussed with the patient or the responsible party- the patient understands and agrees to proceed.

## 2021-02-11 NOTE — PROGRESS NOTES
Name: Stefani Bhandari ADMIT: 2021   : 1965  PCP: Dea Martinez MD    MRN: [id number] LOS: 0 days   AGE/SEX: 55 y.o. female  ROOM: [facility]     Subjective   Subjective       Patient is lying on the bed in no major distress.  No new events overnight.  Denies nausea, vomiting abdominal pain, chest pain.     Objective   Objective   Vital Signs  Temp:  [98 °F (36.7 °C)-99.4 °F (37.4 °C)] 98.3 °F (36.8 °C)  Heart Rate:  [79-90] 89  Resp:  [14-16] 16  BP: (113-163)/(64-89) 148/86  SpO2:  [93 %-99 %] 96 %  on  Flow (L/min):  [6-8] 6;   Device (Oxygen Therapy): room air  Body mass index is 48.02 kg/m².  Physical Exam    HEENT:  Atraumatic, normocephalic.  PERRLA.  Extraocular movements intact.  Conjunctivae pink.  Sclerae, no icterus.  Mucous membranes dry.    NECK:  Supple.  No JVD.  HEART:  Regular rate and rhythm.  Normal S1, S2.   LUNGS:  Fairly clear to auscultation anteriorly.  No wheezes.  No crackles.  ABDOMEN:   Soft, nontender.  Bowel sounds present.  No rebound.  No  guarding.  EXTREMITIES:  No cyanosis, clubbing, or edema.  Palpable pedal pulses.  NEURO:  Grossly nonfocal.  No facial asymmetry.  Good strength in all 4  extremities.  SKIN:  Warm and dry.  No evidence of rashes.      Results Review     I reviewed the patient's new clinical results.  Results from last 7 days   Lab Units 21  1329 21  0403 02/10/21  2003 02/10/21  1238 02/10/21  0531  21  0223 21   WBC 10*3/mm3  --  4.62  --   --  4.84  --  4.69 4.32   HEMOGLOBIN g/dL 7.7* 7.7*  7.7* 7.3* 7.4* 8.6*   < > 7.6*  7.6* 6.8*   PLATELETS 10*3/mm3  --  144  --   --  150  --  175 167    < > = values in this interval not displayed.     Results from last 7 days   Lab Units 21  0403 02/10/21  0531 21  0442 21   SODIUM mmol/L 128* 130* 134* 136   POTASSIUM mmol/L 3.9 4.0 3.8 3.7   CHLORIDE mmol/L 93* 95* 99 100   CO2 mmol/L 25.5 29.0 27.6 28.5   BUN mg/dL 17 18 24* 27*   CREATININE mg/dL  0.66 0.66 0.73 0.83   GLUCOSE mg/dL 98 94 95 173*   Estimated Creatinine Clearance: 139.1 mL/min (by C-G formula based on SCr of 0.66 mg/dL).  Results from last 7 days   Lab Units 02/10/21  0531 02/08/21 2021 02/05/21  0529   ALBUMIN g/dL 2.60*  2.3* 2.30* 2.20*   BILIRUBIN mg/dL 0.6 0.3  --    ALK PHOS U/L 95 93  --    AST (SGOT) U/L 21 16  --    ALT (SGPT) U/L <5 5  --      Results from last 7 days   Lab Units 02/11/21  0403 02/10/21  0531 02/09/21  0442 02/08/21 2021 02/05/21  0529   CALCIUM mg/dL 8.5* 8.6 8.4* 8.2* 8.4*   ALBUMIN g/dL  --  2.60*  2.3*  --  2.30* 2.20*   MAGNESIUM mg/dL  --   --   --   --  1.5*   PHOSPHORUS mg/dL  --   --   --   --  4.2     Results from last 7 days   Lab Units 02/08/21  2020   LACTATE mmol/L 1.9     COVID19   Date Value Ref Range Status   02/08/2021 Not Detected Not Detected - Ref. Range Final   01/29/2021 Not Detected Not Detected - Ref. Range Final     Glucose   Date/Time Value Ref Range Status   02/11/2021 1606 104 70 - 130 mg/dL Final   02/11/2021 0625 106 70 - 130 mg/dL Final   02/10/2021 2034 127 70 - 130 mg/dL Final   02/10/2021 1555 94 70 - 130 mg/dL Final   02/10/2021 1113 102 70 - 130 mg/dL Final   02/10/2021 0626 94 70 - 130 mg/dL Final   02/09/2021 2102 85 70 - 130 mg/dL Final       CT Abdomen Pelvis With Contrast  Narrative: CT ABDOMEN PELVIS W CONTRAST-     CLINICAL HISTORY: Low abdomen pain. GI bleeding. Follow-up hepatomegaly  seen on CT scan of the chest.     TECHNIQUE: Spiral CT images were acquired through the abdomen and pelvis  with IV contrast only and were reconstructed in 3 mm thick axial slices.     Radiation dose reduction techniques were utilized, including automated  exposure control and exposure modulation based on body size.     COMPARISON: CT chest dated 01/24/2021     FINDINGS: As shown on the CT chest, the liver is mildly enlarged. It  measures 22.5 cm in greatest cephalocaudad dimension. The liver margins  are lobulated consistent with  cirrhosis. No focal hepatic lesions are  identified. There is no bile duct dilatation. Mild splenomegaly is also  present. The spleen measures 17.7 cm in greatest transverse diameter.  Mildly enlarged tortuous venous collateral vessels are noted along the  course of the splenic vein. Recanalization of the umbilical vein is also  present. Small esophageal varices are present. The findings are  consistent with portal hypertension. A few borderline-enlarged lymph  nodes in the upper abdomen are again noted, and are unchanged and are  likely benign based on size criteria. The pancreas and kidneys and  adrenal glands appear within normal limits. There is a small amount of  ascites adjacent to the liver and spleen and also in the pelvis that is  new. The stomach and small and large bowel are unremarkable. The uterus  is absent. Images through the lung bases demonstrate patchy linear  atelectasis in the left lower lobe that appears slightly more prominent.  There is also a small ill-defined nodular opacity in the posterior  aspect of the right lower lobe adjacent to the pleural surface that is  new, and is most likely due to atelectasis or focal infiltrate. There is  mild patchy stranding of the subcutaneous fat throughout the abdomen and  pelvis that is new, and is consistent with anasarca.     Impression: Mild hepatomegaly with cirrhotic liver morphology and  evidence of mild portal hypertension as described. Small amount of  ascites in the abdomen and pelvis that is new since the preceding CT  scan dated 01/24/2021. Worsening patchy atelectasis in the left lower  lobe. Small focal pleural-based nodular opacity in the right lower lobe  that is new and is consistent with infiltrate or atelectasis. Mild  splenomegaly.     This report was finalized on 2/9/2021 9:30 AM by Dr. James Vera M.D.       Scheduled Medications  atorvastatin, 40 mg, Oral, Daily  buprenorphine-naloxone, 1 film, Sublingual, Daily  ceFAZolin in  dextrose, 2 g, Intravenous, Q8H  cholecalciferol, 2,000 Units, Oral, Daily  furosemide, 40 mg, Oral, BID  insulin lispro, 0-7 Units, Subcutaneous, TID AC  levothyroxine, 88 mcg, Oral, Q AM  losartan, 100 mg, Oral, Q24H  polyethylene glycol, 119 g, Oral, BID  sodium chloride, 10 mL, Intravenous, Q12H  vancomycin, 125 mg, Oral, Q6H  venlafaxine, 75 mg, Oral, BID    Infusions  sodium chloride, 1,000 mL, Last Rate: 1,000 mL (02/11/21 0959)    Diet  Diet Regular; Consistent Carbohydrate       Assessment/Plan     Active Hospital Problems    Diagnosis  POA   • **Symptomatic anemia [D64.9]  Yes   • Rectal bleeding [K62.5]  Unknown   • Anemia [D64.9]  Unknown   • Abnormal CT scan, liver [R93.2]  Unknown   • Septic arthritis of shoulder, left (CMS/HCC) [M00.9]  Yes   • MSSA bacteremia [R78.81, B95.61]  Yes   • Pulmonary nodules [R91.8]  Yes   • Elevated liver function tests [R79.89]  Yes   • Mixed hyperlipidemia [E78.2]  Yes   • Obesity, Class III, BMI 40-49.9 (morbid obesity) (CMS/formerly [facility]) [E66.01]  Yes   • Essential hypertension [I10]  Yes   • Anxiety and depression [F41.9, F32.9]  Yes   • Diabetes mellitus type 2 in obese (CMS/formerly [facility]) [E11.69, E66.9]  Yes      Resolved Hospital Problems   No resolved problems to display.       1. Symptomatic anemia, there is no evidence of any active bleed at this point.  GI consultation has been obtained and plan is for EGD  And colonoscopy today and underwent bowel prep.  2. Recent history of left shoulder septic arthritis leading to MSSA bacteremia, currently on IV cefazolin and will be continued.  3. Diabetes mellitus, continue with corrective dose insulin for the moment.    4. Hypertension, continue to monitor blood pressure closely.  Losartan and will be continued.  5. Hyperlipidemia on statins.    6. Hypothyroidism, on Synthroid.  7. On SCDs for DVT prophylaxis.      Norman Jurado MD  [facility]  02/11/21  17:01 EST      I wore protective equipment throughout  this patient encounter including a face mask, gloves and protective eyewear.  Hand hygiene was performed before donning protective equipment and after removal when leaving the room.

## 2021-02-11 NOTE — PROGRESS NOTES
"Adult Nutrition  Assessment/PES    Patient Name:  Stefani Bhandari  YOB: 1965  MRN: [id number]  Admit Date:  2/8/2021    Assessment Date:  2/11/2021    Comments:  Nutrition assessment triggered by NPO/Clear Liquid Diet x 3 days.  Rectal bleeding, anemia.  + CDiff.  Currently on CLD.  Plans for EGD and colonoscopy today.    RD to follow up as diet is advanced.    RD working remotely due to covid-19 pandemic. Assessment completed based on review of electronic medical record. RD may be reached via secure chat or email.     Reason for Assessment     Row Name 02/11/21 1200          Reason for Assessment    Reason For Assessment  identified at risk by screening criteria     Diagnosis  psychosocial;other (see comments);diabetes diagnosis/complications;endocrine conditions;cardiac disease;pulmonary disease;gastrointestinal disease;neurologic conditions anxiety, DDD, depression, DM, epilepsy, HLD, HTN, hypothyroid, OA, sleep apnea; adm with anemia, rectal bleeding         Nutrition/Diet History     Row Name 02/11/21 1203          Nutrition/Diet History    Typical Food/Fluid Intake  CLD currently, EGD & colonoscopy today         Anthropometrics     Row Name 02/11/21 1204          Anthropometrics    Height  170.2 cm (67.01\")        Admit Weight    Admit Weight  -- 306# 2/9        Ideal Body Weight (IBW)    Ideal Body Weight (IBW) (kg)  61.88        Body Mass Index (BMI)    BMI Assessment  BMI 40 or greater: obesity grade III 47.89         Labs/Tests/Procedures/Meds     Row Name 02/11/21 1204          Labs/Procedures/Meds    Lab Results Reviewed  reviewed, pertinent     Lab Results Comments  Na, Ca, Hgb, Hct        Diagnostic Tests/Procedures    Diagnostic Test/Procedure Reviewed  reviewed, pertinent     Diagnostic Test/Procedures Comments  EGD & colonoscopy today        Medications    Pertinent Medications Reviewed  reviewed, pertinent     Pertinent Medications Comments  vit D3, lasix, humalog, synthroid, " "miralax, darwin, IVFs         Physical Findings     Row Name 02/11/21 1205          Physical Findings    Overall Physical Appearance  obese     Gastrointestinal  nausea;diarrhea +Cdiff     Skin  surgical incision B=21, inc         Estimated/Assessed Needs     Row Name 02/11/21 1204          Calculation Measurements    Height  170.2 cm (67.01\")         Nutrition Prescription Ordered     Row Name 02/11/21 1206          Nutrition Prescription PO    Current PO Diet  Clear Liquid         Evaluation of Received Nutrient/Fluid Intake     Row Name 02/11/21 1206          PO Evaluation    Number of Meals  3     % PO Intake   CLD               Problem/Interventions:  Problem 1     Row Name 02/11/21 1208          Nutrition Diagnoses Problem 1    Problem 1  Inadequate Intake/Infusion     Inadequate Intake Type  Oral     Macronutrient  Kcal;Protein     Etiology (related to)  MNT for Treatment/Condition     Signs/Symptoms (evidenced by)  Clear Liquid Diet               Intervention Goal     Row Name 02/11/21 1209          Intervention Goal    General  Maintain nutrition;Reduce/improve symptoms;Disease management/therapy     PO  Advance diet;Tolerate PO;PO intake (%)     PO Intake %  75 %     Weight  Appropriate weight loss         Nutrition Intervention     Row Name 02/11/21 1209          Nutrition Intervention    RD/Tech Action  Follow Tx progress;Care plan reviewd           Education/Evaluation     Row Name 02/11/21 1210          Education    Education  Will Instruct as appropriate        Monitor/Evaluation    Monitor  Per protocol;I&O;Pertinent labs;Weight;Skin status;Symptoms           Electronically signed by:  Zhane Shaffer RD  02/11/21 12:12 EST  "

## 2021-02-11 NOTE — ANESTHESIA POSTPROCEDURE EVALUATION
"Patient: Stefani Bhandari    Procedure Summary     Date: 02/11/21 Room / Location:  [facility]    Anesthesia Start: 1116 Anesthesia Stop: 1206    Procedures:       COLONOSCOPY INTO CECUM (N/A )      ESOPHAGOGASTRODUODENOSCOPY WITH BIOPSIES (N/A Esophagus) Diagnosis:       Esophagitis      Gastropathy      Hiatal hernia      Diverticulosis      Hemorrhoids      (Rectal bleeding [K62.5])      (Anemia, unspecified type [D64.9])      (Abnormal CT scan, liver [R93.2])    Surgeon: Joshua Bender MD Provider: James Chavez MD    Anesthesia Type: MAC ASA Status: 3          Anesthesia Type: MAC    Vitals  Vitals Value Taken Time   /82 02/11/21 1225   Temp     Pulse 89 02/11/21 1225   Resp 15 02/11/21 1225   SpO2 96 % 02/11/21 1225           Post Anesthesia Care and Evaluation    Patient location during evaluation: PACU  Patient participation: complete - patient participated  Level of consciousness: awake  Pain score: 0  Pain management: adequate  Airway patency: patent  Anesthetic complications: No anesthetic complications  PONV Status: none  Cardiovascular status: acceptable  Respiratory status: acceptable  Hydration status: acceptable    Comments: /82 (BP Location: Left arm, Patient Position: Lying)   Pulse 89   Temp 37.3 °C (99.1 °F) (Oral)   Resp 15   Ht 170.2 cm (67.01\")   Wt (!) 139 kg (306 lb 11.2 oz)   SpO2 96%   BMI 48.02 kg/m²       "

## 2021-02-11 NOTE — PLAN OF CARE
Goal Outcome Evaluation:     Progress: no change   Patient alert and orientated, remains on room air. Patient down for EGD and colonoscopy today. Still having watery stools from bowel prep. Patient continues on IV and PO antibiotics. Complaints of pain in left shoulder from previous shoulder surgery. Given tylenol with relief. VSS will continue to monitor.

## 2021-02-12 LAB
ANION GAP SERPL CALCULATED.3IONS-SCNC: 7.1 MMOL/L (ref 5–15)
BASOPHILS # BLD AUTO: 0.02 10*3/MM3 (ref 0–0.2)
BASOPHILS NFR BLD AUTO: 0.5 % (ref 0–1.5)
BUN SERPL-MCNC: 20 MG/DL (ref 6–20)
BUN/CREAT SERPL: 28.2 (ref 7–25)
CALCIUM SPEC-SCNC: 8.1 MG/DL (ref 8.6–10.5)
CHLORIDE SERPL-SCNC: 96 MMOL/L (ref 98–107)
CO2 SERPL-SCNC: 26.9 MMOL/L (ref 22–29)
CREAT SERPL-MCNC: 0.71 MG/DL (ref 0.57–1)
DEPRECATED RDW RBC AUTO: 46.3 FL (ref 37–54)
EOSINOPHIL # BLD AUTO: 0.12 10*3/MM3 (ref 0–0.4)
EOSINOPHIL NFR BLD AUTO: 3.2 % (ref 0.3–6.2)
ERYTHROCYTE [DISTWIDTH] IN BLOOD BY AUTOMATED COUNT: 13.9 % (ref 12.3–15.4)
GFR SERPL CREATININE-BSD FRML MDRD: 85 ML/MIN/1.73
GLUCOSE BLDC GLUCOMTR-MCNC: 143 MG/DL (ref 70–130)
GLUCOSE BLDC GLUCOMTR-MCNC: 155 MG/DL (ref 70–130)
GLUCOSE BLDC GLUCOMTR-MCNC: 183 MG/DL (ref 70–130)
GLUCOSE BLDC GLUCOMTR-MCNC: 222 MG/DL (ref 70–130)
GLUCOSE SERPL-MCNC: 145 MG/DL (ref 65–99)
HCT VFR BLD AUTO: 21.8 % (ref 34–46.6)
HCT VFR BLD AUTO: 22 % (ref 34–46.6)
HCT VFR BLD AUTO: 22.2 % (ref 34–46.6)
HCT VFR BLD AUTO: 22.2 % (ref 34–46.6)
HGB BLD-MCNC: 7.1 G/DL (ref 12–15.9)
HGB BLD-MCNC: 7.2 G/DL (ref 12–15.9)
HGB BLD-MCNC: 7.4 G/DL (ref 12–15.9)
HGB BLD-MCNC: 7.4 G/DL (ref 12–15.9)
IMM GRANULOCYTES # BLD AUTO: 0.01 10*3/MM3 (ref 0–0.05)
IMM GRANULOCYTES NFR BLD AUTO: 0.3 % (ref 0–0.5)
LYMPHOCYTES # BLD AUTO: 1.13 10*3/MM3 (ref 0.7–3.1)
LYMPHOCYTES NFR BLD AUTO: 30 % (ref 19.6–45.3)
MCH RBC QN AUTO: 30.8 PG (ref 26.6–33)
MCHC RBC AUTO-ENTMCNC: 33.3 G/DL (ref 31.5–35.7)
MCV RBC AUTO: 92.5 FL (ref 79–97)
MONOCYTES # BLD AUTO: 0.35 10*3/MM3 (ref 0.1–0.9)
MONOCYTES NFR BLD AUTO: 9.3 % (ref 5–12)
NEUTROPHILS NFR BLD AUTO: 2.14 10*3/MM3 (ref 1.7–7)
NEUTROPHILS NFR BLD AUTO: 56.7 % (ref 42.7–76)
NRBC BLD AUTO-RTO: 0 /100 WBC (ref 0–0.2)
PLATELET # BLD AUTO: 125 10*3/MM3 (ref 140–450)
PMV BLD AUTO: 9 FL (ref 6–12)
POTASSIUM SERPL-SCNC: 4.1 MMOL/L (ref 3.5–5.2)
RBC # BLD AUTO: 2.4 10*6/MM3 (ref 3.77–5.28)
SODIUM SERPL-SCNC: 130 MMOL/L (ref 136–145)
UREASE TISS QL: NEGATIVE
WBC # BLD AUTO: 3.77 10*3/MM3 (ref 3.4–10.8)

## 2021-02-12 PROCEDURE — 85018 HEMOGLOBIN: CPT | Performed by: INTERNAL MEDICINE

## 2021-02-12 PROCEDURE — G0378 HOSPITAL OBSERVATION PER HR: HCPCS

## 2021-02-12 PROCEDURE — 99214 OFFICE O/P EST MOD 30 MIN: CPT | Performed by: INTERNAL MEDICINE

## 2021-02-12 PROCEDURE — 85025 COMPLETE CBC W/AUTO DIFF WBC: CPT | Performed by: INTERNAL MEDICINE

## 2021-02-12 PROCEDURE — 85014 HEMATOCRIT: CPT | Performed by: INTERNAL MEDICINE

## 2021-02-12 PROCEDURE — 25010000003 CEFAZOLIN IN DEXTROSE 2-4 GM/100ML-% SOLUTION: Performed by: NURSE PRACTITIONER

## 2021-02-12 PROCEDURE — 63710000001 INSULIN LISPRO (HUMAN) PER 5 UNITS: Performed by: NURSE PRACTITIONER

## 2021-02-12 PROCEDURE — 80048 BASIC METABOLIC PNL TOTAL CA: CPT | Performed by: INTERNAL MEDICINE

## 2021-02-12 PROCEDURE — 82962 GLUCOSE BLOOD TEST: CPT

## 2021-02-12 RX ADMIN — ATORVASTATIN CALCIUM 40 MG: 20 TABLET, FILM COATED ORAL at 08:03

## 2021-02-12 RX ADMIN — FUROSEMIDE 40 MG: 40 TABLET ORAL at 08:03

## 2021-02-12 RX ADMIN — CEFAZOLIN SODIUM 2 G: 2 INJECTION, SOLUTION INTRAVENOUS at 05:59

## 2021-02-12 RX ADMIN — CEFAZOLIN SODIUM 2 G: 2 INJECTION, SOLUTION INTRAVENOUS at 19:50

## 2021-02-12 RX ADMIN — LEVOTHYROXINE SODIUM 88 MCG: 0.09 TABLET ORAL at 05:59

## 2021-02-12 RX ADMIN — CEFAZOLIN SODIUM 2 G: 2 INJECTION, SOLUTION INTRAVENOUS at 12:38

## 2021-02-12 RX ADMIN — Medication 2000 UNITS: at 08:03

## 2021-02-12 RX ADMIN — VANCOMYCIN 125 MG: KIT at 23:58

## 2021-02-12 RX ADMIN — SODIUM CHLORIDE, PRESERVATIVE FREE 10 ML: 5 INJECTION INTRAVENOUS at 08:04

## 2021-02-12 RX ADMIN — ACETAMINOPHEN 650 MG: 325 TABLET, FILM COATED ORAL at 19:50

## 2021-02-12 RX ADMIN — VANCOMYCIN 125 MG: KIT at 18:24

## 2021-02-12 RX ADMIN — LOSARTAN POTASSIUM 100 MG: 100 TABLET, FILM COATED ORAL at 08:03

## 2021-02-12 RX ADMIN — VANCOMYCIN 125 MG: KIT at 06:14

## 2021-02-12 RX ADMIN — VENLAFAXINE HYDROCHLORIDE 75 MG: 75 TABLET ORAL at 19:50

## 2021-02-12 RX ADMIN — INSULIN LISPRO 4 UNITS: 100 INJECTION, SOLUTION INTRAVENOUS; SUBCUTANEOUS at 12:38

## 2021-02-12 RX ADMIN — FUROSEMIDE 40 MG: 40 TABLET ORAL at 19:50

## 2021-02-12 RX ADMIN — INSULIN LISPRO 2 UNITS: 100 INJECTION, SOLUTION INTRAVENOUS; SUBCUTANEOUS at 08:03

## 2021-02-12 RX ADMIN — VANCOMYCIN 125 MG: KIT at 12:38

## 2021-02-12 RX ADMIN — VENLAFAXINE HYDROCHLORIDE 75 MG: 75 TABLET ORAL at 08:03

## 2021-02-12 NOTE — PLAN OF CARE
Goal Outcome Evaluation:     Progress: improving  Outcome Summary: Patient alert and oriented. No complaints of pain, only discomfort to lower abdomen when transitioning to bedside commode. No SOA or nausea. Up in chair throughout shift. Continue IV abx and oral vanc. H&H q8. VSS. No acute distress noted. Nursing will continue to monitor.

## 2021-02-12 NOTE — PROGRESS NOTES
Name: Stefani Bhandari ADMIT: 2021   : 1965  PCP: Dea Martinez MD    MRN: [id number] LOS: 0 days   AGE/SEX: 55 y.o. female  ROOM: [facility]     Subjective   Subjective         Patient is sitting up in a chair and has no specific complaints.  No new events overnight.  Denies nausea, vomiting abdominal pain, chest pain, shortness of breath.     Objective   Objective   Vital Signs  Temp:  [96.7 °F (35.9 °C)-99.8 °F (37.7 °C)] 99.8 °F (37.7 °C)  Heart Rate:  [77-81] 81  Resp:  [16-18] 16  BP: (144-152)/(72-93) 151/72  SpO2:  [98 %-100 %] 98 %  on   ;   Device (Oxygen Therapy): room air  Body mass index is 48.02 kg/m².  Physical Exam    HEENT:  Atraumatic, normocephalic.  PERRLA.  Extraocular movements intact.  Conjunctivae pink.  Sclerae, no icterus.  Mucous membranes dry.    NECK:  Supple.  No JVD.  HEART:  Regular rate and rhythm.  Normal S1, S2.   LUNGS:  Fairly clear to auscultation anteriorly.  No wheezes.  No crackles.  ABDOMEN:   Soft, nontender.  Bowel sounds present.  No rebound.  No  guarding.  EXTREMITIES:  No cyanosis, clubbing, or edema.  Palpable pedal pulses.  NEURO:  Grossly nonfocal.  No facial asymmetry.  Good strength in all 4  extremities.  SKIN:  Warm and dry.  No evidence of rashes.      Results Review     I reviewed the patient's new clinical results.  Results from last 7 days   Lab Units 21  1434 21  0608 21  2109 21  1329 21  0403  02/10/21  0531  21  0223   WBC 10*3/mm3  --  3.77  --   --  4.62  --  4.84  --  4.69   HEMOGLOBIN g/dL 7.1* 7.4*  7.4* 7.7* 7.7* 7.7*  7.7*   < > 8.6*   < > 7.6*  7.6*   PLATELETS 10*3/mm3  --  125*  --   --  144  --  150  --  175    < > = values in this interval not displayed.     Results from last 7 days   Lab Units 21  0608 21  0403 02/10/21  0531 21  0442   SODIUM mmol/L 130* 128* 130* 134*   POTASSIUM mmol/L 4.1 3.9 4.0 3.8   CHLORIDE mmol/L 96* 93* 95* 99   CO2 mmol/L 26.9 25.5 29.0 27.6     BUN mg/dL 20 17 18 24*   CREATININE mg/dL 0.71 0.66 0.66 0.73   GLUCOSE mg/dL 145* 98 94 95   Estimated Creatinine Clearance: 129.3 mL/min (by C-G formula based on SCr of 0.71 mg/dL).  Results from last 7 days   Lab Units 02/10/21  0531 02/08/21 2021   ALBUMIN g/dL 2.60*  2.3* 2.30*   BILIRUBIN mg/dL 0.6 0.3   ALK PHOS U/L 95 93   AST (SGOT) U/L 21 16   ALT (SGPT) U/L <5 5     Results from last 7 days   Lab Units 02/12/21  0608 02/11/21  0403 02/10/21  0531 02/09/21  0442 02/08/21 2021   CALCIUM mg/dL 8.1* 8.5* 8.6 8.4* 8.2*   ALBUMIN g/dL  --   --  2.60*  2.3*  --  2.30*     Results from last 7 days   Lab Units 02/08/21 2020   LACTATE mmol/L 1.9     COVID19   Date Value Ref Range Status   02/08/2021 Not Detected Not Detected - Ref. Range Final   01/29/2021 Not Detected Not Detected - Ref. Range Final     Glucose   Date/Time Value Ref Range Status   02/12/2021 1610 143 (H) 70 - 130 mg/dL Final   02/12/2021 1109 222 (H) 70 - 130 mg/dL Final   02/12/2021 0612 155 (H) 70 - 130 mg/dL Final   02/11/2021 2111 113 70 - 130 mg/dL Final   02/11/2021 1606 104 70 - 130 mg/dL Final   02/11/2021 0625 106 70 - 130 mg/dL Final   02/10/2021 2034 127 70 - 130 mg/dL Final       CT Abdomen Pelvis With Contrast  Narrative: CT ABDOMEN PELVIS W CONTRAST-     CLINICAL HISTORY: Low abdomen pain. GI bleeding. Follow-up hepatomegaly  seen on CT scan of the chest.     TECHNIQUE: Spiral CT images were acquired through the abdomen and pelvis  with IV contrast only and were reconstructed in 3 mm thick axial slices.     Radiation dose reduction techniques were utilized, including automated  exposure control and exposure modulation based on body size.     COMPARISON: CT chest dated 01/24/2021     FINDINGS: As shown on the CT chest, the liver is mildly enlarged. It  measures 22.5 cm in greatest cephalocaudad dimension. The liver margins  are lobulated consistent with cirrhosis. No focal hepatic lesions are  identified. There is no bile duct  dilatation. Mild splenomegaly is also  present. The spleen measures 17.7 cm in greatest transverse diameter.  Mildly enlarged tortuous venous collateral vessels are noted along the  course of the splenic vein. Recanalization of the umbilical vein is also  present. Small esophageal varices are present. The findings are  consistent with portal hypertension. A few borderline-enlarged lymph  nodes in the upper abdomen are again noted, and are unchanged and are  likely benign based on size criteria. The pancreas and kidneys and  adrenal glands appear within normal limits. There is a small amount of  ascites adjacent to the liver and spleen and also in the pelvis that is  new. The stomach and small and large bowel are unremarkable. The uterus  is absent. Images through the lung bases demonstrate patchy linear  atelectasis in the left lower lobe that appears slightly more prominent.  There is also a small ill-defined nodular opacity in the posterior  aspect of the right lower lobe adjacent to the pleural surface that is  new, and is most likely due to atelectasis or focal infiltrate. There is  mild patchy stranding of the subcutaneous fat throughout the abdomen and  pelvis that is new, and is consistent with anasarca.     Impression: Mild hepatomegaly with cirrhotic liver morphology and  evidence of mild portal hypertension as described. Small amount of  ascites in the abdomen and pelvis that is new since the preceding CT  scan dated 01/24/2021. Worsening patchy atelectasis in the left lower  lobe. Small focal pleural-based nodular opacity in the right lower lobe  that is new and is consistent with infiltrate or atelectasis. Mild  splenomegaly.     This report was finalized on 2/9/2021 9:30 AM by Dr. James Vera M.D.       Scheduled Medications  atorvastatin, 40 mg, Oral, Daily  buprenorphine-naloxone, 1 film, Sublingual, Daily  ceFAZolin in dextrose, 2 g, Intravenous, Q8H  cholecalciferol, 2,000 Units, Oral,  Daily  furosemide, 40 mg, Oral, BID  insulin lispro, 0-7 Units, Subcutaneous, TID AC  levothyroxine, 88 mcg, Oral, Q AM  losartan, 100 mg, Oral, Q24H  sodium chloride, 10 mL, Intravenous, Q12H  vancomycin, 125 mg, Oral, Q6H  venlafaxine, 75 mg, Oral, BID    Infusions  sodium chloride, 1,000 mL, Last Rate: 1,000 mL (02/11/21 0959)    Diet  Diet Regular; Consistent Carbohydrate       Assessment/Plan     Active Hospital Problems    Diagnosis  POA   • **Symptomatic anemia [D64.9]  Yes   • Rectal bleeding [K62.5]  Unknown   • Anemia [D64.9]  Unknown   • Abnormal CT scan, liver [R93.2]  Unknown   • Septic arthritis of shoulder, left (CMS/[facility]) [M00.9]  Yes   • MSSA bacteremia [R78.81, B95.61]  Yes   • Pulmonary nodules [R91.8]  Yes   • Elevated liver function tests [R79.89]  Yes   • Mixed hyperlipidemia [E78.2]  Yes   • Obesity, Class III, BMI 40-49.9 (morbid obesity) (CMS/[facility]) [E66.01]  Yes   • Essential hypertension [I10]  Yes   • Anxiety and depression [F41.9, F32.9]  Yes   • Diabetes mellitus type 2 in obese (CMS/[facility]) [E11.69, E66.9]  Yes      Resolved Hospital Problems   No resolved problems to display.       1. Symptomatic anemia, there is no evidence of any active bleed at this point.  GI consultation has been obtained and  Underwent EGD and colonoscopy with no significant findings.  2. Recent history of left shoulder septic arthritis leading to MSSA bacteremia, currently on IV cefazolin and will be continued.  3. Diabetes mellitus, continue with corrective dose insulin for the moment.    4. Hypertension, continue to monitor blood pressure closely.  Losartan and will be continued.  5. Hyperlipidemia on statins.    6. Hypothyroidism, on Synthroid.  7. C diff colitis, diarrhea none over the past 1-2 days.  However p.o. vancomycin has been initiated and given that patient will be on IV cefazolin infectious disease consult has been obtained to determine the duration of the antibiotics.  8. On SCDs for DVT  prophylaxis.      Norman Jurado MD  [facility]  02/12/21  17:23 EST      I wore protective equipment throughout this patient encounter including a face mask, gloves and protective eyewear.  Hand hygiene was performed before donning protective equipment and after removal when leaving the room.

## 2021-02-12 NOTE — PLAN OF CARE
Goal Outcome Evaluation:  Plan of Care Reviewed With: patient  Progress: no change  Outcome Summary: Pt comfortable sleeping  in r/c, has sleep apnea. vss, ra, satting in mid 90s, nsr, nad. transfers to bsc, voiding adequately and bm x1 this shift. last hgb 7.1, no chnage from last draw.

## 2021-02-12 NOTE — PROGRESS NOTES
[facility]  Inpatient Progress Note    Reason for Follow Up: Rectal bleeding, anemia    Subjective     Interval History:   Discussed endoscopic findings with patient, biopsies are pending.  She is being treated for C. difficile colitis.  H&H relatively stable.    Current Facility-Administered Medications:   •  acetaminophen (TYLENOL) tablet 650 mg, 650 mg, Oral, Q4H PRN, 650 mg at 02/11/21 2123 **OR** acetaminophen (TYLENOL) 160 MG/5ML solution 650 mg, 650 mg, Oral, Q4H PRN **OR** acetaminophen (TYLENOL) suppository 650 mg, 650 mg, Rectal, Q4H PRN, Cindy Torres, APRN  •  atorvastatin (LIPITOR) tablet 40 mg, 40 mg, Oral, Daily, Cindy Torres APRN, 40 mg at 02/12/21 0803  •  buprenorphine-naloxone (SUBOXONE) 8-2 MG film 1 film, 1 film, Sublingual, Daily, Cindy Torres APRN  •  ceFAZolin in dextrose (ANCEF) IVPB solution 2 g, 2 g, Intravenous, Q8H, Cindy Torres APRN, Stopped at 02/12/21 0803  •  cholecalciferol (VITAMIN D3) tablet 2,000 Units, 2,000 Units, Oral, Daily, Cindy Torres APRN, 2,000 Units at 02/12/21 0803  •  cyclobenzaprine (FLEXERIL) tablet 10 mg, 10 mg, Oral, TID PRN, Cindy Torres APRN  •  dextrose (D50W) 25 g/ 50mL Intravenous Solution 25 g, 25 g, Intravenous, Q15 Min PRN, Cindy Torres APRN  •  dextrose (GLUTOSE) oral gel 15 g, 15 g, Oral, Q15 Min PRN, Cindy Torres APRN  •  furosemide (LASIX) tablet 40 mg, 40 mg, Oral, BID, Cindy Torres APRN, 40 mg at 02/12/21 0803  •  glucagon (human recombinant) (GLUCAGEN DIAGNOSTIC) injection 1 mg, 1 mg, Subcutaneous, Q15 Min PRN, Cindy Torres APRN  •  insulin lispro (humaLOG, ADMELOG) injection 0-7 Units, 0-7 Units, Subcutaneous, TID AC, Cindy Torres, APRN, 2 Units at 02/12/21 0803  •  levothyroxine (SYNTHROID, LEVOTHROID) tablet 88 mcg, 88 mcg, Oral, Q AM, Cindy Torres, APRN, 88 mcg at 02/12/21 0559  •  losartan (COZAAR) tablet 100 mg, 100 mg, Oral,  Q24H, Cindy Torres, APRN, 100 mg at 02/12/21 0803  •  nitroglycerin (NITROSTAT) SL tablet 0.4 mg, 0.4 mg, Sublingual, Q5 Min PRN, Cindy Torres, APRN  •  ondansetron (ZOFRAN) injection 4 mg, 4 mg, Intravenous, Q6H PRN, Cindy Torres, APRN  •  [COMPLETED] Insert peripheral IV, , , Once **AND** sodium chloride 0.9 % flush 10 mL, 10 mL, Intravenous, PRN, Alexandro Hanna MD  •  sodium chloride 0.9 % flush 10 mL, 10 mL, Intravenous, Q12H, Cindy Torres, APRN, 10 mL at 02/12/21 0804  •  sodium chloride 0.9 % flush 10 mL, 10 mL, Intravenous, PRN, Cindy Torres, APRN  •  sodium chloride 0.9 % infusion 1,000 mL, 1,000 mL, Intravenous, Continuous, Joshua Bender MD, Last Rate: 25 mL/hr at 02/11/21 0959, Currently Infusing at 02/11/21 1119  •  vancomycin oral solution reconstituted 125 mg, 125 mg, Oral, Q6H, Norman Jurado MD, 125 mg at 02/12/21 0614  •  venlafaxine (EFFEXOR) tablet 75 mg, 75 mg, Oral, BID, Cindy Torres, APRN, 75 mg at 02/12/21 0803  Review of Systems:    All systems were reviewed and negative except for:  Gastrointestinal: positive for  See HPI    Objective     Vital Signs  Temp:  [96.7 °F (35.9 °C)-98.7 °F (37.1 °C)] 96.7 °F (35.9 °C)  Heart Rate:  [77-90] 80  Resp:  [14-18] 18  BP: (113-152)/(64-93) 144/78  Body mass index is 48.02 kg/m².    Intake/Output Summary (Last 24 hours) at 2/12/2021 1050  Last data filed at 2/12/2021 0559  Gross per 24 hour   Intake 3120 ml   Output --   Net 3120 ml     No intake/output data recorded.     Physical Exam:   General: patient awake, alert and cooperative   Eyes: Normal lids and lashes, no scleral icterus   Neck: supple, normal ROM   Skin: warm and dry, not jaundiced   Cardiovascular: regular rhythm and rate, no murmurs auscultated   Pulm: clear to auscultation bilaterally, regular and unlabored   Abdomen: soft, nontender, nondistended; normal bowel sounds   Extremities: no rash or edema   Psychiatric: Normal mood  and behavior; memory intact     Results Review:     I reviewed the patient's new clinical results.    Results from last 7 days   Lab Units 02/12/21  0608 02/11/21  2109 02/11/21  1329 02/11/21  0403  02/10/21  0531   WBC 10*3/mm3 3.77  --   --  4.62  --  4.84   HEMOGLOBIN g/dL 7.4*  7.4* 7.7* 7.7* 7.7*  7.7*   < > 8.6*   HEMATOCRIT % 22.2*  22.2* 23.6* 24.0* 22.7*  22.7*   < > 26.4*   PLATELETS 10*3/mm3 125*  --   --  144  --  150    < > = values in this interval not displayed.     Results from last 7 days   Lab Units 02/12/21  0608 02/11/21  0403 02/10/21  0531  02/08/21  2021   SODIUM mmol/L 130* 128* 130*   < > 136   POTASSIUM mmol/L 4.1 3.9 4.0   < > 3.7   CHLORIDE mmol/L 96* 93* 95*   < > 100   CO2 mmol/L 26.9 25.5 29.0   < > 28.5   BUN mg/dL 20 17 18   < > 27*   CREATININE mg/dL 0.71 0.66 0.66   < > 0.83   CALCIUM mg/dL 8.1* 8.5* 8.6   < > 8.2*   BILIRUBIN mg/dL  --   --  0.6  --  0.3   ALK PHOS U/L  --   --  95  --  93   ALT (SGPT) U/L  --   --  <5  --  5   AST (SGOT) U/L  --   --  21  --  16   GLUCOSE mg/dL 145* 98 94   < > 173*    < > = values in this interval not displayed.     Results from last 7 days   Lab Units 02/10/21  0531 02/09/21  0441 02/08/21  2021   INR  1.35* 1.30* 1.33*     Lab Results   Lab Value Date/Time    LIPASE 117 (H) 02/09/2021 0442    LIPASE 112 (H) 02/08/2021 2021       Radiology:  CT Abdomen Pelvis With Contrast   Final Result   Mild hepatomegaly with cirrhotic liver morphology and   evidence of mild portal hypertension as described. Small amount of   ascites in the abdomen and pelvis that is new since the preceding CT   scan dated 01/24/2021. Worsening patchy atelectasis in the left lower   lobe. Small focal pleural-based nodular opacity in the right lower lobe   that is new and is consistent with infiltrate or atelectasis. Mild   splenomegaly.       This report was finalized on 2/9/2021 9:30 AM by Dr. James Vera M.D.          XR Chest 1 View   Final Result   Right PICC  extends to the superior vena cava. Small   atelectasis or infiltrate left mid to lower lung.       This report was finalized on 2/8/2021 8:14 PM by Dr. Armand Vazquez M.D.              Assessment/Plan     Patient Active Problem List   Diagnosis   • Essential hypertension   • Sleep apnea   • Anxiety and depression   • Diabetes mellitus type 2 in obese (CMS/HCC)   • Obesity, Class III, BMI 40-49.9 (morbid obesity) (CMS/HCC)   • Other specified hypothyroidism   • Mixed hyperlipidemia   • Lumbar degenerative disc disease   • Thrombocytopenia (CMS/HCC)   • Tongue lesion   • Pulmonary nodules   • Abnormal CT of the chest   • Elevated liver function tests   • COVID-19 virus infection   • Sepsis without acute organ dysfunction (CMS/HCC)   • History of COVID-19   • LINDSEY (acute kidney injury) (CMS/HCC)   • Hyponatremia   • Septic arthritis of shoulder, left (CMS/HCC)   • MSSA bacteremia   • 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 Anemia  2. Rectal bleeding  3. Abnormal CT findings  4. Hypertension  5. Anxiety/depression  6. Gastropathic changes      Plan:  · Await biopsy results  · Monitor H&H  · Continue treatment for C. difficile  I discussed the patients findings and my recommendations with patient.    Joshua Bender MD

## 2021-02-12 NOTE — CONSULTS
Referring Provider: Dr. Jurado  Reason for Consultation: C. difficile colitis     Subjective   History of present illness: This is a 55-year-old female with type 2 diabetes, depression/anxiety hypothyroidism, hypertension and recent diagnosis of extensive MSSA infection who was admitted on February 8 with anemia.    The patient was last hospitalized at the end of January beginning of February with MSSA bacteremia, lumbar epidural abscess, left shoulder septic arthritis and possible left sacroiliac joint involvement.  She was discharged on February 5 on a 6 to 8-week course of IV cefazolin.  While undergoing weekly lab monitoring she was noticed to have a hemoglobin of less than 7 and was instructed to come to the emergency room.  She underwent EGD/colonoscopy on February 11 with no overt signs of bleeding discovered.  Biopsies are pending.  While in the hospital she reported some diarrhea at home and C. difficile testing came back positive.  She was started on vancomycin 125 mg p.o. 4 times daily.  She feels like her diarrhea was even improving prior to oral vancomycin now due to recent colonoscopy she is unable to tell if she is having bowel movements because of that or because of persistent C. difficile.  Her pain is well controlled.  She denies any nausea or vomiting.  She is tolerating antibiotics without a rash.    Past Medical History:   Diagnosis Date   • DDD (degenerative disc disease), lumbar    • Diabetes mellitus (CMS/HCC)    • NICK (generalized anxiety disorder)    • History of epilepsy     as a child-Abstracted from pt records   • Hyperlipidemia    • Hypertension    • Hypothyroidism    • MDD (major depressive disorder)    • Morbid obesity (CMS/HCC)    • OA (osteoarthritis)        Past Surgical History:   Procedure Laterality Date   • COLONOSCOPY N/A 2/11/2021    Procedure: COLONOSCOPY INTO CECUM;  Surgeon: Joshua Bender MD;  Location: [facility] ENDOSCOPY;  Service: Gastroenterology;  Laterality: N/A;  " pre: same  post: diverticulosis   • ENDOSCOPY N/A 2/11/2021    Procedure: ESOPHAGOGASTRODUODENOSCOPY WITH BIOPSIES;  Surgeon: Joshua Bender MD;  Location: [facility] ENDOSCOPY;  Service: Gastroenterology;  Laterality: N/A;  pre: anemia, rectal bleeding, abnormal CT  post: hiatal hernia, esophagitis, gastropathy   • HYSTERECTOMY  2007   • INCISION AND DRAINAGE SHOULDER Left 1/30/2021    Procedure: INCISION AND DRAINAGE SHOULDER;  Surgeon: Juaquin Richardson II, MD;  Location: [facility] MAIN OR;  Service: Orthopedics;  Laterality: Left;   • TUBAL ABDOMINAL LIGATION  1996        reports that she has quit smoking. She quit after 20.00 years of use. She has never used smokeless tobacco. She reports previous alcohol use. She reports that she does not use drugs.    family history includes Asthma in her mother; Breast cancer in her paternal cousin; Diabetes in her brother, mother, and sister; Heart attack in her brother, father, and sister; Hypertension in her brother, father, mother, and sister; No Known Problems in an other family member; Obesity in her mother; Sleep apnea in her mother.    Allergies   Allergen Reactions   • Tramadol Anaphylaxis     \"seizure like behavior\"       Medication:  Antibiotics:  Cefazolin 2 g IV every 8 hours  Vancomycin 125 mg p.o. every 6 hours    Please refer to the medical record for a full medication list    Review of Systems  Pertinent items are noted in HPI, all other systems reviewed and negative    Objective   Vital Signs   Temp:  [96.7 °F (35.9 °C)-99.8 °F (37.7 °C)] 99.8 °F (37.7 °C)  Heart Rate:  [77-81] 81  Resp:  [16-18] 16  BP: (144-152)/(72-93) 151/72    Physical Exam:   General: In no acute distress  HEENT: Normocephalic, atraumatic, PERRL, EOMI, no scleral icterus. Oropharynx is clear and moist  Neck: Supple, trachea is midline  Cardiovascular: Normal rate, regular rhythm, normal S1 and S2, no murmurs, rubs, or gallops   Respiratory: Lungs are clear to auscultation " bilaterally, no wheezing  GI: Abdomen is soft, nontender, nondistended, positive bowel sounds bilaterally,   Musculoskeletal: Left shoulder with staples in place no erythema induration or drainage  Skin: No rashes, right upper extremity PICC line without erythema or drainage  Extremities: No E/C/C  Neurological: Alert and oriented, moving all 4 extremities  Psychiatric: Normal mood and affect     Results Review:   I reviewed the patient's new clinical results.  I reviewed the patient's new imaging results and agree with the interpretation.    Lab Results   Component Value Date    WBC 3.77 02/12/2021    HGB 7.1 (L) 02/12/2021    HCT 21.8 (L) 02/12/2021    MCV 92.5 02/12/2021     (L) 02/12/2021       Lab Results   Component Value Date    GLUCOSE 145 (H) 02/12/2021    BUN 20 02/12/2021    CREATININE 0.71 02/12/2021    EGFRIFNONA 85 02/12/2021    EGFRIFAFRI 87 10/14/2020    BCR 28.2 (H) 02/12/2021    CO2 26.9 02/12/2021    CALCIUM 8.1 (L) 02/12/2021    PROTENTOTREF 7.4 02/10/2021    ALBUMIN 2.60 (L) 02/10/2021    ALBUMIN 2.3 (L) 02/10/2021    LABIL2 0.5 (L) 02/10/2021    AST 21 02/10/2021    ALT <5 02/10/2021     Hemoglobin A1c 7.3  CRP 20.13  Procalcitonin 3.96    Microbiology:  2/10 GI PCR neg  2/10 C diff positive  2/8 COVID neg    1/30 operative left shoulder wound culture with MSSA  1/26 BCx neg x 2  1/24 BCx MSSA     Radiology:  Admission CAT scan of the abdomen pelvis personally reviewed by me shows hepatomegaly with cirrhotic liver morphology.  Evidence of mild portal hypertension.  Small amount of ascites.  Small pleural-based nodular opacities in the right lower lung.  Mild splenomegaly    Assessment/Plan    C diff colitis  Acute blood loss  MSSA bacteremia  Lumbar epidural abscess  Left shoulder septic arthritis status post operative incision and drainage on 130  Multilevel septic arthritis of multiple facet joints in the lower lumbar spine  Possible left sacral iliac joint involvement with  infection  LINDSEY  Poorly controlled type 2 diabetes  Morbid obesity with a BMI of 44 complicating above  Hepatosplenomegaly    Antibiotic recommendations for her MSSA infection remain unchanged.    Final antibiotic recommendations  1.  Cefazolin 2 g IV every 8 hours x6 to 8 weeks.  Preliminary antibiotic stop date March 12     2.  Please monitor weekly CBC with differential and creatinine and fax the results to the infectious disease clinic at [phone]     3.  We will arrange for ID follow-up on March 12    Continue vancomycin 125 mg p.o. 4 times daily x10 days for C. difficile colitis.  I asked the patient to call the infectious disease clinic if her diarrhea recurs after stopping antibiotics.    Patient is okay for discharge from an ID standpoint at any time.  We will sign off.  Please do not hesitate to call us with any questions or concerns

## 2021-02-12 NOTE — PROGRESS NOTES
Continued Stay Note  [facility]     Patient Name: Stefani Bhandari  MRN: [id number]  Today's Date: 2/12/2021    Admit Date: 2/8/2021    Discharge Plan     Row Name 02/12/21 1121       Plan    Plan  Plan home with [facility].   CATHRYN Knenedy RN    Plan Comments  Called and spoke with Sandra ( [phone]) regarding pt's home IV antibiotic.  Sandra to follow for IV antibiotic needs.  Pt current with  [facility].  [facility]        Discharge Codes    No documentation.             Padmini Kennedy RN

## 2021-02-13 ENCOUNTER — READMISSION MANAGEMENT (OUTPATIENT)
Dept: CALL CENTER | Facility: HOSPITAL | Age: 56
End: 2021-02-13

## 2021-02-13 VITALS
OXYGEN SATURATION: 98 % | SYSTOLIC BLOOD PRESSURE: 166 MMHG | HEART RATE: 83 BPM | HEIGHT: 67 IN | TEMPERATURE: 99.5 F | WEIGHT: 293 LBS | BODY MASS INDEX: 45.99 KG/M2 | DIASTOLIC BLOOD PRESSURE: 85 MMHG | RESPIRATION RATE: 18 BRPM

## 2021-02-13 PROBLEM — A04.72 CLOSTRIDIUM DIFFICILE COLITIS: Status: ACTIVE | Noted: 2021-02-13

## 2021-02-13 PROBLEM — K29.70 GASTRITIS: Status: ACTIVE | Noted: 2021-02-13

## 2021-02-13 LAB
ANION GAP SERPL CALCULATED.3IONS-SCNC: 7.8 MMOL/L (ref 5–15)
BASOPHILS # BLD AUTO: 0.03 10*3/MM3 (ref 0–0.2)
BASOPHILS NFR BLD AUTO: 0.8 % (ref 0–1.5)
BUN SERPL-MCNC: 18 MG/DL (ref 6–20)
BUN/CREAT SERPL: 29 (ref 7–25)
CALCIUM SPEC-SCNC: 8.6 MG/DL (ref 8.6–10.5)
CHLORIDE SERPL-SCNC: 99 MMOL/L (ref 98–107)
CO2 SERPL-SCNC: 25.2 MMOL/L (ref 22–29)
CREAT SERPL-MCNC: 0.62 MG/DL (ref 0.57–1)
DEPRECATED RDW RBC AUTO: 48.5 FL (ref 37–54)
EOSINOPHIL # BLD AUTO: 0.11 10*3/MM3 (ref 0–0.4)
EOSINOPHIL NFR BLD AUTO: 2.9 % (ref 0.3–6.2)
ERYTHROCYTE [DISTWIDTH] IN BLOOD BY AUTOMATED COUNT: 14.3 % (ref 12.3–15.4)
GFR SERPL CREATININE-BSD FRML MDRD: 100 ML/MIN/1.73
GLUCOSE BLDC GLUCOMTR-MCNC: 111 MG/DL (ref 70–130)
GLUCOSE BLDC GLUCOMTR-MCNC: 122 MG/DL (ref 70–130)
GLUCOSE SERPL-MCNC: 112 MG/DL (ref 65–99)
HCT VFR BLD AUTO: 22.4 % (ref 34–46.6)
HCT VFR BLD AUTO: 22.4 % (ref 34–46.6)
HGB BLD-MCNC: 7.3 G/DL (ref 12–15.9)
HGB BLD-MCNC: 7.3 G/DL (ref 12–15.9)
IMM GRANULOCYTES # BLD AUTO: 0.01 10*3/MM3 (ref 0–0.05)
IMM GRANULOCYTES NFR BLD AUTO: 0.3 % (ref 0–0.5)
LYMPHOCYTES # BLD AUTO: 1.31 10*3/MM3 (ref 0.7–3.1)
LYMPHOCYTES NFR BLD AUTO: 34.7 % (ref 19.6–45.3)
MCH RBC QN AUTO: 30.8 PG (ref 26.6–33)
MCHC RBC AUTO-ENTMCNC: 32.6 G/DL (ref 31.5–35.7)
MCV RBC AUTO: 94.5 FL (ref 79–97)
MONOCYTES # BLD AUTO: 0.33 10*3/MM3 (ref 0.1–0.9)
MONOCYTES NFR BLD AUTO: 8.8 % (ref 5–12)
NEUTROPHILS NFR BLD AUTO: 1.98 10*3/MM3 (ref 1.7–7)
NEUTROPHILS NFR BLD AUTO: 52.5 % (ref 42.7–76)
NRBC BLD AUTO-RTO: 0 /100 WBC (ref 0–0.2)
PLATELET # BLD AUTO: 124 10*3/MM3 (ref 140–450)
PMV BLD AUTO: 8.8 FL (ref 6–12)
POTASSIUM SERPL-SCNC: 4 MMOL/L (ref 3.5–5.2)
RBC # BLD AUTO: 2.37 10*6/MM3 (ref 3.77–5.28)
SODIUM SERPL-SCNC: 132 MMOL/L (ref 136–145)
WBC # BLD AUTO: 3.77 10*3/MM3 (ref 3.4–10.8)

## 2021-02-13 PROCEDURE — 25010000003 CEFAZOLIN IN DEXTROSE 2-4 GM/100ML-% SOLUTION: Performed by: NURSE PRACTITIONER

## 2021-02-13 PROCEDURE — 99213 OFFICE O/P EST LOW 20 MIN: CPT | Performed by: INTERNAL MEDICINE

## 2021-02-13 PROCEDURE — 80048 BASIC METABOLIC PNL TOTAL CA: CPT | Performed by: INTERNAL MEDICINE

## 2021-02-13 PROCEDURE — 85025 COMPLETE CBC W/AUTO DIFF WBC: CPT | Performed by: INTERNAL MEDICINE

## 2021-02-13 PROCEDURE — 85014 HEMATOCRIT: CPT | Performed by: INTERNAL MEDICINE

## 2021-02-13 PROCEDURE — 82962 GLUCOSE BLOOD TEST: CPT

## 2021-02-13 PROCEDURE — G0378 HOSPITAL OBSERVATION PER HR: HCPCS

## 2021-02-13 PROCEDURE — 85018 HEMOGLOBIN: CPT | Performed by: INTERNAL MEDICINE

## 2021-02-13 RX ADMIN — ATORVASTATIN CALCIUM 40 MG: 20 TABLET, FILM COATED ORAL at 08:49

## 2021-02-13 RX ADMIN — FUROSEMIDE 40 MG: 40 TABLET ORAL at 08:49

## 2021-02-13 RX ADMIN — LOSARTAN POTASSIUM 100 MG: 100 TABLET, FILM COATED ORAL at 08:49

## 2021-02-13 RX ADMIN — Medication 2000 UNITS: at 08:49

## 2021-02-13 RX ADMIN — VANCOMYCIN 125 MG: KIT at 12:32

## 2021-02-13 RX ADMIN — LEVOTHYROXINE SODIUM 88 MCG: 0.09 TABLET ORAL at 05:59

## 2021-02-13 RX ADMIN — CEFAZOLIN SODIUM 2 G: 2 INJECTION, SOLUTION INTRAVENOUS at 04:39

## 2021-02-13 RX ADMIN — CEFAZOLIN SODIUM 2 G: 2 INJECTION, SOLUTION INTRAVENOUS at 12:32

## 2021-02-13 RX ADMIN — VANCOMYCIN 125 MG: KIT at 05:59

## 2021-02-13 RX ADMIN — SODIUM CHLORIDE, PRESERVATIVE FREE 10 ML: 5 INJECTION INTRAVENOUS at 08:49

## 2021-02-13 RX ADMIN — VENLAFAXINE HYDROCHLORIDE 75 MG: 75 TABLET ORAL at 08:49

## 2021-02-13 RX ADMIN — VANCOMYCIN 125 MG: KIT at 17:46

## 2021-02-13 NOTE — PROGRESS NOTES
[facility]  Inpatient Progress Note    Reason for Follow Up: C. difficile colitis    Subjective     Interval History:   C. difficile colitis under good control with vancomycin ID consult reviewed    Current Facility-Administered Medications:   •  acetaminophen (TYLENOL) tablet 650 mg, 650 mg, Oral, Q4H PRN, 650 mg at 02/12/21 1950 **OR** acetaminophen (TYLENOL) 160 MG/5ML solution 650 mg, 650 mg, Oral, Q4H PRN **OR** acetaminophen (TYLENOL) suppository 650 mg, 650 mg, Rectal, Q4H PRN, Cindy Torres, APRN  •  atorvastatin (LIPITOR) tablet 40 mg, 40 mg, Oral, Daily, Cindy Torres APRN, 40 mg at 02/13/21 0849  •  buprenorphine-naloxone (SUBOXONE) 8-2 MG film 1 film, 1 film, Sublingual, Daily, Cindy Torres APRN  •  ceFAZolin in dextrose (ANCEF) IVPB solution 2 g, 2 g, Intravenous, Q8H, Cindy Torres APRN, Last Rate: 0 mL/hr at 02/13/21 0509, 2 g at 02/13/21 1232  •  cholecalciferol (VITAMIN D3) tablet 2,000 Units, 2,000 Units, Oral, Daily, Cindy Torres APRN, 2,000 Units at 02/13/21 0849  •  cyclobenzaprine (FLEXERIL) tablet 10 mg, 10 mg, Oral, TID PRN, Cindy Torres APRLAURIE  •  dextrose (D50W) 25 g/ 50mL Intravenous Solution 25 g, 25 g, Intravenous, Q15 Min PRN, Cindy Torres APRN  •  dextrose (GLUTOSE) oral gel 15 g, 15 g, Oral, Q15 Min PRN, Cindy Torres APRLAURIE  •  furosemide (LASIX) tablet 40 mg, 40 mg, Oral, BID, Cindy Torres APRN, 40 mg at 02/13/21 0849  •  glucagon (human recombinant) (GLUCAGEN DIAGNOSTIC) injection 1 mg, 1 mg, Subcutaneous, Q15 Min PRN, Cindy Torres APRN  •  insulin lispro (humaLOG, ADMELOG) injection 0-7 Units, 0-7 Units, Subcutaneous, TID AC, Cindy Torres, APRN, 4 Units at 02/12/21 1238  •  levothyroxine (SYNTHROID, LEVOTHROID) tablet 88 mcg, 88 mcg, Oral, Q AM, Cindy Torres, APRN, 88 mcg at 02/13/21 0559  •  losartan (COZAAR) tablet 100 mg, 100 mg, Oral, Q24H, Cindy Torres APRN,  100 mg at 02/13/21 0849  •  nitroglycerin (NITROSTAT) SL tablet 0.4 mg, 0.4 mg, Sublingual, Q5 Min PRN, Cindy Torres APRN  •  ondansetron (ZOFRAN) injection 4 mg, 4 mg, Intravenous, Q6H PRN, Cindy Torres APRN  •  [COMPLETED] Insert peripheral IV, , , Once **AND** sodium chloride 0.9 % flush 10 mL, 10 mL, Intravenous, PRN, Alexandro Hanna MD  •  sodium chloride 0.9 % flush 10 mL, 10 mL, Intravenous, Q12H, Cindy Torres APRN, 10 mL at 02/13/21 0849  •  sodium chloride 0.9 % flush 10 mL, 10 mL, Intravenous, PRN, Cindy Torres, APRN  •  vancomycin oral solution reconstituted 125 mg, 125 mg, Oral, Q6H, Norman Jurado MD, 125 mg at 02/13/21 1232  •  venlafaxine (EFFEXOR) tablet 75 mg, 75 mg, Oral, BID, Cindy Torres APRN, 75 mg at 02/13/21 0849  Review of Systems:    She has weakness of fatigue all other systems reviewed and negative    Objective     Vital Signs  Temp:  [98 °F (36.7 °C)-99.8 °F (37.7 °C)] 99.5 °F (37.5 °C)  Heart Rate:  [81-83] 83  Resp:  [16-18] 18  BP: (150-166)/(72-86) 166/85  Body mass index is 48.02 kg/m².    Intake/Output Summary (Last 24 hours) at 2/13/2021 1405  Last data filed at 2/13/2021 0900  Gross per 24 hour   Intake 960 ml   Output --   Net 960 ml     I/O this shift:  In: 360 [P.O.:360]  Out: -      Physical Exam:   General: patient awake, alert and cooperative   Eyes: Normal lids and lashes, no scleral icterus   Neck: supple, normal ROM   Skin: warm and dry, not jaundiced   Cardiovascular: regular rhythm and rate, no murmurs auscultated   Pulm: clear to auscultation bilaterally, regular and unlabored   Abdomen: soft, nontender, nondistended; normal bowel sounds   Extremities: no rash or edema   Psychiatric: Normal mood and behavior; memory intact     Results Review:     I reviewed the patient's new clinical results.    Results from last 7 days   Lab Units 02/13/21  0439 02/12/21  2217 02/12/21  1434 02/12/21  0608  02/11/21  0403   WBC 10*3/mm3  3.77  --   --  3.77  --  4.62   HEMOGLOBIN g/dL 7.3*  7.3* 7.2* 7.1* 7.4*  7.4*   < > 7.7*  7.7*   HEMATOCRIT % 22.4*  22.4* 22.0* 21.8* 22.2*  22.2*   < > 22.7*  22.7*   PLATELETS 10*3/mm3 124*  --   --  125*  --  144    < > = values in this interval not displayed.     Results from last 7 days   Lab Units 02/13/21  0439 02/12/21  0608 02/11/21  0403 02/10/21  0531  02/08/21 2021   SODIUM mmol/L 132* 130* 128* 130*   < > 136   POTASSIUM mmol/L 4.0 4.1 3.9 4.0   < > 3.7   CHLORIDE mmol/L 99 96* 93* 95*   < > 100   CO2 mmol/L 25.2 26.9 25.5 29.0   < > 28.5   BUN mg/dL 18 20 17 18   < > 27*   CREATININE mg/dL 0.62 0.71 0.66 0.66   < > 0.83   CALCIUM mg/dL 8.6 8.1* 8.5* 8.6   < > 8.2*   BILIRUBIN mg/dL  --   --   --  0.6  --  0.3   ALK PHOS U/L  --   --   --  95  --  93   ALT (SGPT) U/L  --   --   --  <5  --  5   AST (SGOT) U/L  --   --   --  21  --  16   GLUCOSE mg/dL 112* 145* 98 94   < > 173*    < > = values in this interval not displayed.     Results from last 7 days   Lab Units 02/10/21  0531 02/09/21 0441 02/08/21 2021   INR  1.35* 1.30* 1.33*     Lab Results   Lab Value Date/Time    LIPASE 117 (H) 02/09/2021 0442    LIPASE 112 (H) 02/08/2021 2021       Radiology:  CT Abdomen Pelvis With Contrast   Final Result   Mild hepatomegaly with cirrhotic liver morphology and   evidence of mild portal hypertension as described. Small amount of   ascites in the abdomen and pelvis that is new since the preceding CT   scan dated 01/24/2021. Worsening patchy atelectasis in the left lower   lobe. Small focal pleural-based nodular opacity in the right lower lobe   that is new and is consistent with infiltrate or atelectasis. Mild   splenomegaly.       This report was finalized on 2/9/2021 9:30 AM by Dr. James Vera M.D.          XR Chest 1 View   Final Result   Right PICC extends to the superior vena cava. Small   atelectasis or infiltrate left mid to lower lung.       This report was finalized on 2/8/2021 8:14 PM by  Dr. Armand Vazquez M.D.              Assessment/Plan     Patient Active Problem List   Diagnosis   • Essential hypertension   • Sleep apnea   • Anxiety and depression   • Diabetes mellitus type 2 in obese (CMS/HCC)   • Obesity, Class III, BMI 40-49.9 (morbid obesity) (CMS/HCC)   • Other specified hypothyroidism   • Mixed hyperlipidemia   • Lumbar degenerative disc disease   • Thrombocytopenia (CMS/HCC)   • Tongue lesion   • Pulmonary nodules   • Abnormal CT of the chest   • Elevated liver function tests   • COVID-19 virus infection   • Sepsis without acute organ dysfunction (CMS/HCC)   • History of COVID-19   • LINDSEY (acute kidney injury) (CMS/HCC)   • Hyponatremia   • Septic arthritis of shoulder, left (CMS/HCC)   • MSSA bacteremia   • 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 C. difficile colitis      Plan:  · Vancomycin taper per infectious disease consult  · We are okay with discharge of all agree  I discussed the patients findings and my recommendations with patient and nursing staff.    Jaylen Mandujano MD

## 2021-02-13 NOTE — PLAN OF CARE
Pt has no c/o pain, BM x2 today, oral vanc/cefazolin admin, notified CCP for oral vanc coverage though [facility]. Pt insurance does not cover oral vanc, [facility] is out of stock. Pt ready for discharge family at bedside for transport.      Problem: Adult Inpatient Plan of Care  Goal: Plan of Care Review  Outcome: Met  Goal: Patient-Specific Goal (Individualized)  Outcome: Met  Goal: Absence of Hospital-Acquired Illness or Injury  Outcome: Met  Intervention: Identify and Manage Fall Risk  Recent Flowsheet Documentation  Taken 2/13/2021 1000 by Roxie Poon RN  Safety Promotion/Fall Prevention: activity supervised  Taken 2/13/2021 0849 by Roxie Poon RN  Safety Promotion/Fall Prevention:   activity supervised   assistive device/personal items within reach   clutter free environment maintained   fall prevention program maintained   safety round/check completed   room organization consistent   nonskid shoes/slippers when out of bed  Intervention: Prevent Skin Injury  Recent Flowsheet Documentation  Taken 2/13/2021 0849 by Roxie Poon RN  Body Position: (recliner chair) --  Intervention: Prevent and Manage VTE (venous thromboembolism) Risk  Recent Flowsheet Documentation  Taken 2/13/2021 0849 by Roxie Poon RN  VTE Prevention/Management:   bilateral   dorsiflexion/plantar flexion performed  Intervention: Prevent Infection  Recent Flowsheet Documentation  Taken 2/13/2021 0849 by Roxie Poon RN  Infection Prevention:   rest/sleep promoted   single patient room provided  Goal: Optimal Comfort and Wellbeing  Outcome: Met  Intervention: Provide Person-Centered Care  Recent Flowsheet Documentation  Taken 2/13/2021 0849 by Roxie Poon RN  Trust Relationship/Rapport:   care explained   thoughts/feelings acknowledged   reassurance provided   questions encouraged   questions answered   emotional support provided  Goal: Readiness for Transition of Care  Outcome: Met     Problem: Pain Acute  Goal:  Optimal Pain Control  Outcome: Met  Intervention: Optimize Psychosocial Wellbeing  Recent Flowsheet Documentation  Taken 2/13/2021 0849 by Roxie Poon RN  Supportive Measures: active listening utilized  Diversional Activities: television     Problem: Fall Injury Risk  Goal: Absence of Fall and Fall-Related Injury  Outcome: Met  Intervention: Identify and Manage Contributors to Fall Injury Risk  Recent Flowsheet Documentation  Taken 2/13/2021 1000 by Roxei Poon, RN  Medication Review/Management: medications reviewed  Taken 2/13/2021 0849 by Roxie Poon RN  Medication Review/Management: medications reviewed  Self-Care Promotion:   independence encouraged   BADL personal objects within reach   BADL personal routines maintained  Intervention: Promote Injury-Free Environment  Recent Flowsheet Documentation  Taken 2/13/2021 1000 by Roxie Poon, RN  Safety Promotion/Fall Prevention: activity supervised  Taken 2/13/2021 0849 by Roxie Poon RN  Safety Promotion/Fall Prevention:   activity supervised   assistive device/personal items within reach   clutter free environment maintained   fall prevention program maintained   safety round/check completed   room organization consistent   nonskid shoes/slippers when out of bed   Goal Outcome Evaluation:

## 2021-02-13 NOTE — PROGRESS NOTES
Continued Stay Note  [facility]     Patient Name: Stefani Bhandari  MRN: [id number]  Today's Date: 2/13/2021    Admit Date: 2/8/2021    Discharge Plan     Row Name 02/13/21 1413       Plan    Plan  Pt will need oral vanc. CCP spoke with  home infusion, they are scheduled to deliver IV meds on Monday and will provide doses of oral vanc @ that time.  [facility] has arranged for inpt pharmacy to provide doses (6) till  home infusion will deliver meds.  Lesvia Powell RN/CCP        Discharge Codes    No documentation.       Expected Discharge Date and Time     Expected Discharge Date Expected Discharge Time    Feb 13, 2021             Lesvia Powell RN

## 2021-02-13 NOTE — PLAN OF CARE
Goal Outcome Evaluation:  Plan of Care Reviewed With: patient  Progress: improving  Outcome Summary: Patient is alert and oriented. VSS. Medicated for mild shoulder pain with tylenol prn. Up in chair through out night. Up to bedside commode and still requires assistance. IV abx continued and oral vanc. Labs drawn. No acute distress. Probable d/c home today.

## 2021-02-13 NOTE — DISCHARGE SUMMARY
Patient Name: Stefani Bhandari  : 1965  MRN: [id number]    Date of Admission: 2021  Date of Discharge:  2021  Primary Care Physician: Dea Martinez MD      Chief Complaint:   Abnormal Lab      Discharge Diagnoses     Active Hospital Problems    Diagnosis  POA   • **Anemia [D64.9]  Unknown   • Gastritis [K29.70]  Unknown   • Clostridium difficile colitis [A04.72]  Unknown   • Symptomatic anemia [D64.9]  Yes   • Rectal bleeding [K62.5]  Unknown   • Abnormal CT scan, liver [R93.2]  Unknown   • Septic arthritis of shoulder, left (CMS/HCC) [M00.9]  Yes   • MSSA bacteremia [R78.81, B95.61]  Yes   • Pulmonary nodules [R91.8]  Yes   • Elevated liver function tests [R79.89]  Yes   • Mixed hyperlipidemia [E78.2]  Yes   • Obesity, Class III, BMI 40-49.9 (morbid obesity) (CMS/[facility]) [E66.01]  Yes   • Essential hypertension [I10]  Yes   • Anxiety and depression [F41.9, F32.9]  Yes   • Diabetes mellitus type 2 in obese (CMS/[facility]) [E11.69, E66.9]  Yes      Resolved Hospital Problems   No resolved problems to display.        Hospital Course   Mrs. Bhandari is a 55-year-old female with history of hypertension, anxiety and depression, type 2 diabetes, obesity, hyperlipidemia, elevated liver function test, septic arthritis, MSSA bacteremia who presents to the emergency room with symptomatic anemia.  Patient actually had routine lab work drawn due to her receiving IV antibiotics at home.  She was called by Dr. Porras with infectious disease and told her hemoglobin was low and to come to the emergency room.  Patient denied to Dr. Porras any rectal bleeding or emesis, however she states after she talked to Dr. Porras she did have a bowel movement with some blood in it.  She does states she has been more tired and fatigued, no shortness of breath, chest pain, cough.  She denies any fever at home.  She has been taking her cefazolin every 8 hours as directed and has not missed any doses.  She has no other complaints at this  time.  Patient was actually just discharged to the hospital 3 days ago after a 2-week stay where she was found to have MSSA bacteremia with septic arthritis.  She was also started on Lasix at that time and states she has had some mild swelling in her feet that is much improved over the last few days.  In the emergency room patient was found to have glucose 173, sodium 136, potassium 3.7, creatinine 0.83, BUN 27, calcium 8.2, ALT 5, AST 16, albumin 2.3, lipase elevated 112, INR 1.33, white blood cell 4.32, hemoglobin 6.8, hematocrit 20.9, platelets 167.  Patient hemoglobin on 1/24/2021 was 11.6, has gradually decreased since then, patient has not noted any overt signs of bleeding until today where she noticed some scant amount of blood in her stool.      1. Symptomatic anemia, there is no evidence of any active bleed at this point.  GI consultation has been obtained and  Underwent EGD and colonoscopy.  EGD revealed gastritis and colonoscopy revealed external hemorrhoids that were nonbleeding.  Patient hemoglobin is at 7.3 and has not changed any since her admission.  Was advised to be on iron supplements.  2. Recent history of left shoulder septic arthritis leading to MSSA bacteremia, currently on IV cefazolin and will be continued.  Last date of antibiotic is on March 12 and this was already arranged for her during her previous hospitalization  3. Diabetes mellitus, continue with corrective dose insulin for the moment.    4. Hypertension, continue to monitor blood pressure closely.  Losartan and will be continued.  5. Hyperlipidemia on statins.    6. Hypothyroidism, on Synthroid.  7. C diff colitis, diarrhea none over the past 1-2 days.  However p.o. vancomycin has been initiated and given that patient will be on IV cefazolin infectious disease consult has been obtained, recommendation was to continue vancomycin for total of 10 days.  If patient develops diarrhea after that she is supposed to call and will  infectious disease office and patient is aware of the same.  Currently she does not have any diarrhea and is able to tolerate p.o. diet.    Please note patient was seen and examined today on day of discharge.           Day of Discharge       Physical Exam:  Temp:  [98 °F (36.7 °C)-99.8 °F (37.7 °C)] 99.5 °F (37.5 °C)  Heart Rate:  [81-83] 83  Resp:  [16-18] 18  BP: (150-166)/(72-86) 166/85  Body mass index is 48.02 kg/m².  Physical Exam   HEENT:  Atraumatic, normocephalic.  PERRLA.  Extraocular movements intact.  Conjunctivae pink.  Sclerae, no icterus.  Mucous membranes dry.    NECK:  Supple.  No JVD.  HEART:  Regular rate and rhythm.  Normal S1, S2.   LUNGS:  Fairly clear to auscultation anteriorly.  No wheezes.  No crackles.  ABDOMEN:   Soft, nontender.  Bowel sounds present.  No rebound.  No  guarding.  EXTREMITIES:  No cyanosis, clubbing, or edema.  Palpable pedal pulses.  NEURO:  Grossly nonfocal.  No facial asymmetry.  Good strength in all 4  extremities.  SKIN:  Warm and dry.  No evidence of rashes.      Consultants     Consult Orders (all) (From admission, onward)     Start     Ordered    02/12/21 1258  Inpatient Infectious Diseases Consult  Once     Specialty:  Infectious Diseases  Provider:  Dianne Porras MD    02/12/21 1257    02/08/21 2304  Inpatient Gastroenterology Consult  Once     Specialty:  Gastroenterology  Provider:  Vania Arita MD    02/08/21 2304    02/08/21 2116  [facility] (on-call MD unless specified) Details  Once     Specialty:  Hospitalist  Provider:  (Not yet assigned)    02/08/21 2115              Procedures     Imaging Results (All)     Procedure Component Value Units Date/Time    CT Abdomen Pelvis With Contrast [[id number]] Collected: 02/09/21 0910     Updated: 02/09/21 0933    Narrative:      CT ABDOMEN PELVIS W CONTRAST-     CLINICAL HISTORY: Low abdomen pain. GI bleeding. Follow-up hepatomegaly  seen on CT scan of the chest.     TECHNIQUE: Spiral CT images were acquired through the  abdomen and pelvis  with IV contrast only and were reconstructed in 3 mm thick axial slices.     Radiation dose reduction techniques were utilized, including automated  exposure control and exposure modulation based on body size.     COMPARISON: CT chest dated 01/24/2021     FINDINGS: As shown on the CT chest, the liver is mildly enlarged. It  measures 22.5 cm in greatest cephalocaudad dimension. The liver margins  are lobulated consistent with cirrhosis. No focal hepatic lesions are  identified. There is no bile duct dilatation. Mild splenomegaly is also  present. The spleen measures 17.7 cm in greatest transverse diameter.  Mildly enlarged tortuous venous collateral vessels are noted along the  course of the splenic vein. Recanalization of the umbilical vein is also  present. Small esophageal varices are present. The findings are  consistent with portal hypertension. A few borderline-enlarged lymph  nodes in the upper abdomen are again noted, and are unchanged and are  likely benign based on size criteria. The pancreas and kidneys and  adrenal glands appear within normal limits. There is a small amount of  ascites adjacent to the liver and spleen and also in the pelvis that is  new. The stomach and small and large bowel are unremarkable. The uterus  is absent. Images through the lung bases demonstrate patchy linear  atelectasis in the left lower lobe that appears slightly more prominent.  There is also a small ill-defined nodular opacity in the posterior  aspect of the right lower lobe adjacent to the pleural surface that is  new, and is most likely due to atelectasis or focal infiltrate. There is  mild patchy stranding of the subcutaneous fat throughout the abdomen and  pelvis that is new, and is consistent with anasarca.       Impression:      Mild hepatomegaly with cirrhotic liver morphology and  evidence of mild portal hypertension as described. Small amount of  ascites in the abdomen and pelvis that is new  since the preceding CT  scan dated 01/24/2021. Worsening patchy atelectasis in the left lower  lobe. Small focal pleural-based nodular opacity in the right lower lobe  that is new and is consistent with infiltrate or atelectasis. Mild  splenomegaly.     This report was finalized on 2/9/2021 9:30 AM by Dr. James Vera M.D.       XR Chest 1 View [[id number]] Collected: 02/08/21 2011     Updated: 02/08/21 2017    Narrative:      XR CHEST 1 VW-     HISTORY: Female who is 55 years-old,  PICC placement     TECHNIQUE: Frontal view of the chest     COMPARISON: 1/24/2021     FINDINGS: Right PICC extends to the superior vena cava. The heart size  is borderline. Pulmonary vasculature is unremarkable. Small atelectasis  or infiltrate left mid to lower lung, follow-up can characterize  resolution. No pleural effusion, or pneumothorax. No acute osseous  process.       Impression:      Right PICC extends to the superior vena cava. Small  atelectasis or infiltrate left mid to lower lung.     This report was finalized on 2/8/2021 8:14 PM by Dr. Armand Vazquez M.D.             Pertinent Labs     Results from last 7 days   Lab Units 02/13/21  0439 02/12/21  2217 02/12/21  1434 02/12/21  0608  02/11/21  0403  02/10/21  0531   WBC 10*3/mm3 3.77  --   --  3.77  --  4.62  --  4.84   HEMOGLOBIN g/dL 7.3*  7.3* 7.2* 7.1* 7.4*  7.4*   < > 7.7*  7.7*   < > 8.6*   PLATELETS 10*3/mm3 124*  --   --  125*  --  144  --  150    < > = values in this interval not displayed.     Results from last 7 days   Lab Units 02/13/21  0439 02/12/21  0608 02/11/21  0403 02/10/21  0531   SODIUM mmol/L 132* 130* 128* 130*   POTASSIUM mmol/L 4.0 4.1 3.9 4.0   CHLORIDE mmol/L 99 96* 93* 95*   CO2 mmol/L 25.2 26.9 25.5 29.0   BUN mg/dL 18 20 17 18   CREATININE mg/dL 0.62 0.71 0.66 0.66   GLUCOSE mg/dL 112* 145* 98 94   Estimated Creatinine Clearance: 148.1 mL/min (by C-G formula based on SCr of 0.62 mg/dL).  Results from last 7 days   Lab Units  02/10/21  0531 02/08/21 2021   ALBUMIN g/dL 2.60*  2.3* 2.30*   BILIRUBIN mg/dL 0.6 0.3   ALK PHOS U/L 95 93   AST (SGOT) U/L 21 16   ALT (SGPT) U/L <5 5     Results from last 7 days   Lab Units 02/13/21  0439 02/12/21  0608 02/11/21  0403 02/10/21  0531  02/08/21 2021   CALCIUM mg/dL 8.6 8.1* 8.5* 8.6   < > 8.2*   ALBUMIN g/dL  --   --   --  2.60*  2.3*  --  2.30*    < > = values in this interval not displayed.     Results from last 7 days   Lab Units 02/09/21  0442 02/08/21 2021   LIPASE U/L 117* 112*             Invalid input(s): LDLCALC        Test Results Pending at Discharge     Pending Labs     Order Current Status    Tissue Pathology Exam In process          Discharge Details        Discharge Medications      New Medications      Instructions Start Date   vancomycin 50 MG/ML reconstituted solution oral solution reconstituted   125 mg, Oral, Every 6 Hours Scheduled         Continue These Medications      Instructions Start Date   Belbuca 150 MCG film  Generic drug: Buprenorphine HCl   No dose, route, or frequency recorded.      ceFAZolin in dextrose 2-4 GM/100ML-% solution IVPB  Commonly known as: ANCEF   2 g, Intravenous, Every 8 Hours      cholecalciferol 25 MCG (1000 UT) tablet  Commonly known as: VITAMIN D3   2,000 Units, Oral, Daily      cyclobenzaprine 10 MG tablet  Commonly known as: FLEXERIL   TAKE 1 TABLET BY MOUTH THREE TIMES DAILY AS NEEDED FOR MUSCLE SPASMS      diclofenac 75 MG EC tablet  Commonly known as: VOLTAREN   TAKE 1 TABLET BY MOUTH TWICE DAILY      ezetimibe 10 MG tablet  Commonly known as: ZETIA   10 mg, Oral, Daily      furosemide 40 MG tablet  Commonly known as: LASIX   40 mg, Oral, 2 Times Daily      levothyroxine 88 MCG tablet  Commonly known as: SYNTHROID, LEVOTHROID   88 mcg, Oral, Daily      losartan 100 MG tablet  Commonly known as: COZAAR   100 mg, Oral, Every 24 Hours Scheduled      metFORMIN 500 MG tablet  Commonly known as: GLUCOPHAGE   1,000 mg, Oral, 2 times daily     "  methylPREDNISolone 4 MG dose pack  Commonly known as: MEDROL   Take as directed on package instructions.      simvastatin 80 MG tablet  Commonly known as: ZOCOR   80 mg, Oral, Nightly      venlafaxine 75 MG tablet  Commonly known as: EFFEXOR   75 mg, Oral, 2 Times Daily             Allergies   Allergen Reactions   • Tramadol Anaphylaxis     \"seizure like behavior\"         Discharge Disposition:  Home or Self Care    Discharge Diet:  Diet Order   Procedures   • Diet Regular; Consistent Carbohydrate       Discharge Activity:   Activity Instructions     Activity as Tolerated            CODE STATUS:    Code Status and Medical Interventions:   Ordered at: 02/08/21 1651     Code Status:    CPR     Medical Interventions (Level of Support Prior to Arrest):    Full       Future Appointments   Date Time Provider Department Center   3/4/2021 12:45 PM Minh Taylor MD [facility] NURIA NURIA   3/12/2021 11:20 AM Dianne Porras MD [facility] ID NURIA None   3/17/2021  1:00 PM Dea Martinez MD [facility] NURIA   5/14/2021  9:30 AM Dea Martinez MD [facility] NURIA     Additional Instructions for the Follow-ups that You Need to Schedule     Discharge Follow-up with PCP   As directed       Currently Documented PCP:    Dea Martinez MD    PCP Phone Number:    [phone]     Follow Up Details: 2 weeks           Follow-up Information     Dea Martinez MD .    Specialty: Family Medicine  Why: 2 weeks  Contact information:  [address] [facility] [address]  [phone]                   Additional Instructions for the Follow-ups that You Need to Schedule     Discharge Follow-up with PCP   As directed       Currently Documented PCP:    Dea Martinez MD    PCP Phone Number:    [phone]     Follow Up Details: 2 weeks           Time Spent on Discharge:  Greater than 30 minutes      Norman Jurado MD  [facility]  02/13/21  11:24 EST              "

## 2021-02-14 NOTE — OUTREACH NOTE
Prep Survey      Responses   [facility] facility patient discharged from?  [facility]   Is LACE score < 7 ?  No   Emergency Room discharge w/ pulse ox?  No   Eligibility  [facility]   Date of Admission  02/08/21   Date of Discharge  02/13/21   Discharge Disposition  Home or Self Care   Discharge diagnosis  Anemia/Gastritis   Does the patient have one of the following disease processes/diagnoses(primary or secondary)?  Other   Does the patient have Home health ordered?  Yes   What is the Home health agency?   [facility] and [facility] infusion   Is there a DME ordered?  No   Prep survey completed?  Yes          Danielle Manriquez RN

## 2021-02-15 ENCOUNTER — LAB REQUISITION (OUTPATIENT)
Dept: LAB | Facility: HOSPITAL | Age: 56
End: 2021-02-15

## 2021-02-15 ENCOUNTER — TRANSITIONAL CARE MANAGEMENT TELEPHONE ENCOUNTER (OUTPATIENT)
Dept: CALL CENTER | Facility: HOSPITAL | Age: 56
End: 2021-02-15

## 2021-02-15 DIAGNOSIS — Z00.00 ENCOUNTER FOR GENERAL ADULT MEDICAL EXAMINATION WITHOUT ABNORMAL FINDINGS: ICD-10-CM

## 2021-02-15 LAB
BASOPHILS # BLD AUTO: 0.03 10*3/MM3 (ref 0–0.2)
BASOPHILS NFR BLD AUTO: 0.8 % (ref 0–1.5)
CREAT SERPL-MCNC: 1.02 MG/DL (ref 0.57–1)
CYTO UR: NORMAL
DEPRECATED RDW RBC AUTO: 48.6 FL (ref 37–54)
EOSINOPHIL # BLD AUTO: 0.19 10*3/MM3 (ref 0–0.4)
EOSINOPHIL NFR BLD AUTO: 5.3 % (ref 0.3–6.2)
ERYTHROCYTE [DISTWIDTH] IN BLOOD BY AUTOMATED COUNT: 14.2 % (ref 12.3–15.4)
GFR SERPL CREATININE-BSD FRML MDRD: 56 ML/MIN/1.73
HCT VFR BLD AUTO: 20.7 % (ref 34–46.6)
HGB BLD-MCNC: 6.6 G/DL (ref 12–15.9)
IMM GRANULOCYTES # BLD AUTO: 0.01 10*3/MM3 (ref 0–0.05)
IMM GRANULOCYTES NFR BLD AUTO: 0.3 % (ref 0–0.5)
LAB AP CASE REPORT: NORMAL
LAB AP SPECIAL STAINS: NORMAL
LYMPHOCYTES # BLD AUTO: 1.07 10*3/MM3 (ref 0.7–3.1)
LYMPHOCYTES NFR BLD AUTO: 30.1 % (ref 19.6–45.3)
MCH RBC QN AUTO: 30.4 PG (ref 26.6–33)
MCHC RBC AUTO-ENTMCNC: 31.9 G/DL (ref 31.5–35.7)
MCV RBC AUTO: 95.4 FL (ref 79–97)
MONOCYTES # BLD AUTO: 0.36 10*3/MM3 (ref 0.1–0.9)
MONOCYTES NFR BLD AUTO: 10.1 % (ref 5–12)
NEUTROPHILS NFR BLD AUTO: 1.9 10*3/MM3 (ref 1.7–7)
NEUTROPHILS NFR BLD AUTO: 53.4 % (ref 42.7–76)
NRBC BLD AUTO-RTO: 0 /100 WBC (ref 0–0.2)
PATH REPORT.FINAL DX SPEC: NORMAL
PATH REPORT.GROSS SPEC: NORMAL
PLATELET # BLD AUTO: 102 10*3/MM3 (ref 140–450)
PMV BLD AUTO: 9.3 FL (ref 6–12)
RBC # BLD AUTO: 2.17 10*6/MM3 (ref 3.77–5.28)
WBC # BLD AUTO: 3.56 10*3/MM3 (ref 3.4–10.8)

## 2021-02-15 PROCEDURE — 82565 ASSAY OF CREATININE: CPT | Performed by: INTERNAL MEDICINE

## 2021-02-15 PROCEDURE — 85025 COMPLETE CBC W/AUTO DIFF WBC: CPT | Performed by: INTERNAL MEDICINE

## 2021-02-15 NOTE — PROGRESS NOTES
Case Management Discharge Note      Final Note: Pt discharged home with [facility] on 2/13.   CATHRYN Kennedy, RN         Selected Continued Care - Discharged on 2/13/2021 Admission date: 2/8/2021 - Discharge disposition: Home or Self Care    Destination    No services have been selected for the patient.              Durable Medical Equipment    No services have been selected for the patient.              Dialysis/Infusion Coordination complete    Service Provider Selected Services Address Phone Fax Patient Preferred    [facility]  Infusion and IV Therapy [address], [place] [id number] [phone] --          Home Medical Care Coordination complete    Service Provider Selected Services Address Phone Fax Patient Preferred    [facility] CARE [facility] [address] [address] [phone] --       Internal Comment last updated by Lesvia Powell, RN 2/13/2021 1215    msg to [facility] intake that pt is being dc'd                     Therapy    No services have been selected for the patient.              Community Resources    No services have been selected for the patient.                Selected Continued Care - Prior Encounters Includes selections from prior encounters from 11/10/2020 to 2/13/2021    Discharged on 2/5/2021 Admission date: 1/24/2021 - Discharge disposition: Home-Health Care [facility]    Home Medical Care     Service Provider Selected Services Address Phone Fax Patient Preferred    [facility] CARE [facility] [address] [facility] [address] [address] [phone] --                    Transportation Services  Private: Car    Final Discharge Disposition Code: 06 - home with home health care

## 2021-02-15 NOTE — PROGRESS NOTES
I received a call regarding a critical lab result again. Hgb 6.6 I called the patient and spoke to her and her daughter. The pt reports fatigue that has been present for for past few weeks. She denies any new symptoms. Specifically, no SOB, lightheadedness, or dizziness. No chest pain. She reports 2 loose bowel movements today. Her diarrhea is improving. She denies any BRBPR.     I send a message to the GI doctor that saw her in the hospital and her PCP for recommendations.     I will see tomorrow if there is anyway we can get her a blood transfusion.     In the meanwhile, given the bad weather and the fact that she was just in the hospital for similar reasons with no source of bleeding found she will stay home today. I advised her that if she develops any signs or symptoms such as but not limited to SOB, chest pain, lightheadedness, evidence fo heavy bleeding or dizziness she should immediately seek medical attention and come to the ER (call EMS). The patient and her daughter voiced understanding.

## 2021-02-15 NOTE — OUTREACH NOTE
Call Center TCM Note      Responses   [facility] patient discharged from?  [facility]   Does the patient have one of the following disease processes/diagnoses(primary or secondary)?  Other   TCM attempt successful?  Yes   Call start time  1256   Call end time  1259   Discharge diagnosis  Anemia/Gastritis   Meds reviewed with patient/caregiver?  Yes   Is the patient having any side effects they believe may be caused by any medication additions or changes?  No   Does the patient have all medications ordered at discharge?  Yes   Is the patient taking all medications as directed (includes completed medication regime)?  Yes   Does the patient have a primary care provider?   Yes   Does the patient have an appointment with their PCP within 7 days of discharge?  Greater than 7 days   Has the patient kept scheduled appointments due by today?  N/A   Comments  States has appt 3/17 with Dr. Martinez.  Will send message to office to see if they want to see sooner to be in TCM timeframes   Has home health visited the patient within 72 hours of discharge?  N/A   Did the patient receive a copy of their discharge instructions?  Yes   Nursing interventions  Reviewed instructions with patient   What is the patient's perception of their health status since discharge?  Improving   Is the patient/caregiver able to teach back signs and symptoms related to disease process for when to call PCP?  Yes   Is the patient/caregiver able to teach back signs and symptoms related to disease process for when to call 911?  Yes   Is the patient/caregiver able to teach back the hierarchy of who to call/visit for symptoms/problems? PCP, Specialist, Home health nurse, Urgent Care, ED, 911  Yes   If the patient is a current smoker, are they able to teach back resources for cessation?  Not a smoker   Additional teach back comments  States she is doing well.  No eipsodes of rectal bleeding and blood sugars are good per pt.     TCM call completed?  Yes    Wrap up additional comments  Will message PCP office if they would like to see pt sooner.          Lesvia Lancaster LPN    2/15/2021, 13:01 EST

## 2021-02-16 ENCOUNTER — TELEPHONE (OUTPATIENT)
Dept: GASTROENTEROLOGY | Facility: CLINIC | Age: 56
End: 2021-02-16

## 2021-02-16 DIAGNOSIS — D64.9 ANEMIA, UNSPECIFIED TYPE: Primary | ICD-10-CM

## 2021-02-16 RX ORDER — SODIUM CHLORIDE 9 MG/ML
250 INJECTION, SOLUTION INTRAVENOUS AS NEEDED
Status: CANCELLED | OUTPATIENT
Start: 2021-02-17

## 2021-02-16 RX ORDER — FUROSEMIDE 10 MG/ML
20 INJECTION INTRAMUSCULAR; INTRAVENOUS ONCE
Status: CANCELLED | OUTPATIENT
Start: 2021-02-17 | End: 2021-02-16

## 2021-02-16 NOTE — TELEPHONE ENCOUNTER
----- Message from Jaylen Mandujano MD sent at 2/16/2021 12:29 PM EST -----  Regarding: Hemoglobin has dropped  Hemoglobin is down again, overbook in a nurse practitioner Spot this week thank you  ----- Message -----  From: Dianne Porras MD  Sent: 2/15/2021   5:05 PM EST  To: Jaylen Mandujano MD    You saw this patient in the hospital. Her hemoglobin is down again. I left a message on her voicemail asking her to call me back today to see how she is feeling.     ----- Message -----  From: Lab, Background User  Sent: 2/15/2021   3:31 PM EST  To: Dianne Porras MD

## 2021-02-17 ENCOUNTER — HOSPITAL ENCOUNTER (OUTPATIENT)
Dept: INFUSION THERAPY | Facility: HOSPITAL | Age: 56
Discharge: HOME | End: 2021-02-17
Admitting: INTERNAL MEDICINE

## 2021-02-17 VITALS
RESPIRATION RATE: 16 BRPM | TEMPERATURE: 97.5 F | SYSTOLIC BLOOD PRESSURE: 177 MMHG | DIASTOLIC BLOOD PRESSURE: 96 MMHG | HEART RATE: 86 BPM | OXYGEN SATURATION: 97 %

## 2021-02-17 DIAGNOSIS — D64.9 ANEMIA, UNSPECIFIED TYPE: ICD-10-CM

## 2021-02-17 LAB
ABO GROUP BLD: NORMAL
BLD GP AB SCN SERPL QL: NEGATIVE
HGB BLD-MCNC: 9 G/DL (ref 12–15.9)
RH BLD: POSITIVE
T&S EXPIRATION DATE: NORMAL

## 2021-02-17 PROCEDURE — 36592 COLLECT BLOOD FROM PICC: CPT

## 2021-02-17 PROCEDURE — P9016 RBC LEUKOCYTES REDUCED: HCPCS

## 2021-02-17 PROCEDURE — 86850 RBC ANTIBODY SCREEN: CPT | Performed by: INTERNAL MEDICINE

## 2021-02-17 PROCEDURE — 86900 BLOOD TYPING SEROLOGIC ABO: CPT | Performed by: INTERNAL MEDICINE

## 2021-02-17 PROCEDURE — 85018 HEMOGLOBIN: CPT | Performed by: INTERNAL MEDICINE

## 2021-02-17 PROCEDURE — 86900 BLOOD TYPING SEROLOGIC ABO: CPT

## 2021-02-17 PROCEDURE — 96374 THER/PROPH/DIAG INJ IV PUSH: CPT

## 2021-02-17 PROCEDURE — 36430 TRANSFUSION BLD/BLD COMPNT: CPT

## 2021-02-17 PROCEDURE — 86923 COMPATIBILITY TEST ELECTRIC: CPT

## 2021-02-17 PROCEDURE — 86901 BLOOD TYPING SEROLOGIC RH(D): CPT | Performed by: INTERNAL MEDICINE

## 2021-02-17 PROCEDURE — 25010000002 FUROSEMIDE PER 20 MG: Performed by: INTERNAL MEDICINE

## 2021-02-17 RX ORDER — SODIUM CHLORIDE 9 MG/ML
250 INJECTION, SOLUTION INTRAVENOUS AS NEEDED
Status: DISCONTINUED | OUTPATIENT
Start: 2021-02-17 | End: 2021-02-19 | Stop reason: HOSPADM

## 2021-02-17 RX ORDER — FUROSEMIDE 10 MG/ML
20 INJECTION INTRAMUSCULAR; INTRAVENOUS ONCE
Status: COMPLETED | OUTPATIENT
Start: 2021-02-17 | End: 2021-02-17

## 2021-02-17 RX ADMIN — FUROSEMIDE 20 MG: 10 INJECTION, SOLUTION INTRAMUSCULAR; INTRAVENOUS at 12:44

## 2021-02-17 NOTE — PROGRESS NOTES
Yes, if possible.  Due to likely cancellations today I could probably work her in today at 1130 or 245 today (for further 1130 this morning as most likely I will need repeat blood work)  Otherwise, I am out of town next week and probably the soonest I would be able to get in would be early March if not today.  Or we can see if there will be cancellations for Friday and could work her in only if cancellations on Friday.

## 2021-02-17 NOTE — PROGRESS NOTES
Patient tolerated transfusion without s/s of reaction. Post transfusion BP and HGB reported to Dr. Porras. Patient free to be discharged home.

## 2021-02-17 NOTE — PROGRESS NOTES
Spoke with patient and she stated she is unable to come in today as she is getting a blood transfusion today. Will call patient on Friday if we have anyone cancel

## 2021-02-17 NOTE — TELEPHONE ENCOUNTER
Patient called. Advised of Dr. Mandujano's note. She states she is in the process of receiving 2u pRBC's.   She states she was told be Dr. Mandujano to take Fe+ at home. She questions how many times a day she should be taking the medication.   Advised an update will be sent to Dr. Mandujano. She verb understanding.

## 2021-02-17 NOTE — PROGRESS NOTES
Dr. Dianne varma notified of patient's elevated B/P. No new orders noted. Will continue with 2nd unit and monitor B/P and inform Dr. Varma if B/P becomes elevated again.

## 2021-02-18 LAB
BH BB BLOOD EXPIRATION DATE: NORMAL
BH BB BLOOD EXPIRATION DATE: NORMAL
BH BB BLOOD TYPE BARCODE: 6200
BH BB BLOOD TYPE BARCODE: 6200
BH BB DISPENSE STATUS: NORMAL
BH BB DISPENSE STATUS: NORMAL
BH BB PRODUCT CODE: NORMAL
BH BB PRODUCT CODE: NORMAL
BH BB UNIT NUMBER: NORMAL
BH BB UNIT NUMBER: NORMAL
CROSSMATCH INTERPRETATION: NORMAL
CROSSMATCH INTERPRETATION: NORMAL
UNIT  ABO: NORMAL
UNIT  ABO: NORMAL
UNIT  RH: NORMAL
UNIT  RH: NORMAL

## 2021-02-18 NOTE — TELEPHONE ENCOUNTER
Returned patient's phone call. Advised per Dr. Mandujano's note. She verb understanding.   Scheduled visit with Rosmery on 02/23@1500.

## 2021-02-19 ENCOUNTER — OFFICE VISIT (OUTPATIENT)
Dept: FAMILY MEDICINE CLINIC | Facility: CLINIC | Age: 56
End: 2021-02-19

## 2021-02-19 VITALS
OXYGEN SATURATION: 99 % | HEIGHT: 66 IN | TEMPERATURE: 97.3 F | HEART RATE: 89 BPM | WEIGHT: 293 LBS | DIASTOLIC BLOOD PRESSURE: 84 MMHG | SYSTOLIC BLOOD PRESSURE: 140 MMHG | BODY MASS INDEX: 47.09 KG/M2

## 2021-02-19 DIAGNOSIS — A04.72 CLOSTRIDIUM DIFFICILE COLITIS: ICD-10-CM

## 2021-02-19 DIAGNOSIS — F41.9 ANXIETY AND DEPRESSION: ICD-10-CM

## 2021-02-19 DIAGNOSIS — Z09 HOSPITAL DISCHARGE FOLLOW-UP: Primary | ICD-10-CM

## 2021-02-19 DIAGNOSIS — E03.8 OTHER SPECIFIED HYPOTHYROIDISM: ICD-10-CM

## 2021-02-19 DIAGNOSIS — R60.1 ANASARCA: ICD-10-CM

## 2021-02-19 DIAGNOSIS — K74.60 LIVER CIRRHOSIS SECONDARY TO NASH (HCC): ICD-10-CM

## 2021-02-19 DIAGNOSIS — K75.81 LIVER CIRRHOSIS SECONDARY TO NASH (HCC): ICD-10-CM

## 2021-02-19 DIAGNOSIS — F32.A ANXIETY AND DEPRESSION: ICD-10-CM

## 2021-02-19 DIAGNOSIS — E78.2 MIXED HYPERLIPIDEMIA: ICD-10-CM

## 2021-02-19 DIAGNOSIS — E11.69 DIABETES MELLITUS TYPE 2 IN OBESE (HCC): ICD-10-CM

## 2021-02-19 DIAGNOSIS — E66.9 DIABETES MELLITUS TYPE 2 IN OBESE (HCC): ICD-10-CM

## 2021-02-19 DIAGNOSIS — E87.1 HYPONATREMIA: ICD-10-CM

## 2021-02-19 DIAGNOSIS — B95.61 MSSA BACTEREMIA: ICD-10-CM

## 2021-02-19 DIAGNOSIS — M00.012 STAPHYLOCOCCAL ARTHRITIS OF LEFT SHOULDER (HCC): ICD-10-CM

## 2021-02-19 DIAGNOSIS — R78.81 MSSA BACTEREMIA: ICD-10-CM

## 2021-02-19 DIAGNOSIS — I10 ESSENTIAL HYPERTENSION: ICD-10-CM

## 2021-02-19 DIAGNOSIS — D64.9 SYMPTOMATIC ANEMIA: ICD-10-CM

## 2021-02-19 PROBLEM — K14.8 TONGUE LESION: Status: RESOLVED | Noted: 2020-07-15 | Resolved: 2021-02-19

## 2021-02-19 PROBLEM — M46.46 DISCITIS OF LUMBAR REGION: Status: ACTIVE | Noted: 2021-02-19

## 2021-02-19 PROBLEM — K62.5 RECTAL BLEEDING: Status: RESOLVED | Noted: 2021-02-08 | Resolved: 2021-02-19

## 2021-02-19 PROBLEM — N17.9 AKI (ACUTE KIDNEY INJURY) (HCC): Status: RESOLVED | Noted: 2021-01-24 | Resolved: 2021-02-19

## 2021-02-19 PROBLEM — A41.9 SEPSIS WITHOUT ACUTE ORGAN DYSFUNCTION (HCC): Status: RESOLVED | Noted: 2021-01-24 | Resolved: 2021-02-19

## 2021-02-19 PROBLEM — U07.1 COVID-19 VIRUS INFECTION: Status: RESOLVED | Noted: 2021-01-15 | Resolved: 2021-02-19

## 2021-02-19 PROCEDURE — 99496 TRANSJ CARE MGMT HIGH F2F 7D: CPT | Performed by: FAMILY MEDICINE

## 2021-02-19 RX ORDER — CYCLOBENZAPRINE HCL 10 MG
10 TABLET ORAL 3 TIMES DAILY PRN
Qty: 270 TABLET | Refills: 1 | Status: SHIPPED | OUTPATIENT
Start: 2021-02-19 | End: 2022-04-08 | Stop reason: SDUPTHER

## 2021-02-19 RX ORDER — LOSARTAN POTASSIUM 100 MG/1
100 TABLET ORAL
Qty: 90 TABLET | Refills: 1 | Status: SHIPPED | OUTPATIENT
Start: 2021-02-19 | End: 2021-04-02

## 2021-02-19 RX ORDER — EZETIMIBE 10 MG/1
10 TABLET ORAL DAILY
Qty: 90 TABLET | Refills: 1 | Status: SHIPPED | OUTPATIENT
Start: 2021-02-19 | End: 2021-03-09

## 2021-02-19 RX ORDER — SIMVASTATIN 80 MG
80 TABLET ORAL NIGHTLY
Qty: 90 TABLET | Refills: 1 | Status: SHIPPED | OUTPATIENT
Start: 2021-02-19 | End: 2021-04-02

## 2021-02-19 RX ORDER — VENLAFAXINE 75 MG/1
75 TABLET ORAL 2 TIMES DAILY
Qty: 180 TABLET | Refills: 1 | Status: SHIPPED | OUTPATIENT
Start: 2021-02-19 | End: 2021-04-02

## 2021-02-19 RX ORDER — LEVOTHYROXINE SODIUM 88 UG/1
88 TABLET ORAL DAILY
Qty: 90 TABLET | Refills: 1 | Status: SHIPPED | OUTPATIENT
Start: 2021-02-19 | End: 2022-03-03 | Stop reason: SDUPTHER

## 2021-02-19 RX ORDER — FUROSEMIDE 40 MG/1
40 TABLET ORAL 2 TIMES DAILY
Qty: 180 TABLET | Refills: 1 | Status: SHIPPED | OUTPATIENT
Start: 2021-02-19 | End: 2021-03-09 | Stop reason: SDUPTHER

## 2021-02-19 RX ORDER — DICLOFENAC SODIUM 75 MG/1
75 TABLET, DELAYED RELEASE ORAL 2 TIMES DAILY
Qty: 180 TABLET | Refills: 1 | Status: CANCELLED | OUTPATIENT
Start: 2021-02-19

## 2021-02-19 NOTE — PROGRESS NOTES
Transitional Care Follow Up Visit  Subjective     Stefani Bhandari is a 55 y.o. female who presents for a transitional care management visit.    Within 48 business hours after discharge our office contacted her via telephone to coordinate her care and needs.      I reviewed and discussed the details of that call along with the discharge summary, hospital problems, inpatient lab results, inpatient diagnostic studies, and consultation reports with Stefani.     Current outpatient and discharge medications have been reconciled for the patient.  Reviewed by: Dea Martinez MD      Date of TCM Phone Call 2/13/2021   [facility]   Date of Admission 2/8/2021   Date of Discharge 2/13/2021   Discharge Disposition Home or Self Care     Risk for Readmission (LACE) Score: 11 (2/13/2021  6:01 AM)      History of Present Illness   Course During Hospital Stay:      Hospital admissions x3 within the last 4 weeks    Hospital visit #1 --1/24/2021 thru 2/5/2021 --for MSSA bacteremia with septic arthritis, anasarca, liver cirrhosis/Garcia, L5/S1 discitis    His hospital admission she presented with fever, pain, and weakness and was found to have septic joint.  She was treated with IV antibiotics, multiple scans were performed along with labs.  She was seen in consultation by infectious disease, Ortho, and GI.  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  She was started on Lasix 40 mg twice daily.  Otherwise, only change medication was an increased dosage of Metformin to 1000 mg twice daily due to an A1c of 7.3)  Discharge hemoglobin at this visit was 11.6.  She was discharged home with IV antibiotics through home health under the direction of Dr. Porras.    Hospital visit #2, for which this TCM visit was made --admission date was 2/8/2021 thru 2/13/2021  -- Dx severe anemia, rectal bleeding, and C. difficile colitis    Apparently, she was sent to the ER by Dr. Porras who was called with a  "significantly low hemoglobin of 6.8 through outpatient lab work for routine monitoring of IV antibiotics.  During the hospitalization, it was discovered that she did have an episode of rectal bleeding thus was further evaluated by GI with both an EGD and colonoscopy.  These were relatively nonsignificant, showing only mild gastritis and a nonbleeding hemorrhoid.  Her hemoglobin remained stable throughout the hospitalization, not requiring transfusion.  It was discovered she had C. difficile colitis and therefore IV vancomycin was added.  She was discharged home to continue with home health and IV antibiotics under the direction of Dr. Porras (completion date of IV antibiotics 3/12/2021)  Also, she was told to follow-up outpatient with her GI doctor/Dr. eBnder regarding the liver cirrhosis, portal hypertension, and anasarca.  No changes were made to her Lasix 40 mg twice daily (note, no K supplementation)  Discharge labs on 2/13/2021 were notable for hemoglobin of 7.3 (stable), sodium 132, GFR 56, normal electrolytes, normal LFTs    Hospital visit/ER visit #3 --on 2/17/2021 --where she was readmitted/sent back to the ER for severe anemia, hemoglobin 6.6    Again, this hemoglobin was found on routine lab monitoring given IV antibiotic treatment through home health.  Dr. Porras sent her back to the ER for transfusion.  She was transfused 2 units of PRBCs, discharge hemoglobin on 2/17/2021 was 9    Since being home, post hospital day follow-up #6 --- feeling better, especially after the transfusion from 2 days ago.  No further episodes of rectal bleeding.  Denies abdominal pain, nausea, vomiting, shortness of air, chest pain.  States the swelling in her lower extremities is essentially unchanged.  I was called \"on-call\" 2 nights ago on 2/17/2021 regarding an elevated blood pressure during the transfusion of 170/90.  No changes were made to her blood pressure medications, asked daughter to keep monitoring and follow-up " today for hospital follow-up.  Note, she is still taking diclofenac twice daily!  Still on Lasix 40 mg twice daily without potassium supplementation?   See discharge labs below    LOV with me in early January via telemedicine only.  No med changes were made at that visit  Although she was sent for outpatient labs, which were all within normal limits except for an A1c of 7.3 (metformin was increased to 1000 mg twice daily)  Compliant with and tolerating all medications without side effects  Needs several refills today       The following portions of the patient's history were reviewed and updated as appropriate: allergies, current medications, past family history, past medical history, past social history, past surgical history and problem list.    Review of Systems   Constitutional: Negative for fever and unexpected weight change.   Respiratory: Negative for cough and shortness of breath.    Cardiovascular: Negative for chest pain.       Objective   Physical Exam  Vitals signs and nursing note reviewed.   Constitutional:       Appearance: Normal appearance. She is well-developed. She is obese.   HENT:      Head: Normocephalic and atraumatic.      Nose: Nose normal.   Eyes:      Conjunctiva/sclera: Conjunctivae normal.      Pupils: Pupils are equal, round, and reactive to light.   Neck:      Musculoskeletal: Normal range of motion and neck supple.      Thyroid: No thyromegaly.   Cardiovascular:      Rate and Rhythm: Normal rate and regular rhythm.      Heart sounds: Normal heart sounds. No murmur.   Pulmonary:      Effort: Pulmonary effort is normal.      Breath sounds: Normal breath sounds.   Abdominal:      General: Abdomen is flat. Bowel sounds are normal. There is no distension.      Palpations: Abdomen is soft. There is no hepatomegaly, splenomegaly or mass.      Tenderness: There is no abdominal tenderness. There is no guarding or rebound.      Hernia: No hernia is present.   Musculoskeletal: Normal range of  motion.      Right lower le+ Edema present.      Left lower le+ Edema present.   Lymphadenopathy:      Cervical: No cervical adenopathy.   Skin:     General: Skin is warm.   Neurological:      General: No focal deficit present.      Mental Status: She is alert.   Psychiatric:         Mood and Affect: Mood normal.         Behavior: Behavior normal.         Thought Content: Thought content normal.         Judgment: Judgment normal.     LABS --discharge labs on 2021--hemoglobin 7.3, GFR 56, sodium 132, normal LFTs, normal electrolytes                2021--hemoglobin after 2 units of packed RBCs was 9.0               2021 --A1c 7.3 (in October was 7.5), LDL 48    Assessment/Plan   Diagnoses and all orders for this visit:    1. Hospital discharge follow-up (Primary) --complicated hospital admissions x2 for diagnoses listed below #2 through #8  PHD #6 today after second hospitalization.    2. Symptomatic anemia -- S/P most recent hospitalization.  S/P 2 units of packed RBCs on 2021, post transfusion hemoglobin 9  Suspect this may be multifactorial (recent shoulder surgery for septic arthritis, C. difficile colitis, and/or chronic medical disease/liver cirrhosis/portal hypertension, and still on NSAIDs!)  Stop diclofenac and all NSAIDs!  Upper and lower endoscopy nonsignificant findings.  Most definitely needs to keep follow-up with GI/Dr. Bender on outpatient basis  Recheck H&H today  Do recommend starting ferrous sulfate twice daily  -     Hemoglobin and hematocrit, blood; Future    3. Clostridium difficile colitis --clinically improving.  Continue treatment recommendations through infectious disease doctor/Dr. Porras    4. MSSA bacteremia clinically improving.  Per ID  Recommendations    5. Staphylococcal arthritis of left shoulder (CMS/HCC) per ID and Ortho doc    6. 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  Discussed with patient and daughter in depth  Strongly  encourage weight loss  Needs to maintain outpatient follow-up with GI/Dr. Bender    7. Anasarca --stable.  Secondary to liver cirrhosis/METZGER  At this point, recommend continuing with Lasix 40 mg twice daily  We will check renal function and electrolytes today, may need K supplementation?    8. Hyponatremia --controlled?  Recheck BMP, on diuretic  -     Basic Metabolic Panel    9. Mixed hyperlipidemia --well-controlled.  Lipids and LFTs up-to-date, therapeutic.  No change of medication  Continue both Zetia and Zocor as prescribed below    10. Other specified hypothyroidism --stable.  TSH up-to-date no change of medication  Refill Synthroid 88 mcg 1 p.o. every morning    11. Diabetes mellitus type 2 in obese (CMS/HCC) --slightly uncontrolled.  Recently increased Metformin from 1500 mg to 2000 mg daily, continue with this dosing regimen and refill as directed below  Needs low-carb/low calorie/low cholesterol diet and increase cardio exercise to greater than 150 minutes weekly  We will plan to recheck A1c in 3 months    12. Anxiety and depression stable.  No change of medication  Refill Effexor 75 mg 1 p.o. twice daily    13. Essential hypertension --fairly stable.  No change in medication at this point given pain circumstances.  Recommend continue monitoring of blood pressure, if BP remains greater than 150 and/or greater than 90 then call for further recommendations  Refill losartan 100 mg 1 p.o. daily    Other orders  -     ezetimibe (ZETIA) 10 MG tablet; Take 1 tablet by mouth Daily.  Dispense: 90 tablet; Refill: 1  -     cyclobenzaprine (FLEXERIL) 10 MG tablet; Take 1 tablet by mouth 3 (Three) Times a Day As Needed for Muscle Spasms. for muscle spams  Dispense: 270 tablet; Refill: 1  -     Cancel: diclofenac (VOLTAREN) 75 MG EC tablet; Take 1 tablet by mouth 2 (Two) Times a Day.  Dispense: 180 tablet; Refill: 1  -     furosemide (LASIX) 40 MG tablet; Take 1 tablet by mouth 2 (Two) Times a Day.  Dispense: 180  tablet; Refill: 1  -     levothyroxine (SYNTHROID, LEVOTHROID) 88 MCG tablet; Take 1 tablet by mouth Daily.  Dispense: 90 tablet; Refill: 1  -     losartan (COZAAR) 100 MG tablet; Take 1 tablet by mouth Daily.  Dispense: 90 tablet; Refill: 1  -     metFORMIN (GLUCOPHAGE) 500 MG tablet; Take 2 tablets by mouth 2 (two) times a day.  Dispense: 360 tablet; Refill: 1  -     simvastatin (ZOCOR) 80 MG tablet; Take 1 tablet by mouth Every Night.  Dispense: 90 tablet; Refill: 1  -     venlafaxine (EFFEXOR) 75 MG tablet; Take 1 tablet by mouth 2 (Two) Times a Day.  Dispense: 180 tablet; Refill: 1      Return in about 3 months (around 5/19/2021) for Recheck.   Dea Martinez MD

## 2021-02-20 PROBLEM — R93.89 ABNORMAL CT OF THE CHEST: Status: RESOLVED | Noted: 2021-01-15 | Resolved: 2021-02-20

## 2021-02-20 PROBLEM — R93.2 ABNORMAL CT SCAN, LIVER: Status: RESOLVED | Noted: 2021-02-08 | Resolved: 2021-02-20

## 2021-02-20 LAB
BUN SERPL-MCNC: 14 MG/DL (ref 6–24)
BUN/CREAT SERPL: 15 (ref 9–23)
CALCIUM SERPL-MCNC: 8.1 MG/DL (ref 8.7–10.2)
CHLORIDE SERPL-SCNC: 102 MMOL/L (ref 96–106)
CO2 SERPL-SCNC: 23 MMOL/L (ref 20–29)
CREAT SERPL-MCNC: 0.96 MG/DL (ref 0.57–1)
GLUCOSE SERPL-MCNC: 79 MG/DL (ref 65–99)
POTASSIUM SERPL-SCNC: 4.1 MMOL/L (ref 3.5–5.2)
SODIUM SERPL-SCNC: 140 MMOL/L (ref 134–144)

## 2021-02-20 NOTE — PATIENT INSTRUCTIONS
Start iron twice daily  Stop diclofenac and all NSAIDs  Further recommendations to follow based on labs  Follow-up with specialist as planned

## 2021-02-22 ENCOUNTER — LAB REQUISITION (OUTPATIENT)
Dept: LAB | Facility: HOSPITAL | Age: 56
End: 2021-02-22

## 2021-02-22 DIAGNOSIS — Z00.00 ENCOUNTER FOR GENERAL ADULT MEDICAL EXAMINATION WITHOUT ABNORMAL FINDINGS: ICD-10-CM

## 2021-02-22 LAB
CREAT SERPL-MCNC: 0.68 MG/DL (ref 0.57–1)
DEPRECATED RDW RBC AUTO: 45.2 FL (ref 37–54)
ERYTHROCYTE [DISTWIDTH] IN BLOOD BY AUTOMATED COUNT: 13.6 % (ref 12.3–15.4)
GFR SERPL CREATININE-BSD FRML MDRD: 90 ML/MIN/1.73
HCT VFR BLD AUTO: 25.9 % (ref 34–46.6)
HGB BLD-MCNC: 8.6 G/DL (ref 12–15.9)
MCH RBC QN AUTO: 30.3 PG (ref 26.6–33)
MCHC RBC AUTO-ENTMCNC: 33.2 G/DL (ref 31.5–35.7)
MCV RBC AUTO: 91.2 FL (ref 79–97)
PLATELET # BLD AUTO: 122 10*3/MM3 (ref 140–450)
PMV BLD AUTO: 8.9 FL (ref 6–12)
RBC # BLD AUTO: 2.84 10*6/MM3 (ref 3.77–5.28)
WBC # BLD AUTO: 4.96 10*3/MM3 (ref 3.4–10.8)

## 2021-02-22 PROCEDURE — 85027 COMPLETE CBC AUTOMATED: CPT | Performed by: INTERNAL MEDICINE

## 2021-02-22 PROCEDURE — 82565 ASSAY OF CREATININE: CPT | Performed by: INTERNAL MEDICINE

## 2021-02-23 ENCOUNTER — OFFICE VISIT (OUTPATIENT)
Dept: GASTROENTEROLOGY | Facility: CLINIC | Age: 56
End: 2021-02-23

## 2021-02-23 ENCOUNTER — READMISSION MANAGEMENT (OUTPATIENT)
Dept: CALL CENTER | Facility: HOSPITAL | Age: 56
End: 2021-02-23

## 2021-02-23 VITALS — BODY MASS INDEX: 45.99 KG/M2 | WEIGHT: 293 LBS | HEIGHT: 67 IN

## 2021-02-23 DIAGNOSIS — D50.8 OTHER IRON DEFICIENCY ANEMIA: Primary | ICD-10-CM

## 2021-02-23 DIAGNOSIS — K74.60 LIVER CIRRHOSIS SECONDARY TO NASH (HCC): ICD-10-CM

## 2021-02-23 DIAGNOSIS — A04.72 C. DIFFICILE DIARRHEA: ICD-10-CM

## 2021-02-23 DIAGNOSIS — K75.81 LIVER CIRRHOSIS SECONDARY TO NASH (HCC): ICD-10-CM

## 2021-02-23 PROCEDURE — 99214 OFFICE O/P EST MOD 30 MIN: CPT | Performed by: NURSE PRACTITIONER

## 2021-02-23 NOTE — OUTREACH NOTE
Medical Week 2 Survey      Responses   [facility] patient discharged from?  [place]   Does the patient have one of the following disease processes/diagnoses(primary or secondary)?  Other   Week 2 attempt successful?  Yes   Call start time  1422   Rescheduled  Rescheduled-pt requested [In an MD appt at time of call]          Clary Colon RN

## 2021-02-24 ENCOUNTER — READMISSION MANAGEMENT (OUTPATIENT)
Dept: CALL CENTER | Facility: HOSPITAL | Age: 56
End: 2021-02-24

## 2021-02-24 NOTE — OUTREACH NOTE
Medical Week 2 Survey      Responses   [facility] patient discharged from?  [place]   Does the patient have one of the following disease processes/diagnoses(primary or secondary)?  Other   Week 2 attempt successful?  No   Unsuccessful attempts  Attempt 1          Melissa Lovett RN

## 2021-03-01 ENCOUNTER — LAB REQUISITION (OUTPATIENT)
Dept: LAB | Facility: HOSPITAL | Age: 56
End: 2021-03-01

## 2021-03-01 ENCOUNTER — TELEPHONE (OUTPATIENT)
Dept: GASTROENTEROLOGY | Facility: CLINIC | Age: 56
End: 2021-03-01

## 2021-03-01 DIAGNOSIS — Z00.00 ENCOUNTER FOR GENERAL ADULT MEDICAL EXAMINATION WITHOUT ABNORMAL FINDINGS: ICD-10-CM

## 2021-03-01 DIAGNOSIS — D50.8 OTHER IRON DEFICIENCY ANEMIA: Primary | ICD-10-CM

## 2021-03-01 LAB
BASOPHILS # BLD AUTO: 0.02 10*3/MM3 (ref 0–0.2)
BASOPHILS NFR BLD AUTO: 0.4 % (ref 0–1.5)
CREAT SERPL-MCNC: 0.6 MG/DL (ref 0.57–1)
DEPRECATED RDW RBC AUTO: 47.4 FL (ref 37–54)
EOSINOPHIL # BLD AUTO: 0.18 10*3/MM3 (ref 0–0.4)
EOSINOPHIL NFR BLD AUTO: 3.9 % (ref 0.3–6.2)
ERYTHROCYTE [DISTWIDTH] IN BLOOD BY AUTOMATED COUNT: 14.7 % (ref 12.3–15.4)
GFR SERPL CREATININE-BSD FRML MDRD: 103 ML/MIN/1.73
HCT VFR BLD AUTO: 21.8 % (ref 34–46.6)
HGB BLD-MCNC: 7.1 G/DL (ref 12–15.9)
IMM GRANULOCYTES # BLD AUTO: 0.02 10*3/MM3 (ref 0–0.05)
IMM GRANULOCYTES NFR BLD AUTO: 0.4 % (ref 0–0.5)
LYMPHOCYTES # BLD AUTO: 1.38 10*3/MM3 (ref 0.7–3.1)
LYMPHOCYTES NFR BLD AUTO: 29.6 % (ref 19.6–45.3)
MCH RBC QN AUTO: 29.5 PG (ref 26.6–33)
MCHC RBC AUTO-ENTMCNC: 32.6 G/DL (ref 31.5–35.7)
MCV RBC AUTO: 90.5 FL (ref 79–97)
MONOCYTES # BLD AUTO: 0.27 10*3/MM3 (ref 0.1–0.9)
MONOCYTES NFR BLD AUTO: 5.8 % (ref 5–12)
NEUTROPHILS NFR BLD AUTO: 2.8 10*3/MM3 (ref 1.7–7)
NEUTROPHILS NFR BLD AUTO: 59.9 % (ref 42.7–76)
NRBC BLD AUTO-RTO: 0 /100 WBC (ref 0–0.2)
PLATELET # BLD AUTO: 123 10*3/MM3 (ref 140–450)
PMV BLD AUTO: 9.9 FL (ref 6–12)
RBC # BLD AUTO: 2.41 10*6/MM3 (ref 3.77–5.28)
WBC # BLD AUTO: 4.67 10*3/MM3 (ref 3.4–10.8)

## 2021-03-01 PROCEDURE — 82565 ASSAY OF CREATININE: CPT | Performed by: INTERNAL MEDICINE

## 2021-03-01 PROCEDURE — 85025 COMPLETE CBC W/AUTO DIFF WBC: CPT | Performed by: INTERNAL MEDICINE

## 2021-03-01 NOTE — TELEPHONE ENCOUNTER
Returned phone call to patient's daughter. She states Rosmery wants lab work drawn on the patient. Advised will have to send an update to Rosmery for advisement.

## 2021-03-01 NOTE — TELEPHONE ENCOUNTER
----- Message from Mala Martinez RN sent at 3/1/2021 10:25 AM EST -----  Regarding: FW: Blood work order  Contact: [phone]  Dr Mandujano pt  ----- Message -----  From: Mala Kim RN  Sent: 3/1/2021   9:47 AM EST  To: Eli Delarosa, Mala Martinez RN, #  Subject: FW: Blood work order                               ----- Message -----  From: Remedios Ramirez RegSched Rep  Sent: 3/1/2021   9:01 AM EST  To: [facility] Clinical 2 Pool  Subject: Blood work order                                 PT daughter is calling to advise that [facility] needs a order submitted to them for PT blood work, please advise     [facility] health will arrive @11am to PTs home

## 2021-03-02 ENCOUNTER — TELEPHONE (OUTPATIENT)
Dept: GASTROENTEROLOGY | Facility: CLINIC | Age: 56
End: 2021-03-02

## 2021-03-02 PROCEDURE — 82270 OCCULT BLOOD FECES: CPT | Performed by: NURSE PRACTITIONER

## 2021-03-03 ENCOUNTER — TELEPHONE (OUTPATIENT)
Dept: GASTROENTEROLOGY | Facility: CLINIC | Age: 56
End: 2021-03-03

## 2021-03-03 DIAGNOSIS — D50.8 OTHER IRON DEFICIENCY ANEMIA: Primary | ICD-10-CM

## 2021-03-03 NOTE — TELEPHONE ENCOUNTER
----- Message from Radha Moreau Rep sent at 3/3/2021  3:27 PM EST -----  Regarding: questions  Contact: [phone]  Pt daughter is calling (Arlyn ) same ph #, pt infectious disease MD office told her to call us for questions as she is not in office this week.

## 2021-03-03 NOTE — TELEPHONE ENCOUNTER
Called and spoke with pt's daughter Katlyn (ok per LV). She was asking about the results of stool samples, but they are not back. She also states her mothers Hgb, is 7.1.  She is asking if her mother needs another transfusion and also asking what the next step is to find out where her mother is losing blood. Advised would send a msg to JONATHAN Botello.    Msg sent to Rosmery CASTILLO.

## 2021-03-04 ENCOUNTER — TELEPHONE (OUTPATIENT)
Dept: NEUROSURGERY | Facility: CLINIC | Age: 56
End: 2021-03-04

## 2021-03-04 ENCOUNTER — HOSPITAL ENCOUNTER (OUTPATIENT)
Dept: INFUSION THERAPY | Facility: HOSPITAL | Age: 56
Discharge: HOME | End: 2021-03-04
Admitting: NURSE PRACTITIONER

## 2021-03-04 VITALS
SYSTOLIC BLOOD PRESSURE: 151 MMHG | RESPIRATION RATE: 22 BRPM | HEART RATE: 84 BPM | DIASTOLIC BLOOD PRESSURE: 87 MMHG | TEMPERATURE: 97.3 F | OXYGEN SATURATION: 97 %

## 2021-03-04 DIAGNOSIS — D50.8 OTHER IRON DEFICIENCY ANEMIA: ICD-10-CM

## 2021-03-04 LAB
ABO GROUP BLD: NORMAL
BLD GP AB SCN SERPL QL: POSITIVE
NONSPECIFIC GEL REACTION: NORMAL
RH BLD: POSITIVE
T&S EXPIRATION DATE: NORMAL

## 2021-03-04 PROCEDURE — 86920 COMPATIBILITY TEST SPIN: CPT

## 2021-03-04 PROCEDURE — G0463 HOSPITAL OUTPT CLINIC VISIT: HCPCS

## 2021-03-04 PROCEDURE — 36592 COLLECT BLOOD FROM PICC: CPT

## 2021-03-04 PROCEDURE — 86850 RBC ANTIBODY SCREEN: CPT | Performed by: NURSE PRACTITIONER

## 2021-03-04 PROCEDURE — 86901 BLOOD TYPING SEROLOGIC RH(D): CPT | Performed by: NURSE PRACTITIONER

## 2021-03-04 PROCEDURE — 86900 BLOOD TYPING SEROLOGIC ABO: CPT | Performed by: NURSE PRACTITIONER

## 2021-03-04 PROCEDURE — 86922 COMPATIBILITY TEST ANTIGLOB: CPT

## 2021-03-04 PROCEDURE — 86870 RBC ANTIBODY IDENTIFICATION: CPT | Performed by: NURSE PRACTITIONER

## 2021-03-04 NOTE — PROGRESS NOTES
[facility] Blood [facility] notified this nurse the patient has antibodies and blood will not be ready for possibly 2 more hours. Patient reschedule for transfusion 3/5/21 at 12:30. Patient instructed to keep yellow blood bank bracelet on for appt.

## 2021-03-04 NOTE — TELEPHONE ENCOUNTER
Per Rosmery NP - Order 1 unit PRBC's.  Increase Iron to TID.  Order emergent capsule study.      Orders placed for ACU, type and screen and transfuse 1 unit PRBC.  Awaiting phone call back from scheduling for time of transfusion.    Order placed for capsule study.    Msg sent to Ioana.     Called pt and spoke with pt's daughter (ok per LV) and advised of above orders.  I will call back with time for pt to be a [facility] for transfusion. She verbalized understanding.

## 2021-03-04 NOTE — TELEPHONE ENCOUNTER
Patients daughter Katlyn called to cancel appointment due to patient being to exhausted to come in. This is a 3 week hospital f/u and the soonest appointment that I could fine is 03-30-21. They are also requesting a telephone visit or video visit. Please call Katlyn at [phone]

## 2021-03-04 NOTE — TELEPHONE ENCOUNTER
Patient's daughter Katlyn is calling and states that her mothers Hgb, is 7.1.  She is asking if her mother needs another transfusion and also asking what the next step is to find out where her mother is losing blood.    Adding to previous Encounter :  Please advise Katlyn @ [phone]

## 2021-03-04 NOTE — TELEPHONE ENCOUNTER
Received call from Bree from scheduling, she stated the pt can go asap to [facility]U for transfusion.  Called and spoke with pt's daughter. She verbalized understanding.

## 2021-03-05 ENCOUNTER — TELEPHONE (OUTPATIENT)
Dept: NEUROSURGERY | Facility: CLINIC | Age: 56
End: 2021-03-05

## 2021-03-05 ENCOUNTER — TELEPHONE (OUTPATIENT)
Dept: GASTROENTEROLOGY | Facility: CLINIC | Age: 56
End: 2021-03-05

## 2021-03-05 ENCOUNTER — HOSPITAL ENCOUNTER (OUTPATIENT)
Dept: INFUSION THERAPY | Facility: HOSPITAL | Age: 56
Setting detail: INFUSION SERIES
Discharge: HOME | End: 2021-03-05

## 2021-03-05 VITALS
SYSTOLIC BLOOD PRESSURE: 120 MMHG | DIASTOLIC BLOOD PRESSURE: 70 MMHG | OXYGEN SATURATION: 100 % | TEMPERATURE: 97.3 F | RESPIRATION RATE: 16 BRPM | HEART RATE: 87 BPM

## 2021-03-05 DIAGNOSIS — D64.9 SYMPTOMATIC ANEMIA: ICD-10-CM

## 2021-03-05 PROCEDURE — P9016 RBC LEUKOCYTES REDUCED: HCPCS

## 2021-03-05 PROCEDURE — 86900 BLOOD TYPING SEROLOGIC ABO: CPT

## 2021-03-05 PROCEDURE — 36430 TRANSFUSION BLD/BLD COMPNT: CPT

## 2021-03-05 NOTE — TELEPHONE ENCOUNTER
S/alessandra Potts and let her know that we will do a trlevisit with her mom and Claudia Roberson on 03.11.2021.

## 2021-03-05 NOTE — TELEPHONE ENCOUNTER
I want this patient to call Dr. Porras infectious disease to get their input on follow-up stool testing.

## 2021-03-05 NOTE — PATIENT INSTRUCTIONS
"https://www.redcrossblood.org/donate-blood/blood-donation-process/what-happens-to-donated-blood/blood-transfusions/types-of-blood-transfusions.html\"> https://www.hematology.org/education/patients/blood-basics/blood-safety-and-matching\"> https://www.nhlbi.nih.gov/health-topics/blood-transfusion\">   Blood Transfusion, Adult  A blood transfusion is a procedure in which you receive blood or a type of blood cell (blood component) through an IV. You may need a blood transfusion when your blood level is low. This may result from a bleeding disorder, illness, injury, or surgery. The blood may come from a donor. You may also be able to donate blood for yourself (autologous blood donation) before a planned surgery.  The blood given in a transfusion is made up of different blood components. You may receive:  · Red blood cells. These carry oxygen to the cells in the body.  · Platelets. These help your blood to clot.  · Plasma. This is the liquid part of your blood. It carries proteins and other substances throughout the body.  · White blood cells. These help you fight infections.  If you have hemophilia or another clotting disorder, you may also receive other types of blood products.  Tell a health care provider about:  · Any blood disorders you have.  · Any previous reactions you have had during a blood transfusion.  · Any allergies you have.  · All medicines you are taking, including vitamins, herbs, eye drops, creams, and over-the-counter medicines.  · Any surgeries you have had.  · Any medical conditions you have, including any recent fever or cold symptoms.  · Whether you are pregnant or may be pregnant.  What are the risks?  Generally, this is a safe procedure. However, problems may occur.  · The most common problems include:  ? A mild allergic reaction, such as red, swollen areas of skin (hives) and itching.  ? Fever or chills. This may be the body's response to new blood cells received. This may occur during or up to 4 " hours after the transfusion.  · More serious problems may include:  ? Transfusion-associated circulatory overload (TACO), or too much fluid in the lungs. This may cause breathing problems.  ? A serious allergic reaction, such as difficulty breathing or swelling around the face and lips.  ? Transfusion-related acute lung injury (TRALI), which causes breathing difficulty and low oxygen in the blood. This can occur within hours of the transfusion or several days later.  ? Iron overload. This can happen after receiving many blood transfusions over a period of time.  ? Infection or virus being transmitted. This is rare because donated blood is carefully tested before it is given.  ? Hemolytic transfusion reaction. This is rare. It happens when your body's defense system (immune system)tries to attack the new blood cells. Symptoms may include fever, chills, nausea, low blood pressure, and low back or chest pain.  ? Transfusion-associated ftsja-hrvmnl-geta disease (TAGVHD). This is rare. It happens when donated cells attack your body's healthy tissues.  What happens before the procedure?  Medicines  Ask your health care provider about:  · Changing or stopping your regular medicines. This is especially important if you are taking diabetes medicines or blood thinners.  · Taking medicines such as aspirin and ibuprofen. These medicines can thin your blood. Do not take these medicines unless your health care provider tells you to take them.  · Taking over-the-counter medicines, vitamins, herbs, and supplements.  General instructions  · Follow instructions from your health care provider about eating and drinking restrictions.  · You will have a blood test to determine your blood type. This is necessary to know what kind of blood your body will accept and to match it to the donor blood.  · If you are going to have a planned surgery, you may be able to do an autologous blood donation. This may be done in case you need to have a  transfusion.  · You will have your temperature, blood pressure, and pulse monitored before the transfusion.  · If you have had an allergic reaction to a transfusion in the past, you may be given medicine to help prevent a reaction. This medicine may be given to you by mouth (orally) or through an IV.  · Set aside time for the blood transfusion. This procedure generally takes 1-4 hours to complete.  What happens during the procedure?    · An IV will be inserted into one of your veins.  · The bag of donated blood will be attached to your IV. The blood will then enter through your vein.  · Your temperature, blood pressure, and pulse will be monitored regularly during the transfusion. This monitoring is done to detect early signs of a transfusion reaction.  · Tell your nurse right away if you have any of these symptoms during the transfusion:  ? Shortness of breath or trouble breathing.  ? Chest or back pain.  ? Fever or chills.  ? Hives or itching.  · If you have any signs or symptoms of a reaction, your transfusion will be stopped and you may be given medicine.  · When the transfusion is complete, your IV will be removed.  · Pressure may be applied to the IV site for a few minutes.  · A bandage (dressing)will be applied.  The procedure may vary among health care providers and hospitals.  What happens after the procedure?  · Your temperature, blood pressure, pulse, breathing rate, and blood oxygen level will be monitored until you leave the hospital or clinic.  · Your blood may be tested to see how you are responding to the transfusion.  · You may be warmed with fluids or blankets to maintain a normal body temperature.  · If you receive your blood transfusion in an outpatient setting, you will be told whom to contact to report any reactions.  Where to find more information  For more information on blood transfusions, visit the [place]: redcross.org  Summary  · A blood transfusion is a procedure in which you  receive blood or a type of blood cell (blood component) through an IV.  · The blood you receive may come from a donor or be donated by yourself (autologous blood donation) before a planned surgery.  · The blood given in a transfusion is made up of different blood components. You may receive red blood cells, platelets, plasma, or white blood cells depending on the condition treated.  · Your temperature, blood pressure, and pulse will be monitored before, during, and after the transfusion.  · After the transfusion, your blood may be tested to see how your body has responded.  This information is not intended to replace advice given to you by your health care provider. Make sure you discuss any questions you have with your health care provider.  Document Revised: 06/11/2020 Document Reviewed: 06/11/2020  Elsevier Patient Education © 2020 Elsevier Inc.

## 2021-03-05 NOTE — TELEPHONE ENCOUNTER
----- Message from Radha Naidu sent at 3/4/2021  3:33 PM EST -----  Regarding: diarrhea  Contact: [phone]  Pt is still experiencing diarrhea. Does she need to come in or is there something she do. Please advise pt. Thank you

## 2021-03-05 NOTE — TELEPHONE ENCOUNTER
Pt's daughter is calling regarding her mom. She is having diarrhea again and daughter is asking if she needs to do another stool sample.  Has a hx of c-diff and has been treated with vancomycin. Advised would send a msg to Rosmery CASTILLO.    Msg sent to Rosmery CASTILLO.

## 2021-03-05 NOTE — TELEPHONE ENCOUNTER
Called pt and spoke with daughter and advised of Rosmery CASTILLO's note.  She verbalized understanding.

## 2021-03-06 LAB
BH BB BLOOD EXPIRATION DATE: NORMAL
BH BB BLOOD TYPE BARCODE: 6200
BH BB DISPENSE STATUS: NORMAL
BH BB PRODUCT CODE: NORMAL
BH BB UNIT NUMBER: NORMAL
CROSSMATCH INTERPRETATION: NORMAL
UNIT  ABO: NORMAL
UNIT  RH: NORMAL

## 2021-03-08 ENCOUNTER — LAB REQUISITION (OUTPATIENT)
Dept: LAB | Facility: HOSPITAL | Age: 56
End: 2021-03-08

## 2021-03-08 DIAGNOSIS — Z00.00 ENCOUNTER FOR GENERAL ADULT MEDICAL EXAMINATION WITHOUT ABNORMAL FINDINGS: ICD-10-CM

## 2021-03-08 LAB
BASOPHILS # BLD AUTO: 0.04 10*3/MM3 (ref 0–0.2)
BASOPHILS NFR BLD AUTO: 0.9 % (ref 0–1.5)
CREAT SERPL-MCNC: 0.66 MG/DL (ref 0.57–1)
DEPRECATED RDW RBC AUTO: 52.4 FL (ref 37–54)
EOSINOPHIL # BLD AUTO: 0.22 10*3/MM3 (ref 0–0.4)
EOSINOPHIL NFR BLD AUTO: 4.8 % (ref 0.3–6.2)
ERYTHROCYTE [DISTWIDTH] IN BLOOD BY AUTOMATED COUNT: 15.4 % (ref 12.3–15.4)
GFR SERPL CREATININE-BSD FRML MDRD: 93 ML/MIN/1.73
HCT VFR BLD AUTO: 24.5 % (ref 34–46.6)
HGB BLD-MCNC: 7.9 G/DL (ref 12–15.9)
IMM GRANULOCYTES # BLD AUTO: 0.01 10*3/MM3 (ref 0–0.05)
IMM GRANULOCYTES NFR BLD AUTO: 0.2 % (ref 0–0.5)
LYMPHOCYTES # BLD AUTO: 1.45 10*3/MM3 (ref 0.7–3.1)
LYMPHOCYTES NFR BLD AUTO: 31.8 % (ref 19.6–45.3)
MCH RBC QN AUTO: 30.3 PG (ref 26.6–33)
MCHC RBC AUTO-ENTMCNC: 32.2 G/DL (ref 31.5–35.7)
MCV RBC AUTO: 93.9 FL (ref 79–97)
MONOCYTES # BLD AUTO: 0.42 10*3/MM3 (ref 0.1–0.9)
MONOCYTES NFR BLD AUTO: 9.2 % (ref 5–12)
NEUTROPHILS NFR BLD AUTO: 2.42 10*3/MM3 (ref 1.7–7)
NEUTROPHILS NFR BLD AUTO: 53.1 % (ref 42.7–76)
NRBC BLD AUTO-RTO: 0 /100 WBC (ref 0–0.2)
PLATELET # BLD AUTO: 103 10*3/MM3 (ref 140–450)
PMV BLD AUTO: 9.9 FL (ref 6–12)
RBC # BLD AUTO: 2.61 10*6/MM3 (ref 3.77–5.28)
WBC # BLD AUTO: 4.56 10*3/MM3 (ref 3.4–10.8)

## 2021-03-08 PROCEDURE — 85025 COMPLETE CBC W/AUTO DIFF WBC: CPT | Performed by: INTERNAL MEDICINE

## 2021-03-08 PROCEDURE — 82565 ASSAY OF CREATININE: CPT | Performed by: INTERNAL MEDICINE

## 2021-03-09 ENCOUNTER — TELEPHONE (OUTPATIENT)
Dept: GASTROENTEROLOGY | Facility: CLINIC | Age: 56
End: 2021-03-09

## 2021-03-09 DIAGNOSIS — D64.9 ANEMIA, UNSPECIFIED TYPE: Primary | ICD-10-CM

## 2021-03-09 NOTE — TELEPHONE ENCOUNTER
Pt is calling regarding pt last blood work, Hgb 7.9 yesterday.  Asking if she needs another blood transfusion.    Msg sent to Rosmery CASTILLO.

## 2021-03-09 NOTE — TELEPHONE ENCOUNTER
----- Message from Radha Berrios Rep sent at 3/9/2021  3:34 PM EST -----  Regarding: Results  Contact: [phone]  PT is calling for Results and would also like to know a another round of blood transfusion will be needed, please advise

## 2021-03-09 NOTE — TELEPHONE ENCOUNTER
Caller: ROLAANIKAALEXANDERA    Relationship: Emergency Contact    Best call back number:[phone]        Medication needed:   Requested Prescriptions     Pending Prescriptions Disp Refills   • furosemide (LASIX) 40 MG tablet 180 tablet 1     Sig: Take 1 tablet by mouth 2 (Two) Times a Day.       When do you need the refill by: ASAP  ALSO REQUESTING INFO ABOUT HEMATOLOGIST THAT WAS DISCUSSED PREVIOUSLY       Does the patient have less than a 3 day supply:  [x] Yes  [] No    What is the patient's preferred pharmacy: [id number] [address] [facility] AT [facility] [phone] [facility] [phone]

## 2021-03-10 ENCOUNTER — TELEPHONE (OUTPATIENT)
Dept: NEUROSURGERY | Facility: CLINIC | Age: 56
End: 2021-03-10

## 2021-03-10 NOTE — TELEPHONE ENCOUNTER
Patient called. Spoke with her daughter Katlyn.   Advised as per Rosmery's note.   Advised patient scheduled for Thursday 3/11@0800 for blood  She verb understanding and will relay the message to the patient.

## 2021-03-10 NOTE — PROGRESS NOTES
Subjective   Patient ID: Stefani Bhandari is a 55 y.o. female is here today via telephone or 3 week hospital follow-up.    You have chosen to receive care through a telephone visit. Do you consent to use a telephone visit for your medical care today? Yes    We had a telephone visit today.  The patient was at home and I was in the office.  We talked for 5 minutes.    History of Present Illness    The patient was last seen in the hospital on February 1 with an infection in her back.  She has been on antibiotic treatment since then but sees infectious disease tomorrow and plans to stop her antibiotics at that time.  She has not had any further imaging or lab work since then.  She said her back is still pretty painful.    The following portions of the patient's history were reviewed and updated as appropriate: allergies, current medications, past family history, past medical history, past social history, past surgical history and problem list.    Review of Systems    I have reviewed the review of systems as documented by my MA.      Objective         Physical Exam  Neurological:      Mental Status: She is oriented to person, place, and time.       Neurologic Exam     Mental Status   Oriented to person, place, and time.           Assessment/Plan   Independent Review of Radiographic Studies:      I personally reviewed the images from the following studies.    I reviewed her MRI which was done at the end of January.  This does show several areas of possible facet infection.    Medical Decision Making:      I told the patient that if she is stopping her antibiotics tomorrow we should go ahead and get a new MRI and some infection parameters done.  She is in agreement with that plan.  We can do a telephone visit with her to go over the results.    Diagnoses and all orders for this visit:    1. Discitis of lumbar region (Primary)  -     MRI Lumbar Spine With & Without Contrast; Future  -     CBC & Differential; Future  -      C-reactive Protein; Future  -     Sedimentation Rate; Future      Return for After radiology test.

## 2021-03-10 NOTE — TELEPHONE ENCOUNTER
Lets set this patient up for an outpatient blood transfusion with 2 units of packed red blood cells.  Continue weekly CBCs.

## 2021-03-10 NOTE — TELEPHONE ENCOUNTER
Caller: JF LEE    Relationship to patient: Emergency Contact    Best call back number: [phone]    Chief complaint: scheduling CONFLICT WITH ANOTHER OFFICE.      Type of visit: PHONE    Requested date:     If rescheduling, when is the original appointment: 3/11/2021     Additional notes: PATIENTS DAUGHTER CALLED REGARDING TELEPHONE VISIT SCHEDULED DURING HER BLOOD INFUSION APT.  INFUSION OFFICE PREFERS PT TO START INFUSION AT 8AM BUT UNABLE TO SCHEDULE IN [facility] DUE TO PHONE APT WITH [facility] PT INTERESTED IN SPEAKING TO DR BRANNON AT INITIAL TIME   10AM  BUT INFUSION OFFICE ASKING ID APT TIME COULD BE SCHEDULED IN [facility] AT A  LATER TIME TO ACCOMMODATE THEIR SCHEDULE,  PLEASE ADVISE

## 2021-03-11 ENCOUNTER — HOSPITAL ENCOUNTER (OUTPATIENT)
Dept: INFUSION THERAPY | Facility: HOSPITAL | Age: 56
Discharge: HOME | End: 2021-03-11
Admitting: NURSE PRACTITIONER

## 2021-03-11 ENCOUNTER — OFFICE VISIT (OUTPATIENT)
Dept: NEUROSURGERY | Facility: CLINIC | Age: 56
End: 2021-03-11

## 2021-03-11 ENCOUNTER — TELEPHONE (OUTPATIENT)
Dept: FAMILY MEDICINE CLINIC | Facility: CLINIC | Age: 56
End: 2021-03-11

## 2021-03-11 VITALS
DIASTOLIC BLOOD PRESSURE: 80 MMHG | OXYGEN SATURATION: 95 % | RESPIRATION RATE: 20 BRPM | SYSTOLIC BLOOD PRESSURE: 180 MMHG | HEART RATE: 92 BPM | TEMPERATURE: 97.8 F

## 2021-03-11 DIAGNOSIS — M46.46 DISCITIS OF LUMBAR REGION: Primary | ICD-10-CM

## 2021-03-11 DIAGNOSIS — D64.9 ANEMIA, UNSPECIFIED TYPE: ICD-10-CM

## 2021-03-11 LAB
ABO GROUP BLD: NORMAL
ANTI-E: NORMAL
BLD GP AB SCN SERPL QL: POSITIVE
DAT POLY-SP REAG RBC QL: NEGATIVE
RH BLD: POSITIVE
T&S EXPIRATION DATE: NORMAL

## 2021-03-11 PROCEDURE — 86922 COMPATIBILITY TEST ANTIGLOB: CPT

## 2021-03-11 PROCEDURE — 86902 BLOOD TYPE ANTIGEN DONOR EA: CPT

## 2021-03-11 PROCEDURE — 36592 COLLECT BLOOD FROM PICC: CPT

## 2021-03-11 PROCEDURE — 86850 RBC ANTIBODY SCREEN: CPT | Performed by: NURSE PRACTITIONER

## 2021-03-11 PROCEDURE — 36430 TRANSFUSION BLD/BLD COMPNT: CPT

## 2021-03-11 PROCEDURE — 86880 COOMBS TEST DIRECT: CPT | Performed by: NURSE PRACTITIONER

## 2021-03-11 PROCEDURE — 86900 BLOOD TYPING SEROLOGIC ABO: CPT | Performed by: NURSE PRACTITIONER

## 2021-03-11 PROCEDURE — 86920 COMPATIBILITY TEST SPIN: CPT

## 2021-03-11 PROCEDURE — 86870 RBC ANTIBODY IDENTIFICATION: CPT | Performed by: NURSE PRACTITIONER

## 2021-03-11 PROCEDURE — 99441 PR PHYS/QHP TELEPHONE EVALUATION 5-10 MIN: CPT | Performed by: NEUROLOGICAL SURGERY

## 2021-03-11 PROCEDURE — 86901 BLOOD TYPING SEROLOGIC RH(D): CPT | Performed by: NURSE PRACTITIONER

## 2021-03-11 PROCEDURE — 86900 BLOOD TYPING SEROLOGIC ABO: CPT

## 2021-03-11 PROCEDURE — P9016 RBC LEUKOCYTES REDUCED: HCPCS

## 2021-03-11 RX ORDER — FUROSEMIDE 40 MG/1
40 TABLET ORAL 2 TIMES DAILY
Qty: 60 TABLET | Refills: 1 | Status: SHIPPED | OUTPATIENT
Start: 2021-03-11 | End: 2021-03-14

## 2021-03-11 NOTE — PATIENT INSTRUCTIONS
Blood Transfusion, Adult, Care After  This sheet gives you information about how to care for yourself after your procedure. Your doctor may also give you more specific instructions. If you have problems or questions, contact your doctor.  What can I expect after the procedure?  After the procedure, it is common to have:  · Bruising and soreness at the IV site.  · A fever or chills on the day of the procedure. This may be your body's response to the new blood cells received.  · A headache.  Follow these instructions at home:  Insertion site care         · Follow instructions from your doctor about how to take care of your insertion site. This is where an IV tube was put into your vein. Make sure you:  ? Wash your hands with soap and water before and after you change your bandage (dressing). If you cannot use soap and water, use hand .  ? Change your bandage as told by your doctor.  · Check your insertion site every day for signs of infection. Check for:  ? Redness, swelling, or pain.  ? Bleeding from the site.  ? Warmth.  ? Pus or a bad smell.  General instructions  · Take over-the-counter and prescription medicines only as told by your doctor.  · Rest as told by your doctor.  · Go back to your normal activities as told by your doctor.  · Keep all follow-up visits as told by your doctor. This is important.  Contact a doctor if:  · You have itching or red, swollen areas of skin (hives).  · You feel worried or nervous (anxious).  · You feel weak after doing your normal activities.  · You have redness, swelling, warmth, or pain around the insertion site.  · You have blood coming from the insertion site, and the blood does not stop with pressure.  · You have pus or a bad smell coming from the insertion site.  Get help right away if:  · You have signs of a serious reaction. This may be coming from an allergy or the body's defense system (immune system). Signs include:  ? Trouble breathing or shortness of  breath.  ? Swelling of the face or feeling warm (flushed).  ? Fever or chills.  ? Head, chest, or back pain.  ? Dark pee (urine) or blood in the pee.  ? Widespread rash.  ? Fast heartbeat.  ? Feeling dizzy or light-headed.  You may receive your blood transfusion in an outpatient setting. If so, you will be told whom to contact to report any reactions.  These symptoms may be an emergency. Do not wait to see if the symptoms will go away. Get medical help right away. Call your local emergency services (911 in the [place] Do not drive yourself to the hospital.  Summary  · Bruising and soreness at the IV site are common.  · Check your insertion site every day for signs of infection.  · Rest as told by your doctor. Go back to your normal activities as told by your doctor.  · Get help right away if you have signs of a serious reaction.  This information is not intended to replace advice given to you by your health care provider. Make sure you discuss any questions you have with your health care provider.  Document Revised: 06/11/2020 Document Reviewed: 06/11/2020  [employer] Patient Education © 2020 [employer]  Anemia    Anemia is a condition in which you do not have enough red blood cells or hemoglobin. Hemoglobin is a substance in red blood cells that carries oxygen. When you do not have enough red blood cells or hemoglobin (are anemic), your body cannot get enough oxygen and your organs may not work properly. As a result, you may feel very tired or have other problems.  What are the causes?  Common causes of anemia include:  · Excessive bleeding. Anemia can be caused by excessive bleeding inside or outside the body, including bleeding from the intestine or from periods in women.  · Poor nutrition.  · Long-lasting (chronic) kidney, thyroid, and liver disease.  · Bone marrow disorders.  · Cancer and treatments for cancer.  · HIV (human immunodeficiency virus) and AIDS (acquired immunodeficiency syndrome).  · Treatments  for HIV and AIDS.  · Spleen problems.  · Blood disorders.  · Infections, medicines, and autoimmune disorders that destroy red blood cells.  What are the signs or symptoms?  Symptoms of this condition include:  · Minor weakness.  · Dizziness.  · Headache.  · Feeling heartbeats that are irregular or faster than normal (palpitations).  · Shortness of breath, especially with exercise.  · Paleness.  · Cold sensitivity.  · Indigestion.  · Nausea.  · Difficulty sleeping.  · Difficulty concentrating.  Symptoms may occur suddenly or develop slowly. If your anemia is mild, you may not have symptoms.  How is this diagnosed?  This condition is diagnosed based on:  · Blood tests.  · Your medical history.  · A physical exam.  · Bone marrow biopsy.  Your health care provider may also check your stool (feces) for blood and may do additional testing to look for the cause of your bleeding.  You may also have other tests, including:  · Imaging tests, such as a CT scan or MRI.  · Endoscopy.  · Colonoscopy.  How is this treated?  Treatment for this condition depends on the cause. If you continue to lose a lot of blood, you may need to be treated at a hospital. Treatment may include:  · Taking supplements of iron, vitamin B12, or folic acid.  · Taking a hormone medicine (erythropoietin) that can help to stimulate red blood cell growth.  · Having a blood transfusion. This may be needed if you lose a lot of blood.  · Making changes to your diet.  · Having surgery to remove your spleen.  Follow these instructions at home:  · Take over-the-counter and prescription medicines only as told by your health care provider.  · Take supplements only as told by your health care provider.  · Follow any diet instructions that you were given.  · Keep all follow-up visits as told by your health care provider. This is important.  Contact a health care provider if:  · You develop new bleeding anywhere in the body.  Get help right away if:  · You are very  weak.  · You are short of breath.  · You have pain in your abdomen or chest.  · You are dizzy or feel faint.  · You have trouble concentrating.  · You have bloody or black, tarry stools.  · You vomit repeatedly or you vomit up blood.  Summary  · Anemia is a condition in which you do not have enough red blood cells or enough of a substance in your red blood cells that carries oxygen (hemoglobin).  · Symptoms may occur suddenly or develop slowly.  · If your anemia is mild, you may not have symptoms.  · This condition is diagnosed with blood tests as well as a medical history and physical exam. Other tests may be needed.  · Treatment for this condition depends on the cause of the anemia.  This information is not intended to replace advice given to you by your health care provider. Make sure you discuss any questions you have with your health care provider.  Document Revised: 11/30/2018 Document Reviewed: 01/19/2018  [employer] Patient Education © 2020 [employer]

## 2021-03-11 NOTE — TELEPHONE ENCOUNTER
Pt is requesting a referral to a Hematologist. Pt states that she has spoke with Dr. Martinez about this previously.

## 2021-03-11 NOTE — PROGRESS NOTES
Patient arrived to [facility] this AM and was not typed and screened yesterday.  Lab was drawn for T&S for 2 units of PRBC's to be transfused today.  Blood bank called [facility] and patient informed of delay due to her development of more antibodies and difficulty cross matching her for blood.  Patient has elevated blood pressure and stated she took her morning medication for it and her Lasix prior to admission.  Patient's first unit of blood did not get started until after 1200 and had to be infused slower than maximum rate due to her blood pressure.  Ms. Caceres was notified of delay and need to put patient on [facility] schedule for 1 unit of blood tomorrow.  This was also what the patient requested to do near the end of today's completion as she was getting very tired of being here.  She brought her breakfast with her this morning but ate turkey sandwich box lunch and PO cola while here.  BRP per self with minimal assistance with her IV pump once blood started.  She stated she urinates well from Lasix.  She was instructed to take her morning medications tomorrow prior to coming in and to keep her yellow blood bank bracelet on and not remove it.  She verbalized understanding.  Patient was given her AVS and discharged per wheel chair to front entrance where her daughter is picking her up.

## 2021-03-12 ENCOUNTER — OFFICE VISIT (OUTPATIENT)
Dept: INFECTIOUS DISEASES | Facility: CLINIC | Age: 56
End: 2021-03-12

## 2021-03-12 ENCOUNTER — HOSPITAL ENCOUNTER (OUTPATIENT)
Dept: INFUSION THERAPY | Facility: HOSPITAL | Age: 56
Setting detail: INFUSION SERIES
Discharge: HOME | End: 2021-03-12

## 2021-03-12 VITALS
HEART RATE: 92 BPM | DIASTOLIC BLOOD PRESSURE: 96 MMHG | BODY MASS INDEX: 41.47 KG/M2 | HEIGHT: 67 IN | TEMPERATURE: 97.4 F | RESPIRATION RATE: 12 BRPM | WEIGHT: 264.2 LBS | SYSTOLIC BLOOD PRESSURE: 144 MMHG

## 2021-03-12 VITALS
DIASTOLIC BLOOD PRESSURE: 78 MMHG | RESPIRATION RATE: 20 BRPM | OXYGEN SATURATION: 94 % | SYSTOLIC BLOOD PRESSURE: 159 MMHG | TEMPERATURE: 98.9 F | HEART RATE: 94 BPM

## 2021-03-12 DIAGNOSIS — D64.9 SYMPTOMATIC ANEMIA: ICD-10-CM

## 2021-03-12 DIAGNOSIS — R78.81 MSSA BACTEREMIA: ICD-10-CM

## 2021-03-12 DIAGNOSIS — B95.61 MSSA BACTEREMIA: ICD-10-CM

## 2021-03-12 DIAGNOSIS — G06.2 EPIDURAL ABSCESS: ICD-10-CM

## 2021-03-12 DIAGNOSIS — D64.9 ANEMIA, UNSPECIFIED TYPE: Primary | ICD-10-CM

## 2021-03-12 DIAGNOSIS — Z79.2 LONG-TERM CURRENT USE OF ANTIBIOTICS FOR PREVENTION OF RECURRENT INFECTION: ICD-10-CM

## 2021-03-12 DIAGNOSIS — M00.012 STAPHYLOCOCCAL ARTHRITIS OF LEFT SHOULDER (HCC): ICD-10-CM

## 2021-03-12 DIAGNOSIS — D69.6 THROMBOCYTOPENIA (HCC): Primary | ICD-10-CM

## 2021-03-12 PROCEDURE — 99214 OFFICE O/P EST MOD 30 MIN: CPT | Performed by: INTERNAL MEDICINE

## 2021-03-12 PROCEDURE — 86900 BLOOD TYPING SEROLOGIC ABO: CPT

## 2021-03-12 PROCEDURE — 36430 TRANSFUSION BLD/BLD COMPNT: CPT

## 2021-03-12 PROCEDURE — P9016 RBC LEUKOCYTES REDUCED: HCPCS

## 2021-03-12 RX ORDER — EZETIMIBE 10 MG/1
10 TABLET ORAL DAILY
Qty: 30 TABLET | Refills: 1 | Status: SHIPPED | OUTPATIENT
Start: 2021-03-12 | End: 2021-10-29

## 2021-03-12 NOTE — TELEPHONE ENCOUNTER
Okay, place referral to hematologist.  Diagnosis --persistently low hemoglobin/severe anemia.  GI work-up negative

## 2021-03-12 NOTE — PATIENT INSTRUCTIONS
"https://www.[facility].org/donate-blood/blood-donation-process/what-happens-to-donated-blood/blood-transfusions/types-of-blood-transfusions.html\"> https://www.hematology.org/education/patients/blood-basics/blood-safety-and-matching\"> https://www.nhlbi.nih.gov/health-topics/blood-transfusion\">   Blood Transfusion, Adult  A blood transfusion is a procedure in which you receive blood or a type of blood cell (blood component) through an IV. You may need a blood transfusion when your blood level is low. This may result from a bleeding disorder, illness, injury, or surgery. The blood may come from a donor. You may also be able to donate blood for yourself (autologous blood donation) before a planned surgery.  The blood given in a transfusion is made up of different blood components. You may receive:  · Red blood cells. These carry oxygen to the cells in the body.  · Platelets. These help your blood to clot.  · Plasma. This is the liquid part of your blood. It carries proteins and other substances throughout the body.  · White blood cells. These help you fight infections.  If you have hemophilia or another clotting disorder, you may also receive other types of blood products.  Tell a health care provider about:  · Any blood disorders you have.  · Any previous reactions you have had during a blood transfusion.  · Any allergies you have.  · All medicines you are taking, including vitamins, herbs, eye drops, creams, and over-the-counter medicines.  · Any surgeries you have had.  · Any medical conditions you have, including any recent fever or cold symptoms.  · Whether you are pregnant or may be pregnant.  What are the risks?  Generally, this is a safe procedure. However, problems may occur.  · The most common problems include:  ? A mild allergic reaction, such as red, swollen areas of skin (hives) and itching.  ? Fever or chills. This may be the body's response to new blood cells received. This may occur during or up to 4 " hours after the transfusion.  · More serious problems may include:  ? Transfusion-associated circulatory overload (TACO), or too much fluid in the lungs. This may cause breathing problems.  ? A serious allergic reaction, such as difficulty breathing or swelling around the face and lips.  ? Transfusion-related acute lung injury (TRALI), which causes breathing difficulty and low oxygen in the blood. This can occur within hours of the transfusion or several days later.  ? Iron overload. This can happen after receiving many blood transfusions over a period of time.  ? Infection or virus being transmitted. This is rare because donated blood is carefully tested before it is given.  ? Hemolytic transfusion reaction. This is rare. It happens when your body's defense system (immune system)tries to attack the new blood cells. Symptoms may include fever, chills, nausea, low blood pressure, and low back or chest pain.  ? Transfusion-associated todbb-rbhfsf-hzws disease (TAGVHD). This is rare. It happens when donated cells attack your body's healthy tissues.  What happens before the procedure?  Medicines  Ask your health care provider about:  · Changing or stopping your regular medicines. This is especially important if you are taking diabetes medicines or blood thinners.  · Taking medicines such as aspirin and ibuprofen. These medicines can thin your blood. Do not take these medicines unless your health care provider tells you to take them.  · Taking over-the-counter medicines, vitamins, herbs, and supplements.  General instructions  · Follow instructions from your health care provider about eating and drinking restrictions.  · You will have a blood test to determine your blood type. This is necessary to know what kind of blood your body will accept and to match it to the donor blood.  · If you are going to have a planned surgery, you may be able to do an autologous blood donation. This may be done in case you need to have a  transfusion.  · You will have your temperature, blood pressure, and pulse monitored before the transfusion.  · If you have had an allergic reaction to a transfusion in the past, you may be given medicine to help prevent a reaction. This medicine may be given to you by mouth (orally) or through an IV.  · Set aside time for the blood transfusion. This procedure generally takes 1-4 hours to complete.  What happens during the procedure?    · An IV will be inserted into one of your veins.  · The bag of donated blood will be attached to your IV. The blood will then enter through your vein.  · Your temperature, blood pressure, and pulse will be monitored regularly during the transfusion. This monitoring is done to detect early signs of a transfusion reaction.  · Tell your nurse right away if you have any of these symptoms during the transfusion:  ? Shortness of breath or trouble breathing.  ? Chest or back pain.  ? Fever or chills.  ? Hives or itching.  · If you have any signs or symptoms of a reaction, your transfusion will be stopped and you may be given medicine.  · When the transfusion is complete, your IV will be removed.  · Pressure may be applied to the IV site for a few minutes.  · A bandage (dressing)will be applied.  The procedure may vary among health care providers and hospitals.  What happens after the procedure?  · Your temperature, blood pressure, pulse, breathing rate, and blood oxygen level will be monitored until you leave the hospital or clinic.  · Your blood may be tested to see how you are responding to the transfusion.  · You may be warmed with fluids or blankets to maintain a normal body temperature.  · If you receive your blood transfusion in an outpatient setting, you will be told whom to contact to report any reactions.  Where to find more information  For more information on blood transfusions, visit the [employer]: [facility]org  Summary  · A blood transfusion is a procedure in which you  receive blood or a type of blood cell (blood component) through an IV.  · The blood you receive may come from a donor or be donated by yourself (autologous blood donation) before a planned surgery.  · The blood given in a transfusion is made up of different blood components. You may receive red blood cells, platelets, plasma, or white blood cells depending on the condition treated.  · Your temperature, blood pressure, and pulse will be monitored before, during, and after the transfusion.  · After the transfusion, your blood may be tested to see how your body has responded.  This information is not intended to replace advice given to you by your health care provider. Make sure you discuss any questions you have with your health care provider.  Document Revised: 06/11/2020 Document Reviewed: 06/11/2020  [employer] Patient Education © 2020 [employer]    PICC Removal, Adult, Care After  This sheet gives you information about how to care for yourself after your procedure. Your health care provider may also give you more specific instructions. If you have problems or questions, contact your health care provider.  What can I expect after the procedure?  After your procedure, it is common to have:  · Tenderness or soreness.  · Redness, swelling, or a scab where the PICC was removed (exit site).  Follow these instructions at home:  For the first 24 hours after the procedure    · Keep the bandage (dressing) on the exit site clean and dry. Do not remove the dressing until your health care provider tells you to do so.  · Check your arm often for signs and symptoms of an infection. Check for:  ? A red streak that spreads away from the dressing.  ? Blood or fluid that you can see on the dressing.  ? More redness or swelling.  · Do not lift anything heavy or do activities that require great effort until your health care provider says it is okay. You should avoid:  ? Lifting weights.  ? Yard work.  ? Any physical activity with  repetitive arm movement.  · Watch closely for any signs of an air bubble in the vein (air embolism). This is a rare but serious complication. If you have signs of air embolism, call 911 immediately and lie down on your left side to keep the air from moving into the lungs. Signs of an air embolism include:  ? Difficulty breathing.  ? Chest pain.  ? Coughing or wheezing.  ? Skin that is pale, blue, cold, or clammy.  ? Rapid pulse.  ? Rapid breathing.  ? Fainting.  After 24 Hours have passed:  · Remove your dressing as told by your health care provider. Make sure you wash your hands with soap and water before and after you change the dressing. If soap and water are not available, use hand .  · Return to your normal activities as told by your health care provider.  · A small scab may develop over the exit site. Do not pick at the scab.  · When bathing or showering, gently wash the exit site with soap and water. Pat it dry.  · Watch for signs of infection, such as:  ? Fever or chills.  ? Swollen glands under the arm.  ? More redness, swelling, or soreness in the arm.  ? Blood, fluid, or pus coming from the exit site.  ? Warmth or a bad smell at the exit site.  ? A red streak spreading away from the exit site.  General instructions  · Take over-the-counter and prescription medicines only as told by your health care provider. Do not take any new medicines without checking with your health care provider first.  · If you were prescribed an antibiotic medicine, apply or take it as told by your health care provider. Do not stop using the antibiotic even if your condition improves.  · Keep all follow-up visits as told by your health care provider. This is important.  Contact a health care provider if:  · You have a fever or chills.  · You have soreness, redness, or swelling on your exit site, and it gets worse.  · You have swollen glands under your arm.  · You have any of the following symptoms at your exit  site:  ? Blood, fluid, or pus.  ? Unusual warmth.  ? A bad smell.  ? A red streak spreading away from the exit site.  Get help right away if:  · You have numbness or tingling in your fingers, hand, or arm.  · Your arm looks blue and feels cold.  · You have signs of an air embolism, such as:  ? Difficulty breathing.  ? Chest pain.  ? Coughing or wheezing.  ? Skin that is pale, blue, cold, or clammy.  ? Rapid pulse.  ? Rapid breathing.  ? Fainting.  These symptoms may represent a serious problem that is an emergency. Do not wait to see if the symptoms will go away. Get medical help right away. Call your local emergency services (911 in the [place] Do not drive yourself to the hospital.  Summary  · After your procedure, it is common to have tenderness or soreness, redness, swelling, or a scab at the exit site.  · Keep the dressing over the exit site clean and dry. Do not remove the dressing until your health care provider tells you to do so.  · Do not lift anything heavy or do activities that require great effort until your health care provider says it is okay.  · Watch closely for any signs of an air embolism. If you have signs of air embolism, call 911 immediately and lie down on your left side.  This information is not intended to replace advice given to you by your health care provider. Make sure you discuss any questions you have with your health care provider.  Document Revised: 11/30/2018 Document Reviewed: 02/13/2018  [employer] Patient Education © 2020 [employer]

## 2021-03-12 NOTE — PROGRESS NOTES
Reason for clinic visits: Follow up for left shoulder septic arthritis and epidural abscess    HPI: Stefani Bhandari is a 55 y.o. female who was last seen in hospital on February 15.  Since that time she has once again required blood transfusions.  She to use denies any evidence of blood in her stool.  The patient states that her left shoulder is doing well.  The pain has decreased although not resolved.  Her incision is well-healed and she denies any erythema or drainage.  She feels like her back pain is also doing well though she continues to have problems with back pain.  She was seen by neurosurgery and the plan is for repeat blood work and MRI of the back.  She has not had any more episodes of diarrhea.  She denies any abdominal pain nausea or vomiting.  No rashes    Past Medical History:   Diagnosis Date   • Anemia    • Anxiety    • Cirrhosis (CMS/HCC)    • Clostridium difficile colitis 01/31/2021   • DDD (degenerative disc disease), lumbar    • Depression    • Diabetes mellitus (CMS/HCC)    • Elevated LFTs    • NICK (generalized anxiety disorder)    • History of epilepsy     as a child-Abstracted from pt records   • Hyperlipidemia    • Hypertension    • Hypothyroidism    • MDD (major depressive disorder)    • Morbid obesity (CMS/HCC)    • METZGER (nonalcoholic steatohepatitis) 2000   • OA (osteoarthritis)    • Obesity    • Sleep apnea    • Wears glasses        Past Surgical History:   Procedure Laterality Date   • COLONOSCOPY N/A 2/11/2021    Procedure: COLONOSCOPY INTO CECUM;  Surgeon: Joshua Bender MD;  Location: [facility] ENDOSCOPY;  Service: Gastroenterology;  Laterality: N/A;  pre: same  post: diverticulosis   • ENDOSCOPY N/A 2/11/2021    Procedure: ESOPHAGOGASTRODUODENOSCOPY WITH BIOPSIES;  Surgeon: Joshua Bender MD;  Location: [facility] ENDOSCOPY;  Service: Gastroenterology;  Laterality: N/A;  pre: anemia, rectal bleeding, abnormal CT  post: hiatal hernia, esophagitis, gastropathy   • HYSTERECTOMY  " 2007   • INCISION AND DRAINAGE SHOULDER Left 1/30/2021    Procedure: INCISION AND DRAINAGE SHOULDER;  Surgeon: Juaquin Richardson II, MD;  Location: [facility];  Service: Orthopedics;  Laterality: Left;   • TUBAL ABDOMINAL LIGATION  1996       Social History   reports that she has quit smoking. She quit after 20.00 years of use. She has never used smokeless tobacco. She reports previous alcohol use. She reports that she does not use drugs.    Family History  family history includes Asthma in her mother; Breast cancer in her paternal cousin; Diabetes in her brother, mother, and sister; Heart attack in her brother, father, and sister; Hypertension in her brother, father, mother, and sister; No Known Problems in an other family member; Obesity in her mother; Sleep apnea in her mother.    Allergies   Allergen Reactions   • Tramadol Anaphylaxis     \"seizure like behavior\"       The medication list has been reviewed and updated.     Review of Systems  Pertinent items are noted in HPI, all other systems reviewed and negative    Vital Signs   Temp:  [97.4 °F (36.3 °C)-98.9 °F (37.2 °C)] 98.9 °F (37.2 °C)  Heart Rate:  [89-96] 94  Resp:  [12-20] 20  BP: (144-196)/(78-96) 159/78    Physical Exam:   General: In no acute distress  Cardiovascular: RRR no murmurs    Respiratory: Breathing comfortably on room air   GI: Abdomen is soft, non-tender, non-distended, positive bowel sounds bilaterally  Musculoskeletal: Left shoulder with incision well-healed no erythema no purulence  Skin: No rashes     Lab Results   Component Value Date    WBC 4.56 03/08/2021    HGB 7.9 (L) 03/08/2021    HCT 24.5 (L) 03/08/2021    MCV 93.9 03/08/2021     (L) 03/08/2021       Lab Results   Component Value Date    GLUCOSE 112 (H) 02/13/2021    BUN 14 02/19/2021    CREATININE 0.66 03/08/2021    EGFRIFNONA 93 03/08/2021    EGFRIFAFRI 76 02/19/2021    BCR 15 02/19/2021    CO2 23 02/19/2021    CALCIUM 8.1 (L) 02/19/2021    PROTENTOTREF 7.4 " 02/10/2021    ALBUMIN 2.60 (L) 02/10/2021    ALBUMIN 2.3 (L) 02/10/2021    LABIL2 0.5 (L) 02/10/2021    AST 21 02/10/2021    ALT <5 02/10/2021       Lab Results   Component Value Date    SEDRATE 111 (H) 01/24/2021       Lab Results   Component Value Date    CRP 28.13 (H) 01/24/2021     2/10 GI PCR neg  2/10 C diff positive  2/8 COVID neg     1/30 operative left shoulder wound culture with MSSA  1/26 BCx neg x 2  1/24 BCx MSSA     Assessment:  This is a 55 y.o. female who presents to clinic today for follow up of MSSA bacteremia, MSSA left native shoulder septic arthritis and lumbar spine epidural abscess.  At this time the patient has completed an 8-week course of cefazolin.  Her antibiotic course has been complicated by acute anemia requiring frequent blood transfusions and C. difficile colitis.  Currently the patient does not have any signs or symptoms of ongoing left shoulder infection.  Her back pain has also significantly improved although not resolved.  I will follow-up her inflammatory markers as well as follow-up MRI of the lumbar spine.  In the meantime we will discontinue her IV antibiotics but switch her to dicloxacillin 500 mg p.o. 3 times daily until we see the results of her blood work and MRI.  I will also refer her to hematology given her unexplained anemia    Plan:   1.  Discontinue IV cefazolin  2.  PICC line will be pulled in the ACU after her blood transfusion today  3.  Obtain CBC with differential, ESR and CRP  4.  Start dicloxacillin 500 mg p.o. 3 times daily x2 weeks  5.  Follow-up MRI of the lumbar spine    Return to Infectious Disease clinic to be determined    Time: More than 50% of time spent in counseling and coordination of care:  Total face-to-face/floor time 30 min.  Time spent in counseling 30 min. Counseling included the following topics: Need for oral antibiotics, future treatment plan

## 2021-03-13 LAB
BH BB BLOOD EXPIRATION DATE: NORMAL
BH BB BLOOD EXPIRATION DATE: NORMAL
BH BB BLOOD TYPE BARCODE: 6200
BH BB BLOOD TYPE BARCODE: NORMAL
BH BB DISPENSE STATUS: NORMAL
BH BB DISPENSE STATUS: NORMAL
BH BB PRODUCT CODE: NORMAL
BH BB PRODUCT CODE: NORMAL
BH BB UNIT NUMBER: NORMAL
BH BB UNIT NUMBER: NORMAL
CROSSMATCH INTERPRETATION: NORMAL
CROSSMATCH INTERPRETATION: NORMAL
UNIT  ABO: NORMAL
UNIT  ABO: NORMAL
UNIT  RH: NORMAL
UNIT  RH: NORMAL

## 2021-03-14 RX ORDER — FUROSEMIDE 40 MG/1
TABLET ORAL
Qty: 60 TABLET | Refills: 1 | Status: SHIPPED | OUTPATIENT
Start: 2021-03-14 | End: 2021-03-18

## 2021-03-15 ENCOUNTER — TELEPHONE (OUTPATIENT)
Dept: ONCOLOGY | Facility: CLINIC | Age: 56
End: 2021-03-15

## 2021-03-15 NOTE — TELEPHONE ENCOUNTER
Caller: Jose    Relationship to patient: daughter    Best call back number: [phone]    Inezia would like to ask if she can come to the 3/29/21 new pt appt.   Pt is unstable in walking and needs her assistance.

## 2021-03-17 ENCOUNTER — TELEPHONE (OUTPATIENT)
Dept: GASTROENTEROLOGY | Facility: CLINIC | Age: 56
End: 2021-03-17

## 2021-03-17 ENCOUNTER — TELEPHONE (OUTPATIENT)
Dept: NEUROSURGERY | Facility: CLINIC | Age: 56
End: 2021-03-17

## 2021-03-17 DIAGNOSIS — K75.81 LIVER CIRRHOSIS SECONDARY TO NASH (HCC): ICD-10-CM

## 2021-03-17 DIAGNOSIS — D64.9 ANEMIA, UNSPECIFIED TYPE: Primary | ICD-10-CM

## 2021-03-17 DIAGNOSIS — K74.60 LIVER CIRRHOSIS SECONDARY TO NASH (HCC): ICD-10-CM

## 2021-03-17 NOTE — TELEPHONE ENCOUNTER
Patient's daughter called to find out if MRI had been scheduled yet? Informed her that it had not and she asked for the number to [facility] Scheduling, she also wanted to know when her mom's f/u appt was , I informed her that she is scheduled for 04.13.2021

## 2021-03-17 NOTE — TELEPHONE ENCOUNTER
Called pt's daughter and she is asking if her mom needs a CBC.  Her last transfusion was 3/12/21.  Daughter states her mom is still very weak. Advised will send a msg to Rosmery CASTILLO.    Msg sent to Rosmery CASTILLO.

## 2021-03-17 NOTE — TELEPHONE ENCOUNTER
----- Message from Radha Naidu sent at 3/17/2021 12:09 PM EDT -----  Regarding: blood transfusion scheduling  Pt's daughter has questions regarding pt's blood transfusions. Please call. Thank you

## 2021-03-18 RX ORDER — FUROSEMIDE 40 MG/1
TABLET ORAL
Qty: 180 TABLET | Refills: 1 | Status: SHIPPED | OUTPATIENT
Start: 2021-03-18 | End: 2021-04-15

## 2021-03-22 NOTE — TELEPHONE ENCOUNTER
Called and spoke with pt's daughter (ok per LV).  Advised of Rosmery CASTILLO's note.  She verbalized understanding.     Orders placed for weekly CBC x4 weeks    Msg sent to Rosmery CASTILLO to cosign.

## 2021-03-24 ENCOUNTER — BULK ORDERING (OUTPATIENT)
Dept: CASE MANAGEMENT | Facility: OTHER | Age: 56
End: 2021-03-24

## 2021-03-24 DIAGNOSIS — Z23 IMMUNIZATION DUE: ICD-10-CM

## 2021-03-24 NOTE — TELEPHONE ENCOUNTER
This lady continues to require blood transfusions and her capsule cannot be completed until April 7.  Are you okay with that timeframe?

## 2021-03-25 ENCOUNTER — LAB (OUTPATIENT)
Dept: LAB | Facility: HOSPITAL | Age: 56
End: 2021-03-25

## 2021-03-25 ENCOUNTER — TELEPHONE (OUTPATIENT)
Dept: GASTROENTEROLOGY | Facility: CLINIC | Age: 56
End: 2021-03-25

## 2021-03-25 DIAGNOSIS — K74.60 LIVER CIRRHOSIS SECONDARY TO NASH (HCC): ICD-10-CM

## 2021-03-25 DIAGNOSIS — D64.9 ANEMIA, UNSPECIFIED TYPE: ICD-10-CM

## 2021-03-25 DIAGNOSIS — K75.81 LIVER CIRRHOSIS SECONDARY TO NASH (HCC): ICD-10-CM

## 2021-03-25 LAB
BASOPHILS # BLD AUTO: 0.04 10*3/MM3 (ref 0–0.2)
BASOPHILS NFR BLD AUTO: 0.8 % (ref 0–1.5)
DEPRECATED RDW RBC AUTO: 50.2 FL (ref 37–54)
EOSINOPHIL # BLD AUTO: 0.22 10*3/MM3 (ref 0–0.4)
EOSINOPHIL NFR BLD AUTO: 4.2 % (ref 0.3–6.2)
ERYTHROCYTE [DISTWIDTH] IN BLOOD BY AUTOMATED COUNT: 15 % (ref 12.3–15.4)
FOLATE SERPL-MCNC: >20 NG/ML (ref 4.78–24.2)
HCT VFR BLD AUTO: 32 % (ref 34–46.6)
HGB BLD-MCNC: 10.3 G/DL (ref 12–15.9)
IMM GRANULOCYTES # BLD AUTO: 0.01 10*3/MM3 (ref 0–0.05)
IMM GRANULOCYTES NFR BLD AUTO: 0.2 % (ref 0–0.5)
IRON 24H UR-MRATE: 97 MCG/DL (ref 37–145)
IRON SATN MFR SERPL: 37 % (ref 20–50)
LYMPHOCYTES # BLD AUTO: 1.89 10*3/MM3 (ref 0.7–3.1)
LYMPHOCYTES NFR BLD AUTO: 36 % (ref 19.6–45.3)
MCH RBC QN AUTO: 29.5 PG (ref 26.6–33)
MCHC RBC AUTO-ENTMCNC: 32.2 G/DL (ref 31.5–35.7)
MCV RBC AUTO: 91.7 FL (ref 79–97)
MONOCYTES # BLD AUTO: 0.33 10*3/MM3 (ref 0.1–0.9)
MONOCYTES NFR BLD AUTO: 6.3 % (ref 5–12)
NEUTROPHILS NFR BLD AUTO: 2.76 10*3/MM3 (ref 1.7–7)
NEUTROPHILS NFR BLD AUTO: 52.5 % (ref 42.7–76)
NRBC BLD AUTO-RTO: 0 /100 WBC (ref 0–0.2)
PLATELET # BLD AUTO: 161 10*3/MM3 (ref 140–450)
PMV BLD AUTO: 9.2 FL (ref 6–12)
RBC # BLD AUTO: 3.49 10*6/MM3 (ref 3.77–5.28)
TIBC SERPL-MCNC: 261 MCG/DL (ref 298–536)
TRANSFERRIN SERPL-MCNC: 175 MG/DL (ref 200–360)
VIT B12 BLD-MCNC: 656 PG/ML (ref 211–946)
WBC # BLD AUTO: 5.25 10*3/MM3 (ref 3.4–10.8)

## 2021-03-25 PROCEDURE — 36415 COLL VENOUS BLD VENIPUNCTURE: CPT | Performed by: NURSE PRACTITIONER

## 2021-03-25 PROCEDURE — 84466 ASSAY OF TRANSFERRIN: CPT | Performed by: NURSE PRACTITIONER

## 2021-03-25 PROCEDURE — 82746 ASSAY OF FOLIC ACID SERUM: CPT | Performed by: NURSE PRACTITIONER

## 2021-03-25 PROCEDURE — 85025 COMPLETE CBC W/AUTO DIFF WBC: CPT

## 2021-03-25 PROCEDURE — 82607 VITAMIN B-12: CPT | Performed by: NURSE PRACTITIONER

## 2021-03-25 PROCEDURE — 83540 ASSAY OF IRON: CPT | Performed by: NURSE PRACTITIONER

## 2021-03-25 NOTE — TELEPHONE ENCOUNTER
----- Message from Radha Naidu sent at 3/25/2021  4:20 PM EDT -----  Regarding: bloodwork numbers  Contact: [phone]  Please advise pt of her today's bloodwork. Thank you

## 2021-03-26 NOTE — PROGRESS NOTES
Please inform Stefani that her hemoglobin is better. Up to 10.3. Continue with weekly CBCs until capsules results are back. No indication for blood transfusion at this moment. Is she still taking her iron supplement? Should be twice a day. Let me know.

## 2021-03-29 ENCOUNTER — CONSULT (OUTPATIENT)
Dept: ONCOLOGY | Facility: CLINIC | Age: 56
End: 2021-03-29

## 2021-03-29 ENCOUNTER — LAB (OUTPATIENT)
Dept: LAB | Facility: HOSPITAL | Age: 56
End: 2021-03-29

## 2021-03-29 VITALS
HEART RATE: 98 BPM | TEMPERATURE: 98.4 F | HEIGHT: 67 IN | WEIGHT: 254 LBS | OXYGEN SATURATION: 97 % | RESPIRATION RATE: 17 BRPM | BODY MASS INDEX: 39.87 KG/M2 | DIASTOLIC BLOOD PRESSURE: 82 MMHG | SYSTOLIC BLOOD PRESSURE: 169 MMHG

## 2021-03-29 DIAGNOSIS — K74.60 LIVER CIRRHOSIS SECONDARY TO NASH (HCC): ICD-10-CM

## 2021-03-29 DIAGNOSIS — R78.81 MSSA BACTEREMIA: ICD-10-CM

## 2021-03-29 DIAGNOSIS — D64.9 ANEMIA, UNSPECIFIED TYPE: Primary | ICD-10-CM

## 2021-03-29 DIAGNOSIS — M00.012 STAPHYLOCOCCAL ARTHRITIS OF LEFT SHOULDER (HCC): ICD-10-CM

## 2021-03-29 DIAGNOSIS — K75.81 LIVER CIRRHOSIS SECONDARY TO NASH (HCC): ICD-10-CM

## 2021-03-29 DIAGNOSIS — B95.61 MSSA BACTEREMIA: ICD-10-CM

## 2021-03-29 LAB
BASOPHILS # BLD AUTO: 0.03 10*3/MM3 (ref 0–0.2)
BASOPHILS NFR BLD AUTO: 0.5 % (ref 0–1.5)
DEPRECATED RDW RBC AUTO: 49.9 FL (ref 37–54)
EOSINOPHIL # BLD AUTO: 0.23 10*3/MM3 (ref 0–0.4)
EOSINOPHIL NFR BLD AUTO: 4 % (ref 0.3–6.2)
ERYTHROCYTE [DISTWIDTH] IN BLOOD BY AUTOMATED COUNT: 15.3 % (ref 12.3–15.4)
HCT VFR BLD AUTO: 29.5 % (ref 34–46.6)
HGB BLD-MCNC: 9.7 G/DL (ref 12–15.9)
HGB RETIC QN AUTO: 35.3 PG (ref 29.8–36.1)
IMM GRANULOCYTES # BLD AUTO: 0.02 10*3/MM3 (ref 0–0.05)
IMM GRANULOCYTES NFR BLD AUTO: 0.3 % (ref 0–0.5)
IMM RETICS NFR: 11.4 % (ref 3–15.8)
LYMPHOCYTES # BLD AUTO: 1.82 10*3/MM3 (ref 0.7–3.1)
LYMPHOCYTES NFR BLD AUTO: 31.3 % (ref 19.6–45.3)
MCH RBC QN AUTO: 29.6 PG (ref 26.6–33)
MCHC RBC AUTO-ENTMCNC: 32.9 G/DL (ref 31.5–35.7)
MCV RBC AUTO: 89.9 FL (ref 79–97)
MONOCYTES # BLD AUTO: 0.32 10*3/MM3 (ref 0.1–0.9)
MONOCYTES NFR BLD AUTO: 5.5 % (ref 5–12)
NEUTROPHILS NFR BLD AUTO: 3.39 10*3/MM3 (ref 1.7–7)
NEUTROPHILS NFR BLD AUTO: 58.4 % (ref 42.7–76)
NRBC BLD AUTO-RTO: 0 /100 WBC (ref 0–0.2)
PLATELET # BLD AUTO: 138 10*3/MM3 (ref 140–450)
PMV BLD AUTO: 9 FL (ref 6–12)
RBC # BLD AUTO: 3.28 10*6/MM3 (ref 3.77–5.28)
RETICS # AUTO: 0.08 10*6/MM3 (ref 0.02–0.13)
RETICS/RBC NFR AUTO: 2.39 % (ref 0.7–1.9)
WBC # BLD AUTO: 5.81 10*3/MM3 (ref 3.4–10.8)

## 2021-03-29 PROCEDURE — 36415 COLL VENOUS BLD VENIPUNCTURE: CPT

## 2021-03-29 PROCEDURE — 85025 COMPLETE CBC W/AUTO DIFF WBC: CPT

## 2021-03-29 PROCEDURE — 99244 OFF/OP CNSLTJ NEW/EST MOD 40: CPT | Performed by: INTERNAL MEDICINE

## 2021-03-29 PROCEDURE — 85046 RETICYTE/HGB CONCENTRATE: CPT | Performed by: INTERNAL MEDICINE

## 2021-03-29 RX ORDER — OXYCODONE AND ACETAMINOPHEN 10; 325 MG/1; MG/1
1 TABLET ORAL 3 TIMES DAILY
COMMUNITY
Start: 2021-03-19 | End: 2021-05-08 | Stop reason: HOSPADM

## 2021-03-29 NOTE — PROGRESS NOTES
Subjective     REASON FOR CONSULTATION:    1.  Anemia requiring packed red cell transfusions on multiple occasions  2.  Methicillin sensitive staph aureus bacteremia from left shoulder septic arthritis.  3.  L5-S1 discitis and epidural abscesses  4.  METZGER cirrhosis  5.  No obvious bleeding on EGD or colonoscopy.  Patient scheduled for camera capsule study 4/7/2021  Provide an opinion on any further workup or treatment                             REQUESTING PHYSICIAN: Dianne Porras MD    RECORDS OBTAINED:  Records of the patients history including those obtained from the referring provider were reviewed and summarized in detail.    HISTORY OF PRESENT ILLNESS:  The patient is a 55 y.o. year old female who is here for an opinion about the above issue.  She has a complicated medical history including recent lengthy hospitalization from 1/24/2021 to 2/5/2021 due to methicillin sensitive staph aureus bacteremia.  This was from a left shoulder septic arthritis that required incision and drainage.  She also was found to have evidence of discitis at L5-S1 and possible epidural abscess is on MRI scan 1/26/2021.    She was receiving antibiotic therapy under the direction of infectious disease and required readmission to the hospital 2/8/2021 due to severe symptomatic anemia.  She has required multiple red cell transfusions most recently with 2 units packed red blood cells on 3/12/2021.  Her hemoglobin in our office today is 9.7 g/dL.    She has a history of liver cirrhosis from METZGER.  She has undergone upper and lower GI endoscopy with Dr. Bender on 2/11/2021 with no malignancy or bleeding ulcers identified.  She did have some gastritis.  She is scheduled for camera capsule study as an outpatient on 4/7/2021.    Because of her multiple transfusions she has developed an anti-E antibody.    She reports that she is taking an oral iron supplement 3 times daily.  Her iron studies that are available for review in the computer seem  to be more consistent with anemia of chronic disease.    History of Present Illness     Past Medical History:   Diagnosis Date   • Anemia    • Anxiety    • Cirrhosis (CMS/HCC)    • Clostridium difficile colitis 01/31/2021   • DDD (degenerative disc disease), lumbar    • Depression    • Diabetes mellitus (CMS/HCC)    • Elevated LFTs    • NICK (generalized anxiety disorder)    • History of epilepsy     as a child-Abstracted from pt records   • Hyperlipidemia    • Hypertension    • Hypothyroidism    • MDD (major depressive disorder)    • Morbid obesity (CMS/HCC)    • METZGER (nonalcoholic steatohepatitis) 2000   • OA (osteoarthritis)    • Obesity    • Sleep apnea    • Wears glasses         Past Surgical History:   Procedure Laterality Date   • COLONOSCOPY N/A 2/11/2021    Procedure: COLONOSCOPY INTO CECUM;  Surgeon: Joshua Bender MD;  Location: [facility] ENDOSCOPY;  Service: Gastroenterology;  Laterality: N/A;  pre: same  post: diverticulosis   • ENDOSCOPY N/A 2/11/2021    Procedure: ESOPHAGOGASTRODUODENOSCOPY WITH BIOPSIES;  Surgeon: Joshua Bender MD;  Location: [facility] ENDOSCOPY;  Service: Gastroenterology;  Laterality: N/A;  pre: anemia, rectal bleeding, abnormal CT  post: hiatal hernia, esophagitis, gastropathy   • HYSTERECTOMY  2007   • INCISION AND DRAINAGE SHOULDER Left 1/30/2021    Procedure: INCISION AND DRAINAGE SHOULDER;  Surgeon: Juaquin Richardson II, MD;  Location: [facility] MAIN OR;  Service: Orthopedics;  Laterality: Left;   • TUBAL ABDOMINAL LIGATION  1996        Current Outpatient Medications on File Prior to Visit   Medication Sig Dispense Refill   • cholecalciferol (VITAMIN D3) 25 MCG (1000 UT) tablet Take 2,000 Units by mouth Daily.     • cyclobenzaprine (FLEXERIL) 10 MG tablet Take 1 tablet by mouth 3 (Three) Times a Day As Needed for Muscle Spasms. for muscle spams 270 tablet 1   • ezetimibe (ZETIA) 10 MG tablet TAKE 1 TABLET BY MOUTH DAILY 30 tablet 1   • furosemide (LASIX) 40 MG  "tablet TAKE 1 TABLET BY MOUTH TWICE DAILY 180 tablet 1   • levothyroxine (SYNTHROID, LEVOTHROID) 88 MCG tablet Take 1 tablet by mouth Daily. 90 tablet 1   • losartan (COZAAR) 100 MG tablet Take 1 tablet by mouth Daily. 90 tablet 1   • metFORMIN (GLUCOPHAGE) 500 MG tablet Take 2 tablets by mouth 2 (two) times a day. 360 tablet 1   • oxyCODONE-acetaminophen (PERCOCET)  MG per tablet Take 1 tablet by mouth 3 (Three) Times a Day.     • simvastatin (ZOCOR) 80 MG tablet Take 1 tablet by mouth Every Night. 90 tablet 1   • venlafaxine (EFFEXOR) 75 MG tablet Take 1 tablet by mouth 2 (Two) Times a Day. 180 tablet 1     No current facility-administered medications on file prior to visit.        ALLERGIES:    Allergies   Allergen Reactions   • Tramadol Anaphylaxis     \"seizure like behavior\"        Social History     Socioeconomic History   • Marital status:      Spouse name: Not on file   • Number of children: Not on file   • Years of education: Not on file   • Highest education level: Not on file   Tobacco Use   • Smoking status: Former Smoker     Years: 20.00   • Smokeless tobacco: Never Used   • Tobacco comment: 1 pack per 2 days   Vaping Use   • Vaping Use: Never used   Substance and Sexual Activity   • Alcohol use: Not Currently   • Drug use: Never   • Sexual activity: Defer        Family History   Problem Relation Age of Onset   • Obesity Mother    • Diabetes Mother    • Hypertension Mother    • Sleep apnea Mother    • Asthma Mother    • Hypertension Father    • Heart attack Father    • Diabetes Sister    • Hypertension Sister    • Heart attack Sister    • Diabetes Brother    • Hypertension Brother    • Heart attack Brother    • No Known Problems Other    • Breast cancer Paternal Cousin    • Malig Hyperthermia Neg Hx         Review of Systems   Constitutional: Positive for activity change, appetite change, fatigue and unexpected weight change. Negative for chills and fever.   HENT: Negative for mouth sores, " "trouble swallowing and voice change.    Eyes: Negative for pain and visual disturbance.   Respiratory: Negative for cough, shortness of breath and wheezing.    Cardiovascular: Negative for chest pain and palpitations.   Gastrointestinal: Negative for abdominal pain, constipation, diarrhea, nausea and vomiting.        Darker colored stool since taking oral iron.   Genitourinary: Negative for difficulty urinating, frequency and urgency.   Musculoskeletal: Positive for arthralgias. Negative for joint swelling.        Decreased range of motion in the left shoulder   Skin: Positive for pallor. Negative for rash.   Neurological: Positive for weakness. Negative for dizziness, seizures and headaches.   Hematological: Negative for adenopathy. Does not bruise/bleed easily.   Psychiatric/Behavioral: Negative for behavioral problems and confusion. The patient is not nervous/anxious.         Objective     Vitals:    03/29/21 1525   BP: 169/82   Pulse: 98   Resp: 17   Temp: 98.4 °F (36.9 °C)   TempSrc: Skin   SpO2: 97%   Weight: 115 kg (254 lb)  Comment: new weight   Height: 169.5 cm (66.73\")  Comment: new height   PainSc:   7   PainLoc: Generalized     No flowsheet data found.    Physical Exam  Constitutional:       General: She is not in acute distress.     Appearance: She is well-developed.   HENT:      Head: Normocephalic.   Eyes:      General: No scleral icterus.     Conjunctiva/sclera: Conjunctivae normal.      Pupils: Pupils are equal, round, and reactive to light.   Neck:      Thyroid: No thyromegaly.      Vascular: No JVD.   Cardiovascular:      Rate and Rhythm: Normal rate and regular rhythm.      Heart sounds: No murmur heard.   No friction rub. No gallop.    Pulmonary:      Effort: Pulmonary effort is normal.      Breath sounds: Normal breath sounds. No wheezing or rales.   Abdominal:      General: There is no distension.      Palpations: Abdomen is soft. There is no mass.      Tenderness: There is no abdominal " tenderness.   Musculoskeletal:         General: No deformity.      Left shoulder: Decreased range of motion.      Cervical back: Normal range of motion and neck supple.      Comments: Decreased range of motion in the left shoulder.  She has healed surgical scar anteriorly on the left shoulder   Lymphadenopathy:      Cervical: No cervical adenopathy.   Skin:     General: Skin is warm and dry.      Findings: No erythema or rash.   Neurological:      Mental Status: She is alert and oriented to person, place, and time.      Cranial Nerves: No cranial nerve deficit.      Deep Tendon Reflexes: Reflexes are normal and symmetric.   Psychiatric:         Behavior: Behavior normal.         Judgment: Judgment normal.           RECENT LABS:  Hematology WBC   Date Value Ref Range Status   03/29/2021 5.81 3.40 - 10.80 10*3/mm3 Final   10/14/2020 5.87 3.40 - 10.80 10*3/mm3 Final     RBC   Date Value Ref Range Status   03/29/2021 3.28 (L) 3.77 - 5.28 10*6/mm3 Final   10/14/2020 4.10 3.77 - 5.28 10*6/mm3 Final     Hemoglobin   Date Value Ref Range Status   03/29/2021 9.7 (L) 12.0 - 15.9 g/dL Final     Hematocrit   Date Value Ref Range Status   03/29/2021 29.5 (L) 34.0 - 46.6 % Final     Platelets   Date Value Ref Range Status   03/29/2021 138 (L) 140 - 450 10*3/mm3 Final        Lab Results   Component Value Date    GLUCOSE 112 (H) 02/13/2021    BUN 14 02/19/2021    CREATININE 0.66 03/08/2021    EGFRIFNONA 93 03/08/2021    EGFRIFAFRI 76 02/19/2021    BCR 15 02/19/2021    K 4.1 02/19/2021    CO2 23 02/19/2021    CALCIUM 8.1 (L) 02/19/2021    PROTENTOTREF 7.4 02/10/2021    ALBUMIN 2.60 (L) 02/10/2021    ALBUMIN 2.3 (L) 02/10/2021    LABIL2 0.5 (L) 02/10/2021    AST 21 02/10/2021    ALT <5 02/10/2021     Lab Results   Component Value Date    IRON 97 03/25/2021    TIBC 261 (L) 03/25/2021    FERRITIN 233.00 (H) 02/09/2021     IMAGING    LUMBAR SPINE MRI WITH AND WITHOUT INTRAVENOUS CONTRAST 1/26/2021  IMPRESSION:  1.  Evaluation is  significantly suboptimal as detailed above. Evaluation  of the central canal on axial images is essentially nondiagnostic.  2.  Constellation of findings most suggestive of septic arthritis  involving multiple facet joints within the lower lumbar spine with  extension into the epidural space and concern for developing epidural  abscesses. There are findings most suggestive of an abscess within the  left paravertebral musculature of the lower lumbar and sacral spine.  Subtle increased edema and enhancement within the L5-S1 disc space is  also highly concerning for discitis. Neurosurgical consultation is  recommended.  3.  Asymmetric edema is present within the left sacroiliac joint;  however this area is not completely visualized. Findings raise concern  for extension of infection into this area and can be further evaluated  with dedicated pelvic MRI with and without contrast if clinically  indicated.    MRI LEFT SHOULDER WITH AND WITHOUT CONTRAST 1/26/2021  IMPRESSION:  1. Diffuse abnormal muscular edema and fluid with areas of absent  enhancement within the subscapularis muscle consistent with myonecrosis  and suspected pyo-myonecrosis. There is glenohumeral joint effusion with  synovial enhancement consistent with synovitis suspected to be  associated with septic arthritis. Fluid extends along the biceps tendon  sheath and there is proximal biceps muscular edema and myositis  surrounding the biceps myotendinous junction.  2. Supraspinatus insertional interstitial tear with diffuse  supraspinatus tendinopathy. No evidence for full-thickness rotator cuff  tear.  3. Subacromial/subdeltoid bursitis, presumed infectious. Anterior  deltoid myositis. Mild/moderate AC joint arthritis.  4. Subcortical bone marrow edema and enhancement within the  posterolateral to superior humeral head deep to the articular surface  infraspinatus tendon insertional fibers. This could represent early  developing osteomyelitis.    CT ABDOMEN  PELVIS WO CONTRAST-, CT CHEST WO CONTRAST 1/24/2021  IMPRESSION:  COMBINED IMPRESSION:      1.  Hepatomegaly with diffuse hepatic steatosis and a nodular hepatic  contour, which can be seen with cirrhosis. Correlate with liver function  enzymes.  2.  Mildly enlarged upper abdominal lymph nodes are nonspecific and may  be reactive.  3.  Splenomegaly.  4.  Pulmonary nodules measuring up to 0.4 cm are not significantly  changed from 12/18/2020. Recommend follow-up CT chest in 1 year.    Assessment/Plan     1.  Anemia which has required multiple red cell transfusions over the last few months.  During this time the patient was quite ill with methicillin sensitive staph aureus bacteremia due to septic arthritis of the left shoulder and lumbar epidural abscesses and discitis.  She most recently was transfused on 3/12/2021 and her hemoglobin is still low but adequate at 9.7 g/dL.  We suspect this is multifactorial anemia with a very large component of anemia of chronic disease in the setting.  She has been on oral iron supplementation although her iron studies were probably more consistent with anemia of chronic disease than true iron deficiency.  2.  Cirrhosis most likely related to METZGER.  3.  Upper and lower GI endoscopy 2/11/2021 with no active bleeding sites identified.  Patient is scheduled for camera capsule study 4/7/2021.  Stool for occult blood was negative during her hospitalization.  4.  Mild thrombocytopenia.  This is likely related to her underlying cirrhosis and splenomegaly.    Recommendations  1.  We will continue to observe with weekly blood counts for now and consider further red cell transfusions as needed if her hemoglobin drops below 8.0 g/dL.  2.  For the time being she will remain on her oral iron supplementation but we will plan to repeat iron studies in 3 weeks.  3.  She will return for MD follow-up in 4 weeks to review her follow-up CBCs and iron studies.  Hopefully at the time of that visit her  results from the camera capsule study may be available.  We discussed today that if we do not have a better understanding of her anemia and if she continues to require transfusions we may need to consider bone marrow aspirate and biopsy.  4.  She developed an allo antibody ( Anti-E) but her Velia test was negative and my suspicion for clinically significant hemolysis is fairly low.    Thanks for allowing us to see this very interesting patient in consultation.

## 2021-03-30 ENCOUNTER — TELEPHONE (OUTPATIENT)
Dept: GASTROENTEROLOGY | Facility: CLINIC | Age: 56
End: 2021-03-30

## 2021-04-02 RX ORDER — VENLAFAXINE 75 MG/1
TABLET ORAL
Qty: 30 TABLET | Refills: 1 | Status: SHIPPED | OUTPATIENT
Start: 2021-04-02 | End: 2021-12-28

## 2021-04-02 RX ORDER — SIMVASTATIN 80 MG
80 TABLET ORAL NIGHTLY
Qty: 30 TABLET | Refills: 1 | Status: SHIPPED | OUTPATIENT
Start: 2021-04-02 | End: 2021-04-07

## 2021-04-02 RX ORDER — LOSARTAN POTASSIUM 100 MG/1
100 TABLET ORAL
Qty: 30 TABLET | Refills: 1 | Status: SHIPPED | OUTPATIENT
Start: 2021-04-02 | End: 2021-04-07

## 2021-04-05 ENCOUNTER — LAB (OUTPATIENT)
Dept: LAB | Facility: HOSPITAL | Age: 56
End: 2021-04-05

## 2021-04-05 ENCOUNTER — CLINICAL SUPPORT (OUTPATIENT)
Dept: ONCOLOGY | Facility: HOSPITAL | Age: 56
End: 2021-04-05

## 2021-04-05 DIAGNOSIS — D64.9 ANEMIA, UNSPECIFIED TYPE: ICD-10-CM

## 2021-04-05 LAB
BASOPHILS # BLD AUTO: 0.06 10*3/MM3 (ref 0–0.2)
BASOPHILS NFR BLD AUTO: 0.8 % (ref 0–1.5)
DEPRECATED RDW RBC AUTO: 49.2 FL (ref 37–54)
EOSINOPHIL # BLD AUTO: 0.32 10*3/MM3 (ref 0–0.4)
EOSINOPHIL NFR BLD AUTO: 4.3 % (ref 0.3–6.2)
ERYTHROCYTE [DISTWIDTH] IN BLOOD BY AUTOMATED COUNT: 15.3 % (ref 12.3–15.4)
HCT VFR BLD AUTO: 29.8 % (ref 34–46.6)
HGB BLD-MCNC: 10.2 G/DL (ref 12–15.9)
IMM GRANULOCYTES # BLD AUTO: 0.08 10*3/MM3 (ref 0–0.05)
IMM GRANULOCYTES NFR BLD AUTO: 1.1 % (ref 0–0.5)
LYMPHOCYTES # BLD AUTO: 2.25 10*3/MM3 (ref 0.7–3.1)
LYMPHOCYTES NFR BLD AUTO: 30 % (ref 19.6–45.3)
MCH RBC QN AUTO: 30.4 PG (ref 26.6–33)
MCHC RBC AUTO-ENTMCNC: 34.2 G/DL (ref 31.5–35.7)
MCV RBC AUTO: 89 FL (ref 79–97)
MONOCYTES # BLD AUTO: 0.41 10*3/MM3 (ref 0.1–0.9)
MONOCYTES NFR BLD AUTO: 5.5 % (ref 5–12)
NEUTROPHILS NFR BLD AUTO: 4.38 10*3/MM3 (ref 1.7–7)
NEUTROPHILS NFR BLD AUTO: 58.3 % (ref 42.7–76)
NRBC BLD AUTO-RTO: 0 /100 WBC (ref 0–0.2)
PLATELET # BLD AUTO: 167 10*3/MM3 (ref 140–450)
PMV BLD AUTO: 9.2 FL (ref 6–12)
RBC # BLD AUTO: 3.35 10*6/MM3 (ref 3.77–5.28)
WBC # BLD AUTO: 7.5 10*3/MM3 (ref 3.4–10.8)

## 2021-04-05 PROCEDURE — 85025 COMPLETE CBC W/AUTO DIFF WBC: CPT

## 2021-04-05 PROCEDURE — 36415 COLL VENOUS BLD VENIPUNCTURE: CPT

## 2021-04-05 PROCEDURE — G0463 HOSPITAL OUTPT CLINIC VISIT: HCPCS

## 2021-04-05 NOTE — NURSING NOTE
Lab Results   Component Value Date    WBC 7.50 04/05/2021    HGB 10.2 (L) 04/05/2021    HCT 29.8 (L) 04/05/2021    MCV 89.0 04/05/2021     04/05/2021     Pt is here for lab with RN review.  CBC reviewed with pt, counts are stable for this pt at this time. Pt  reports feeling better and that she has more energy.Copy of labs given to pt and f/u appt reviewed. Pt is instructed to call the office with any concerns or new symptoms prior to next visit. Pt vu

## 2021-04-07 ENCOUNTER — TELEPHONE (OUTPATIENT)
Dept: GASTROENTEROLOGY | Facility: CLINIC | Age: 56
End: 2021-04-07

## 2021-04-07 RX ORDER — SIMVASTATIN 80 MG
80 TABLET ORAL NIGHTLY
Qty: 90 TABLET | Refills: 0 | Status: SHIPPED | OUTPATIENT
Start: 2021-04-07 | End: 2021-12-13

## 2021-04-07 RX ORDER — LOSARTAN POTASSIUM 100 MG/1
100 TABLET ORAL
Qty: 90 TABLET | Refills: 0 | Status: SHIPPED | OUTPATIENT
Start: 2021-04-07 | End: 2022-03-03 | Stop reason: SDUPTHER

## 2021-04-07 NOTE — TELEPHONE ENCOUNTER
Called and spoke with pt's daughter (ok per LV) regarding capsule study.  Pt needs to reschedule.     Msg sent to Radha POLLACK

## 2021-04-07 NOTE — TELEPHONE ENCOUNTER
----- Message from Mala Martinez RN sent at 4/7/2021  8:16 AM EDT -----    ----- Message -----  From: Roberto Andrade  Sent: 4/7/2021   8:11 AM EDT  To: Socrates King [facility] 1 Quirino Stoddard was calling on the patient's behalf wanting to speak to Mrs. Medeiros you can contact her at [phone]

## 2021-04-11 ENCOUNTER — HOSPITAL ENCOUNTER (OUTPATIENT)
Dept: MRI IMAGING | Facility: HOSPITAL | Age: 56
Discharge: HOME | End: 2021-04-11
Admitting: NEUROLOGICAL SURGERY

## 2021-04-11 DIAGNOSIS — M46.46 DISCITIS OF LUMBAR REGION: ICD-10-CM

## 2021-04-11 PROCEDURE — 82565 ASSAY OF CREATININE: CPT

## 2021-04-11 PROCEDURE — A9577 INJ MULTIHANCE: HCPCS | Performed by: NEUROLOGICAL SURGERY

## 2021-04-11 PROCEDURE — 72158 MRI LUMBAR SPINE W/O & W/DYE: CPT

## 2021-04-11 PROCEDURE — 0 GADOBENATE DIMEGLUMINE 529 MG/ML SOLUTION: Performed by: NEUROLOGICAL SURGERY

## 2021-04-11 RX ADMIN — GADOBENATE DIMEGLUMINE 20 ML: 529 INJECTION, SOLUTION INTRAVENOUS at 13:42

## 2021-04-12 ENCOUNTER — APPOINTMENT (OUTPATIENT)
Dept: LAB | Facility: HOSPITAL | Age: 56
End: 2021-04-12

## 2021-04-12 ENCOUNTER — APPOINTMENT (OUTPATIENT)
Dept: ONCOLOGY | Facility: HOSPITAL | Age: 56
End: 2021-04-12

## 2021-04-12 LAB — CREAT BLDA-MCNC: 1 MG/DL (ref 0.6–1.3)

## 2021-04-13 ENCOUNTER — OFFICE VISIT (OUTPATIENT)
Dept: NEUROSURGERY | Facility: CLINIC | Age: 56
End: 2021-04-13

## 2021-04-13 DIAGNOSIS — M46.46 DISCITIS OF LUMBAR REGION: Primary | ICD-10-CM

## 2021-04-13 PROCEDURE — 99441 PR PHYS/QHP TELEPHONE EVALUATION 5-10 MIN: CPT | Performed by: NEUROLOGICAL SURGERY

## 2021-04-13 NOTE — PROGRESS NOTES
Subjective   Patient ID: Stefani Bhandari is a 55 y.o. female is here today for follow-up with a new MRI Lumbar that was ordered on 03.11.2021.    You have chosen to receive care through a telephone visit. Do you consent to use a telephone visit for your medical care today? Yes    We did a telephone visit today.  The patient was at home and I was in the office.  Her daughter was also on the phone.  We talked for 5 minutes.    History of Present Illness    This patient is still having some back pain but she says it is reduced at this point to what her normal back pain is and has been for years.  She has no new pain.    The following portions of the patient's history were reviewed and updated as appropriate: allergies, current medications, past family history, past medical history, past social history, past surgical history and problem list.    Review of Systems    I have reviewed the review of systems as documented by my MA.      Objective         Physical Exam  Neurological:      Mental Status: She is alert and oriented to person, place, and time.       Neurologic Exam     Mental Status   Oriented to person, place, and time.           Assessment/Plan   Independent Review of Radiographic Studies:      I personally reviewed the images from the following studies.    I reviewed her MRI which was done a couple of days ago.  This does show some ongoing inflammation and edema in the soft tissues in the bone.  There is one area that could be a small abscess measuring only about 7 mm.  She is scheduled to have blood drawn tomorrow.    Medical Decision Making:      I told the patient and her daughter that as long as she is feeling better and the lab work does not indicate anything alarming we will continue to follow this and plan to scan her back again in a couple of months.  I explained that MRIs commonly look worse before they look better even though the infection has cleared up.    Diagnoses and all orders for this  visit:    1. Discitis of lumbar region (Primary)  -     MRI Lumbar Spine With & Without Contrast; Future      Return in about 2 months (around 6/13/2021).

## 2021-04-14 ENCOUNTER — LAB (OUTPATIENT)
Dept: LAB | Facility: HOSPITAL | Age: 56
End: 2021-04-14

## 2021-04-14 DIAGNOSIS — K74.60 LIVER CIRRHOSIS SECONDARY TO NASH (HCC): ICD-10-CM

## 2021-04-14 DIAGNOSIS — R78.81 MSSA BACTEREMIA: ICD-10-CM

## 2021-04-14 DIAGNOSIS — D64.9 ANEMIA, UNSPECIFIED TYPE: ICD-10-CM

## 2021-04-14 DIAGNOSIS — M00.012 STAPHYLOCOCCAL ARTHRITIS OF LEFT SHOULDER (HCC): ICD-10-CM

## 2021-04-14 DIAGNOSIS — M46.46 DISCITIS OF LUMBAR REGION: ICD-10-CM

## 2021-04-14 DIAGNOSIS — B95.61 MSSA BACTEREMIA: ICD-10-CM

## 2021-04-14 DIAGNOSIS — K75.81 LIVER CIRRHOSIS SECONDARY TO NASH (HCC): ICD-10-CM

## 2021-04-14 LAB
ALBUMIN SERPL-MCNC: 3.6 G/DL (ref 3.5–5.2)
ALBUMIN/GLOB SERPL: 0.6 G/DL
ALP SERPL-CCNC: 144 U/L (ref 39–117)
ALT SERPL W P-5'-P-CCNC: 22 U/L (ref 1–33)
ANION GAP SERPL CALCULATED.3IONS-SCNC: 11.8 MMOL/L (ref 5–15)
AST SERPL-CCNC: 26 U/L (ref 1–32)
BASOPHILS # BLD AUTO: 0.05 10*3/MM3 (ref 0–0.2)
BASOPHILS NFR BLD AUTO: 0.7 % (ref 0–1.5)
BILIRUB SERPL-MCNC: 0.7 MG/DL (ref 0–1.2)
BUN SERPL-MCNC: 19 MG/DL (ref 6–20)
BUN/CREAT SERPL: 17.6 (ref 7–25)
CALCIUM SPEC-SCNC: 9.6 MG/DL (ref 8.6–10.5)
CHLORIDE SERPL-SCNC: 97 MMOL/L (ref 98–107)
CO2 SERPL-SCNC: 28.2 MMOL/L (ref 22–29)
CREAT SERPL-MCNC: 1.08 MG/DL (ref 0.57–1)
CRP SERPL-MCNC: 3.48 MG/DL (ref 0–0.5)
DEPRECATED RDW RBC AUTO: 52.3 FL (ref 37–54)
EOSINOPHIL # BLD AUTO: 0.36 10*3/MM3 (ref 0–0.4)
EOSINOPHIL NFR BLD AUTO: 4.8 % (ref 0.3–6.2)
ERYTHROCYTE [DISTWIDTH] IN BLOOD BY AUTOMATED COUNT: 15.5 % (ref 12.3–15.4)
ERYTHROCYTE [SEDIMENTATION RATE] IN BLOOD: 57 MM/HR (ref 0–30)
FERRITIN SERPL-MCNC: 797 NG/ML (ref 13–150)
GFR SERPL CREATININE-BSD FRML MDRD: 52 ML/MIN/1.73
GLOBULIN UR ELPH-MCNC: 5.6 GM/DL
GLUCOSE SERPL-MCNC: 142 MG/DL (ref 65–99)
HCT VFR BLD AUTO: 30.4 % (ref 34–46.6)
HGB BLD-MCNC: 9.6 G/DL (ref 12–15.9)
HGB RETIC QN AUTO: 34.5 PG (ref 29.8–36.1)
IMM GRANULOCYTES # BLD AUTO: 0.03 10*3/MM3 (ref 0–0.05)
IMM GRANULOCYTES NFR BLD AUTO: 0.4 % (ref 0–0.5)
IMM RETICS NFR: 18.4 % (ref 3–15.8)
IRON 24H UR-MRATE: 83 MCG/DL (ref 37–145)
IRON SATN MFR SERPL: 29 % (ref 20–50)
LDH SERPL-CCNC: 215 U/L (ref 135–214)
LYMPHOCYTES # BLD AUTO: 2.25 10*3/MM3 (ref 0.7–3.1)
LYMPHOCYTES NFR BLD AUTO: 30 % (ref 19.6–45.3)
MCH RBC QN AUTO: 29 PG (ref 26.6–33)
MCHC RBC AUTO-ENTMCNC: 31.6 G/DL (ref 31.5–35.7)
MCV RBC AUTO: 91.8 FL (ref 79–97)
MONOCYTES # BLD AUTO: 0.38 10*3/MM3 (ref 0.1–0.9)
MONOCYTES NFR BLD AUTO: 5.1 % (ref 5–12)
NEUTROPHILS NFR BLD AUTO: 4.44 10*3/MM3 (ref 1.7–7)
NEUTROPHILS NFR BLD AUTO: 59 % (ref 42.7–76)
NRBC BLD AUTO-RTO: 0 /100 WBC (ref 0–0.2)
PLATELET # BLD AUTO: 194 10*3/MM3 (ref 140–450)
PMV BLD AUTO: 9 FL (ref 6–12)
POTASSIUM SERPL-SCNC: 3.8 MMOL/L (ref 3.5–5.2)
PROT SERPL-MCNC: 9.2 G/DL (ref 6–8.5)
RBC # BLD AUTO: 3.31 10*6/MM3 (ref 3.77–5.28)
RETICS # AUTO: 0.08 10*6/MM3 (ref 0.02–0.13)
RETICS/RBC NFR AUTO: 2.31 % (ref 0.7–1.9)
SODIUM SERPL-SCNC: 137 MMOL/L (ref 136–145)
TIBC SERPL-MCNC: 289 MCG/DL (ref 298–536)
TRANSFERRIN SERPL-MCNC: 194 MG/DL (ref 200–360)
WBC # BLD AUTO: 7.51 10*3/MM3 (ref 3.4–10.8)

## 2021-04-14 PROCEDURE — 83540 ASSAY OF IRON: CPT

## 2021-04-14 PROCEDURE — 83615 LACTATE (LD) (LDH) ENZYME: CPT

## 2021-04-14 PROCEDURE — 86140 C-REACTIVE PROTEIN: CPT

## 2021-04-14 PROCEDURE — 82728 ASSAY OF FERRITIN: CPT

## 2021-04-14 PROCEDURE — 84466 ASSAY OF TRANSFERRIN: CPT

## 2021-04-14 PROCEDURE — 36415 COLL VENOUS BLD VENIPUNCTURE: CPT

## 2021-04-14 PROCEDURE — 85652 RBC SED RATE AUTOMATED: CPT

## 2021-04-14 PROCEDURE — 85025 COMPLETE CBC W/AUTO DIFF WBC: CPT | Performed by: NURSE PRACTITIONER

## 2021-04-14 PROCEDURE — 85046 RETICYTE/HGB CONCENTRATE: CPT

## 2021-04-14 PROCEDURE — 80053 COMPREHEN METABOLIC PANEL: CPT

## 2021-04-15 RX ORDER — FUROSEMIDE 40 MG/1
TABLET ORAL
Qty: 180 TABLET | Refills: 1 | Status: SHIPPED | OUTPATIENT
Start: 2021-04-15 | End: 2022-04-08 | Stop reason: SDUPTHER

## 2021-04-19 ENCOUNTER — TELEPHONE (OUTPATIENT)
Dept: GASTROENTEROLOGY | Facility: CLINIC | Age: 56
End: 2021-04-19

## 2021-04-19 ENCOUNTER — CLINICAL SUPPORT (OUTPATIENT)
Dept: GASTROENTEROLOGY | Facility: CLINIC | Age: 56
End: 2021-04-19

## 2021-04-19 ENCOUNTER — TRANSCRIBE ORDERS (OUTPATIENT)
Dept: PREADMISSION TESTING | Facility: HOSPITAL | Age: 56
End: 2021-04-19

## 2021-04-19 DIAGNOSIS — D64.9 ANEMIA, UNSPECIFIED TYPE: Primary | ICD-10-CM

## 2021-04-19 DIAGNOSIS — Z01.818 OTHER SPECIFIED PRE-OPERATIVE EXAMINATION: Primary | ICD-10-CM

## 2021-04-19 PROCEDURE — 91110 GI TRC IMG INTRAL ESOPH-ILE: CPT | Performed by: INTERNAL MEDICINE

## 2021-04-22 ENCOUNTER — PRE-ADMISSION TESTING (OUTPATIENT)
Dept: PREADMISSION TESTING | Facility: HOSPITAL | Age: 56
End: 2021-04-22

## 2021-04-22 VITALS
TEMPERATURE: 99.3 F | RESPIRATION RATE: 16 BRPM | BODY MASS INDEX: 40.03 KG/M2 | HEIGHT: 66 IN | HEART RATE: 102 BPM | OXYGEN SATURATION: 97 % | SYSTOLIC BLOOD PRESSURE: 150 MMHG | WEIGHT: 249.1 LBS | DIASTOLIC BLOOD PRESSURE: 78 MMHG

## 2021-04-22 DIAGNOSIS — D64.9 ANEMIA, UNSPECIFIED TYPE: ICD-10-CM

## 2021-04-22 LAB
ALBUMIN SERPL-MCNC: 3.3 G/DL (ref 3.5–5.2)
ALBUMIN/GLOB SERPL: 0.6 G/DL
ALP SERPL-CCNC: 145 U/L (ref 39–117)
ALT SERPL W P-5'-P-CCNC: 26 U/L (ref 1–33)
ANION GAP SERPL CALCULATED.3IONS-SCNC: 14.3 MMOL/L (ref 5–15)
AST SERPL-CCNC: 35 U/L (ref 1–32)
BASOPHILS # BLD AUTO: 0.03 10*3/MM3 (ref 0–0.2)
BASOPHILS NFR BLD AUTO: 0.5 % (ref 0–1.5)
BILIRUB SERPL-MCNC: 0.5 MG/DL (ref 0–1.2)
BUN SERPL-MCNC: 21 MG/DL (ref 6–20)
BUN/CREAT SERPL: 20.4 (ref 7–25)
CALCIUM SPEC-SCNC: 9.1 MG/DL (ref 8.6–10.5)
CHLORIDE SERPL-SCNC: 99 MMOL/L (ref 98–107)
CO2 SERPL-SCNC: 23.7 MMOL/L (ref 22–29)
CREAT SERPL-MCNC: 1.03 MG/DL (ref 0.57–1)
DEPRECATED RDW RBC AUTO: 50.6 FL (ref 37–54)
EOSINOPHIL # BLD AUTO: 0.27 10*3/MM3 (ref 0–0.4)
EOSINOPHIL NFR BLD AUTO: 4.6 % (ref 0.3–6.2)
ERYTHROCYTE [DISTWIDTH] IN BLOOD BY AUTOMATED COUNT: 14.9 % (ref 12.3–15.4)
FERRITIN SERPL-MCNC: 732 NG/ML (ref 13–150)
GFR SERPL CREATININE-BSD FRML MDRD: 55 ML/MIN/1.73
GLOBULIN UR ELPH-MCNC: 5.1 GM/DL
GLUCOSE SERPL-MCNC: 121 MG/DL (ref 65–99)
HBA1C MFR BLD: 5.4 % (ref 4.8–5.6)
HCT VFR BLD AUTO: 27.7 % (ref 34–46.6)
HGB BLD-MCNC: 9 G/DL (ref 12–15.9)
HGB RETIC QN AUTO: 32.3 PG (ref 29.8–36.1)
IMM GRANULOCYTES # BLD AUTO: 0.03 10*3/MM3 (ref 0–0.05)
IMM GRANULOCYTES NFR BLD AUTO: 0.5 % (ref 0–0.5)
IMM RETICS NFR: 17.4 % (ref 3–15.8)
IRON 24H UR-MRATE: 60 MCG/DL (ref 37–145)
IRON SATN MFR SERPL: 23 % (ref 20–50)
LYMPHOCYTES # BLD AUTO: 1.77 10*3/MM3 (ref 0.7–3.1)
LYMPHOCYTES NFR BLD AUTO: 29.8 % (ref 19.6–45.3)
MCH RBC QN AUTO: 30.4 PG (ref 26.6–33)
MCHC RBC AUTO-ENTMCNC: 32.5 G/DL (ref 31.5–35.7)
MCV RBC AUTO: 93.6 FL (ref 79–97)
MONOCYTES # BLD AUTO: 0.33 10*3/MM3 (ref 0.1–0.9)
MONOCYTES NFR BLD AUTO: 5.6 % (ref 5–12)
NEUTROPHILS NFR BLD AUTO: 3.5 10*3/MM3 (ref 1.7–7)
NEUTROPHILS NFR BLD AUTO: 59 % (ref 42.7–76)
NRBC BLD AUTO-RTO: 0 /100 WBC (ref 0–0.2)
PLATELET # BLD AUTO: 157 10*3/MM3 (ref 140–450)
PMV BLD AUTO: 9.3 FL (ref 6–12)
POTASSIUM SERPL-SCNC: 3.6 MMOL/L (ref 3.5–5.2)
PROT SERPL-MCNC: 8.4 G/DL (ref 6–8.5)
RBC # BLD AUTO: 2.96 10*6/MM3 (ref 3.77–5.28)
RETICS # AUTO: 0.09 10*6/MM3 (ref 0.02–0.13)
RETICS/RBC NFR AUTO: 2.95 % (ref 0.7–1.9)
SODIUM SERPL-SCNC: 137 MMOL/L (ref 136–145)
TIBC SERPL-MCNC: 265 MCG/DL (ref 298–536)
TRANSFERRIN SERPL-MCNC: 178 MG/DL (ref 200–360)
WBC # BLD AUTO: 5.93 10*3/MM3 (ref 3.4–10.8)

## 2021-04-22 PROCEDURE — 83540 ASSAY OF IRON: CPT

## 2021-04-22 PROCEDURE — 36415 COLL VENOUS BLD VENIPUNCTURE: CPT

## 2021-04-22 PROCEDURE — 85046 RETICYTE/HGB CONCENTRATE: CPT

## 2021-04-22 PROCEDURE — 84466 ASSAY OF TRANSFERRIN: CPT

## 2021-04-22 PROCEDURE — 85025 COMPLETE CBC W/AUTO DIFF WBC: CPT

## 2021-04-22 PROCEDURE — 80053 COMPREHEN METABOLIC PANEL: CPT

## 2021-04-22 PROCEDURE — 83036 HEMOGLOBIN GLYCOSYLATED A1C: CPT

## 2021-04-22 PROCEDURE — 82728 ASSAY OF FERRITIN: CPT

## 2021-04-22 NOTE — DISCHARGE INSTRUCTIONS
Take the following medications the morning of surgery:  LEVOTHYROXINE, VENLAFAXINE    ARRIVE 11:00  4/27/21 (CALL DR GIORDANO'S OFFICE TO CONFIRM ARRIVAL TIME)      If you are on prescription narcotic pain medication to control your pain you may also take that medication the morning of surgery.    General Instructions:  • Do not eat solid food after midnight the night before surgery.  • You may drink clear liquids day of surgery but must stop at least one hour before your hospital arrival time.  • It is beneficial for you to have a clear drink that contains carbohydrates the day of surgery.  We suggest a 12 to 20 ounce bottle of Gatorade or Powerade for non-diabetic patients or a 12 to 20 ounce bottle of G2 or Powerade Zero for diabetic patients. (Pediatric patients, are not advised to drink a 12 to 20 ounce carbohydrate drink)    Clear liquids are liquids you can see through.  Nothing red in color.     Plain water                               Sports drinks  Sodas                                   Gelatin (Jell-O)  Fruit juices without pulp such as white grape juice and apple juice  Popsicles that contain no fruit or yogurt  Tea or coffee (no cream or milk added)  Gatorade / Powerade  G2 / Powerade Zero    • Infants may have breast milk up to four hours before surgery.  • Infants drinking formula may drink formula up to six hours before surgery.   • Patients who avoid smoking, chewing tobacco and alcohol for 4 weeks prior to surgery have a reduced risk of post-operative complications.  Quit smoking as many days before surgery as you can.  • Do not smoke, use chewing tobacco or drink alcohol the day of surgery.   • If applicable bring your C-PAP/ BI-PAP machine.  • Bring any papers given to you in the doctor’s office.  • Wear clean comfortable clothes.  • Do not wear contact lenses, false eyelashes or make-up.  Bring a case for your glasses.   • Bring crutches or walker if applicable.  • Remove all piercings.  Leave  jewelry and any other valuables at home.  • Hair extensions with metal clips must be removed prior to surgery.  • The Pre-Admission Testing nurse will instruct you to bring medications if unable to obtain an accurate list in Pre-Admission Testing.        If you were given a blood bank ID arm band remember to bring it with you the day of surgery.    Preventing a Surgical Site Infection:  • For 2 to 3 days before surgery, avoid shaving with a razor because the razor can irritate skin and make it easier to develop an infection.    • Any areas of open skin can increase the risk of a post-operative wound infection by allowing bacteria to enter and travel throughout the body.  Notify your surgeon if you have any skin wounds / rashes even if it is not near the expected surgical site.  The area will need assessed to determine if surgery should be delayed until it is healed.  • The night prior to surgery shower using a fresh bar of anti-bacterial soap (such as Dial) and clean washcloth.  Sleep in a clean bed with clean clothing.  Do not allow pets to sleep with you.  • Shower on the morning of surgery using a fresh bar of anti-bacterial soap (such as Dial) and clean washcloth.  Dry with a clean towel and dress in clean clothing.  • Ask your surgeon if you will be receiving antibiotics prior to surgery.  • Make sure you, your family, and all healthcare providers clean their hands with soap and water or an alcohol based hand  before caring for you or your wound.    Day of surgery:  Your arrival time is approximately two hours before your scheduled surgery time.  Upon arrival, a Pre-op nurse and Anesthesiologist will review your health history, obtain vital signs, and answer questions you may have.  The only belongings needed at this time will be a list of your home medications and if applicable your C-PAP/BI-PAP machine.  A Pre-op nurse will start an IV and you may receive medication in preparation for surgery,  including something to help you relax.     Please be aware that surgery does come with discomfort.  We want to make every effort to control your discomfort so please discuss any uncontrolled symptoms with your nurse.   Your doctor will most likely have prescribed pain medications.      If you are going home after surgery you will receive individualized written care instructions before being discharged.  A responsible adult must drive you to and from the hospital on the day of your surgery and stay with you for 24 hours.  Discharge prescriptions can be filled by the hospital pharmacy during regular pharmacy hours.  If you are having surgery late in the day/evening your prescription may be e-prescribed to your pharmacy.  Please verify your pharmacy hours or chose a 24 hour pharmacy to avoid not having access to your prescription because your pharmacy has closed for the day.    If you are staying overnight following surgery, you will be transported to your hospital room following the recovery period.  [facility] has all private rooms.    If you have any questions please call Pre-Admission Testing at [phone].  Deductibles and co-payments are collected on the day of service. Please be prepared to pay the required co-pay, deductible or deposit on the day of service as defined by your plan.    Patient Education for Self-Quarantine Process    Following your COVID testing, we strongly recommend that you do not leave your home after you have been tested for COVID except to get medical care. This includes not going to work, school or to public areas.  If this is not possible for you to do please limit your activities to only required outings.  Be sure to wear a mask when you are with other people, practice social distancing and wash your hands frequently.      The following items provide additional details to keep you safe.  • Wash your hands with soap and water frequently for at least 20 seconds.    • Avoid touching your eyes, nose and mouth with unwashed hands.  • Do not share anything - utensils, towels, food from the same bowl.   • Have your own utensils, drinking glass, dishes, towels and bedding.   • Do not have visitors.   • Do use FaceTime to stay in touch with family and friends.  • You should stay in a specific room away from others if possible.   • Stay at least 6 feet away from others in the home if you cannot have a dedicated room to yourself.   • Do not snuggle with your pet. While the CDC says there is no evidence that pets can spread COVID-19 or be infected from humans, it is probably best to avoid “petting, snuggling, being kissed or licked and sharing food (during self-quarantine)”, according to the CDC.   • Sanitize household surfaces daily. Include all high touch areas (door handles, light switches, phones, countertops, etc.)  • Do not share a bathroom with others, if possible.   • Wear a mask around others in your home if you are unable to stay in a separate room or 6 feet apart. If  you are unable to wear a mask, have your family member wear a mask if they must be within 6 feet of you.   Call your surgeon immediately if you experience any of the following symptoms:  • Sore Throat  • Shortness of Breath or difficulty breathing  • Cough  • Chills  • Body soreness or muscle pain  • Headache  • Fever  • New loss of taste or smell  • Do not arrive for your surgery ill.  Your procedure will need to be rescheduled to another time.  You will need to call your physician before the day of surgery to avoid any unnecessary exposure to hospital staff as well as other patients.

## 2021-04-24 ENCOUNTER — LAB (OUTPATIENT)
Dept: LAB | Facility: HOSPITAL | Age: 56
End: 2021-04-24

## 2021-04-24 DIAGNOSIS — Z01.818 OTHER SPECIFIED PRE-OPERATIVE EXAMINATION: ICD-10-CM

## 2021-04-24 PROCEDURE — C9803 HOPD COVID-19 SPEC COLLECT: HCPCS

## 2021-04-24 PROCEDURE — U0004 COV-19 TEST NON-CDC HGH THRU: HCPCS

## 2021-04-26 LAB — SARS-COV-2 RNA RESP QL NAA+PROBE: NOT DETECTED

## 2021-04-27 ENCOUNTER — APPOINTMENT (OUTPATIENT)
Dept: GENERAL RADIOLOGY | Facility: HOSPITAL | Age: 56
End: 2021-04-27

## 2021-04-27 ENCOUNTER — HOSPITAL ENCOUNTER (INPATIENT)
Facility: HOSPITAL | Age: 56
LOS: 11 days | Discharge: HOME HEALTH | End: 2021-05-08
Attending: ORTHOPAEDIC SURGERY | Admitting: ORTHOPAEDIC SURGERY

## 2021-04-27 ENCOUNTER — ANESTHESIA EVENT (OUTPATIENT)
Dept: PERIOP | Facility: HOSPITAL | Age: 56
End: 2021-04-27

## 2021-04-27 ENCOUNTER — ANESTHESIA (OUTPATIENT)
Dept: PERIOP | Facility: HOSPITAL | Age: 56
End: 2021-04-27

## 2021-04-27 DIAGNOSIS — R78.81 MSSA BACTEREMIA: Primary | ICD-10-CM

## 2021-04-27 DIAGNOSIS — Z96.619 INFECTION OF PROSTHETIC SHOULDER JOINT, INITIAL ENCOUNTER: ICD-10-CM

## 2021-04-27 DIAGNOSIS — M46.46 DISCITIS OF LUMBAR REGION: ICD-10-CM

## 2021-04-27 DIAGNOSIS — M00.012 STAPHYLOCOCCAL ARTHRITIS OF LEFT SHOULDER: ICD-10-CM

## 2021-04-27 DIAGNOSIS — G06.1 ABSCESS IN EPIDURAL SPACE OF LUMBAR SPINE: ICD-10-CM

## 2021-04-27 DIAGNOSIS — M86.9 OSTEOMYELITIS: ICD-10-CM

## 2021-04-27 DIAGNOSIS — B95.61 MSSA BACTEREMIA: Primary | ICD-10-CM

## 2021-04-27 DIAGNOSIS — T84.59XA INFECTION OF PROSTHETIC SHOULDER JOINT, INITIAL ENCOUNTER: ICD-10-CM

## 2021-04-27 LAB
ABO GROUP BLD: NORMAL
ANTI-LITTLE C: NORMAL
BLD GP AB SCN SERPL QL: POSITIVE
DAT POLY-SP REAG RBC QL: NEGATIVE
GLUCOSE BLDC GLUCOMTR-MCNC: 119 MG/DL (ref 70–130)
GLUCOSE BLDC GLUCOMTR-MCNC: 151 MG/DL (ref 70–130)
RH BLD: POSITIVE
T&S EXPIRATION DATE: NORMAL

## 2021-04-27 PROCEDURE — G0378 HOSPITAL OBSERVATION PER HR: HCPCS

## 2021-04-27 PROCEDURE — 25010000002 VANCOMYCIN 10 G RECONSTITUTED SOLUTION: Performed by: ORTHOPAEDIC SURGERY

## 2021-04-27 PROCEDURE — 86922 COMPATIBILITY TEST ANTIGLOB: CPT

## 2021-04-27 PROCEDURE — 87075 CULTR BACTERIA EXCEPT BLOOD: CPT | Performed by: ORTHOPAEDIC SURGERY

## 2021-04-27 PROCEDURE — 25010000002 HYDROMORPHONE PER 4 MG: Performed by: ANESTHESIOLOGY

## 2021-04-27 PROCEDURE — C1776 JOINT DEVICE (IMPLANTABLE): HCPCS | Performed by: ORTHOPAEDIC SURGERY

## 2021-04-27 PROCEDURE — 86880 COOMBS TEST DIRECT: CPT | Performed by: ORTHOPAEDIC SURGERY

## 2021-04-27 PROCEDURE — 82962 GLUCOSE BLOOD TEST: CPT

## 2021-04-27 PROCEDURE — C1713 ANCHOR/SCREW BN/BN,TIS/BN: HCPCS | Performed by: ORTHOPAEDIC SURGERY

## 2021-04-27 PROCEDURE — 25010000002 NEOSTIGMINE 5 MG/10ML SOLUTION: Performed by: ANESTHESIOLOGY

## 2021-04-27 PROCEDURE — 25010000002 PROPOFOL 10 MG/ML EMULSION: Performed by: NURSE ANESTHETIST, CERTIFIED REGISTERED

## 2021-04-27 PROCEDURE — 86920 COMPATIBILITY TEST SPIN: CPT

## 2021-04-27 PROCEDURE — 73020 X-RAY EXAM OF SHOULDER: CPT

## 2021-04-27 PROCEDURE — 86870 RBC ANTIBODY IDENTIFICATION: CPT | Performed by: ORTHOPAEDIC SURGERY

## 2021-04-27 PROCEDURE — 0RHK08Z INSERTION OF SPACER INTO LEFT SHOULDER JOINT, OPEN APPROACH: ICD-10-PCS | Performed by: ORTHOPAEDIC SURGERY

## 2021-04-27 PROCEDURE — 0PBD0ZZ EXCISION OF LEFT HUMERAL HEAD, OPEN APPROACH: ICD-10-PCS | Performed by: ORTHOPAEDIC SURGERY

## 2021-04-27 PROCEDURE — 86901 BLOOD TYPING SEROLOGIC RH(D): CPT | Performed by: ORTHOPAEDIC SURGERY

## 2021-04-27 PROCEDURE — 25010000002 FENTANYL CITRATE (PF) 100 MCG/2ML SOLUTION: Performed by: ANESTHESIOLOGY

## 2021-04-27 PROCEDURE — 86902 BLOOD TYPE ANTIGEN DONOR EA: CPT

## 2021-04-27 PROCEDURE — 25010000002 FENTANYL CITRATE (PF) 100 MCG/2ML SOLUTION: Performed by: NURSE ANESTHETIST, CERTIFIED REGISTERED

## 2021-04-27 PROCEDURE — 86850 RBC ANTIBODY SCREEN: CPT | Performed by: ORTHOPAEDIC SURGERY

## 2021-04-27 PROCEDURE — 87070 CULTURE OTHR SPECIMN AEROBIC: CPT | Performed by: ORTHOPAEDIC SURGERY

## 2021-04-27 PROCEDURE — 86900 BLOOD TYPING SEROLOGIC ABO: CPT | Performed by: ORTHOPAEDIC SURGERY

## 2021-04-27 PROCEDURE — 87205 SMEAR GRAM STAIN: CPT | Performed by: ORTHOPAEDIC SURGERY

## 2021-04-27 DEVICE — IMPLANTABLE DEVICE: Type: IMPLANTABLE DEVICE | Site: SHOULDER | Status: FUNCTIONAL

## 2021-04-27 RX ORDER — SODIUM CHLORIDE 0.9 % (FLUSH) 0.9 %
3 SYRINGE (ML) INJECTION EVERY 12 HOURS SCHEDULED
Status: DISCONTINUED | OUTPATIENT
Start: 2021-04-27 | End: 2021-04-27 | Stop reason: HOSPADM

## 2021-04-27 RX ORDER — VENLAFAXINE 75 MG/1
75 TABLET ORAL 2 TIMES DAILY
Status: DISCONTINUED | OUTPATIENT
Start: 2021-04-27 | End: 2021-05-08 | Stop reason: HOSPADM

## 2021-04-27 RX ORDER — EPHEDRINE SULFATE 50 MG/ML
5 INJECTION, SOLUTION INTRAVENOUS ONCE AS NEEDED
Status: DISCONTINUED | OUTPATIENT
Start: 2021-04-27 | End: 2021-04-27 | Stop reason: HOSPADM

## 2021-04-27 RX ORDER — SODIUM CHLORIDE 0.9 % (FLUSH) 0.9 %
3-10 SYRINGE (ML) INJECTION AS NEEDED
Status: DISCONTINUED | OUTPATIENT
Start: 2021-04-27 | End: 2021-04-27 | Stop reason: HOSPADM

## 2021-04-27 RX ORDER — ASPIRIN 325 MG
325 TABLET, DELAYED RELEASE (ENTERIC COATED) ORAL DAILY
Status: DISCONTINUED | OUTPATIENT
Start: 2021-04-28 | End: 2021-04-29

## 2021-04-27 RX ORDER — FENTANYL CITRATE 50 UG/ML
50 INJECTION, SOLUTION INTRAMUSCULAR; INTRAVENOUS
Status: DISCONTINUED | OUTPATIENT
Start: 2021-04-27 | End: 2021-04-27 | Stop reason: HOSPADM

## 2021-04-27 RX ORDER — AMOXICILLIN 250 MG
2 CAPSULE ORAL NIGHTLY PRN
Status: DISCONTINUED | OUTPATIENT
Start: 2021-04-27 | End: 2021-05-03

## 2021-04-27 RX ORDER — GLYCOPYRROLATE 0.2 MG/ML
INJECTION INTRAMUSCULAR; INTRAVENOUS AS NEEDED
Status: DISCONTINUED | OUTPATIENT
Start: 2021-04-27 | End: 2021-04-27 | Stop reason: SURG

## 2021-04-27 RX ORDER — OXYCODONE AND ACETAMINOPHEN 7.5; 325 MG/1; MG/1
2 TABLET ORAL EVERY 4 HOURS PRN
Status: DISCONTINUED | OUTPATIENT
Start: 2021-04-27 | End: 2021-05-03

## 2021-04-27 RX ORDER — CYCLOBENZAPRINE HCL 10 MG
10 TABLET ORAL 3 TIMES DAILY PRN
Status: DISCONTINUED | OUTPATIENT
Start: 2021-04-27 | End: 2021-05-08 | Stop reason: HOSPADM

## 2021-04-27 RX ORDER — DIPHENHYDRAMINE HYDROCHLORIDE 50 MG/ML
25 INJECTION INTRAMUSCULAR; INTRAVENOUS EVERY 6 HOURS PRN
Status: DISCONTINUED | OUTPATIENT
Start: 2021-04-27 | End: 2021-05-03

## 2021-04-27 RX ORDER — HYDROMORPHONE HYDROCHLORIDE 1 MG/ML
0.5 INJECTION, SOLUTION INTRAMUSCULAR; INTRAVENOUS; SUBCUTANEOUS
Status: COMPLETED | OUTPATIENT
Start: 2021-04-27 | End: 2021-04-27

## 2021-04-27 RX ORDER — PROMETHAZINE HYDROCHLORIDE 12.5 MG/1
12.5 SUPPOSITORY RECTAL EVERY 6 HOURS PRN
Status: DISCONTINUED | OUTPATIENT
Start: 2021-04-27 | End: 2021-05-03

## 2021-04-27 RX ORDER — LOSARTAN POTASSIUM 100 MG/1
100 TABLET ORAL EVERY MORNING
Status: DISCONTINUED | OUTPATIENT
Start: 2021-04-28 | End: 2021-05-05

## 2021-04-27 RX ORDER — FUROSEMIDE 40 MG/1
40 TABLET ORAL 2 TIMES DAILY
Status: DISCONTINUED | OUTPATIENT
Start: 2021-04-27 | End: 2021-05-05

## 2021-04-27 RX ORDER — LIDOCAINE HYDROCHLORIDE 10 MG/ML
0.5 INJECTION, SOLUTION EPIDURAL; INFILTRATION; INTRACAUDAL; PERINEURAL ONCE AS NEEDED
Status: DISCONTINUED | OUTPATIENT
Start: 2021-04-27 | End: 2021-04-27 | Stop reason: HOSPADM

## 2021-04-27 RX ORDER — OXYCODONE AND ACETAMINOPHEN 7.5; 325 MG/1; MG/1
1 TABLET ORAL ONCE AS NEEDED
Status: DISCONTINUED | OUTPATIENT
Start: 2021-04-27 | End: 2021-04-27 | Stop reason: HOSPADM

## 2021-04-27 RX ORDER — DIPHENHYDRAMINE HCL 25 MG
25 CAPSULE ORAL EVERY 6 HOURS PRN
Status: DISCONTINUED | OUTPATIENT
Start: 2021-04-27 | End: 2021-05-03

## 2021-04-27 RX ORDER — HYDROCODONE BITARTRATE AND ACETAMINOPHEN 7.5; 325 MG/1; MG/1
1 TABLET ORAL ONCE AS NEEDED
Status: DISCONTINUED | OUTPATIENT
Start: 2021-04-27 | End: 2021-04-27 | Stop reason: HOSPADM

## 2021-04-27 RX ORDER — ATORVASTATIN CALCIUM 20 MG/1
40 TABLET, FILM COATED ORAL DAILY
Status: DISCONTINUED | OUTPATIENT
Start: 2021-04-28 | End: 2021-05-08 | Stop reason: HOSPADM

## 2021-04-27 RX ORDER — DOCUSATE SODIUM 100 MG/1
100 CAPSULE, LIQUID FILLED ORAL 2 TIMES DAILY PRN
Status: DISCONTINUED | OUTPATIENT
Start: 2021-04-27 | End: 2021-05-03

## 2021-04-27 RX ORDER — PROMETHAZINE HYDROCHLORIDE 12.5 MG/1
12.5 TABLET ORAL EVERY 6 HOURS PRN
Status: DISCONTINUED | OUTPATIENT
Start: 2021-04-27 | End: 2021-05-03

## 2021-04-27 RX ORDER — FAMOTIDINE 10 MG/ML
20 INJECTION, SOLUTION INTRAVENOUS ONCE
Status: COMPLETED | OUTPATIENT
Start: 2021-04-27 | End: 2021-04-27

## 2021-04-27 RX ORDER — ROCURONIUM BROMIDE 10 MG/ML
INJECTION, SOLUTION INTRAVENOUS AS NEEDED
Status: DISCONTINUED | OUTPATIENT
Start: 2021-04-27 | End: 2021-04-27 | Stop reason: SURG

## 2021-04-27 RX ORDER — HYDROMORPHONE HCL 110MG/55ML
PATIENT CONTROLLED ANALGESIA SYRINGE INTRAVENOUS AS NEEDED
Status: DISCONTINUED | OUTPATIENT
Start: 2021-04-27 | End: 2021-04-27 | Stop reason: SURG

## 2021-04-27 RX ORDER — FENTANYL CITRATE 50 UG/ML
INJECTION, SOLUTION INTRAMUSCULAR; INTRAVENOUS AS NEEDED
Status: DISCONTINUED | OUTPATIENT
Start: 2021-04-27 | End: 2021-04-27 | Stop reason: SURG

## 2021-04-27 RX ORDER — SODIUM CHLORIDE 9 MG/ML
100 INJECTION, SOLUTION INTRAVENOUS CONTINUOUS
Status: DISCONTINUED | OUTPATIENT
Start: 2021-04-27 | End: 2021-04-29

## 2021-04-27 RX ORDER — FLUMAZENIL 0.1 MG/ML
0.2 INJECTION INTRAVENOUS AS NEEDED
Status: DISCONTINUED | OUTPATIENT
Start: 2021-04-27 | End: 2021-04-27 | Stop reason: HOSPADM

## 2021-04-27 RX ORDER — ONDANSETRON 4 MG/1
4 TABLET, FILM COATED ORAL EVERY 6 HOURS PRN
Status: DISCONTINUED | OUTPATIENT
Start: 2021-04-27 | End: 2021-05-03

## 2021-04-27 RX ORDER — LIDOCAINE HYDROCHLORIDE 20 MG/ML
INJECTION, SOLUTION INFILTRATION; PERINEURAL AS NEEDED
Status: DISCONTINUED | OUTPATIENT
Start: 2021-04-27 | End: 2021-04-27 | Stop reason: SURG

## 2021-04-27 RX ORDER — SODIUM CHLORIDE 0.9 % (FLUSH) 0.9 %
10 SYRINGE (ML) INJECTION AS NEEDED
Status: DISCONTINUED | OUTPATIENT
Start: 2021-04-27 | End: 2021-05-03

## 2021-04-27 RX ORDER — FAMOTIDINE 20 MG/1
40 TABLET, FILM COATED ORAL DAILY
Status: DISCONTINUED | OUTPATIENT
Start: 2021-04-28 | End: 2021-05-08 | Stop reason: HOSPADM

## 2021-04-27 RX ORDER — MIDAZOLAM HYDROCHLORIDE 1 MG/ML
1 INJECTION INTRAMUSCULAR; INTRAVENOUS
Status: DISCONTINUED | OUTPATIENT
Start: 2021-04-27 | End: 2021-04-27 | Stop reason: HOSPADM

## 2021-04-27 RX ORDER — SODIUM CHLORIDE 0.9 % (FLUSH) 0.9 %
3 SYRINGE (ML) INJECTION EVERY 12 HOURS SCHEDULED
Status: DISCONTINUED | OUTPATIENT
Start: 2021-04-27 | End: 2021-05-08 | Stop reason: HOSPADM

## 2021-04-27 RX ORDER — ONDANSETRON 2 MG/ML
4 INJECTION INTRAMUSCULAR; INTRAVENOUS ONCE AS NEEDED
Status: DISCONTINUED | OUTPATIENT
Start: 2021-04-27 | End: 2021-04-27 | Stop reason: HOSPADM

## 2021-04-27 RX ORDER — NEOSTIGMINE METHYLSULFATE 0.5 MG/ML
INJECTION, SOLUTION INTRAVENOUS AS NEEDED
Status: DISCONTINUED | OUTPATIENT
Start: 2021-04-27 | End: 2021-04-27 | Stop reason: SURG

## 2021-04-27 RX ORDER — PROPOFOL 10 MG/ML
VIAL (ML) INTRAVENOUS AS NEEDED
Status: DISCONTINUED | OUTPATIENT
Start: 2021-04-27 | End: 2021-04-27 | Stop reason: SURG

## 2021-04-27 RX ORDER — FENTANYL CITRATE 50 UG/ML
50 INJECTION, SOLUTION INTRAMUSCULAR; INTRAVENOUS
Status: COMPLETED | OUTPATIENT
Start: 2021-04-27 | End: 2021-04-27

## 2021-04-27 RX ORDER — ONDANSETRON 2 MG/ML
4 INJECTION INTRAMUSCULAR; INTRAVENOUS EVERY 6 HOURS PRN
Status: DISCONTINUED | OUTPATIENT
Start: 2021-04-27 | End: 2021-05-03

## 2021-04-27 RX ORDER — MIDAZOLAM HYDROCHLORIDE 1 MG/ML
2 INJECTION INTRAMUSCULAR; INTRAVENOUS
Status: DISCONTINUED | OUTPATIENT
Start: 2021-04-27 | End: 2021-04-27 | Stop reason: HOSPADM

## 2021-04-27 RX ORDER — SODIUM CHLORIDE, SODIUM LACTATE, POTASSIUM CHLORIDE, CALCIUM CHLORIDE 600; 310; 30; 20 MG/100ML; MG/100ML; MG/100ML; MG/100ML
9 INJECTION, SOLUTION INTRAVENOUS CONTINUOUS
Status: DISCONTINUED | OUTPATIENT
Start: 2021-04-27 | End: 2021-05-03

## 2021-04-27 RX ORDER — NALOXONE HCL 0.4 MG/ML
0.1 VIAL (ML) INJECTION
Status: DISCONTINUED | OUTPATIENT
Start: 2021-04-27 | End: 2021-05-03

## 2021-04-27 RX ORDER — LEVOTHYROXINE SODIUM 88 UG/1
88 TABLET ORAL EVERY MORNING
Status: DISCONTINUED | OUTPATIENT
Start: 2021-04-28 | End: 2021-05-08 | Stop reason: HOSPADM

## 2021-04-27 RX ORDER — OXYCODONE AND ACETAMINOPHEN 7.5; 325 MG/1; MG/1
1 TABLET ORAL EVERY 4 HOURS PRN
Status: DISCONTINUED | OUTPATIENT
Start: 2021-04-27 | End: 2021-05-03

## 2021-04-27 RX ADMIN — SODIUM CHLORIDE, PRESERVATIVE FREE 3 ML: 5 INJECTION INTRAVENOUS at 22:06

## 2021-04-27 RX ADMIN — ROCURONIUM BROMIDE 40 MG: 50 INJECTION INTRAVENOUS at 17:16

## 2021-04-27 RX ADMIN — HYDROMORPHONE HYDROCHLORIDE 0.5 MG: 1 INJECTION, SOLUTION INTRAMUSCULAR; INTRAVENOUS; SUBCUTANEOUS at 18:28

## 2021-04-27 RX ADMIN — FENTANYL CITRATE 100 MCG: 50 INJECTION INTRAMUSCULAR; INTRAVENOUS at 17:16

## 2021-04-27 RX ADMIN — HYDROMORPHONE HYDROCHLORIDE 0.5 MG: 1 INJECTION, SOLUTION INTRAMUSCULAR; INTRAVENOUS; SUBCUTANEOUS at 18:39

## 2021-04-27 RX ADMIN — HYDROMORPHONE HYDROCHLORIDE 0.5 MG: 2 INJECTION, SOLUTION INTRAMUSCULAR; INTRAVENOUS; SUBCUTANEOUS at 17:59

## 2021-04-27 RX ADMIN — VANCOMYCIN HYDROCHLORIDE 1750 MG: 10 INJECTION, POWDER, LYOPHILIZED, FOR SOLUTION INTRAVENOUS at 22:47

## 2021-04-27 RX ADMIN — PROPOFOL 150 MG: 10 INJECTION, EMULSION INTRAVENOUS at 17:16

## 2021-04-27 RX ADMIN — OXYCODONE HYDROCHLORIDE AND ACETAMINOPHEN 2 TABLET: 7.5; 325 TABLET ORAL at 22:48

## 2021-04-27 RX ADMIN — HYDROMORPHONE HYDROCHLORIDE 0.5 MG: 2 INJECTION, SOLUTION INTRAMUSCULAR; INTRAVENOUS; SUBCUTANEOUS at 17:36

## 2021-04-27 RX ADMIN — FENTANYL CITRATE 50 MCG: 50 INJECTION, SOLUTION INTRAMUSCULAR; INTRAVENOUS at 19:15

## 2021-04-27 RX ADMIN — SODIUM CHLORIDE 100 ML/HR: 9 INJECTION, SOLUTION INTRAVENOUS at 21:55

## 2021-04-27 RX ADMIN — SODIUM CHLORIDE, POTASSIUM CHLORIDE, SODIUM LACTATE AND CALCIUM CHLORIDE 9 ML/HR: 600; 310; 30; 20 INJECTION, SOLUTION INTRAVENOUS at 11:35

## 2021-04-27 RX ADMIN — HYDROMORPHONE HYDROCHLORIDE 0.5 MG: 1 INJECTION, SOLUTION INTRAMUSCULAR; INTRAVENOUS; SUBCUTANEOUS at 18:56

## 2021-04-27 RX ADMIN — FAMOTIDINE 20 MG: 10 INJECTION INTRAVENOUS at 12:08

## 2021-04-27 RX ADMIN — LIDOCAINE HYDROCHLORIDE 80 MG: 20 INJECTION, SOLUTION INFILTRATION; PERINEURAL at 17:16

## 2021-04-27 RX ADMIN — FENTANYL CITRATE 50 MCG: 50 INJECTION, SOLUTION INTRAMUSCULAR; INTRAVENOUS at 18:49

## 2021-04-27 RX ADMIN — FENTANYL CITRATE 50 MCG: 50 INJECTION, SOLUTION INTRAMUSCULAR; INTRAVENOUS at 18:25

## 2021-04-27 RX ADMIN — FENTANYL CITRATE 50 MCG: 50 INJECTION, SOLUTION INTRAMUSCULAR; INTRAVENOUS at 18:30

## 2021-04-27 RX ADMIN — NEOSTIGMINE METHYLSULFATE 4 MG: 0.5 INJECTION INTRAVENOUS at 18:03

## 2021-04-27 RX ADMIN — VENLAFAXINE HYDROCHLORIDE 75 MG: 75 TABLET ORAL at 22:54

## 2021-04-27 RX ADMIN — GLYCOPYRROLATE 0.4 MG: 0.2 INJECTION INTRAMUSCULAR; INTRAVENOUS at 18:03

## 2021-04-27 RX ADMIN — SODIUM CHLORIDE, POTASSIUM CHLORIDE, SODIUM LACTATE AND CALCIUM CHLORIDE: 600; 310; 30; 20 INJECTION, SOLUTION INTRAVENOUS at 17:40

## 2021-04-27 RX ADMIN — DOCUSATE SODIUM 100 MG: 100 CAPSULE, LIQUID FILLED ORAL at 22:48

## 2021-04-27 RX ADMIN — HYDROMORPHONE HYDROCHLORIDE 0.5 MG: 1 INJECTION, SOLUTION INTRAMUSCULAR; INTRAVENOUS; SUBCUTANEOUS at 19:15

## 2021-04-27 RX ADMIN — CYCLOBENZAPRINE 10 MG: 10 TABLET, FILM COATED ORAL at 22:48

## 2021-04-27 NOTE — ANESTHESIA PROCEDURE NOTES
Airway  Urgency: elective    Date/Time: 4/27/2021 5:19 PM  Airway not difficult    General Information and Staff    Patient location during procedure: OR  CRNA: Alivia Duvall CRNA    Indications and Patient Condition  Indications for airway management: airway protection    Preoxygenated: yes  Mask difficulty assessment: 1 - vent by mask    Final Airway Details  Final airway type: endotracheal airway      Successful airway: ETT  Cuffed: yes   Successful intubation technique: direct laryngoscopy  Endotracheal tube insertion site: oral  Blade: Krishna  Blade size: 3  ETT size (mm): 7.0  Cormack-Lehane Classification: grade I - full view of glottis  Placement verified by: chest auscultation and capnometry   Measured from: lips  ETT/EBT  to lips (cm): 21  Number of attempts at approach: 1  Assessment: lips, teeth, and gum same as pre-op and atraumatic intubation    Additional Comments   ett cuff up at MOP

## 2021-04-27 NOTE — ANESTHESIA POSTPROCEDURE EVALUATION
"Patient: Stefani Bhandari    Procedure Summary     Date: 04/27/21 Room / Location: [facility] OR [address] [place] [address] MAIN OR    Anesthesia Start: 1710 Anesthesia Stop: 1826    Procedure: OPEN IRRIGATION AND DEBRIDEMENT OF THE LEFT SHOULDER WITH OSTEOTOMY AND SPACER PLACEMENT (Left Arm Upper) Diagnosis:     Surgeons: Juaquin Richardson II, MD Provider: Jose Luis Bailey MD    Anesthesia Type: general ASA Status: 3          Anesthesia Type: general    Vitals  Vitals Value Taken Time   /68 04/27/21 1830   Temp 37.1 °C (98.8 °F) 04/27/21 1821   Pulse 95 04/27/21 1842   Resp 14 04/27/21 1830   SpO2 100 % 04/27/21 1842   Vitals shown include unvalidated device data.        Post Anesthesia Care and Evaluation    Patient location during evaluation: bedside  Patient participation: complete - patient participated  Level of consciousness: awake and alert  Pain management: adequate  Airway patency: patent  Anesthetic complications: No anesthetic complications    Cardiovascular status: acceptable  Respiratory status: acceptable  Hydration status: acceptable    Comments: /68   Pulse 101   Temp 37.1 °C (98.8 °F) (Oral)   Resp 14   Ht 167.6 cm (66\")   Wt 112 kg (245 lb 14.4 oz)   SpO2 100%   BMI 39.69 kg/m²       "

## 2021-04-27 NOTE — ANESTHESIA PREPROCEDURE EVALUATION
Anesthesia Evaluation     NPO Solid Status: > 8 hours             Airway   Mallampati: II  TM distance: >3 FB  Neck ROM: full  Dental - normal exam     Pulmonary - normal exam   (+) sleep apnea (not on CPAP yet),   Cardiovascular - normal exam    (+) hypertension, hyperlipidemia,       Neuro/Psych  GI/Hepatic/Renal/Endo    (+)   liver disease, diabetes mellitus,     Musculoskeletal     Abdominal    Substance History      OB/GYN          Other                        Anesthesia Plan    ASA 3     general       Anesthetic plan, all risks, benefits, and alternatives have been provided, discussed and informed consent has been obtained with: patient.

## 2021-04-27 NOTE — TELEPHONE ENCOUNTER
Patient called. Advised as per Dr. Mandujano's note. She verb understanding. She states she will have the CBC drawn at her PCP office and have the results faxed to our office.

## 2021-04-27 NOTE — TELEPHONE ENCOUNTER
Please call patient, normal capsule endoscopy study, lets check a CBC in 1 month and have her follow-up with her primary care doctor Dr. Martinez

## 2021-04-28 ENCOUNTER — APPOINTMENT (OUTPATIENT)
Dept: GENERAL RADIOLOGY | Facility: HOSPITAL | Age: 56
End: 2021-04-28

## 2021-04-28 PROBLEM — E11.9 TYPE 2 DIABETES MELLITUS, WITHOUT LONG-TERM CURRENT USE OF INSULIN: Status: ACTIVE | Noted: 2020-01-22

## 2021-04-28 PROBLEM — E03.9 HYPOTHYROIDISM (ACQUIRED): Status: ACTIVE | Noted: 2020-04-06

## 2021-04-28 PROBLEM — M86.9: Status: ACTIVE | Noted: 2021-04-28

## 2021-04-28 PROBLEM — M46.46 DISCITIS OF LUMBAR REGION: Status: ACTIVE | Noted: 2021-04-28

## 2021-04-28 PROBLEM — E66.9 OBESITY (BMI 30-39.9): Status: ACTIVE | Noted: 2021-04-28

## 2021-04-28 LAB
ANION GAP SERPL CALCULATED.3IONS-SCNC: 11.2 MMOL/L (ref 5–15)
BUN SERPL-MCNC: 19 MG/DL (ref 6–20)
BUN/CREAT SERPL: 18.6 (ref 7–25)
CALCIUM SPEC-SCNC: 8.5 MG/DL (ref 8.6–10.5)
CHLORIDE SERPL-SCNC: 97 MMOL/L (ref 98–107)
CO2 SERPL-SCNC: 25.8 MMOL/L (ref 22–29)
CREAT SERPL-MCNC: 1.02 MG/DL (ref 0.57–1)
GFR SERPL CREATININE-BSD FRML MDRD: 56 ML/MIN/1.73
GLUCOSE BLDC GLUCOMTR-MCNC: 175 MG/DL (ref 70–130)
GLUCOSE BLDC GLUCOMTR-MCNC: 205 MG/DL (ref 70–130)
GLUCOSE BLDC GLUCOMTR-MCNC: 242 MG/DL (ref 70–130)
GLUCOSE SERPL-MCNC: 191 MG/DL (ref 65–99)
HCT VFR BLD AUTO: 22.7 % (ref 34–46.6)
HGB BLD-MCNC: 7.6 G/DL (ref 12–15.9)
POTASSIUM SERPL-SCNC: 3.8 MMOL/L (ref 3.5–5.2)
SODIUM SERPL-SCNC: 134 MMOL/L (ref 136–145)

## 2021-04-28 PROCEDURE — 97110 THERAPEUTIC EXERCISES: CPT

## 2021-04-28 PROCEDURE — G0378 HOSPITAL OBSERVATION PER HR: HCPCS

## 2021-04-28 PROCEDURE — 72100 X-RAY EXAM L-S SPINE 2/3 VWS: CPT

## 2021-04-28 PROCEDURE — 36430 TRANSFUSION BLD/BLD COMPNT: CPT

## 2021-04-28 PROCEDURE — 86900 BLOOD TYPING SEROLOGIC ABO: CPT

## 2021-04-28 PROCEDURE — 85014 HEMATOCRIT: CPT | Performed by: ORTHOPAEDIC SURGERY

## 2021-04-28 PROCEDURE — 99255 IP/OBS CONSLTJ NEW/EST HI 80: CPT | Performed by: ORTHOPAEDIC SURGERY

## 2021-04-28 PROCEDURE — 82962 GLUCOSE BLOOD TEST: CPT

## 2021-04-28 PROCEDURE — 63710000001 INSULIN LISPRO (HUMAN) PER 5 UNITS: Performed by: ORTHOPAEDIC SURGERY

## 2021-04-28 PROCEDURE — 63710000001 INSULIN LISPRO (HUMAN) PER 5 UNITS: Performed by: INTERNAL MEDICINE

## 2021-04-28 PROCEDURE — 97535 SELF CARE MNGMENT TRAINING: CPT

## 2021-04-28 PROCEDURE — 85018 HEMOGLOBIN: CPT | Performed by: ORTHOPAEDIC SURGERY

## 2021-04-28 PROCEDURE — 99254 IP/OBS CNSLTJ NEW/EST MOD 60: CPT | Performed by: INTERNAL MEDICINE

## 2021-04-28 PROCEDURE — 80048 BASIC METABOLIC PNL TOTAL CA: CPT | Performed by: ORTHOPAEDIC SURGERY

## 2021-04-28 PROCEDURE — P9016 RBC LEUKOCYTES REDUCED: HCPCS

## 2021-04-28 PROCEDURE — 97165 OT EVAL LOW COMPLEX 30 MIN: CPT

## 2021-04-28 RX ORDER — NICOTINE POLACRILEX 4 MG
15 LOZENGE BUCCAL
Status: DISCONTINUED | OUTPATIENT
Start: 2021-04-28 | End: 2021-05-08 | Stop reason: HOSPADM

## 2021-04-28 RX ORDER — DEXTROSE MONOHYDRATE 25 G/50ML
25 INJECTION, SOLUTION INTRAVENOUS
Status: DISCONTINUED | OUTPATIENT
Start: 2021-04-28 | End: 2021-05-08 | Stop reason: HOSPADM

## 2021-04-28 RX ORDER — INSULIN LISPRO 100 [IU]/ML
0-9 INJECTION, SOLUTION INTRAVENOUS; SUBCUTANEOUS
Status: DISCONTINUED | OUTPATIENT
Start: 2021-04-28 | End: 2021-05-08 | Stop reason: HOSPADM

## 2021-04-28 RX ADMIN — OXYCODONE HYDROCHLORIDE AND ACETAMINOPHEN 2 TABLET: 7.5; 325 TABLET ORAL at 22:37

## 2021-04-28 RX ADMIN — ASPIRIN 325 MG: 325 TABLET, COATED ORAL at 08:04

## 2021-04-28 RX ADMIN — SODIUM CHLORIDE, PRESERVATIVE FREE 3 ML: 5 INJECTION INTRAVENOUS at 20:18

## 2021-04-28 RX ADMIN — FUROSEMIDE 40 MG: 40 TABLET ORAL at 20:18

## 2021-04-28 RX ADMIN — OXYCODONE HYDROCHLORIDE AND ACETAMINOPHEN 2 TABLET: 7.5; 325 TABLET ORAL at 08:45

## 2021-04-28 RX ADMIN — FAMOTIDINE 40 MG: 20 TABLET, FILM COATED ORAL at 08:02

## 2021-04-28 RX ADMIN — FUROSEMIDE 40 MG: 40 TABLET ORAL at 08:04

## 2021-04-28 RX ADMIN — INSULIN LISPRO 4 UNITS: 100 INJECTION, SOLUTION INTRAVENOUS; SUBCUTANEOUS at 11:21

## 2021-04-28 RX ADMIN — OXYCODONE HYDROCHLORIDE AND ACETAMINOPHEN 2 TABLET: 7.5; 325 TABLET ORAL at 12:34

## 2021-04-28 RX ADMIN — OXYCODONE HYDROCHLORIDE AND ACETAMINOPHEN 2 TABLET: 7.5; 325 TABLET ORAL at 17:48

## 2021-04-28 RX ADMIN — VENLAFAXINE HYDROCHLORIDE 75 MG: 75 TABLET ORAL at 08:03

## 2021-04-28 RX ADMIN — LOSARTAN POTASSIUM 100 MG: 100 TABLET, FILM COATED ORAL at 08:04

## 2021-04-28 RX ADMIN — VENLAFAXINE HYDROCHLORIDE 75 MG: 75 TABLET ORAL at 20:18

## 2021-04-28 RX ADMIN — ATORVASTATIN CALCIUM 40 MG: 20 TABLET, FILM COATED ORAL at 08:02

## 2021-04-28 RX ADMIN — SODIUM CHLORIDE, PRESERVATIVE FREE 3 ML: 5 INJECTION INTRAVENOUS at 08:05

## 2021-04-28 RX ADMIN — INSULIN LISPRO 4 UNITS: 100 INJECTION, SOLUTION INTRAVENOUS; SUBCUTANEOUS at 16:50

## 2021-04-28 RX ADMIN — LEVOTHYROXINE SODIUM 88 MCG: 0.09 TABLET ORAL at 08:04

## 2021-04-28 RX ADMIN — OXYCODONE HYDROCHLORIDE AND ACETAMINOPHEN 2 TABLET: 7.5; 325 TABLET ORAL at 04:34

## 2021-04-29 PROBLEM — M86.9 OSTEOMYELITIS: Status: ACTIVE | Noted: 2021-04-29

## 2021-04-29 LAB
ANION GAP SERPL CALCULATED.3IONS-SCNC: 11.3 MMOL/L (ref 5–15)
BASOPHILS # BLD AUTO: 0.06 10*3/MM3 (ref 0–0.2)
BASOPHILS NFR BLD AUTO: 0.9 % (ref 0–1.5)
BUN SERPL-MCNC: 24 MG/DL (ref 6–20)
BUN/CREAT SERPL: 22.9 (ref 7–25)
CALCIUM SPEC-SCNC: 8.5 MG/DL (ref 8.6–10.5)
CHLORIDE SERPL-SCNC: 98 MMOL/L (ref 98–107)
CO2 SERPL-SCNC: 26.7 MMOL/L (ref 22–29)
CREAT SERPL-MCNC: 1.05 MG/DL (ref 0.57–1)
DEPRECATED RDW RBC AUTO: 50.1 FL (ref 37–54)
EOSINOPHIL # BLD AUTO: 0.44 10*3/MM3 (ref 0–0.4)
EOSINOPHIL NFR BLD AUTO: 6.3 % (ref 0.3–6.2)
ERYTHROCYTE [DISTWIDTH] IN BLOOD BY AUTOMATED COUNT: 14.7 % (ref 12.3–15.4)
GFR SERPL CREATININE-BSD FRML MDRD: 54 ML/MIN/1.73
GLUCOSE BLDC GLUCOMTR-MCNC: 133 MG/DL (ref 70–130)
GLUCOSE BLDC GLUCOMTR-MCNC: 139 MG/DL (ref 70–130)
GLUCOSE BLDC GLUCOMTR-MCNC: 161 MG/DL (ref 70–130)
GLUCOSE BLDC GLUCOMTR-MCNC: 163 MG/DL (ref 70–130)
GLUCOSE SERPL-MCNC: 129 MG/DL (ref 65–99)
HCT VFR BLD AUTO: 28.3 % (ref 34–46.6)
HGB BLD-MCNC: 9.3 G/DL (ref 12–15.9)
IMM GRANULOCYTES # BLD AUTO: 0.03 10*3/MM3 (ref 0–0.05)
IMM GRANULOCYTES NFR BLD AUTO: 0.4 % (ref 0–0.5)
LYMPHOCYTES # BLD AUTO: 2.06 10*3/MM3 (ref 0.7–3.1)
LYMPHOCYTES NFR BLD AUTO: 29.7 % (ref 19.6–45.3)
MCH RBC QN AUTO: 30.7 PG (ref 26.6–33)
MCHC RBC AUTO-ENTMCNC: 32.9 G/DL (ref 31.5–35.7)
MCV RBC AUTO: 93.4 FL (ref 79–97)
MONOCYTES # BLD AUTO: 0.4 10*3/MM3 (ref 0.1–0.9)
MONOCYTES NFR BLD AUTO: 5.8 % (ref 5–12)
NEUTROPHILS NFR BLD AUTO: 3.95 10*3/MM3 (ref 1.7–7)
NEUTROPHILS NFR BLD AUTO: 56.9 % (ref 42.7–76)
NRBC BLD AUTO-RTO: 0 /100 WBC (ref 0–0.2)
PLATELET # BLD AUTO: 165 10*3/MM3 (ref 140–450)
PMV BLD AUTO: 9.1 FL (ref 6–12)
POTASSIUM SERPL-SCNC: 4 MMOL/L (ref 3.5–5.2)
RBC # BLD AUTO: 3.03 10*6/MM3 (ref 3.77–5.28)
SODIUM SERPL-SCNC: 136 MMOL/L (ref 136–145)
WBC # BLD AUTO: 6.94 10*3/MM3 (ref 3.4–10.8)

## 2021-04-29 PROCEDURE — 85025 COMPLETE CBC W/AUTO DIFF WBC: CPT | Performed by: INTERNAL MEDICINE

## 2021-04-29 PROCEDURE — 82962 GLUCOSE BLOOD TEST: CPT

## 2021-04-29 PROCEDURE — 63710000001 INSULIN LISPRO (HUMAN) PER 5 UNITS: Performed by: INTERNAL MEDICINE

## 2021-04-29 PROCEDURE — 63710000001 INSULIN LISPRO (HUMAN) PER 5 UNITS: Performed by: ORTHOPAEDIC SURGERY

## 2021-04-29 PROCEDURE — 80048 BASIC METABOLIC PNL TOTAL CA: CPT | Performed by: INTERNAL MEDICINE

## 2021-04-29 RX ORDER — IPRATROPIUM BROMIDE AND ALBUTEROL SULFATE 2.5; .5 MG/3ML; MG/3ML
3 SOLUTION RESPIRATORY (INHALATION) EVERY 4 HOURS PRN
Status: DISCONTINUED | OUTPATIENT
Start: 2021-04-29 | End: 2021-05-08 | Stop reason: HOSPADM

## 2021-04-29 RX ADMIN — FUROSEMIDE 40 MG: 40 TABLET ORAL at 20:40

## 2021-04-29 RX ADMIN — VENLAFAXINE HYDROCHLORIDE 75 MG: 75 TABLET ORAL at 20:40

## 2021-04-29 RX ADMIN — VENLAFAXINE HYDROCHLORIDE 75 MG: 75 TABLET ORAL at 08:23

## 2021-04-29 RX ADMIN — OXYCODONE HYDROCHLORIDE AND ACETAMINOPHEN 2 TABLET: 7.5; 325 TABLET ORAL at 22:42

## 2021-04-29 RX ADMIN — SODIUM CHLORIDE, PRESERVATIVE FREE 3 ML: 5 INJECTION INTRAVENOUS at 20:40

## 2021-04-29 RX ADMIN — INSULIN LISPRO 2 UNITS: 100 INJECTION, SOLUTION INTRAVENOUS; SUBCUTANEOUS at 16:59

## 2021-04-29 RX ADMIN — OXYCODONE HYDROCHLORIDE AND ACETAMINOPHEN 2 TABLET: 7.5; 325 TABLET ORAL at 03:58

## 2021-04-29 RX ADMIN — FAMOTIDINE 40 MG: 20 TABLET, FILM COATED ORAL at 08:23

## 2021-04-29 RX ADMIN — ATORVASTATIN CALCIUM 40 MG: 20 TABLET, FILM COATED ORAL at 08:23

## 2021-04-29 RX ADMIN — FUROSEMIDE 40 MG: 40 TABLET ORAL at 08:23

## 2021-04-29 RX ADMIN — OXYCODONE HYDROCHLORIDE AND ACETAMINOPHEN 1 TABLET: 7.5; 325 TABLET ORAL at 12:59

## 2021-04-29 RX ADMIN — INSULIN LISPRO 2 UNITS: 100 INJECTION, SOLUTION INTRAVENOUS; SUBCUTANEOUS at 11:59

## 2021-04-29 RX ADMIN — LOSARTAN POTASSIUM 100 MG: 100 TABLET, FILM COATED ORAL at 08:24

## 2021-04-29 RX ADMIN — LEVOTHYROXINE SODIUM 88 MCG: 0.09 TABLET ORAL at 06:24

## 2021-04-29 RX ADMIN — OXYCODONE HYDROCHLORIDE AND ACETAMINOPHEN 2 TABLET: 7.5; 325 TABLET ORAL at 08:23

## 2021-04-29 RX ADMIN — OXYCODONE HYDROCHLORIDE AND ACETAMINOPHEN 2 TABLET: 7.5; 325 TABLET ORAL at 17:34

## 2021-04-29 RX ADMIN — ASPIRIN 325 MG: 325 TABLET, COATED ORAL at 08:24

## 2021-04-29 RX ADMIN — SODIUM CHLORIDE, PRESERVATIVE FREE 3 ML: 5 INJECTION INTRAVENOUS at 08:23

## 2021-04-30 LAB
ANION GAP SERPL CALCULATED.3IONS-SCNC: 8.8 MMOL/L (ref 5–15)
BACTERIA SPEC AEROBE CULT: NORMAL
BUN SERPL-MCNC: 27 MG/DL (ref 6–20)
BUN/CREAT SERPL: 22.1 (ref 7–25)
CALCIUM SPEC-SCNC: 8.8 MG/DL (ref 8.6–10.5)
CHLORIDE SERPL-SCNC: 105 MMOL/L (ref 98–107)
CO2 SERPL-SCNC: 24.2 MMOL/L (ref 22–29)
CREAT SERPL-MCNC: 1.22 MG/DL (ref 0.57–1)
CRP SERPL-MCNC: 2.88 MG/DL (ref 0–0.5)
DEPRECATED RDW RBC AUTO: 48.5 FL (ref 37–54)
ERYTHROCYTE [DISTWIDTH] IN BLOOD BY AUTOMATED COUNT: 14.6 % (ref 12.3–15.4)
ERYTHROCYTE [SEDIMENTATION RATE] IN BLOOD: 13 MM/HR (ref 0–30)
GFR SERPL CREATININE-BSD FRML MDRD: 46 ML/MIN/1.73
GLUCOSE BLDC GLUCOMTR-MCNC: 135 MG/DL (ref 70–130)
GLUCOSE BLDC GLUCOMTR-MCNC: 136 MG/DL (ref 70–130)
GLUCOSE BLDC GLUCOMTR-MCNC: 189 MG/DL (ref 70–130)
GLUCOSE BLDC GLUCOMTR-MCNC: 99 MG/DL (ref 70–130)
GLUCOSE SERPL-MCNC: 118 MG/DL (ref 65–99)
GRAM STN SPEC: NORMAL
HCT VFR BLD AUTO: 29.3 % (ref 34–46.6)
HGB BLD-MCNC: 9.8 G/DL (ref 12–15.9)
MCH RBC QN AUTO: 30.4 PG (ref 26.6–33)
MCHC RBC AUTO-ENTMCNC: 33.4 G/DL (ref 31.5–35.7)
MCV RBC AUTO: 91 FL (ref 79–97)
PLATELET # BLD AUTO: 154 10*3/MM3 (ref 140–450)
PMV BLD AUTO: 8.8 FL (ref 6–12)
POTASSIUM SERPL-SCNC: 4.9 MMOL/L (ref 3.5–5.2)
RBC # BLD AUTO: 3.22 10*6/MM3 (ref 3.77–5.28)
SODIUM SERPL-SCNC: 138 MMOL/L (ref 136–145)
WBC # BLD AUTO: 6.67 10*3/MM3 (ref 3.4–10.8)

## 2021-04-30 PROCEDURE — 85027 COMPLETE CBC AUTOMATED: CPT | Performed by: NURSE PRACTITIONER

## 2021-04-30 PROCEDURE — 63710000001 INSULIN LISPRO (HUMAN) PER 5 UNITS: Performed by: INTERNAL MEDICINE

## 2021-04-30 PROCEDURE — 99232 SBSQ HOSP IP/OBS MODERATE 35: CPT | Performed by: INTERNAL MEDICINE

## 2021-04-30 PROCEDURE — 86140 C-REACTIVE PROTEIN: CPT | Performed by: INTERNAL MEDICINE

## 2021-04-30 PROCEDURE — 80048 BASIC METABOLIC PNL TOTAL CA: CPT | Performed by: NURSE PRACTITIONER

## 2021-04-30 PROCEDURE — 85652 RBC SED RATE AUTOMATED: CPT | Performed by: INTERNAL MEDICINE

## 2021-04-30 PROCEDURE — 82962 GLUCOSE BLOOD TEST: CPT

## 2021-04-30 RX ADMIN — FUROSEMIDE 40 MG: 40 TABLET ORAL at 20:11

## 2021-04-30 RX ADMIN — SODIUM CHLORIDE, PRESERVATIVE FREE 3 ML: 5 INJECTION INTRAVENOUS at 10:41

## 2021-04-30 RX ADMIN — LOSARTAN POTASSIUM 100 MG: 100 TABLET, FILM COATED ORAL at 10:39

## 2021-04-30 RX ADMIN — OXYCODONE HYDROCHLORIDE AND ACETAMINOPHEN 2 TABLET: 7.5; 325 TABLET ORAL at 04:41

## 2021-04-30 RX ADMIN — ATORVASTATIN CALCIUM 40 MG: 20 TABLET, FILM COATED ORAL at 10:39

## 2021-04-30 RX ADMIN — OXYCODONE HYDROCHLORIDE AND ACETAMINOPHEN 2 TABLET: 7.5; 325 TABLET ORAL at 10:40

## 2021-04-30 RX ADMIN — SODIUM CHLORIDE, PRESERVATIVE FREE 3 ML: 5 INJECTION INTRAVENOUS at 20:12

## 2021-04-30 RX ADMIN — OXYCODONE HYDROCHLORIDE AND ACETAMINOPHEN 2 TABLET: 7.5; 325 TABLET ORAL at 18:05

## 2021-04-30 RX ADMIN — INSULIN LISPRO 2 UNITS: 100 INJECTION, SOLUTION INTRAVENOUS; SUBCUTANEOUS at 12:34

## 2021-04-30 RX ADMIN — VENLAFAXINE HYDROCHLORIDE 75 MG: 75 TABLET ORAL at 10:39

## 2021-04-30 RX ADMIN — FAMOTIDINE 40 MG: 20 TABLET, FILM COATED ORAL at 10:39

## 2021-04-30 RX ADMIN — LEVOTHYROXINE SODIUM 88 MCG: 0.09 TABLET ORAL at 06:31

## 2021-04-30 RX ADMIN — VENLAFAXINE HYDROCHLORIDE 75 MG: 75 TABLET ORAL at 20:12

## 2021-04-30 RX ADMIN — FUROSEMIDE 40 MG: 40 TABLET ORAL at 10:39

## 2021-05-01 LAB
BH BB BLOOD EXPIRATION DATE: NORMAL
BH BB BLOOD EXPIRATION DATE: NORMAL
BH BB BLOOD TYPE BARCODE: 6200
BH BB BLOOD TYPE BARCODE: 6200
BH BB DISPENSE STATUS: NORMAL
BH BB DISPENSE STATUS: NORMAL
BH BB PRODUCT CODE: NORMAL
BH BB PRODUCT CODE: NORMAL
BH BB UNIT NUMBER: NORMAL
BH BB UNIT NUMBER: NORMAL
CROSSMATCH INTERPRETATION: NORMAL
CROSSMATCH INTERPRETATION: NORMAL
GLUCOSE BLDC GLUCOMTR-MCNC: 137 MG/DL (ref 70–130)
GLUCOSE BLDC GLUCOMTR-MCNC: 151 MG/DL (ref 70–130)
GLUCOSE BLDC GLUCOMTR-MCNC: 90 MG/DL (ref 70–130)
GLUCOSE BLDC GLUCOMTR-MCNC: 97 MG/DL (ref 70–130)
UNIT  ABO: NORMAL
UNIT  ABO: NORMAL
UNIT  RH: NORMAL
UNIT  RH: NORMAL

## 2021-05-01 PROCEDURE — 82962 GLUCOSE BLOOD TEST: CPT

## 2021-05-01 PROCEDURE — 63710000001 INSULIN LISPRO (HUMAN) PER 5 UNITS: Performed by: INTERNAL MEDICINE

## 2021-05-01 RX ORDER — POTASSIUM CHLORIDE 7.45 MG/ML
10 INJECTION INTRAVENOUS
Status: DISCONTINUED | OUTPATIENT
Start: 2021-05-01 | End: 2021-05-08 | Stop reason: HOSPADM

## 2021-05-01 RX ORDER — POTASSIUM CHLORIDE 1.5 G/1.77G
40 POWDER, FOR SOLUTION ORAL AS NEEDED
Status: DISCONTINUED | OUTPATIENT
Start: 2021-05-01 | End: 2021-05-08 | Stop reason: HOSPADM

## 2021-05-01 RX ORDER — POTASSIUM CHLORIDE 750 MG/1
40 TABLET, FILM COATED, EXTENDED RELEASE ORAL AS NEEDED
Status: DISCONTINUED | OUTPATIENT
Start: 2021-05-01 | End: 2021-05-08 | Stop reason: HOSPADM

## 2021-05-01 RX ADMIN — LEVOTHYROXINE SODIUM 88 MCG: 0.09 TABLET ORAL at 07:04

## 2021-05-01 RX ADMIN — SODIUM CHLORIDE, PRESERVATIVE FREE 3 ML: 5 INJECTION INTRAVENOUS at 08:52

## 2021-05-01 RX ADMIN — FUROSEMIDE 40 MG: 40 TABLET ORAL at 22:16

## 2021-05-01 RX ADMIN — VENLAFAXINE HYDROCHLORIDE 75 MG: 75 TABLET ORAL at 22:16

## 2021-05-01 RX ADMIN — INSULIN LISPRO 2 UNITS: 100 INJECTION, SOLUTION INTRAVENOUS; SUBCUTANEOUS at 18:28

## 2021-05-01 RX ADMIN — OXYCODONE HYDROCHLORIDE AND ACETAMINOPHEN 2 TABLET: 7.5; 325 TABLET ORAL at 00:15

## 2021-05-01 RX ADMIN — OXYCODONE HYDROCHLORIDE AND ACETAMINOPHEN 2 TABLET: 7.5; 325 TABLET ORAL at 18:44

## 2021-05-01 RX ADMIN — LOSARTAN POTASSIUM 100 MG: 100 TABLET, FILM COATED ORAL at 08:52

## 2021-05-01 RX ADMIN — OXYCODONE HYDROCHLORIDE AND ACETAMINOPHEN 2 TABLET: 7.5; 325 TABLET ORAL at 22:47

## 2021-05-01 RX ADMIN — VENLAFAXINE HYDROCHLORIDE 75 MG: 75 TABLET ORAL at 08:52

## 2021-05-01 RX ADMIN — OXYCODONE HYDROCHLORIDE AND ACETAMINOPHEN 2 TABLET: 7.5; 325 TABLET ORAL at 07:04

## 2021-05-01 RX ADMIN — FUROSEMIDE 40 MG: 40 TABLET ORAL at 08:51

## 2021-05-01 RX ADMIN — FAMOTIDINE 40 MG: 20 TABLET, FILM COATED ORAL at 08:51

## 2021-05-01 RX ADMIN — ATORVASTATIN CALCIUM 40 MG: 20 TABLET, FILM COATED ORAL at 08:51

## 2021-05-01 RX ADMIN — SODIUM CHLORIDE, PRESERVATIVE FREE 3 ML: 5 INJECTION INTRAVENOUS at 22:16

## 2021-05-02 LAB
ABO GROUP BLD: NORMAL
ABO GROUP BLD: NORMAL
ANION GAP SERPL CALCULATED.3IONS-SCNC: 11 MMOL/L (ref 5–15)
ANTI-E: NORMAL
ANTI-LITTLE C: NORMAL
BACTERIA SPEC ANAEROBE CULT: NORMAL
BLD GP AB SCN SERPL QL: POSITIVE
BUN SERPL-MCNC: 23 MG/DL (ref 6–20)
BUN/CREAT SERPL: 23.2 (ref 7–25)
CALCIUM SPEC-SCNC: 9 MG/DL (ref 8.6–10.5)
CHLORIDE SERPL-SCNC: 99 MMOL/L (ref 98–107)
CO2 SERPL-SCNC: 24 MMOL/L (ref 22–29)
CREAT SERPL-MCNC: 0.99 MG/DL (ref 0.57–1)
DAT POLY-SP REAG RBC QL: NEGATIVE
DEPRECATED RDW RBC AUTO: 49 FL (ref 37–54)
ERYTHROCYTE [DISTWIDTH] IN BLOOD BY AUTOMATED COUNT: 14.5 % (ref 12.3–15.4)
GFR SERPL CREATININE-BSD FRML MDRD: 58 ML/MIN/1.73
GLUCOSE BLDC GLUCOMTR-MCNC: 132 MG/DL (ref 70–130)
GLUCOSE BLDC GLUCOMTR-MCNC: 137 MG/DL (ref 70–130)
GLUCOSE BLDC GLUCOMTR-MCNC: 152 MG/DL (ref 70–130)
GLUCOSE BLDC GLUCOMTR-MCNC: 159 MG/DL (ref 70–130)
GLUCOSE BLDC GLUCOMTR-MCNC: 168 MG/DL (ref 70–130)
GLUCOSE SERPL-MCNC: 114 MG/DL (ref 65–99)
HCT VFR BLD AUTO: 27 % (ref 34–46.6)
HGB BLD-MCNC: 9 G/DL (ref 12–15.9)
MCH RBC QN AUTO: 30.8 PG (ref 26.6–33)
MCHC RBC AUTO-ENTMCNC: 33.3 G/DL (ref 31.5–35.7)
MCV RBC AUTO: 92.5 FL (ref 79–97)
NONSPECIFIC GEL REACTION: NORMAL
PLATELET # BLD AUTO: 159 10*3/MM3 (ref 140–450)
PMV BLD AUTO: 8.9 FL (ref 6–12)
POTASSIUM SERPL-SCNC: 3.8 MMOL/L (ref 3.5–5.2)
RBC # BLD AUTO: 2.92 10*6/MM3 (ref 3.77–5.28)
RH BLD: POSITIVE
RH BLD: POSITIVE
SARS-COV-2 ORF1AB RESP QL NAA+PROBE: NOT DETECTED
SODIUM SERPL-SCNC: 134 MMOL/L (ref 136–145)
T&S EXPIRATION DATE: NORMAL
WBC # BLD AUTO: 5.79 10*3/MM3 (ref 3.4–10.8)

## 2021-05-02 PROCEDURE — 86900 BLOOD TYPING SEROLOGIC ABO: CPT

## 2021-05-02 PROCEDURE — 82962 GLUCOSE BLOOD TEST: CPT

## 2021-05-02 PROCEDURE — 85027 COMPLETE CBC AUTOMATED: CPT | Performed by: ORTHOPAEDIC SURGERY

## 2021-05-02 PROCEDURE — 80048 BASIC METABOLIC PNL TOTAL CA: CPT | Performed by: ORTHOPAEDIC SURGERY

## 2021-05-02 PROCEDURE — 86902 BLOOD TYPE ANTIGEN DONOR EA: CPT

## 2021-05-02 PROCEDURE — 86850 RBC ANTIBODY SCREEN: CPT | Performed by: ORTHOPAEDIC SURGERY

## 2021-05-02 PROCEDURE — 86920 COMPATIBILITY TEST SPIN: CPT

## 2021-05-02 PROCEDURE — 86870 RBC ANTIBODY IDENTIFICATION: CPT | Performed by: ORTHOPAEDIC SURGERY

## 2021-05-02 PROCEDURE — 86922 COMPATIBILITY TEST ANTIGLOB: CPT

## 2021-05-02 PROCEDURE — 86880 COOMBS TEST DIRECT: CPT | Performed by: ORTHOPAEDIC SURGERY

## 2021-05-02 PROCEDURE — 63710000001 INSULIN LISPRO (HUMAN) PER 5 UNITS: Performed by: INTERNAL MEDICINE

## 2021-05-02 PROCEDURE — 86901 BLOOD TYPING SEROLOGIC RH(D): CPT | Performed by: ORTHOPAEDIC SURGERY

## 2021-05-02 PROCEDURE — 86900 BLOOD TYPING SEROLOGIC ABO: CPT | Performed by: ORTHOPAEDIC SURGERY

## 2021-05-02 PROCEDURE — U0004 COV-19 TEST NON-CDC HGH THRU: HCPCS | Performed by: ORTHOPAEDIC SURGERY

## 2021-05-02 PROCEDURE — 86901 BLOOD TYPING SEROLOGIC RH(D): CPT

## 2021-05-02 RX ADMIN — FAMOTIDINE 40 MG: 20 TABLET, FILM COATED ORAL at 08:26

## 2021-05-02 RX ADMIN — LEVOTHYROXINE SODIUM 88 MCG: 0.09 TABLET ORAL at 06:50

## 2021-05-02 RX ADMIN — OXYCODONE HYDROCHLORIDE AND ACETAMINOPHEN 2 TABLET: 7.5; 325 TABLET ORAL at 05:33

## 2021-05-02 RX ADMIN — OXYCODONE HYDROCHLORIDE AND ACETAMINOPHEN 2 TABLET: 7.5; 325 TABLET ORAL at 17:50

## 2021-05-02 RX ADMIN — FUROSEMIDE 40 MG: 40 TABLET ORAL at 08:26

## 2021-05-02 RX ADMIN — OXYCODONE HYDROCHLORIDE AND ACETAMINOPHEN 2 TABLET: 7.5; 325 TABLET ORAL at 12:06

## 2021-05-02 RX ADMIN — SODIUM CHLORIDE, PRESERVATIVE FREE 3 ML: 5 INJECTION INTRAVENOUS at 08:27

## 2021-05-02 RX ADMIN — VENLAFAXINE HYDROCHLORIDE 75 MG: 75 TABLET ORAL at 08:26

## 2021-05-02 RX ADMIN — ATORVASTATIN CALCIUM 40 MG: 20 TABLET, FILM COATED ORAL at 08:26

## 2021-05-02 RX ADMIN — INSULIN LISPRO 2 UNITS: 100 INJECTION, SOLUTION INTRAVENOUS; SUBCUTANEOUS at 08:27

## 2021-05-02 RX ADMIN — SODIUM CHLORIDE, PRESERVATIVE FREE 3 ML: 5 INJECTION INTRAVENOUS at 21:23

## 2021-05-02 RX ADMIN — OXYCODONE HYDROCHLORIDE AND ACETAMINOPHEN 2 TABLET: 7.5; 325 TABLET ORAL at 21:23

## 2021-05-02 RX ADMIN — VENLAFAXINE HYDROCHLORIDE 75 MG: 75 TABLET ORAL at 21:23

## 2021-05-02 RX ADMIN — LOSARTAN POTASSIUM 100 MG: 100 TABLET, FILM COATED ORAL at 06:50

## 2021-05-02 RX ADMIN — FUROSEMIDE 40 MG: 40 TABLET ORAL at 21:23

## 2021-05-03 ENCOUNTER — APPOINTMENT (OUTPATIENT)
Dept: GENERAL RADIOLOGY | Facility: HOSPITAL | Age: 56
End: 2021-05-03

## 2021-05-03 ENCOUNTER — ANESTHESIA EVENT (OUTPATIENT)
Dept: PERIOP | Facility: HOSPITAL | Age: 56
End: 2021-05-03

## 2021-05-03 ENCOUNTER — ANESTHESIA (OUTPATIENT)
Dept: PERIOP | Facility: HOSPITAL | Age: 56
End: 2021-05-03

## 2021-05-03 LAB
ANION GAP SERPL CALCULATED.3IONS-SCNC: 9.7 MMOL/L (ref 5–15)
BUN SERPL-MCNC: 24 MG/DL (ref 6–20)
BUN/CREAT SERPL: 21.4 (ref 7–25)
CALCIUM SPEC-SCNC: 8.9 MG/DL (ref 8.6–10.5)
CHLORIDE SERPL-SCNC: 98 MMOL/L (ref 98–107)
CO2 SERPL-SCNC: 28.3 MMOL/L (ref 22–29)
CREAT SERPL-MCNC: 1.12 MG/DL (ref 0.57–1)
DEPRECATED RDW RBC AUTO: 49.4 FL (ref 37–54)
ERYTHROCYTE [DISTWIDTH] IN BLOOD BY AUTOMATED COUNT: 14.6 % (ref 12.3–15.4)
GFR SERPL CREATININE-BSD FRML MDRD: 50 ML/MIN/1.73
GLUCOSE BLDC GLUCOMTR-MCNC: 120 MG/DL (ref 70–130)
GLUCOSE BLDC GLUCOMTR-MCNC: 149 MG/DL (ref 70–130)
GLUCOSE BLDC GLUCOMTR-MCNC: 244 MG/DL (ref 70–130)
GLUCOSE SERPL-MCNC: 161 MG/DL (ref 65–99)
HCT VFR BLD AUTO: 27.7 % (ref 34–46.6)
HGB BLD-MCNC: 9.2 G/DL (ref 12–15.9)
MCH RBC QN AUTO: 30.9 PG (ref 26.6–33)
MCHC RBC AUTO-ENTMCNC: 33.2 G/DL (ref 31.5–35.7)
MCV RBC AUTO: 93 FL (ref 79–97)
PLATELET # BLD AUTO: 148 10*3/MM3 (ref 140–450)
PMV BLD AUTO: 8.8 FL (ref 6–12)
POTASSIUM SERPL-SCNC: 3.6 MMOL/L (ref 3.5–5.2)
RBC # BLD AUTO: 2.98 10*6/MM3 (ref 3.77–5.28)
SODIUM SERPL-SCNC: 136 MMOL/L (ref 136–145)
WBC # BLD AUTO: 5.89 10*3/MM3 (ref 3.4–10.8)

## 2021-05-03 PROCEDURE — 25010000002 PHENYLEPHRINE PER 1 ML: Performed by: NURSE ANESTHETIST, CERTIFIED REGISTERED

## 2021-05-03 PROCEDURE — 63048 LAM FACETEC &FORAMOT EA ADDL: CPT | Performed by: ORTHOPAEDIC SURGERY

## 2021-05-03 PROCEDURE — 25010000002 NEOSTIGMINE 5 MG/10ML SOLUTION: Performed by: NURSE ANESTHETIST, CERTIFIED REGISTERED

## 2021-05-03 PROCEDURE — 25010000002 ALBUMIN HUMAN 5% PER 50 ML: Performed by: NURSE ANESTHETIST, CERTIFIED REGISTERED

## 2021-05-03 PROCEDURE — 25010000002 MIDAZOLAM PER 1 MG: Performed by: ANESTHESIOLOGY

## 2021-05-03 PROCEDURE — 25010000002 HYDROMORPHONE PER 4 MG: Performed by: ANESTHESIOLOGY

## 2021-05-03 PROCEDURE — 82962 GLUCOSE BLOOD TEST: CPT

## 2021-05-03 PROCEDURE — 63047 LAM FACETEC & FORAMOT LUMBAR: CPT | Performed by: ORTHOPAEDIC SURGERY

## 2021-05-03 PROCEDURE — 87205 SMEAR GRAM STAIN: CPT | Performed by: ORTHOPAEDIC SURGERY

## 2021-05-03 PROCEDURE — 25010000002 HYDROMORPHONE 1 MG/ML SOLUTION: Performed by: ORTHOPAEDIC SURGERY

## 2021-05-03 PROCEDURE — C1713 ANCHOR/SCREW BN/BN,TIS/BN: HCPCS | Performed by: ORTHOPAEDIC SURGERY

## 2021-05-03 PROCEDURE — 22015 I&D ABSCESS P-SPINE L/S/LS: CPT | Performed by: ORTHOPAEDIC SURGERY

## 2021-05-03 PROCEDURE — 36430 TRANSFUSION BLD/BLD COMPNT: CPT

## 2021-05-03 PROCEDURE — 87075 CULTR BACTERIA EXCEPT BLOOD: CPT | Performed by: ORTHOPAEDIC SURGERY

## 2021-05-03 PROCEDURE — 82803 BLOOD GASES ANY COMBINATION: CPT

## 2021-05-03 PROCEDURE — 87186 SC STD MICRODIL/AGAR DIL: CPT | Performed by: ORTHOPAEDIC SURGERY

## 2021-05-03 PROCEDURE — 80048 BASIC METABOLIC PNL TOTAL CA: CPT | Performed by: INTERNAL MEDICINE

## 2021-05-03 PROCEDURE — 25010000002 DEXAMETHASONE SODIUM PHOSPHATE 20 MG/5ML SOLUTION: Performed by: NURSE ANESTHETIST, CERTIFIED REGISTERED

## 2021-05-03 PROCEDURE — 85027 COMPLETE CBC AUTOMATED: CPT | Performed by: INTERNAL MEDICINE

## 2021-05-03 PROCEDURE — 22842 INSERT SPINE FIXATION DEVICE: CPT | Performed by: ORTHOPAEDIC SURGERY

## 2021-05-03 PROCEDURE — 86900 BLOOD TYPING SEROLOGIC ABO: CPT

## 2021-05-03 PROCEDURE — 25010000002 VANCOMYCIN 1 G RECONSTITUTED SOLUTION: Performed by: ORTHOPAEDIC SURGERY

## 2021-05-03 PROCEDURE — 87070 CULTURE OTHR SPECIMN AEROBIC: CPT | Performed by: ORTHOPAEDIC SURGERY

## 2021-05-03 PROCEDURE — 85018 HEMOGLOBIN: CPT | Performed by: ORTHOPAEDIC SURGERY

## 2021-05-03 PROCEDURE — 25010000003 HYDROMORPHONE HCL PF 50 MG/5ML SOLUTION: Performed by: ORTHOPAEDIC SURGERY

## 2021-05-03 PROCEDURE — 22614 ARTHRD PST TQ 1NTRSPC EA ADD: CPT | Performed by: ORTHOPAEDIC SURGERY

## 2021-05-03 PROCEDURE — 99232 SBSQ HOSP IP/OBS MODERATE 35: CPT | Performed by: INTERNAL MEDICINE

## 2021-05-03 PROCEDURE — 25010000002 FENTANYL CITRATE (PF) 100 MCG/2ML SOLUTION: Performed by: NURSE ANESTHETIST, CERTIFIED REGISTERED

## 2021-05-03 PROCEDURE — 22612 ARTHRD PST TQ 1NTRSPC LUMBAR: CPT | Performed by: ORTHOPAEDIC SURGERY

## 2021-05-03 PROCEDURE — 85014 HEMATOCRIT: CPT

## 2021-05-03 PROCEDURE — 25010000002 FENTANYL CITRATE (PF) 100 MCG/2ML SOLUTION: Performed by: ANESTHESIOLOGY

## 2021-05-03 PROCEDURE — 25010000003 CEFAZOLIN PER 500 MG: Performed by: ORTHOPAEDIC SURGERY

## 2021-05-03 PROCEDURE — 86902 BLOOD TYPE ANTIGEN DONOR EA: CPT

## 2021-05-03 PROCEDURE — 88304 TISSUE EXAM BY PATHOLOGIST: CPT | Performed by: ORTHOPAEDIC SURGERY

## 2021-05-03 PROCEDURE — 87147 CULTURE TYPE IMMUNOLOGIC: CPT | Performed by: ORTHOPAEDIC SURGERY

## 2021-05-03 PROCEDURE — P9016 RBC LEUKOCYTES REDUCED: HCPCS

## 2021-05-03 PROCEDURE — 85014 HEMATOCRIT: CPT | Performed by: ORTHOPAEDIC SURGERY

## 2021-05-03 PROCEDURE — 87176 TISSUE HOMOGENIZATION CULTR: CPT | Performed by: ORTHOPAEDIC SURGERY

## 2021-05-03 PROCEDURE — 76000 FLUOROSCOPY <1 HR PHYS/QHP: CPT

## 2021-05-03 PROCEDURE — 25010000002 PROPOFOL 10 MG/ML EMULSION: Performed by: NURSE ANESTHETIST, CERTIFIED REGISTERED

## 2021-05-03 PROCEDURE — P9041 ALBUMIN (HUMAN),5%, 50ML: HCPCS | Performed by: NURSE ANESTHETIST, CERTIFIED REGISTERED

## 2021-05-03 PROCEDURE — 87015 SPECIMEN INFECT AGNT CONCNTJ: CPT | Performed by: ORTHOPAEDIC SURGERY

## 2021-05-03 PROCEDURE — 72100 X-RAY EXAM L-S SPINE 2/3 VWS: CPT

## 2021-05-03 PROCEDURE — 0SG00K1 FUSION OF LUMBAR VERTEBRAL JOINT WITH NONAUTOLOGOUS TISSUE SUBSTITUTE, POSTERIOR APPROACH, POSTERIOR COLUMN, OPEN APPROACH: ICD-10-PCS | Performed by: ORTHOPAEDIC SURGERY

## 2021-05-03 PROCEDURE — 85018 HEMOGLOBIN: CPT

## 2021-05-03 PROCEDURE — C1889 IMPLANT/INSERT DEVICE, NOC: HCPCS | Performed by: ORTHOPAEDIC SURGERY

## 2021-05-03 PROCEDURE — 0SB20ZZ EXCISION OF LUMBAR VERTEBRAL DISC, OPEN APPROACH: ICD-10-PCS | Performed by: ORTHOPAEDIC SURGERY

## 2021-05-03 PROCEDURE — 25010000003 CEFAZOLIN IN DEXTROSE 2-4 GM/100ML-% SOLUTION: Performed by: NURSE ANESTHETIST, CERTIFIED REGISTERED

## 2021-05-03 PROCEDURE — 82947 ASSAY GLUCOSE BLOOD QUANT: CPT

## 2021-05-03 DEVICE — FLOSEAL HEMOSTATIC MATRIX, 10ML
Type: IMPLANTABLE DEVICE | Site: SPINE LUMBAR | Status: FUNCTIONAL
Brand: FLOSEAL HEMOSTATIC MATRIX

## 2021-05-03 DEVICE — SCRW EXPEDIUM PA TI 6X50MM: Type: IMPLANTABLE DEVICE | Site: SPINE LUMBAR | Status: FUNCTIONAL

## 2021-05-03 DEVICE — WAX BONE HEMO NAT 2.5G: Type: IMPLANTABLE DEVICE | Site: SPINE LUMBAR | Status: FUNCTIONAL

## 2021-05-03 DEVICE — SCRW EXPEDIUM PA TI 6X45MM: Type: IMPLANTABLE DEVICE | Site: SPINE LUMBAR | Status: FUNCTIONAL

## 2021-05-03 DEVICE — SCRW EXPEDIUM PA TI 6X35MM: Type: IMPLANTABLE DEVICE | Site: SPINE LUMBAR | Status: FUNCTIONAL

## 2021-05-03 DEVICE — SCRW VIPER INNR ST: Type: IMPLANTABLE DEVICE | Site: SPINE LUMBAR | Status: FUNCTIONAL

## 2021-05-03 DEVICE — ROD PRELRD SPINE EXPEDIUM W/LINE TI 5.5X110MM: Type: IMPLANTABLE DEVICE | Site: SPINE LUMBAR | Status: FUNCTIONAL

## 2021-05-03 DEVICE — SCRW EXPEDIUM PA TI 6X40MM: Type: IMPLANTABLE DEVICE | Site: SPINE LUMBAR | Status: FUNCTIONAL

## 2021-05-03 RX ORDER — POLYETHYLENE GLYCOL 3350 17 G/17G
17 POWDER, FOR SOLUTION ORAL DAILY
Status: DISCONTINUED | OUTPATIENT
Start: 2021-05-04 | End: 2021-05-08 | Stop reason: HOSPADM

## 2021-05-03 RX ORDER — KETAMINE HYDROCHLORIDE 10 MG/ML
INJECTION INTRAMUSCULAR; INTRAVENOUS AS NEEDED
Status: DISCONTINUED | OUTPATIENT
Start: 2021-05-03 | End: 2021-05-03 | Stop reason: SURG

## 2021-05-03 RX ORDER — VANCOMYCIN HYDROCHLORIDE 1 G/20ML
INJECTION, POWDER, LYOPHILIZED, FOR SOLUTION INTRAVENOUS AS NEEDED
Status: DISCONTINUED | OUTPATIENT
Start: 2021-05-03 | End: 2021-05-03 | Stop reason: HOSPADM

## 2021-05-03 RX ORDER — FENTANYL CITRATE 50 UG/ML
100 INJECTION, SOLUTION INTRAMUSCULAR; INTRAVENOUS
Status: COMPLETED | OUTPATIENT
Start: 2021-05-03 | End: 2021-05-03

## 2021-05-03 RX ORDER — HYDROMORPHONE HCL IN 0.9% NACL 10 MG/50ML
PATIENT CONTROLLED ANALGESIA SYRINGE INTRAVENOUS CONTINUOUS
Status: DISCONTINUED | OUTPATIENT
Start: 2021-05-03 | End: 2021-05-04

## 2021-05-03 RX ORDER — SODIUM CHLORIDE 9 MG/ML
100 INJECTION, SOLUTION INTRAVENOUS CONTINUOUS
Status: DISCONTINUED | OUTPATIENT
Start: 2021-05-03 | End: 2021-05-07

## 2021-05-03 RX ORDER — ONDANSETRON 2 MG/ML
4 INJECTION INTRAMUSCULAR; INTRAVENOUS EVERY 6 HOURS PRN
Status: DISCONTINUED | OUTPATIENT
Start: 2021-05-03 | End: 2021-05-08 | Stop reason: HOSPADM

## 2021-05-03 RX ORDER — FENTANYL CITRATE 50 UG/ML
50 INJECTION, SOLUTION INTRAMUSCULAR; INTRAVENOUS
Status: DISCONTINUED | OUTPATIENT
Start: 2021-05-03 | End: 2021-05-03 | Stop reason: HOSPADM

## 2021-05-03 RX ORDER — ALBUMIN, HUMAN INJ 5% 5 %
SOLUTION INTRAVENOUS CONTINUOUS PRN
Status: DISCONTINUED | OUTPATIENT
Start: 2021-05-03 | End: 2021-05-03 | Stop reason: SURG

## 2021-05-03 RX ORDER — SODIUM CHLORIDE 0.9 % (FLUSH) 0.9 %
10 SYRINGE (ML) INJECTION AS NEEDED
Status: DISCONTINUED | OUTPATIENT
Start: 2021-05-03 | End: 2021-05-08 | Stop reason: HOSPADM

## 2021-05-03 RX ORDER — DEXAMETHASONE SODIUM PHOSPHATE 4 MG/ML
INJECTION, SOLUTION INTRA-ARTICULAR; INTRALESIONAL; INTRAMUSCULAR; INTRAVENOUS; SOFT TISSUE AS NEEDED
Status: DISCONTINUED | OUTPATIENT
Start: 2021-05-03 | End: 2021-05-03 | Stop reason: SURG

## 2021-05-03 RX ORDER — ROCURONIUM BROMIDE 10 MG/ML
INJECTION, SOLUTION INTRAVENOUS AS NEEDED
Status: DISCONTINUED | OUTPATIENT
Start: 2021-05-03 | End: 2021-05-03 | Stop reason: SURG

## 2021-05-03 RX ORDER — OXYCODONE AND ACETAMINOPHEN 7.5; 325 MG/1; MG/1
1 TABLET ORAL EVERY 4 HOURS PRN
Status: DISCONTINUED | OUTPATIENT
Start: 2021-05-03 | End: 2021-05-08 | Stop reason: HOSPADM

## 2021-05-03 RX ORDER — HYDROCODONE BITARTRATE AND ACETAMINOPHEN 7.5; 325 MG/1; MG/1
1 TABLET ORAL ONCE AS NEEDED
Status: DISCONTINUED | OUTPATIENT
Start: 2021-05-03 | End: 2021-05-03 | Stop reason: HOSPADM

## 2021-05-03 RX ORDER — LIDOCAINE HYDROCHLORIDE 20 MG/ML
INJECTION, SOLUTION INFILTRATION; PERINEURAL AS NEEDED
Status: DISCONTINUED | OUTPATIENT
Start: 2021-05-03 | End: 2021-05-03 | Stop reason: SURG

## 2021-05-03 RX ORDER — FLUMAZENIL 0.1 MG/ML
0.2 INJECTION INTRAVENOUS AS NEEDED
Status: DISCONTINUED | OUTPATIENT
Start: 2021-05-03 | End: 2021-05-03 | Stop reason: HOSPADM

## 2021-05-03 RX ORDER — BISACODYL 10 MG
10 SUPPOSITORY, RECTAL RECTAL DAILY PRN
Status: DISCONTINUED | OUTPATIENT
Start: 2021-05-03 | End: 2021-05-08 | Stop reason: HOSPADM

## 2021-05-03 RX ORDER — NALOXONE HCL 0.4 MG/ML
0.2 VIAL (ML) INJECTION AS NEEDED
Status: DISCONTINUED | OUTPATIENT
Start: 2021-05-03 | End: 2021-05-03 | Stop reason: HOSPADM

## 2021-05-03 RX ORDER — GLYCOPYRROLATE 0.2 MG/ML
INJECTION INTRAMUSCULAR; INTRAVENOUS AS NEEDED
Status: DISCONTINUED | OUTPATIENT
Start: 2021-05-03 | End: 2021-05-03 | Stop reason: SURG

## 2021-05-03 RX ORDER — CEFAZOLIN SODIUM 2 G/100ML
2 INJECTION, SOLUTION INTRAVENOUS EVERY 8 HOURS
Status: COMPLETED | OUTPATIENT
Start: 2021-05-04 | End: 2021-05-04

## 2021-05-03 RX ORDER — OXYCODONE AND ACETAMINOPHEN 7.5; 325 MG/1; MG/1
1 TABLET ORAL ONCE AS NEEDED
Status: DISCONTINUED | OUTPATIENT
Start: 2021-05-03 | End: 2021-05-03 | Stop reason: HOSPADM

## 2021-05-03 RX ORDER — HYDROMORPHONE HCL 110MG/55ML
PATIENT CONTROLLED ANALGESIA SYRINGE INTRAVENOUS AS NEEDED
Status: DISCONTINUED | OUTPATIENT
Start: 2021-05-03 | End: 2021-05-03 | Stop reason: SURG

## 2021-05-03 RX ORDER — HYDRALAZINE HYDROCHLORIDE 20 MG/ML
5 INJECTION INTRAMUSCULAR; INTRAVENOUS
Status: DISCONTINUED | OUTPATIENT
Start: 2021-05-03 | End: 2021-05-03 | Stop reason: HOSPADM

## 2021-05-03 RX ORDER — GLYCOPYRROLATE 0.2 MG/ML
0.2 INJECTION INTRAMUSCULAR; INTRAVENOUS
Status: DISCONTINUED | OUTPATIENT
Start: 2021-05-03 | End: 2021-05-03 | Stop reason: HOSPADM

## 2021-05-03 RX ORDER — EPHEDRINE SULFATE 50 MG/ML
5 INJECTION, SOLUTION INTRAVENOUS ONCE AS NEEDED
Status: DISCONTINUED | OUTPATIENT
Start: 2021-05-03 | End: 2021-05-03 | Stop reason: HOSPADM

## 2021-05-03 RX ORDER — PROPOFOL 10 MG/ML
VIAL (ML) INTRAVENOUS AS NEEDED
Status: DISCONTINUED | OUTPATIENT
Start: 2021-05-03 | End: 2021-05-03 | Stop reason: SURG

## 2021-05-03 RX ORDER — MIDAZOLAM HYDROCHLORIDE 1 MG/ML
1 INJECTION INTRAMUSCULAR; INTRAVENOUS
Status: COMPLETED | OUTPATIENT
Start: 2021-05-03 | End: 2021-05-03

## 2021-05-03 RX ORDER — HYDROMORPHONE HYDROCHLORIDE 1 MG/ML
0.5 INJECTION, SOLUTION INTRAMUSCULAR; INTRAVENOUS; SUBCUTANEOUS
Status: DISCONTINUED | OUTPATIENT
Start: 2021-05-03 | End: 2021-05-03 | Stop reason: HOSPADM

## 2021-05-03 RX ORDER — SODIUM CHLORIDE 0.9 % (FLUSH) 0.9 %
3 SYRINGE (ML) INJECTION EVERY 12 HOURS SCHEDULED
Status: DISCONTINUED | OUTPATIENT
Start: 2021-05-03 | End: 2021-05-03 | Stop reason: HOSPADM

## 2021-05-03 RX ORDER — PROMETHAZINE HYDROCHLORIDE 25 MG/1
25 TABLET ORAL ONCE AS NEEDED
Status: DISCONTINUED | OUTPATIENT
Start: 2021-05-03 | End: 2021-05-03 | Stop reason: HOSPADM

## 2021-05-03 RX ORDER — CEFAZOLIN SODIUM 2 G/100ML
INJECTION, SOLUTION INTRAVENOUS AS NEEDED
Status: DISCONTINUED | OUTPATIENT
Start: 2021-05-03 | End: 2021-05-03 | Stop reason: SURG

## 2021-05-03 RX ORDER — ONDANSETRON 2 MG/ML
4 INJECTION INTRAMUSCULAR; INTRAVENOUS ONCE AS NEEDED
Status: DISCONTINUED | OUTPATIENT
Start: 2021-05-03 | End: 2021-05-03 | Stop reason: HOSPADM

## 2021-05-03 RX ORDER — LIDOCAINE HYDROCHLORIDE 10 MG/ML
0.5 INJECTION, SOLUTION EPIDURAL; INFILTRATION; INTRACAUDAL; PERINEURAL ONCE AS NEEDED
Status: DISCONTINUED | OUTPATIENT
Start: 2021-05-03 | End: 2021-05-03 | Stop reason: HOSPADM

## 2021-05-03 RX ORDER — ACETAMINOPHEN 325 MG/1
650 TABLET ORAL EVERY 4 HOURS PRN
Status: DISCONTINUED | OUTPATIENT
Start: 2021-05-03 | End: 2021-05-08 | Stop reason: HOSPADM

## 2021-05-03 RX ORDER — FENTANYL CITRATE 50 UG/ML
INJECTION, SOLUTION INTRAMUSCULAR; INTRAVENOUS AS NEEDED
Status: DISCONTINUED | OUTPATIENT
Start: 2021-05-03 | End: 2021-05-03 | Stop reason: SURG

## 2021-05-03 RX ORDER — NALOXONE HCL 0.4 MG/ML
0.1 VIAL (ML) INJECTION
Status: DISCONTINUED | OUTPATIENT
Start: 2021-05-03 | End: 2021-05-08 | Stop reason: HOSPADM

## 2021-05-03 RX ORDER — OXYCODONE AND ACETAMINOPHEN 7.5; 325 MG/1; MG/1
2 TABLET ORAL EVERY 4 HOURS PRN
Status: DISCONTINUED | OUTPATIENT
Start: 2021-05-03 | End: 2021-05-08 | Stop reason: HOSPADM

## 2021-05-03 RX ORDER — NEOSTIGMINE METHYLSULFATE 0.5 MG/ML
INJECTION, SOLUTION INTRAVENOUS AS NEEDED
Status: DISCONTINUED | OUTPATIENT
Start: 2021-05-03 | End: 2021-05-03 | Stop reason: SURG

## 2021-05-03 RX ORDER — SODIUM CHLORIDE, SODIUM LACTATE, POTASSIUM CHLORIDE, CALCIUM CHLORIDE 600; 310; 30; 20 MG/100ML; MG/100ML; MG/100ML; MG/100ML
9 INJECTION, SOLUTION INTRAVENOUS CONTINUOUS
Status: DISCONTINUED | OUTPATIENT
Start: 2021-05-03 | End: 2021-05-03

## 2021-05-03 RX ORDER — DIPHENHYDRAMINE HYDROCHLORIDE 50 MG/ML
12.5 INJECTION INTRAMUSCULAR; INTRAVENOUS
Status: DISCONTINUED | OUTPATIENT
Start: 2021-05-03 | End: 2021-05-03 | Stop reason: HOSPADM

## 2021-05-03 RX ORDER — PROMETHAZINE HYDROCHLORIDE 25 MG/1
25 SUPPOSITORY RECTAL ONCE AS NEEDED
Status: DISCONTINUED | OUTPATIENT
Start: 2021-05-03 | End: 2021-05-03 | Stop reason: HOSPADM

## 2021-05-03 RX ORDER — MIDAZOLAM HYDROCHLORIDE 1 MG/ML
2 INJECTION INTRAMUSCULAR; INTRAVENOUS
Status: COMPLETED | OUTPATIENT
Start: 2021-05-03 | End: 2021-05-03

## 2021-05-03 RX ORDER — AMOXICILLIN 250 MG
1 CAPSULE ORAL 2 TIMES DAILY
Status: DISCONTINUED | OUTPATIENT
Start: 2021-05-03 | End: 2021-05-08 | Stop reason: HOSPADM

## 2021-05-03 RX ORDER — SODIUM CHLORIDE 0.9 % (FLUSH) 0.9 %
3 SYRINGE (ML) INJECTION EVERY 12 HOURS SCHEDULED
Status: DISCONTINUED | OUTPATIENT
Start: 2021-05-03 | End: 2021-05-08 | Stop reason: HOSPADM

## 2021-05-03 RX ORDER — SODIUM CHLORIDE 0.9 % (FLUSH) 0.9 %
3-10 SYRINGE (ML) INJECTION AS NEEDED
Status: DISCONTINUED | OUTPATIENT
Start: 2021-05-03 | End: 2021-05-03 | Stop reason: HOSPADM

## 2021-05-03 RX ORDER — DIPHENHYDRAMINE HCL 25 MG
25 CAPSULE ORAL
Status: DISCONTINUED | OUTPATIENT
Start: 2021-05-03 | End: 2021-05-03 | Stop reason: HOSPADM

## 2021-05-03 RX ORDER — ONDANSETRON 4 MG/1
4 TABLET, FILM COATED ORAL EVERY 6 HOURS PRN
Status: DISCONTINUED | OUTPATIENT
Start: 2021-05-03 | End: 2021-05-08 | Stop reason: HOSPADM

## 2021-05-03 RX ORDER — LABETALOL HYDROCHLORIDE 5 MG/ML
5 INJECTION, SOLUTION INTRAVENOUS
Status: DISCONTINUED | OUTPATIENT
Start: 2021-05-03 | End: 2021-05-03 | Stop reason: HOSPADM

## 2021-05-03 RX ADMIN — SODIUM CHLORIDE, POTASSIUM CHLORIDE, SODIUM LACTATE AND CALCIUM CHLORIDE: 600; 310; 30; 20 INJECTION, SOLUTION INTRAVENOUS at 16:34

## 2021-05-03 RX ADMIN — SODIUM CHLORIDE, POTASSIUM CHLORIDE, SODIUM LACTATE AND CALCIUM CHLORIDE: 600; 310; 30; 20 INJECTION, SOLUTION INTRAVENOUS at 18:32

## 2021-05-03 RX ADMIN — KETAMINE HYDROCHLORIDE 10 MG: 10 INJECTION INTRAMUSCULAR; INTRAVENOUS at 19:48

## 2021-05-03 RX ADMIN — PHENYLEPHRINE HYDROCHLORIDE 200 MCG: 10 INJECTION INTRAVENOUS at 16:46

## 2021-05-03 RX ADMIN — OXYCODONE HYDROCHLORIDE AND ACETAMINOPHEN 2 TABLET: 7.5; 325 TABLET ORAL at 01:14

## 2021-05-03 RX ADMIN — DEXAMETHASONE SODIUM PHOSPHATE 8 MG: 4 INJECTION, SOLUTION INTRAMUSCULAR; INTRAVENOUS at 16:44

## 2021-05-03 RX ADMIN — NEOSTIGMINE METHYLSULFATE 2.5 MG: 0.5 INJECTION INTRAVENOUS at 21:07

## 2021-05-03 RX ADMIN — ROCURONIUM BROMIDE 10 MG: 50 INJECTION INTRAVENOUS at 17:49

## 2021-05-03 RX ADMIN — ROCURONIUM BROMIDE 20 MG: 50 INJECTION INTRAVENOUS at 19:20

## 2021-05-03 RX ADMIN — HYDROMORPHONE HYDROCHLORIDE 0.5 MG: 2 INJECTION, SOLUTION INTRAMUSCULAR; INTRAVENOUS; SUBCUTANEOUS at 20:27

## 2021-05-03 RX ADMIN — MIDAZOLAM 1 MG: 1 INJECTION INTRAMUSCULAR; INTRAVENOUS at 16:03

## 2021-05-03 RX ADMIN — HYDROMORPHONE HYDROCHLORIDE 0.5 MG: 1 INJECTION, SOLUTION INTRAMUSCULAR; INTRAVENOUS; SUBCUTANEOUS at 22:12

## 2021-05-03 RX ADMIN — SODIUM CHLORIDE, PRESERVATIVE FREE 3 ML: 5 INJECTION INTRAVENOUS at 08:57

## 2021-05-03 RX ADMIN — PHENYLEPHRINE HYDROCHLORIDE 100 MCG: 10 INJECTION INTRAVENOUS at 19:21

## 2021-05-03 RX ADMIN — ROCURONIUM BROMIDE 20 MG: 50 INJECTION INTRAVENOUS at 17:18

## 2021-05-03 RX ADMIN — FENTANYL CITRATE 100 MCG: 50 INJECTION INTRAMUSCULAR; INTRAVENOUS at 16:34

## 2021-05-03 RX ADMIN — FENTANYL CITRATE 100 MCG: 50 INJECTION, SOLUTION INTRAMUSCULAR; INTRAVENOUS at 16:13

## 2021-05-03 RX ADMIN — OXYCODONE HYDROCHLORIDE AND ACETAMINOPHEN 2 TABLET: 7.5; 325 TABLET ORAL at 06:18

## 2021-05-03 RX ADMIN — PHENYLEPHRINE HYDROCHLORIDE 100 MCG: 10 INJECTION INTRAVENOUS at 17:25

## 2021-05-03 RX ADMIN — ROCURONIUM BROMIDE 40 MG: 50 INJECTION INTRAVENOUS at 16:36

## 2021-05-03 RX ADMIN — SODIUM CHLORIDE, POTASSIUM CHLORIDE, SODIUM LACTATE AND CALCIUM CHLORIDE 9 ML/HR: 600; 310; 30; 20 INJECTION, SOLUTION INTRAVENOUS at 16:14

## 2021-05-03 RX ADMIN — SODIUM CHLORIDE, POTASSIUM CHLORIDE, SODIUM LACTATE AND CALCIUM CHLORIDE: 600; 310; 30; 20 INJECTION, SOLUTION INTRAVENOUS at 20:59

## 2021-05-03 RX ADMIN — CEFAZOLIN SODIUM 2 G: 2 INJECTION, SOLUTION INTRAVENOUS at 20:30

## 2021-05-03 RX ADMIN — HYDROMORPHONE HYDROCHLORIDE: 10 INJECTION, SOLUTION INTRAMUSCULAR; INTRAVENOUS; SUBCUTANEOUS at 21:44

## 2021-05-03 RX ADMIN — MIDAZOLAM 1 MG: 1 INJECTION INTRAMUSCULAR; INTRAVENOUS at 16:14

## 2021-05-03 RX ADMIN — KETAMINE HYDROCHLORIDE 10 MG: 10 INJECTION INTRAMUSCULAR; INTRAVENOUS at 20:18

## 2021-05-03 RX ADMIN — CEFAZOLIN SODIUM 2 G: 2 INJECTION, SOLUTION INTRAVENOUS at 17:53

## 2021-05-03 RX ADMIN — GLYCOPYRROLATE 0.4 MG: 0.2 INJECTION INTRAMUSCULAR; INTRAVENOUS at 21:07

## 2021-05-03 RX ADMIN — FENTANYL CITRATE 50 MCG: 50 INJECTION, SOLUTION INTRAMUSCULAR; INTRAVENOUS at 22:00

## 2021-05-03 RX ADMIN — ALBUMIN HUMAN: 0.05 INJECTION, SOLUTION INTRAVENOUS at 18:20

## 2021-05-03 RX ADMIN — LIDOCAINE HYDROCHLORIDE 60 MG: 20 INJECTION, SOLUTION INFILTRATION; PERINEURAL at 20:05

## 2021-05-03 RX ADMIN — LIDOCAINE HYDROCHLORIDE 100 MG: 20 INJECTION, SOLUTION INFILTRATION; PERINEURAL at 16:34

## 2021-05-03 RX ADMIN — LIDOCAINE HYDROCHLORIDE 60 MG: 20 INJECTION, SOLUTION INFILTRATION; PERINEURAL at 20:35

## 2021-05-03 RX ADMIN — HYDROMORPHONE HYDROCHLORIDE 2 MG: 1 INJECTION, SOLUTION INTRAMUSCULAR; INTRAVENOUS; SUBCUTANEOUS at 11:03

## 2021-05-03 RX ADMIN — PROPOFOL 150 MG: 10 INJECTION, EMULSION INTRAVENOUS at 16:36

## 2021-05-03 RX ADMIN — LEVOTHYROXINE SODIUM 88 MCG: 0.09 TABLET ORAL at 06:18

## 2021-05-03 NOTE — ANESTHESIA PROCEDURE NOTES
Airway  Urgency: elective    Date/Time: 5/3/2021 4:39 PM  Airway not difficult    General Information and Staff    Patient location during procedure: OR  Anesthesiologist: Higinio Hilliard MD  CRNA: Kermit Griffin CRNA    Indications and Patient Condition  Indications for airway management: airway protection    Preoxygenated: yes  Mask difficulty assessment: 1 - vent by mask    Final Airway Details  Final airway type: endotracheal airway      Successful airway: ETT  Cuffed: yes   Successful intubation technique: direct laryngoscopy  Endotracheal tube insertion site: oral  Blade: Walsh  Blade size: 2  ETT size (mm): 7.5  Cormack-Lehane Classification: grade I - full view of glottis  Placement verified by: chest auscultation and capnometry   Measured from: teeth  Number of attempts at approach: 1  Assessment: lips, teeth, and gum same as pre-op and atraumatic intubation    Additional Comments  Pre 02 100%, SIVI, DL x1, atraumatic intubation, BLBS, Positive ETC02.

## 2021-05-03 NOTE — ANESTHESIA PREPROCEDURE EVALUATION
Anesthesia Evaluation     NPO Solid Status: > 8 hours             Airway   Mallampati: II  TM distance: >3 FB  Neck ROM: full  Dental - normal exam     Pulmonary - normal exam   (+) sleep apnea (not on CPAP yet),   Cardiovascular - normal exam    (+) hypertension, hyperlipidemia,       Neuro/Psych  GI/Hepatic/Renal/Endo    (+) morbid obesity,  liver disease, diabetes mellitus,     Musculoskeletal     Abdominal    Substance History      OB/GYN          Other                          Anesthesia Plan    ASA 3     general   (Surgeon does not want left arm raised due to rtecent surgery on left shoulder)  intravenous induction     Anesthetic plan, all risks, benefits, and alternatives have been provided, discussed and informed consent has been obtained with: patient.

## 2021-05-04 LAB
ANION GAP SERPL CALCULATED.3IONS-SCNC: 8.5 MMOL/L (ref 5–15)
BASE EXCESS BLDA CALC-SCNC: 10 MMOL/L (ref -5–5)
BASE EXCESS BLDA CALC-SCNC: 13 MMOL/L (ref -5–5)
BH BB BLOOD EXPIRATION DATE: NORMAL
BH BB BLOOD EXPIRATION DATE: NORMAL
BH BB BLOOD TYPE BARCODE: 6200
BH BB BLOOD TYPE BARCODE: 6200
BH BB DISPENSE STATUS: NORMAL
BH BB DISPENSE STATUS: NORMAL
BH BB PRODUCT CODE: NORMAL
BH BB PRODUCT CODE: NORMAL
BH BB UNIT NUMBER: NORMAL
BH BB UNIT NUMBER: NORMAL
BUN SERPL-MCNC: 22 MG/DL (ref 6–20)
BUN/CREAT SERPL: 21.6 (ref 7–25)
CA-I BLDA-SCNC: ABNORMAL MMOL/L
CA-I BLDA-SCNC: ABNORMAL MMOL/L
CALCIUM SPEC-SCNC: 8 MG/DL (ref 8.6–10.5)
CHLORIDE SERPL-SCNC: 102 MMOL/L (ref 98–107)
CO2 BLDA-SCNC: 32 MMOL/L (ref 24–29)
CO2 BLDA-SCNC: 34 MMOL/L (ref 24–29)
CO2 SERPL-SCNC: 26.5 MMOL/L (ref 22–29)
CREAT SERPL-MCNC: 1.02 MG/DL (ref 0.57–1)
CROSSMATCH INTERPRETATION: NORMAL
CROSSMATCH INTERPRETATION: NORMAL
DEPRECATED RDW RBC AUTO: 46.6 FL (ref 37–54)
ERYTHROCYTE [DISTWIDTH] IN BLOOD BY AUTOMATED COUNT: 14.3 % (ref 12.3–15.4)
GFR SERPL CREATININE-BSD FRML MDRD: 56 ML/MIN/1.73
GLUCOSE BLDC GLUCOMTR-MCNC: 144 MG/DL (ref 70–130)
GLUCOSE BLDC GLUCOMTR-MCNC: 167 MG/DL (ref 70–130)
GLUCOSE BLDC GLUCOMTR-MCNC: 191 MG/DL (ref 70–130)
GLUCOSE BLDC GLUCOMTR-MCNC: 196 MG/DL (ref 70–130)
GLUCOSE BLDC GLUCOMTR-MCNC: 219 MG/DL (ref 70–130)
GLUCOSE BLDC GLUCOMTR-MCNC: 245 MG/DL (ref 70–130)
GLUCOSE SERPL-MCNC: 169 MG/DL (ref 65–99)
HCO3 BLDA-SCNC: 31 MMOL/L (ref 22–26)
HCO3 BLDA-SCNC: 32.8 MMOL/L (ref 22–26)
HCT VFR BLD AUTO: 20.7 % (ref 34–46.6)
HCT VFR BLD AUTO: 26.3 % (ref 34–46.6)
HCT VFR BLD AUTO: 31 % (ref 34–46.6)
HCT VFR BLDA CALC: 21 % (ref 38–51)
HCT VFR BLDA CALC: 31 % (ref 38–51)
HGB BLD-MCNC: 10 G/DL (ref 12–15.9)
HGB BLD-MCNC: 7.2 G/DL (ref 12–15.9)
HGB BLD-MCNC: 9.1 G/DL (ref 12–15.9)
HGB BLDA-MCNC: 10.5 G/DL (ref 12–17)
HGB BLDA-MCNC: 7.1 G/DL (ref 12–17)
MCH RBC QN AUTO: 31.2 PG (ref 26.6–33)
MCHC RBC AUTO-ENTMCNC: 34.6 G/DL (ref 31.5–35.7)
MCV RBC AUTO: 90.1 FL (ref 79–97)
PCO2 BLDA: 27 MM HG (ref 35–45)
PCO2 BLDA: 30.8 MM HG (ref 35–45)
PH BLDA: 7.61 PH UNITS (ref 7.35–7.6)
PH BLDA: 7.69 PH UNITS (ref 7.35–7.6)
PLATELET # BLD AUTO: 188 10*3/MM3 (ref 140–450)
PMV BLD AUTO: 8.9 FL (ref 6–12)
PO2 BLDA: 210 MMHG (ref 80–105)
PO2 BLDA: 228 MMHG (ref 80–105)
POTASSIUM BLDA-SCNC: 3.9 MMOL/L (ref 3.5–4.9)
POTASSIUM BLDA-SCNC: 4 MMOL/L (ref 3.5–4.9)
POTASSIUM SERPL-SCNC: 4.6 MMOL/L (ref 3.5–5.2)
RBC # BLD AUTO: 2.92 10*6/MM3 (ref 3.77–5.28)
SAO2 % BLDA: 100 % (ref 95–98)
SAO2 % BLDA: 100 % (ref 95–98)
SODIUM SERPL-SCNC: 137 MMOL/L (ref 136–145)
UNIT  ABO: NORMAL
UNIT  ABO: NORMAL
UNIT  RH: NORMAL
UNIT  RH: NORMAL
WBC # BLD AUTO: 11.23 10*3/MM3 (ref 3.4–10.8)

## 2021-05-04 PROCEDURE — 86900 BLOOD TYPING SEROLOGIC ABO: CPT

## 2021-05-04 PROCEDURE — 80048 BASIC METABOLIC PNL TOTAL CA: CPT | Performed by: ORTHOPAEDIC SURGERY

## 2021-05-04 PROCEDURE — 99024 POSTOP FOLLOW-UP VISIT: CPT | Performed by: ORTHOPAEDIC SURGERY

## 2021-05-04 PROCEDURE — 63710000001 INSULIN LISPRO (HUMAN) PER 5 UNITS: Performed by: ORTHOPAEDIC SURGERY

## 2021-05-04 PROCEDURE — 85018 HEMOGLOBIN: CPT | Performed by: NURSE PRACTITIONER

## 2021-05-04 PROCEDURE — 25010000003 CEFAZOLIN IN DEXTROSE 2-4 GM/100ML-% SOLUTION: Performed by: ORTHOPAEDIC SURGERY

## 2021-05-04 PROCEDURE — 82962 GLUCOSE BLOOD TEST: CPT

## 2021-05-04 PROCEDURE — 97110 THERAPEUTIC EXERCISES: CPT

## 2021-05-04 PROCEDURE — P9016 RBC LEUKOCYTES REDUCED: HCPCS

## 2021-05-04 PROCEDURE — 97161 PT EVAL LOW COMPLEX 20 MIN: CPT

## 2021-05-04 PROCEDURE — 85014 HEMATOCRIT: CPT | Performed by: NURSE PRACTITIONER

## 2021-05-04 PROCEDURE — 85027 COMPLETE CBC AUTOMATED: CPT | Performed by: ORTHOPAEDIC SURGERY

## 2021-05-04 PROCEDURE — 36430 TRANSFUSION BLD/BLD COMPNT: CPT

## 2021-05-04 RX ADMIN — ATORVASTATIN CALCIUM 40 MG: 20 TABLET, FILM COATED ORAL at 08:28

## 2021-05-04 RX ADMIN — SODIUM CHLORIDE, PRESERVATIVE FREE 3 ML: 5 INJECTION INTRAVENOUS at 08:30

## 2021-05-04 RX ADMIN — LOSARTAN POTASSIUM 100 MG: 100 TABLET, FILM COATED ORAL at 06:18

## 2021-05-04 RX ADMIN — LEVOTHYROXINE SODIUM 88 MCG: 0.09 TABLET ORAL at 06:18

## 2021-05-04 RX ADMIN — VENLAFAXINE HYDROCHLORIDE 75 MG: 75 TABLET ORAL at 21:34

## 2021-05-04 RX ADMIN — SODIUM CHLORIDE, PRESERVATIVE FREE 3 ML: 5 INJECTION INTRAVENOUS at 21:34

## 2021-05-04 RX ADMIN — OXYCODONE HYDROCHLORIDE AND ACETAMINOPHEN 2 TABLET: 7.5; 325 TABLET ORAL at 21:34

## 2021-05-04 RX ADMIN — SODIUM CHLORIDE, PRESERVATIVE FREE 3 ML: 5 INJECTION INTRAVENOUS at 23:07

## 2021-05-04 RX ADMIN — OXYCODONE HYDROCHLORIDE AND ACETAMINOPHEN 1 TABLET: 7.5; 325 TABLET ORAL at 11:10

## 2021-05-04 RX ADMIN — INSULIN LISPRO 2 UNITS: 100 INJECTION, SOLUTION INTRAVENOUS; SUBCUTANEOUS at 08:28

## 2021-05-04 RX ADMIN — POLYETHYLENE GLYCOL 3350 17 G: 17 POWDER, FOR SOLUTION ORAL at 08:28

## 2021-05-04 RX ADMIN — VENLAFAXINE HYDROCHLORIDE 75 MG: 75 TABLET ORAL at 08:28

## 2021-05-04 RX ADMIN — CEFAZOLIN SODIUM 2 G: 2 INJECTION, SOLUTION INTRAVENOUS at 02:35

## 2021-05-04 RX ADMIN — FUROSEMIDE 40 MG: 40 TABLET ORAL at 21:34

## 2021-05-04 RX ADMIN — DOCUSATE SODIUM 50MG AND SENNOSIDES 8.6MG 1 TABLET: 8.6; 5 TABLET, FILM COATED ORAL at 08:28

## 2021-05-04 RX ADMIN — OXYCODONE HYDROCHLORIDE AND ACETAMINOPHEN 2 TABLET: 7.5; 325 TABLET ORAL at 11:13

## 2021-05-04 RX ADMIN — OXYCODONE HYDROCHLORIDE AND ACETAMINOPHEN 2 TABLET: 7.5; 325 TABLET ORAL at 17:00

## 2021-05-04 RX ADMIN — CEFAZOLIN SODIUM 2 G: 2 INJECTION, SOLUTION INTRAVENOUS at 12:00

## 2021-05-04 RX ADMIN — INSULIN LISPRO 2 UNITS: 100 INJECTION, SOLUTION INTRAVENOUS; SUBCUTANEOUS at 11:30

## 2021-05-04 RX ADMIN — SODIUM CHLORIDE 100 ML/HR: 9 INJECTION, SOLUTION INTRAVENOUS at 00:36

## 2021-05-04 RX ADMIN — FUROSEMIDE 40 MG: 40 TABLET ORAL at 08:28

## 2021-05-04 RX ADMIN — DOCUSATE SODIUM 50MG AND SENNOSIDES 8.6MG 1 TABLET: 8.6; 5 TABLET, FILM COATED ORAL at 21:34

## 2021-05-04 RX ADMIN — INSULIN LISPRO 4 UNITS: 100 INJECTION, SOLUTION INTRAVENOUS; SUBCUTANEOUS at 17:46

## 2021-05-04 RX ADMIN — FAMOTIDINE 40 MG: 20 TABLET, FILM COATED ORAL at 08:28

## 2021-05-04 NOTE — ANESTHESIA POSTPROCEDURE EVALUATION
"Patient: Stefani Bhandari    Procedure Summary     Date: 05/03/21 Room / Location: [facility] OR 21 / [facility] MAIN OR    Anesthesia Start: 1629 Anesthesia Stop: 2135    Procedure: Irrigation and debridement lumbar spine, lumbar 3-lumbar 4, lumbar 4-lumbar 5, lumbar 5-left sacral 1 laminectomy and fusion with instrumentation (N/A Spine Lumbar) Diagnosis:     Surgeons: Dylan Clark MD Provider: Howard Chang DO    Anesthesia Type: general ASA Status: 3          Anesthesia Type: general    Vitals  Vitals Value Taken Time   /73 05/03/21 2215   Temp 37.1 °C (98.8 °F) 05/03/21 2215   Pulse 105 05/03/21 2225   Resp 16 05/03/21 2215   SpO2 98 % 05/03/21 2225   Vitals shown include unvalidated device data.        Post Anesthesia Care and Evaluation    Patient location during evaluation: bedside  Patient participation: complete - patient participated  Level of consciousness: awake and alert  Pain management: satisfactory to patient  Airway patency: patent  Anesthetic complications: No anesthetic complications  PONV Status: none  Cardiovascular status: acceptable and hemodynamically stable  Respiratory status: acceptable  Hydration status: acceptable    Comments: /76 (BP Location: Right arm, Patient Position: Lying)   Pulse 106   Temp 36.7 °C (98.1 °F) (Oral)   Resp 16   Ht 167.6 cm (66\")   Wt 112 kg (245 lb 14.4 oz)   SpO2 97%   BMI 39.69 kg/m²         "

## 2021-05-05 LAB
ALBUMIN SERPL-MCNC: 2.6 G/DL (ref 3.5–5.2)
ALBUMIN/GLOB SERPL: 0.7 G/DL
ALP SERPL-CCNC: 97 U/L (ref 39–117)
ALT SERPL W P-5'-P-CCNC: 18 U/L (ref 1–33)
ANION GAP SERPL CALCULATED.3IONS-SCNC: 10.3 MMOL/L (ref 5–15)
AST SERPL-CCNC: 42 U/L (ref 1–32)
BASOPHILS # BLD AUTO: 0.05 10*3/MM3 (ref 0–0.2)
BASOPHILS NFR BLD AUTO: 0.6 % (ref 0–1.5)
BH BB BLOOD EXPIRATION DATE: NORMAL
BH BB BLOOD EXPIRATION DATE: NORMAL
BH BB BLOOD TYPE BARCODE: 6200
BH BB BLOOD TYPE BARCODE: 6200
BH BB DISPENSE STATUS: NORMAL
BH BB DISPENSE STATUS: NORMAL
BH BB PRODUCT CODE: NORMAL
BH BB PRODUCT CODE: NORMAL
BH BB UNIT NUMBER: NORMAL
BH BB UNIT NUMBER: NORMAL
BILIRUB SERPL-MCNC: 0.6 MG/DL (ref 0–1.2)
BUN SERPL-MCNC: 30 MG/DL (ref 6–20)
BUN/CREAT SERPL: 25 (ref 7–25)
CALCIUM SPEC-SCNC: 7.9 MG/DL (ref 8.6–10.5)
CHLORIDE SERPL-SCNC: 101 MMOL/L (ref 98–107)
CO2 SERPL-SCNC: 23.7 MMOL/L (ref 22–29)
CREAT SERPL-MCNC: 1.2 MG/DL (ref 0.57–1)
CROSSMATCH INTERPRETATION: NORMAL
CROSSMATCH INTERPRETATION: NORMAL
CYTO UR: NORMAL
DEPRECATED RDW RBC AUTO: 50.3 FL (ref 37–54)
EOSINOPHIL # BLD AUTO: 0.5 10*3/MM3 (ref 0–0.4)
EOSINOPHIL NFR BLD AUTO: 5.7 % (ref 0.3–6.2)
ERYTHROCYTE [DISTWIDTH] IN BLOOD BY AUTOMATED COUNT: 14.5 % (ref 12.3–15.4)
GFR SERPL CREATININE-BSD FRML MDRD: 46 ML/MIN/1.73
GLOBULIN UR ELPH-MCNC: 3.6 GM/DL
GLUCOSE BLDC GLUCOMTR-MCNC: 169 MG/DL (ref 70–130)
GLUCOSE BLDC GLUCOMTR-MCNC: 187 MG/DL (ref 70–130)
GLUCOSE BLDC GLUCOMTR-MCNC: 191 MG/DL (ref 70–130)
GLUCOSE BLDC GLUCOMTR-MCNC: 214 MG/DL (ref 70–130)
GLUCOSE SERPL-MCNC: 184 MG/DL (ref 65–99)
HCT VFR BLD AUTO: 24.5 % (ref 34–46.6)
HGB BLD-MCNC: 7.8 G/DL (ref 12–15.9)
IMM GRANULOCYTES # BLD AUTO: 0.03 10*3/MM3 (ref 0–0.05)
IMM GRANULOCYTES NFR BLD AUTO: 0.3 % (ref 0–0.5)
LAB AP CASE REPORT: NORMAL
LYMPHOCYTES # BLD AUTO: 2.57 10*3/MM3 (ref 0.7–3.1)
LYMPHOCYTES NFR BLD AUTO: 29.1 % (ref 19.6–45.3)
MCH RBC QN AUTO: 30.5 PG (ref 26.6–33)
MCHC RBC AUTO-ENTMCNC: 31.8 G/DL (ref 31.5–35.7)
MCV RBC AUTO: 95.7 FL (ref 79–97)
MONOCYTES # BLD AUTO: 0.62 10*3/MM3 (ref 0.1–0.9)
MONOCYTES NFR BLD AUTO: 7 % (ref 5–12)
NEUTROPHILS NFR BLD AUTO: 5.06 10*3/MM3 (ref 1.7–7)
NEUTROPHILS NFR BLD AUTO: 57.3 % (ref 42.7–76)
NRBC BLD AUTO-RTO: 0 /100 WBC (ref 0–0.2)
PATH REPORT.FINAL DX SPEC: NORMAL
PATH REPORT.GROSS SPEC: NORMAL
PLATELET # BLD AUTO: 120 10*3/MM3 (ref 140–450)
PMV BLD AUTO: 9 FL (ref 6–12)
POTASSIUM SERPL-SCNC: 3.9 MMOL/L (ref 3.5–5.2)
PROT SERPL-MCNC: 6.2 G/DL (ref 6–8.5)
RBC # BLD AUTO: 2.56 10*6/MM3 (ref 3.77–5.28)
SODIUM SERPL-SCNC: 135 MMOL/L (ref 136–145)
UNIT  ABO: NORMAL
UNIT  ABO: NORMAL
UNIT  RH: NORMAL
UNIT  RH: NORMAL
WBC # BLD AUTO: 8.83 10*3/MM3 (ref 3.4–10.8)

## 2021-05-05 PROCEDURE — 25010000003 CEFAZOLIN IN DEXTROSE 2-4 GM/100ML-% SOLUTION: Performed by: INTERNAL MEDICINE

## 2021-05-05 PROCEDURE — P9016 RBC LEUKOCYTES REDUCED: HCPCS

## 2021-05-05 PROCEDURE — 86900 BLOOD TYPING SEROLOGIC ABO: CPT

## 2021-05-05 PROCEDURE — 82962 GLUCOSE BLOOD TEST: CPT

## 2021-05-05 PROCEDURE — 99232 SBSQ HOSP IP/OBS MODERATE 35: CPT | Performed by: INTERNAL MEDICINE

## 2021-05-05 PROCEDURE — 63710000001 INSULIN LISPRO (HUMAN) PER 5 UNITS: Performed by: ORTHOPAEDIC SURGERY

## 2021-05-05 PROCEDURE — 85025 COMPLETE CBC W/AUTO DIFF WBC: CPT | Performed by: ORTHOPAEDIC SURGERY

## 2021-05-05 PROCEDURE — 97110 THERAPEUTIC EXERCISES: CPT

## 2021-05-05 PROCEDURE — 80053 COMPREHEN METABOLIC PANEL: CPT | Performed by: NURSE PRACTITIONER

## 2021-05-05 PROCEDURE — 97116 GAIT TRAINING THERAPY: CPT

## 2021-05-05 PROCEDURE — 36430 TRANSFUSION BLD/BLD COMPNT: CPT

## 2021-05-05 PROCEDURE — 99024 POSTOP FOLLOW-UP VISIT: CPT | Performed by: ORTHOPAEDIC SURGERY

## 2021-05-05 RX ORDER — CEFAZOLIN SODIUM 2 G/100ML
2 INJECTION, SOLUTION INTRAVENOUS EVERY 8 HOURS
Status: DISCONTINUED | OUTPATIENT
Start: 2021-05-05 | End: 2021-05-08 | Stop reason: HOSPADM

## 2021-05-05 RX ADMIN — SODIUM CHLORIDE, PRESERVATIVE FREE 3 ML: 5 INJECTION INTRAVENOUS at 22:31

## 2021-05-05 RX ADMIN — LOSARTAN POTASSIUM 100 MG: 100 TABLET, FILM COATED ORAL at 08:00

## 2021-05-05 RX ADMIN — INSULIN LISPRO 2 UNITS: 100 INJECTION, SOLUTION INTRAVENOUS; SUBCUTANEOUS at 11:37

## 2021-05-05 RX ADMIN — DOCUSATE SODIUM 50MG AND SENNOSIDES 8.6MG 1 TABLET: 8.6; 5 TABLET, FILM COATED ORAL at 22:31

## 2021-05-05 RX ADMIN — OXYCODONE HYDROCHLORIDE AND ACETAMINOPHEN 2 TABLET: 7.5; 325 TABLET ORAL at 05:47

## 2021-05-05 RX ADMIN — CEFAZOLIN SODIUM 2 G: 2 INJECTION, SOLUTION INTRAVENOUS at 18:59

## 2021-05-05 RX ADMIN — INSULIN LISPRO 4 UNITS: 100 INJECTION, SOLUTION INTRAVENOUS; SUBCUTANEOUS at 17:30

## 2021-05-05 RX ADMIN — LEVOTHYROXINE SODIUM 88 MCG: 0.09 TABLET ORAL at 05:47

## 2021-05-05 RX ADMIN — SODIUM CHLORIDE, PRESERVATIVE FREE 3 ML: 5 INJECTION INTRAVENOUS at 08:17

## 2021-05-05 RX ADMIN — INSULIN LISPRO 2 UNITS: 100 INJECTION, SOLUTION INTRAVENOUS; SUBCUTANEOUS at 08:13

## 2021-05-05 RX ADMIN — OXYCODONE HYDROCHLORIDE AND ACETAMINOPHEN 2 TABLET: 7.5; 325 TABLET ORAL at 09:48

## 2021-05-05 RX ADMIN — CEFAZOLIN SODIUM 2 G: 2 INJECTION, SOLUTION INTRAVENOUS at 10:30

## 2021-05-05 RX ADMIN — FUROSEMIDE 40 MG: 40 TABLET ORAL at 08:18

## 2021-05-05 RX ADMIN — SODIUM CHLORIDE, PRESERVATIVE FREE 3 ML: 5 INJECTION INTRAVENOUS at 08:18

## 2021-05-05 RX ADMIN — OXYCODONE HYDROCHLORIDE AND ACETAMINOPHEN 2 TABLET: 7.5; 325 TABLET ORAL at 18:52

## 2021-05-05 RX ADMIN — DOCUSATE SODIUM 50MG AND SENNOSIDES 8.6MG 1 TABLET: 8.6; 5 TABLET, FILM COATED ORAL at 08:13

## 2021-05-05 RX ADMIN — SODIUM CHLORIDE, PRESERVATIVE FREE 3 ML: 5 INJECTION INTRAVENOUS at 23:13

## 2021-05-05 RX ADMIN — VENLAFAXINE HYDROCHLORIDE 75 MG: 75 TABLET ORAL at 08:13

## 2021-05-05 RX ADMIN — OXYCODONE HYDROCHLORIDE AND ACETAMINOPHEN 2 TABLET: 7.5; 325 TABLET ORAL at 23:12

## 2021-05-05 RX ADMIN — OXYCODONE HYDROCHLORIDE AND ACETAMINOPHEN 2 TABLET: 7.5; 325 TABLET ORAL at 01:49

## 2021-05-05 RX ADMIN — FAMOTIDINE 40 MG: 20 TABLET, FILM COATED ORAL at 08:13

## 2021-05-05 RX ADMIN — VENLAFAXINE HYDROCHLORIDE 75 MG: 75 TABLET ORAL at 22:31

## 2021-05-05 RX ADMIN — LEVOTHYROXINE SODIUM 88 MCG: 0.09 TABLET ORAL at 08:14

## 2021-05-05 RX ADMIN — OXYCODONE HYDROCHLORIDE AND ACETAMINOPHEN 2 TABLET: 7.5; 325 TABLET ORAL at 14:38

## 2021-05-05 RX ADMIN — ATORVASTATIN CALCIUM 40 MG: 20 TABLET, FILM COATED ORAL at 08:13

## 2021-05-05 RX ADMIN — POLYETHYLENE GLYCOL 3350 17 G: 17 POWDER, FOR SOLUTION ORAL at 08:13

## 2021-05-06 LAB
ALBUMIN SERPL-MCNC: 2.9 G/DL (ref 3.5–5.2)
ALBUMIN/GLOB SERPL: 0.7 G/DL
ALP SERPL-CCNC: 112 U/L (ref 39–117)
ALT SERPL W P-5'-P-CCNC: 19 U/L (ref 1–33)
ANION GAP SERPL CALCULATED.3IONS-SCNC: 9.9 MMOL/L (ref 5–15)
AST SERPL-CCNC: 41 U/L (ref 1–32)
BACTERIA SPEC AEROBE CULT: ABNORMAL
BACTERIA SPEC AEROBE CULT: ABNORMAL
BACTERIA SPEC AEROBE CULT: NORMAL
BASOPHILS # BLD AUTO: 0.05 10*3/MM3 (ref 0–0.2)
BASOPHILS NFR BLD AUTO: 0.6 % (ref 0–1.5)
BH BB BLOOD EXPIRATION DATE: NORMAL
BH BB BLOOD TYPE BARCODE: 6200
BH BB DISPENSE STATUS: NORMAL
BH BB PRODUCT CODE: NORMAL
BH BB UNIT NUMBER: NORMAL
BILIRUB SERPL-MCNC: 0.5 MG/DL (ref 0–1.2)
BUN SERPL-MCNC: 29 MG/DL (ref 6–20)
BUN/CREAT SERPL: 27.6 (ref 7–25)
CALCIUM SPEC-SCNC: 8.1 MG/DL (ref 8.6–10.5)
CHLORIDE SERPL-SCNC: 98 MMOL/L (ref 98–107)
CO2 SERPL-SCNC: 25.1 MMOL/L (ref 22–29)
CREAT SERPL-MCNC: 1.05 MG/DL (ref 0.57–1)
CROSSMATCH INTERPRETATION: NORMAL
DEPRECATED RDW RBC AUTO: 46.7 FL (ref 37–54)
EOSINOPHIL # BLD AUTO: 0.45 10*3/MM3 (ref 0–0.4)
EOSINOPHIL NFR BLD AUTO: 5 % (ref 0.3–6.2)
ERYTHROCYTE [DISTWIDTH] IN BLOOD BY AUTOMATED COUNT: 14.1 % (ref 12.3–15.4)
GFR SERPL CREATININE-BSD FRML MDRD: 54 ML/MIN/1.73
GLOBULIN UR ELPH-MCNC: 4 GM/DL
GLUCOSE BLDC GLUCOMTR-MCNC: 142 MG/DL (ref 70–130)
GLUCOSE BLDC GLUCOMTR-MCNC: 144 MG/DL (ref 70–130)
GLUCOSE BLDC GLUCOMTR-MCNC: 172 MG/DL (ref 70–130)
GLUCOSE BLDC GLUCOMTR-MCNC: 180 MG/DL (ref 70–130)
GLUCOSE SERPL-MCNC: 166 MG/DL (ref 65–99)
GRAM STN SPEC: ABNORMAL
GRAM STN SPEC: ABNORMAL
GRAM STN SPEC: NORMAL
HCT VFR BLD AUTO: 27.8 % (ref 34–46.6)
HGB BLD-MCNC: 9.2 G/DL (ref 12–15.9)
IMM GRANULOCYTES # BLD AUTO: 0.04 10*3/MM3 (ref 0–0.05)
IMM GRANULOCYTES NFR BLD AUTO: 0.4 % (ref 0–0.5)
LYMPHOCYTES # BLD AUTO: 2.03 10*3/MM3 (ref 0.7–3.1)
LYMPHOCYTES NFR BLD AUTO: 22.7 % (ref 19.6–45.3)
MCH RBC QN AUTO: 30.6 PG (ref 26.6–33)
MCHC RBC AUTO-ENTMCNC: 33.1 G/DL (ref 31.5–35.7)
MCV RBC AUTO: 92.4 FL (ref 79–97)
MONOCYTES # BLD AUTO: 0.6 10*3/MM3 (ref 0.1–0.9)
MONOCYTES NFR BLD AUTO: 6.7 % (ref 5–12)
NEUTROPHILS NFR BLD AUTO: 5.78 10*3/MM3 (ref 1.7–7)
NEUTROPHILS NFR BLD AUTO: 64.6 % (ref 42.7–76)
NRBC BLD AUTO-RTO: 0 /100 WBC (ref 0–0.2)
PLATELET # BLD AUTO: 129 10*3/MM3 (ref 140–450)
PMV BLD AUTO: 9.1 FL (ref 6–12)
POTASSIUM SERPL-SCNC: 4.4 MMOL/L (ref 3.5–5.2)
PROT SERPL-MCNC: 6.9 G/DL (ref 6–8.5)
RBC # BLD AUTO: 3.01 10*6/MM3 (ref 3.77–5.28)
SODIUM SERPL-SCNC: 133 MMOL/L (ref 136–145)
UNIT  ABO: NORMAL
UNIT  RH: NORMAL
WBC # BLD AUTO: 8.95 10*3/MM3 (ref 3.4–10.8)

## 2021-05-06 PROCEDURE — 80053 COMPREHEN METABOLIC PANEL: CPT | Performed by: NURSE PRACTITIONER

## 2021-05-06 PROCEDURE — 02HV33Z INSERTION OF INFUSION DEVICE INTO SUPERIOR VENA CAVA, PERCUTANEOUS APPROACH: ICD-10-PCS | Performed by: INTERNAL MEDICINE

## 2021-05-06 PROCEDURE — 85025 COMPLETE CBC W/AUTO DIFF WBC: CPT | Performed by: ORTHOPAEDIC SURGERY

## 2021-05-06 PROCEDURE — 97110 THERAPEUTIC EXERCISES: CPT

## 2021-05-06 PROCEDURE — 63710000001 INSULIN LISPRO (HUMAN) PER 5 UNITS: Performed by: ORTHOPAEDIC SURGERY

## 2021-05-06 PROCEDURE — B548ZZA ULTRASONOGRAPHY OF SUPERIOR VENA CAVA, GUIDANCE: ICD-10-PCS | Performed by: INTERNAL MEDICINE

## 2021-05-06 PROCEDURE — 25010000003 CEFAZOLIN IN DEXTROSE 2-4 GM/100ML-% SOLUTION: Performed by: INTERNAL MEDICINE

## 2021-05-06 PROCEDURE — C1751 CATH, INF, PER/CENT/MIDLINE: HCPCS

## 2021-05-06 PROCEDURE — 82962 GLUCOSE BLOOD TEST: CPT

## 2021-05-06 RX ORDER — SODIUM CHLORIDE 0.9 % (FLUSH) 0.9 %
20 SYRINGE (ML) INJECTION AS NEEDED
Status: DISCONTINUED | OUTPATIENT
Start: 2021-05-06 | End: 2021-05-08 | Stop reason: HOSPADM

## 2021-05-06 RX ORDER — SODIUM CHLORIDE 0.9 % (FLUSH) 0.9 %
10 SYRINGE (ML) INJECTION EVERY 12 HOURS SCHEDULED
Status: DISCONTINUED | OUTPATIENT
Start: 2021-05-06 | End: 2021-05-08 | Stop reason: HOSPADM

## 2021-05-06 RX ORDER — SODIUM CHLORIDE 0.9 % (FLUSH) 0.9 %
10 SYRINGE (ML) INJECTION AS NEEDED
Status: DISCONTINUED | OUTPATIENT
Start: 2021-05-06 | End: 2021-05-08 | Stop reason: HOSPADM

## 2021-05-06 RX ORDER — LOSARTAN POTASSIUM 100 MG/1
100 TABLET ORAL
Status: DISCONTINUED | OUTPATIENT
Start: 2021-05-07 | End: 2021-05-08 | Stop reason: HOSPADM

## 2021-05-06 RX ADMIN — ATORVASTATIN CALCIUM 40 MG: 20 TABLET, FILM COATED ORAL at 08:02

## 2021-05-06 RX ADMIN — OXYCODONE HYDROCHLORIDE AND ACETAMINOPHEN 2 TABLET: 7.5; 325 TABLET ORAL at 12:08

## 2021-05-06 RX ADMIN — VENLAFAXINE HYDROCHLORIDE 75 MG: 75 TABLET ORAL at 20:59

## 2021-05-06 RX ADMIN — DOCUSATE SODIUM 50MG AND SENNOSIDES 8.6MG 1 TABLET: 8.6; 5 TABLET, FILM COATED ORAL at 20:59

## 2021-05-06 RX ADMIN — OXYCODONE HYDROCHLORIDE AND ACETAMINOPHEN 2 TABLET: 7.5; 325 TABLET ORAL at 03:33

## 2021-05-06 RX ADMIN — OXYCODONE HYDROCHLORIDE AND ACETAMINOPHEN 2 TABLET: 7.5; 325 TABLET ORAL at 08:02

## 2021-05-06 RX ADMIN — SODIUM CHLORIDE, PRESERVATIVE FREE 3 ML: 5 INJECTION INTRAVENOUS at 08:03

## 2021-05-06 RX ADMIN — OXYCODONE HYDROCHLORIDE AND ACETAMINOPHEN 2 TABLET: 7.5; 325 TABLET ORAL at 16:16

## 2021-05-06 RX ADMIN — VENLAFAXINE HYDROCHLORIDE 75 MG: 75 TABLET ORAL at 08:03

## 2021-05-06 RX ADMIN — FAMOTIDINE 40 MG: 20 TABLET, FILM COATED ORAL at 08:03

## 2021-05-06 RX ADMIN — SODIUM CHLORIDE, PRESERVATIVE FREE 10 ML: 5 INJECTION INTRAVENOUS at 21:00

## 2021-05-06 RX ADMIN — SODIUM CHLORIDE, PRESERVATIVE FREE 10 ML: 5 INJECTION INTRAVENOUS at 12:09

## 2021-05-06 RX ADMIN — INSULIN LISPRO 2 UNITS: 100 INJECTION, SOLUTION INTRAVENOUS; SUBCUTANEOUS at 08:02

## 2021-05-06 RX ADMIN — CEFAZOLIN SODIUM 2 G: 2 INJECTION, SOLUTION INTRAVENOUS at 18:04

## 2021-05-06 RX ADMIN — CEFAZOLIN SODIUM 2 G: 2 INJECTION, SOLUTION INTRAVENOUS at 01:52

## 2021-05-06 RX ADMIN — OXYCODONE HYDROCHLORIDE AND ACETAMINOPHEN 2 TABLET: 7.5; 325 TABLET ORAL at 20:59

## 2021-05-06 RX ADMIN — INSULIN LISPRO 2 UNITS: 100 INJECTION, SOLUTION INTRAVENOUS; SUBCUTANEOUS at 16:16

## 2021-05-06 RX ADMIN — CEFAZOLIN SODIUM 2 G: 2 INJECTION, SOLUTION INTRAVENOUS at 11:47

## 2021-05-06 RX ADMIN — LEVOTHYROXINE SODIUM 88 MCG: 0.09 TABLET ORAL at 08:02

## 2021-05-07 LAB
ABO GROUP BLD: NORMAL
ABO GROUP BLD: NORMAL
ANION GAP SERPL CALCULATED.3IONS-SCNC: 8 MMOL/L (ref 5–15)
ANTI-E: NORMAL
ANTI-LITTLE C: NORMAL
BLD GP AB SCN SERPL QL: POSITIVE
BUN SERPL-MCNC: 22 MG/DL (ref 6–20)
BUN/CREAT SERPL: 25.6 (ref 7–25)
CALCIUM SPEC-SCNC: 7.8 MG/DL (ref 8.6–10.5)
CHLORIDE SERPL-SCNC: 100 MMOL/L (ref 98–107)
CO2 SERPL-SCNC: 25 MMOL/L (ref 22–29)
CREAT SERPL-MCNC: 0.86 MG/DL (ref 0.57–1)
DAT POLY-SP REAG RBC QL: NEGATIVE
DEPRECATED RDW RBC AUTO: 46.6 FL (ref 37–54)
ERYTHROCYTE [DISTWIDTH] IN BLOOD BY AUTOMATED COUNT: 14 % (ref 12.3–15.4)
GFR SERPL CREATININE-BSD FRML MDRD: 68 ML/MIN/1.73
GLUCOSE BLDC GLUCOMTR-MCNC: 142 MG/DL (ref 70–130)
GLUCOSE BLDC GLUCOMTR-MCNC: 145 MG/DL (ref 70–130)
GLUCOSE BLDC GLUCOMTR-MCNC: 145 MG/DL (ref 70–130)
GLUCOSE BLDC GLUCOMTR-MCNC: 236 MG/DL (ref 70–130)
GLUCOSE SERPL-MCNC: 153 MG/DL (ref 65–99)
HCT VFR BLD AUTO: 22.7 % (ref 34–46.6)
HGB BLD-MCNC: 7.6 G/DL (ref 12–15.9)
MCH RBC QN AUTO: 30.8 PG (ref 26.6–33)
MCHC RBC AUTO-ENTMCNC: 33.5 G/DL (ref 31.5–35.7)
MCV RBC AUTO: 91.9 FL (ref 79–97)
PLATELET # BLD AUTO: 91 10*3/MM3 (ref 140–450)
PMV BLD AUTO: 9.1 FL (ref 6–12)
POTASSIUM SERPL-SCNC: 3.6 MMOL/L (ref 3.5–5.2)
RBC # BLD AUTO: 2.47 10*6/MM3 (ref 3.77–5.28)
RH BLD: POSITIVE
RH BLD: POSITIVE
SODIUM SERPL-SCNC: 133 MMOL/L (ref 136–145)
T&S EXPIRATION DATE: NORMAL
WBC # BLD AUTO: 8.68 10*3/MM3 (ref 3.4–10.8)

## 2021-05-07 PROCEDURE — 86900 BLOOD TYPING SEROLOGIC ABO: CPT | Performed by: NURSE PRACTITIONER

## 2021-05-07 PROCEDURE — P9016 RBC LEUKOCYTES REDUCED: HCPCS

## 2021-05-07 PROCEDURE — 25010000003 CEFAZOLIN IN DEXTROSE 2-4 GM/100ML-% SOLUTION: Performed by: INTERNAL MEDICINE

## 2021-05-07 PROCEDURE — 82962 GLUCOSE BLOOD TEST: CPT

## 2021-05-07 PROCEDURE — 80048 BASIC METABOLIC PNL TOTAL CA: CPT | Performed by: NURSE PRACTITIONER

## 2021-05-07 PROCEDURE — 97530 THERAPEUTIC ACTIVITIES: CPT

## 2021-05-07 PROCEDURE — 86920 COMPATIBILITY TEST SPIN: CPT

## 2021-05-07 PROCEDURE — 86922 COMPATIBILITY TEST ANTIGLOB: CPT

## 2021-05-07 PROCEDURE — 86902 BLOOD TYPE ANTIGEN DONOR EA: CPT

## 2021-05-07 PROCEDURE — 36430 TRANSFUSION BLD/BLD COMPNT: CPT

## 2021-05-07 PROCEDURE — 86880 COOMBS TEST DIRECT: CPT | Performed by: NURSE PRACTITIONER

## 2021-05-07 PROCEDURE — 86900 BLOOD TYPING SEROLOGIC ABO: CPT

## 2021-05-07 PROCEDURE — 86901 BLOOD TYPING SEROLOGIC RH(D): CPT | Performed by: NURSE PRACTITIONER

## 2021-05-07 PROCEDURE — 86870 RBC ANTIBODY IDENTIFICATION: CPT | Performed by: NURSE PRACTITIONER

## 2021-05-07 PROCEDURE — 86850 RBC ANTIBODY SCREEN: CPT | Performed by: NURSE PRACTITIONER

## 2021-05-07 PROCEDURE — 85027 COMPLETE CBC AUTOMATED: CPT | Performed by: NURSE PRACTITIONER

## 2021-05-07 PROCEDURE — 63710000001 INSULIN LISPRO (HUMAN) PER 5 UNITS: Performed by: ORTHOPAEDIC SURGERY

## 2021-05-07 RX ORDER — OXYCODONE AND ACETAMINOPHEN 7.5; 325 MG/1; MG/1
1 TABLET ORAL EVERY 4 HOURS PRN
Qty: 50 TABLET | Refills: 0 | Status: SHIPPED | OUTPATIENT
Start: 2021-05-07 | End: 2021-05-10

## 2021-05-07 RX ORDER — CEFAZOLIN SODIUM 2 G/100ML
2 INJECTION, SOLUTION INTRAVENOUS EVERY 8 HOURS
Qty: 9000 ML | Refills: 1
Start: 2021-05-07 | End: 2021-06-17

## 2021-05-07 RX ORDER — AMOXICILLIN 250 MG
1 CAPSULE ORAL 2 TIMES DAILY
Qty: 60 TABLET | Refills: 0 | Status: SHIPPED | OUTPATIENT
Start: 2021-05-07 | End: 2021-06-17

## 2021-05-07 RX ORDER — ASPIRIN 325 MG
325 TABLET, DELAYED RELEASE (ENTERIC COATED) ORAL DAILY
Status: DISCONTINUED | OUTPATIENT
Start: 2021-05-08 | End: 2021-05-08 | Stop reason: HOSPADM

## 2021-05-07 RX ADMIN — ATORVASTATIN CALCIUM 40 MG: 20 TABLET, FILM COATED ORAL at 09:06

## 2021-05-07 RX ADMIN — OXYCODONE HYDROCHLORIDE AND ACETAMINOPHEN 2 TABLET: 7.5; 325 TABLET ORAL at 01:09

## 2021-05-07 RX ADMIN — CEFAZOLIN SODIUM 2 G: 2 INJECTION, SOLUTION INTRAVENOUS at 17:52

## 2021-05-07 RX ADMIN — OXYCODONE HYDROCHLORIDE AND ACETAMINOPHEN 2 TABLET: 7.5; 325 TABLET ORAL at 10:24

## 2021-05-07 RX ADMIN — OXYCODONE HYDROCHLORIDE AND ACETAMINOPHEN 2 TABLET: 7.5; 325 TABLET ORAL at 22:49

## 2021-05-07 RX ADMIN — SODIUM CHLORIDE, PRESERVATIVE FREE 10 ML: 5 INJECTION INTRAVENOUS at 20:00

## 2021-05-07 RX ADMIN — DOCUSATE SODIUM 50MG AND SENNOSIDES 8.6MG 1 TABLET: 8.6; 5 TABLET, FILM COATED ORAL at 21:57

## 2021-05-07 RX ADMIN — VENLAFAXINE HYDROCHLORIDE 75 MG: 75 TABLET ORAL at 21:57

## 2021-05-07 RX ADMIN — OXYCODONE HYDROCHLORIDE AND ACETAMINOPHEN 2 TABLET: 7.5; 325 TABLET ORAL at 06:12

## 2021-05-07 RX ADMIN — CEFAZOLIN SODIUM 2 G: 2 INJECTION, SOLUTION INTRAVENOUS at 02:21

## 2021-05-07 RX ADMIN — LOSARTAN POTASSIUM 100 MG: 100 TABLET, FILM COATED ORAL at 09:06

## 2021-05-07 RX ADMIN — OXYCODONE HYDROCHLORIDE AND ACETAMINOPHEN 1 TABLET: 7.5; 325 TABLET ORAL at 14:42

## 2021-05-07 RX ADMIN — CEFAZOLIN SODIUM 2 G: 2 INJECTION, SOLUTION INTRAVENOUS at 09:06

## 2021-05-07 RX ADMIN — OXYCODONE HYDROCHLORIDE AND ACETAMINOPHEN 2 TABLET: 7.5; 325 TABLET ORAL at 18:46

## 2021-05-07 RX ADMIN — FAMOTIDINE 40 MG: 20 TABLET, FILM COATED ORAL at 09:06

## 2021-05-07 RX ADMIN — INSULIN LISPRO 4 UNITS: 100 INJECTION, SOLUTION INTRAVENOUS; SUBCUTANEOUS at 12:16

## 2021-05-07 RX ADMIN — VENLAFAXINE HYDROCHLORIDE 75 MG: 75 TABLET ORAL at 09:06

## 2021-05-07 RX ADMIN — SODIUM CHLORIDE, PRESERVATIVE FREE 10 ML: 5 INJECTION INTRAVENOUS at 09:06

## 2021-05-07 RX ADMIN — LEVOTHYROXINE SODIUM 88 MCG: 0.09 TABLET ORAL at 06:12

## 2021-05-08 ENCOUNTER — READMISSION MANAGEMENT (OUTPATIENT)
Dept: CALL CENTER | Facility: HOSPITAL | Age: 56
End: 2021-05-08

## 2021-05-08 VITALS
OXYGEN SATURATION: 97 % | BODY MASS INDEX: 39.52 KG/M2 | SYSTOLIC BLOOD PRESSURE: 151 MMHG | RESPIRATION RATE: 18 BRPM | HEIGHT: 66 IN | TEMPERATURE: 97.3 F | WEIGHT: 245.9 LBS | DIASTOLIC BLOOD PRESSURE: 71 MMHG | HEART RATE: 74 BPM

## 2021-05-08 LAB
BACTERIA SPEC ANAEROBE CULT: NORMAL
BACTERIA SPEC ANAEROBE CULT: NORMAL
BASOPHILS # BLD AUTO: 0.04 10*3/MM3 (ref 0–0.2)
BASOPHILS NFR BLD AUTO: 0.5 % (ref 0–1.5)
BH BB BLOOD EXPIRATION DATE: NORMAL
BH BB BLOOD EXPIRATION DATE: NORMAL
BH BB BLOOD TYPE BARCODE: 6200
BH BB BLOOD TYPE BARCODE: 6200
BH BB DISPENSE STATUS: NORMAL
BH BB DISPENSE STATUS: NORMAL
BH BB PRODUCT CODE: NORMAL
BH BB PRODUCT CODE: NORMAL
BH BB UNIT NUMBER: NORMAL
BH BB UNIT NUMBER: NORMAL
CROSSMATCH INTERPRETATION: NORMAL
CROSSMATCH INTERPRETATION: NORMAL
DEPRECATED RDW RBC AUTO: 48.7 FL (ref 37–54)
EOSINOPHIL # BLD AUTO: 0.44 10*3/MM3 (ref 0–0.4)
EOSINOPHIL NFR BLD AUTO: 5.1 % (ref 0.3–6.2)
ERYTHROCYTE [DISTWIDTH] IN BLOOD BY AUTOMATED COUNT: 14.5 % (ref 12.3–15.4)
GLUCOSE BLDC GLUCOMTR-MCNC: 126 MG/DL (ref 70–130)
GLUCOSE BLDC GLUCOMTR-MCNC: 151 MG/DL (ref 70–130)
HCT VFR BLD AUTO: 27.7 % (ref 34–46.6)
HGB BLD-MCNC: 9.4 G/DL (ref 12–15.9)
IMM GRANULOCYTES # BLD AUTO: 0.04 10*3/MM3 (ref 0–0.05)
IMM GRANULOCYTES NFR BLD AUTO: 0.5 % (ref 0–0.5)
LYMPHOCYTES # BLD AUTO: 1.63 10*3/MM3 (ref 0.7–3.1)
LYMPHOCYTES NFR BLD AUTO: 19 % (ref 19.6–45.3)
MCH RBC QN AUTO: 31.4 PG (ref 26.6–33)
MCHC RBC AUTO-ENTMCNC: 33.9 G/DL (ref 31.5–35.7)
MCV RBC AUTO: 92.6 FL (ref 79–97)
MONOCYTES # BLD AUTO: 0.51 10*3/MM3 (ref 0.1–0.9)
MONOCYTES NFR BLD AUTO: 5.9 % (ref 5–12)
NEUTROPHILS NFR BLD AUTO: 5.92 10*3/MM3 (ref 1.7–7)
NEUTROPHILS NFR BLD AUTO: 69 % (ref 42.7–76)
NRBC BLD AUTO-RTO: 0 /100 WBC (ref 0–0.2)
PLATELET # BLD AUTO: 103 10*3/MM3 (ref 140–450)
PMV BLD AUTO: 9 FL (ref 6–12)
RBC # BLD AUTO: 2.99 10*6/MM3 (ref 3.77–5.28)
UNIT  ABO: NORMAL
UNIT  ABO: NORMAL
UNIT  RH: NORMAL
UNIT  RH: NORMAL
WBC # BLD AUTO: 8.58 10*3/MM3 (ref 3.4–10.8)

## 2021-05-08 PROCEDURE — 25010000003 CEFAZOLIN IN DEXTROSE 2-4 GM/100ML-% SOLUTION: Performed by: INTERNAL MEDICINE

## 2021-05-08 PROCEDURE — 86900 BLOOD TYPING SEROLOGIC ABO: CPT

## 2021-05-08 PROCEDURE — 82962 GLUCOSE BLOOD TEST: CPT

## 2021-05-08 PROCEDURE — 97110 THERAPEUTIC EXERCISES: CPT

## 2021-05-08 PROCEDURE — 63710000001 INSULIN LISPRO (HUMAN) PER 5 UNITS: Performed by: ORTHOPAEDIC SURGERY

## 2021-05-08 PROCEDURE — P9016 RBC LEUKOCYTES REDUCED: HCPCS

## 2021-05-08 PROCEDURE — 85025 COMPLETE CBC W/AUTO DIFF WBC: CPT | Performed by: NURSE PRACTITIONER

## 2021-05-08 PROCEDURE — 36430 TRANSFUSION BLD/BLD COMPNT: CPT

## 2021-05-08 RX ORDER — ASPIRIN 325 MG
325 TABLET, DELAYED RELEASE (ENTERIC COATED) ORAL DAILY
Qty: 30 TABLET | Refills: 0 | Status: SHIPPED | OUTPATIENT
Start: 2021-05-09 | End: 2022-04-08

## 2021-05-08 RX ADMIN — OXYCODONE HYDROCHLORIDE AND ACETAMINOPHEN 2 TABLET: 7.5; 325 TABLET ORAL at 12:03

## 2021-05-08 RX ADMIN — LEVOTHYROXINE SODIUM 88 MCG: 0.09 TABLET ORAL at 06:15

## 2021-05-08 RX ADMIN — ASPIRIN 325 MG: 325 TABLET, COATED ORAL at 08:13

## 2021-05-08 RX ADMIN — FAMOTIDINE 40 MG: 20 TABLET, FILM COATED ORAL at 08:12

## 2021-05-08 RX ADMIN — INSULIN LISPRO 2 UNITS: 100 INJECTION, SOLUTION INTRAVENOUS; SUBCUTANEOUS at 12:04

## 2021-05-08 RX ADMIN — SODIUM CHLORIDE, PRESERVATIVE FREE 10 ML: 5 INJECTION INTRAVENOUS at 08:14

## 2021-05-08 RX ADMIN — CEFAZOLIN SODIUM 2 G: 2 INJECTION, SOLUTION INTRAVENOUS at 10:27

## 2021-05-08 RX ADMIN — OXYCODONE HYDROCHLORIDE AND ACETAMINOPHEN 2 TABLET: 7.5; 325 TABLET ORAL at 08:13

## 2021-05-08 RX ADMIN — VENLAFAXINE HYDROCHLORIDE 75 MG: 75 TABLET ORAL at 08:12

## 2021-05-08 RX ADMIN — CEFAZOLIN SODIUM 2 G: 2 INJECTION, SOLUTION INTRAVENOUS at 04:03

## 2021-05-08 RX ADMIN — LOSARTAN POTASSIUM 100 MG: 100 TABLET, FILM COATED ORAL at 08:12

## 2021-05-08 RX ADMIN — OXYCODONE HYDROCHLORIDE AND ACETAMINOPHEN 2 TABLET: 7.5; 325 TABLET ORAL at 04:03

## 2021-05-08 RX ADMIN — ATORVASTATIN CALCIUM 40 MG: 20 TABLET, FILM COATED ORAL at 08:12

## 2021-05-08 NOTE — OUTREACH NOTE
Prep Survey      Responses   [facility] facility patient discharged from?  [facility]   Is LACE score < 7 ?  No   Emergency Room discharge w/ pulse ox?  No   Eligibility  [facility]   Date of Admission  04/27/21   Date of Discharge  05/08/21   Discharge Disposition  Home or Self Care   Discharge diagnosis  LUMBAR FUSION   Does the patient have one of the following disease processes/diagnoses(primary or secondary)?  General Surgery   Does the patient have Home health ordered?  Yes   What is the Home health agency?   [facility]   Is there a DME ordered?  Yes   What DME was ordered?   [facility] [place].   Prep survey completed?  Yes          Anne Cisneros RN

## 2021-05-10 ENCOUNTER — TRANSITIONAL CARE MANAGEMENT TELEPHONE ENCOUNTER (OUTPATIENT)
Dept: CALL CENTER | Facility: HOSPITAL | Age: 56
End: 2021-05-10

## 2021-05-10 ENCOUNTER — TELEPHONE (OUTPATIENT)
Dept: INFECTIOUS DISEASES | Facility: CLINIC | Age: 56
End: 2021-05-10

## 2021-05-10 ENCOUNTER — LAB REQUISITION (OUTPATIENT)
Dept: LAB | Facility: HOSPITAL | Age: 56
End: 2021-05-10

## 2021-05-10 ENCOUNTER — TELEPHONE (OUTPATIENT)
Dept: NEUROSURGERY | Facility: CLINIC | Age: 56
End: 2021-05-10

## 2021-05-10 DIAGNOSIS — Z00.00 ENCOUNTER FOR GENERAL ADULT MEDICAL EXAMINATION WITHOUT ABNORMAL FINDINGS: ICD-10-CM

## 2021-05-10 LAB
ADV 40+41 DNA STL QL NAA+NON-PROBE: NOT DETECTED
ASTRO TYP 1-8 RNA STL QL NAA+NON-PROBE: NOT DETECTED
BASOPHILS # BLD AUTO: 0.05 10*3/MM3 (ref 0–0.2)
BASOPHILS NFR BLD AUTO: 0.4 % (ref 0–1.5)
C CAYETANENSIS DNA STL QL NAA+NON-PROBE: NOT DETECTED
C COLI+JEJ+UPSA DNA STL QL NAA+NON-PROBE: NOT DETECTED
C DIFF TOX GENS STL QL NAA+PROBE: POSITIVE
CREAT SERPL-MCNC: 0.74 MG/DL (ref 0.57–1)
CRYPTOSP DNA STL QL NAA+NON-PROBE: NOT DETECTED
DEPRECATED RDW RBC AUTO: 48.2 FL (ref 37–54)
E HISTOLYT DNA STL QL NAA+NON-PROBE: NOT DETECTED
EAEC PAA PLAS AGGR+AATA ST NAA+NON-PRB: NOT DETECTED
EC STX1+STX2 GENES STL QL NAA+NON-PROBE: NOT DETECTED
EOSINOPHIL # BLD AUTO: 0.49 10*3/MM3 (ref 0–0.4)
EOSINOPHIL NFR BLD AUTO: 3.9 % (ref 0.3–6.2)
EPEC EAE GENE STL QL NAA+NON-PROBE: NOT DETECTED
ERYTHROCYTE [DISTWIDTH] IN BLOOD BY AUTOMATED COUNT: 14.4 % (ref 12.3–15.4)
ETEC LTA+ST1A+ST1B TOX ST NAA+NON-PROBE: NOT DETECTED
G LAMBLIA DNA STL QL NAA+NON-PROBE: NOT DETECTED
GFR SERPL CREATININE-BSD FRML MDRD: 81 ML/MIN/1.73
HCT VFR BLD AUTO: 31.3 % (ref 34–46.6)
HGB BLD-MCNC: 10.4 G/DL (ref 12–15.9)
IMM GRANULOCYTES # BLD AUTO: 0.07 10*3/MM3 (ref 0–0.05)
IMM GRANULOCYTES NFR BLD AUTO: 0.6 % (ref 0–0.5)
LYMPHOCYTES # BLD AUTO: 1.83 10*3/MM3 (ref 0.7–3.1)
LYMPHOCYTES NFR BLD AUTO: 14.7 % (ref 19.6–45.3)
MCH RBC QN AUTO: 30.9 PG (ref 26.6–33)
MCHC RBC AUTO-ENTMCNC: 33.2 G/DL (ref 31.5–35.7)
MCV RBC AUTO: 92.9 FL (ref 79–97)
MONOCYTES # BLD AUTO: 0.59 10*3/MM3 (ref 0.1–0.9)
MONOCYTES NFR BLD AUTO: 4.7 % (ref 5–12)
NEUTROPHILS NFR BLD AUTO: 75.7 % (ref 42.7–76)
NEUTROPHILS NFR BLD AUTO: 9.45 10*3/MM3 (ref 1.7–7)
NOROVIRUS GI+II RNA STL QL NAA+NON-PROBE: NOT DETECTED
NRBC BLD AUTO-RTO: 0 /100 WBC (ref 0–0.2)
P SHIGELLOIDES DNA STL QL NAA+NON-PROBE: NOT DETECTED
PLATELET # BLD AUTO: 142 10*3/MM3 (ref 140–450)
PMV BLD AUTO: 9.5 FL (ref 6–12)
RBC # BLD AUTO: 3.37 10*6/MM3 (ref 3.77–5.28)
RVA RNA STL QL NAA+NON-PROBE: NOT DETECTED
S ENT+BONG DNA STL QL NAA+NON-PROBE: NOT DETECTED
SAPO I+II+IV+V RNA STL QL NAA+NON-PROBE: NOT DETECTED
SHIGELLA SP+EIEC IPAH ST NAA+NON-PROBE: NOT DETECTED
V CHOL+PARA+VUL DNA STL QL NAA+NON-PROBE: NOT DETECTED
V CHOLERAE DNA STL QL NAA+NON-PROBE: NOT DETECTED
WBC # BLD AUTO: 12.48 10*3/MM3 (ref 3.4–10.8)
Y ENTEROCOL DNA STL QL NAA+NON-PROBE: NOT DETECTED

## 2021-05-10 PROCEDURE — 85025 COMPLETE CBC W/AUTO DIFF WBC: CPT | Performed by: INTERNAL MEDICINE

## 2021-05-10 PROCEDURE — 82565 ASSAY OF CREATININE: CPT | Performed by: INTERNAL MEDICINE

## 2021-05-10 PROCEDURE — 87493 C DIFF AMPLIFIED PROBE: CPT | Performed by: INTERNAL MEDICINE

## 2021-05-10 PROCEDURE — 0097U HC BIOFIRE FILMARRAY GI PANEL: CPT | Performed by: INTERNAL MEDICINE

## 2021-05-10 RX ORDER — VANCOMYCIN HYDROCHLORIDE 125 MG/1
125 CAPSULE ORAL 4 TIMES DAILY
Qty: 40 CAPSULE | Refills: 0 | Status: SHIPPED | OUTPATIENT
Start: 2021-05-10 | End: 2021-05-20

## 2021-05-10 NOTE — TELEPHONE ENCOUNTER
----- Message from Dianne Porras MD sent at 5/10/2021 11:27 AM EDT -----  I definitely want C. difficile stool sample which sounds like it is going to be collected today.  I will call in vancomycin 125 mg p.o. 4 times daily to her pharmacy.  As long as she feels like her mother is stable and able to stay hydrated she can remain at home.  Certainly if she is not keeping up with her hydration or is getting worse she would need to come to the hospital.  ----- Message -----  From: Marielena Metz MA  Sent: 5/10/2021  11:25 AM EDT  To: Dianne Porras MD      ----- Message -----  From: Miley Infante RegSched Rep  Sent: 5/10/2021  11:19 AM EDT  To: Marielena Metz MA    PATIENT WAS JUST DISCHARGED FROM HOSPITAL Saturday AND IS CURRENTLY ON IV ANTIBIOTICS. PATIENT STARTED EXPERIENCING URGENT, FREQUENT BOWEL MOVEMENTS Saturday EVENING. DAUGHTER THINKS CDIFF IS BACK AND WOULD LIKE TO KNOW IF THERE IS ANYTHING ELSE SHE CAN DO OR IF SHE NEEDS TO TAKE HER MOTHER BACK TO THE HOSPITAL. HOME HEALTH WILL BE AT HER HOME LATER TODAY TO COLLECT A STOOL SAMPLE.    KSENIA [phone]

## 2021-05-10 NOTE — OUTREACH NOTE
"Call Center TCM Note      Responses   [facility] patient discharged from?  [place]   Does the patient have one of the following disease processes/diagnoses(primary or secondary)?  General Surgery   TCM attempt successful?  Yes [[place] on verbal release ]   Call start time  1318   Call end time  1322   Meds reviewed with patient/caregiver?  Yes   Is the patient having any side effects they believe may be caused by any medication additions or changes?  No   Does the patient have all medications related to this admission filled (includes all antibiotics, pain medications, etc.)  Yes   Prescription comments  States Otilio will have her med filled by 1:30 today    Is the patient taking all medications as directed (includes completed medication regime)?  Yes   Has the patient kept scheduled appointments due by today?  N/A   Comments  Has a premade 4 mo fu apt with PCP but states daughter has called to see if they can switch that to a video visit due to her driving restrictions at this time    What is the Home health agency?   [facility]   Has home health visited the patient within 72 hours of discharge?  Yes   Home health comments   visited today per patient to take a sample of stool to test for C-DIff    What DME was ordered?   [facility] [place].   Has all DME been delivered?  Yes   Psychosocial issues?  No   Did the patient receive a copy of their discharge instructions?  Yes   Nursing interventions  Reviewed instructions with patient   What is the patient's perception of their health status since discharge?  New symptoms unrelated to diagnosis [\"They think I have C-Diff-the  nurse took a stool sample today to have it tested\" ]   Nursing interventions  Nurse provided patient education   Is the patient /caregiver able to teach back basic post-op care?  Continue use of incentive spirometry at least 1 week post discharge, Practice 'cough and deep breath', Drive as instructed by MD in discharge " instructions   Is the patient/caregiver able to teach back signs and symptoms of incisional infection?  Increased redness, swelling or pain at the incisonal site, Increased drainage or bleeding   Is the patient/caregiver able to teach back steps to recovery at home?  Set small, achievable goals for return to baseline health, Rest and rebuild strength, gradually increase activity   If the patient is a current smoker, are they able to teach back resources for cessation?  Not a smoker   Is the patient/caregiver able to teach back the hierarchy of who to call/visit for symptoms/problems? PCP, Specialist, Home health nurse, Urgent Care, ED, 911  Yes   TCM call completed?  Yes          Deja Brooks RN    5/10/2021, 13:25 EDT

## 2021-05-11 NOTE — PROGRESS NOTES
Pt is now scheduled for hospital follow up on 5/21 and waiting to hear from Dr. Martinez about visit on 5/14/21.

## 2021-05-17 ENCOUNTER — LAB REQUISITION (OUTPATIENT)
Dept: LAB | Facility: HOSPITAL | Age: 56
End: 2021-05-17

## 2021-05-17 DIAGNOSIS — Z00.00 ENCOUNTER FOR GENERAL ADULT MEDICAL EXAMINATION WITHOUT ABNORMAL FINDINGS: ICD-10-CM

## 2021-05-17 LAB
BASOPHILS # BLD AUTO: 0.03 10*3/MM3 (ref 0–0.2)
BASOPHILS NFR BLD AUTO: 0.7 % (ref 0–1.5)
CREAT SERPL-MCNC: 0.73 MG/DL (ref 0.57–1)
DEPRECATED RDW RBC AUTO: 48.8 FL (ref 37–54)
EOSINOPHIL # BLD AUTO: 0.29 10*3/MM3 (ref 0–0.4)
EOSINOPHIL NFR BLD AUTO: 6.6 % (ref 0.3–6.2)
ERYTHROCYTE [DISTWIDTH] IN BLOOD BY AUTOMATED COUNT: 14.3 % (ref 12.3–15.4)
GFR SERPL CREATININE-BSD FRML MDRD: 82 ML/MIN/1.73
HCT VFR BLD AUTO: 28.9 % (ref 34–46.6)
HGB BLD-MCNC: 9.4 G/DL (ref 12–15.9)
IMM GRANULOCYTES # BLD AUTO: 0.01 10*3/MM3 (ref 0–0.05)
IMM GRANULOCYTES NFR BLD AUTO: 0.2 % (ref 0–0.5)
LYMPHOCYTES # BLD AUTO: 1.37 10*3/MM3 (ref 0.7–3.1)
LYMPHOCYTES NFR BLD AUTO: 31.1 % (ref 19.6–45.3)
MCH RBC QN AUTO: 30.8 PG (ref 26.6–33)
MCHC RBC AUTO-ENTMCNC: 32.5 G/DL (ref 31.5–35.7)
MCV RBC AUTO: 94.8 FL (ref 79–97)
MONOCYTES # BLD AUTO: 0.22 10*3/MM3 (ref 0.1–0.9)
MONOCYTES NFR BLD AUTO: 5 % (ref 5–12)
NEUTROPHILS NFR BLD AUTO: 2.48 10*3/MM3 (ref 1.7–7)
NEUTROPHILS NFR BLD AUTO: 56.4 % (ref 42.7–76)
NRBC BLD AUTO-RTO: 0 /100 WBC (ref 0–0.2)
PLATELET # BLD AUTO: 135 10*3/MM3 (ref 140–450)
PMV BLD AUTO: 9.5 FL (ref 6–12)
RBC # BLD AUTO: 3.05 10*6/MM3 (ref 3.77–5.28)
WBC # BLD AUTO: 4.4 10*3/MM3 (ref 3.4–10.8)

## 2021-05-17 PROCEDURE — 85025 COMPLETE CBC W/AUTO DIFF WBC: CPT | Performed by: INTERNAL MEDICINE

## 2021-05-17 PROCEDURE — 82565 ASSAY OF CREATININE: CPT | Performed by: INTERNAL MEDICINE

## 2021-05-18 ENCOUNTER — READMISSION MANAGEMENT (OUTPATIENT)
Dept: CALL CENTER | Facility: HOSPITAL | Age: 56
End: 2021-05-18

## 2021-05-18 NOTE — OUTREACH NOTE
General Surgery Week 2 Survey      Responses   [facility] patient discharged from?  [place]   Does the patient have one of the following disease processes/diagnoses(primary or secondary)?  General Surgery   Week 2 attempt successful?  Yes   Call start time  1305   Call end time  1315   Discharge diagnosis  prosthetic shoulder infection, discitis of lumbar region, I&D lumbar spine   Is patient permission given to speak with other caregiver?  Yes   List who call center can speak with  Katlyn, daughter   Person spoke with today (if not patient) and relationship  Katlyn, daughter   Medication alerts for this patient  continued home IV antibiotics.    Meds reviewed with patient/caregiver?  Yes   Is the patient having any side effects they believe may be caused by any medication additions or changes?  No   Does the patient have all medications related to this admission filled (includes all antibiotics, pain medications, etc.)  Yes   Is the patient taking all medications as directed (includes completed medication regime)?  Yes   Does the patient have a follow up appointment scheduled with their surgeon?  Yes   Has the patient kept scheduled appointments due by today?  N/A   What is the Home health agency?   [facility]   Has home health visited the patient within 72 hours of discharge?  Yes   What DME was ordered?   [facility] [place].   Has all DME been delivered?  Yes   Psychosocial issues?  No   Did the patient receive a copy of their discharge instructions?  Yes   Nursing interventions  Reviewed instructions with patient   What is the patient's perception of their health status since discharge?  Improving   Is the patient/caregiver able to teach back signs and symptoms of incisional infection?  Increased redness, swelling or pain at the incisonal site, Increased drainage or bleeding, Incisional warmth, Pus or odor from incision, Fever   Is the patient/caregiver able to teach back steps to recovery at home?  Set  small, achievable goals for return to baseline health, Rest and rebuild strength, gradually increase activity   If the patient is a current smoker, are they able to teach back resources for cessation?  Not a smoker   Is the patient/caregiver able to teach back the hierarchy of who to call/visit for symptoms/problems? PCP, Specialist, Home health nurse, Urgent Care, ED, 911  Yes   Week 2 call completed?  Yes          Anne Schroeder RN

## 2021-05-21 ENCOUNTER — OFFICE VISIT (OUTPATIENT)
Dept: FAMILY MEDICINE CLINIC | Facility: CLINIC | Age: 56
End: 2021-05-21

## 2021-05-21 VITALS
HEART RATE: 96 BPM | OXYGEN SATURATION: 99 % | BODY MASS INDEX: 40.47 KG/M2 | TEMPERATURE: 98.4 F | WEIGHT: 251.8 LBS | HEIGHT: 66 IN

## 2021-05-21 DIAGNOSIS — T84.59XS INFECTION OF PROSTHETIC SHOULDER JOINT, SEQUELA: ICD-10-CM

## 2021-05-21 DIAGNOSIS — Z09 HOSPITAL DISCHARGE FOLLOW-UP: Primary | ICD-10-CM

## 2021-05-21 DIAGNOSIS — I10 ESSENTIAL HYPERTENSION: ICD-10-CM

## 2021-05-21 DIAGNOSIS — R60.1 ANASARCA: ICD-10-CM

## 2021-05-21 DIAGNOSIS — E11.69 TYPE 2 DIABETES MELLITUS WITH OTHER SPECIFIED COMPLICATION, WITHOUT LONG-TERM CURRENT USE OF INSULIN (HCC): ICD-10-CM

## 2021-05-21 DIAGNOSIS — R78.81 MSSA BACTEREMIA: ICD-10-CM

## 2021-05-21 DIAGNOSIS — B95.61 MSSA BACTEREMIA: ICD-10-CM

## 2021-05-21 DIAGNOSIS — M46.46 DISCITIS OF LUMBAR REGION: ICD-10-CM

## 2021-05-21 DIAGNOSIS — M86.8X1: ICD-10-CM

## 2021-05-21 DIAGNOSIS — K74.60 LIVER CIRRHOSIS SECONDARY TO NASH (HCC): ICD-10-CM

## 2021-05-21 DIAGNOSIS — E03.9 HYPOTHYROIDISM (ACQUIRED): ICD-10-CM

## 2021-05-21 DIAGNOSIS — A04.72 CLOSTRIDIUM DIFFICILE COLITIS: ICD-10-CM

## 2021-05-21 DIAGNOSIS — Z96.619 INFECTION OF PROSTHETIC SHOULDER JOINT, SEQUELA: ICD-10-CM

## 2021-05-21 DIAGNOSIS — K75.81 LIVER CIRRHOSIS SECONDARY TO NASH (HCC): ICD-10-CM

## 2021-05-21 DIAGNOSIS — E78.2 MIXED HYPERLIPIDEMIA: ICD-10-CM

## 2021-05-21 PROBLEM — D64.9 SYMPTOMATIC ANEMIA: Status: RESOLVED | Noted: 2021-02-08 | Resolved: 2021-05-21

## 2021-05-21 PROBLEM — E87.1 HYPONATREMIA: Status: RESOLVED | Noted: 2021-01-24 | Resolved: 2021-05-21

## 2021-05-21 PROCEDURE — 99495 TRANSJ CARE MGMT MOD F2F 14D: CPT | Performed by: FAMILY MEDICINE

## 2021-05-21 RX ORDER — OXYCODONE AND ACETAMINOPHEN 10; 325 MG/1; MG/1
1 TABLET ORAL EVERY 8 HOURS PRN
COMMUNITY
Start: 2021-05-20

## 2021-05-21 RX ORDER — EPINEPHRINE 0.3 MG/.3ML
SOLUTION INTRAMUSCULAR; SUBCUTANEOUS
COMMUNITY
Start: 2021-05-07 | End: 2021-06-17

## 2021-05-24 ENCOUNTER — LAB REQUISITION (OUTPATIENT)
Dept: LAB | Facility: HOSPITAL | Age: 56
End: 2021-05-24

## 2021-05-24 DIAGNOSIS — Z00.00 ENCOUNTER FOR GENERAL ADULT MEDICAL EXAMINATION WITHOUT ABNORMAL FINDINGS: ICD-10-CM

## 2021-05-24 LAB
BASOPHILS # BLD AUTO: 0.04 10*3/MM3 (ref 0–0.2)
BASOPHILS NFR BLD AUTO: 0.9 % (ref 0–1.5)
CREAT SERPL-MCNC: 0.85 MG/DL (ref 0.57–1)
DEPRECATED RDW RBC AUTO: 49.1 FL (ref 37–54)
EOSINOPHIL # BLD AUTO: 0.31 10*3/MM3 (ref 0–0.4)
EOSINOPHIL NFR BLD AUTO: 6.8 % (ref 0.3–6.2)
ERYTHROCYTE [DISTWIDTH] IN BLOOD BY AUTOMATED COUNT: 14.5 % (ref 12.3–15.4)
GFR SERPL CREATININE-BSD FRML MDRD: 69 ML/MIN/1.73
HCT VFR BLD AUTO: 28.4 % (ref 34–46.6)
HGB BLD-MCNC: 9.4 G/DL (ref 12–15.9)
LYMPHOCYTES # BLD AUTO: 1.43 10*3/MM3 (ref 0.7–3.1)
LYMPHOCYTES NFR BLD AUTO: 31.4 % (ref 19.6–45.3)
MCH RBC QN AUTO: 31.1 PG (ref 26.6–33)
MCHC RBC AUTO-ENTMCNC: 33.1 G/DL (ref 31.5–35.7)
MCV RBC AUTO: 94 FL (ref 79–97)
MONOCYTES # BLD AUTO: 0.25 10*3/MM3 (ref 0.1–0.9)
MONOCYTES NFR BLD AUTO: 5.5 % (ref 5–12)
NEUTROPHILS NFR BLD AUTO: 2.52 10*3/MM3 (ref 1.7–7)
NEUTROPHILS NFR BLD AUTO: 55.2 % (ref 42.7–76)
PLATELET # BLD AUTO: 138 10*3/MM3 (ref 140–450)
PMV BLD AUTO: 9.7 FL (ref 6–12)
RBC # BLD AUTO: 3.02 10*6/MM3 (ref 3.77–5.28)
WBC # BLD AUTO: 4.56 10*3/MM3 (ref 3.4–10.8)

## 2021-05-24 PROCEDURE — 85025 COMPLETE CBC W/AUTO DIFF WBC: CPT | Performed by: INTERNAL MEDICINE

## 2021-05-24 PROCEDURE — 82565 ASSAY OF CREATININE: CPT | Performed by: INTERNAL MEDICINE

## 2021-05-26 ENCOUNTER — READMISSION MANAGEMENT (OUTPATIENT)
Dept: CALL CENTER | Facility: HOSPITAL | Age: 56
End: 2021-05-26

## 2021-05-26 ENCOUNTER — OFFICE VISIT (OUTPATIENT)
Dept: ORTHOPEDIC SURGERY | Facility: CLINIC | Age: 56
End: 2021-05-26

## 2021-05-26 VITALS — BODY MASS INDEX: 40.34 KG/M2 | TEMPERATURE: 97.5 F | WEIGHT: 251 LBS | HEIGHT: 66 IN

## 2021-05-26 DIAGNOSIS — M43.16 SPONDYLOLISTHESIS OF LUMBAR REGION: ICD-10-CM

## 2021-05-26 DIAGNOSIS — M48.062 SPINAL STENOSIS OF LUMBAR REGION WITH NEUROGENIC CLAUDICATION: ICD-10-CM

## 2021-05-26 DIAGNOSIS — R52 PAIN: Primary | ICD-10-CM

## 2021-05-26 DIAGNOSIS — M46.26 ACUTE OSTEOMYELITIS OF LUMBAR SPINE (HCC): ICD-10-CM

## 2021-05-26 PROCEDURE — 99024 POSTOP FOLLOW-UP VISIT: CPT | Performed by: ORTHOPAEDIC SURGERY

## 2021-05-26 PROCEDURE — 72100 X-RAY EXAM L-S SPINE 2/3 VWS: CPT | Performed by: ORTHOPAEDIC SURGERY

## 2021-05-26 NOTE — OUTREACH NOTE
General Surgery Week 3 Survey      Responses   [facility] patient discharged from?  [place]   Does the patient have one of the following disease processes/diagnoses(primary or secondary)?  General Surgery   Week 3 attempt successful?  No   Unsuccessful attempts  Attempt 1          Deepti Goodman LPN

## 2021-06-01 ENCOUNTER — LAB REQUISITION (OUTPATIENT)
Dept: LAB | Facility: HOSPITAL | Age: 56
End: 2021-06-01

## 2021-06-01 DIAGNOSIS — Z00.00 ENCOUNTER FOR GENERAL ADULT MEDICAL EXAMINATION WITHOUT ABNORMAL FINDINGS: ICD-10-CM

## 2021-06-01 LAB
BASOPHILS # BLD AUTO: 0.05 10*3/MM3 (ref 0–0.2)
BASOPHILS NFR BLD AUTO: 1.2 % (ref 0–1.5)
CREAT SERPL-MCNC: 0.88 MG/DL (ref 0.57–1)
DEPRECATED RDW RBC AUTO: 51.2 FL (ref 37–54)
EOSINOPHIL # BLD AUTO: 0.3 10*3/MM3 (ref 0–0.4)
EOSINOPHIL NFR BLD AUTO: 7.4 % (ref 0.3–6.2)
ERYTHROCYTE [DISTWIDTH] IN BLOOD BY AUTOMATED COUNT: 14.3 % (ref 12.3–15.4)
GFR SERPL CREATININE-BSD FRML MDRD: 66 ML/MIN/1.73
HCT VFR BLD AUTO: 31.4 % (ref 34–46.6)
HGB BLD-MCNC: 10.2 G/DL (ref 12–15.9)
IMM GRANULOCYTES # BLD AUTO: 0.01 10*3/MM3 (ref 0–0.05)
IMM GRANULOCYTES NFR BLD AUTO: 0.2 % (ref 0–0.5)
LYMPHOCYTES # BLD AUTO: 1.29 10*3/MM3 (ref 0.7–3.1)
LYMPHOCYTES NFR BLD AUTO: 31.9 % (ref 19.6–45.3)
MCH RBC QN AUTO: 31.6 PG (ref 26.6–33)
MCHC RBC AUTO-ENTMCNC: 32.5 G/DL (ref 31.5–35.7)
MCV RBC AUTO: 97.2 FL (ref 79–97)
MONOCYTES # BLD AUTO: 0.33 10*3/MM3 (ref 0.1–0.9)
MONOCYTES NFR BLD AUTO: 8.1 % (ref 5–12)
NEUTROPHILS NFR BLD AUTO: 2.07 10*3/MM3 (ref 1.7–7)
NEUTROPHILS NFR BLD AUTO: 51.2 % (ref 42.7–76)
NRBC BLD AUTO-RTO: 0 /100 WBC (ref 0–0.2)
PLATELET # BLD AUTO: 108 10*3/MM3 (ref 140–450)
PMV BLD AUTO: 9.9 FL (ref 6–12)
RBC # BLD AUTO: 3.23 10*6/MM3 (ref 3.77–5.28)
WBC # BLD AUTO: 4.05 10*3/MM3 (ref 3.4–10.8)

## 2021-06-01 PROCEDURE — 85025 COMPLETE CBC W/AUTO DIFF WBC: CPT | Performed by: INTERNAL MEDICINE

## 2021-06-01 PROCEDURE — 82565 ASSAY OF CREATININE: CPT | Performed by: INTERNAL MEDICINE

## 2021-06-07 ENCOUNTER — LAB REQUISITION (OUTPATIENT)
Dept: LAB | Facility: HOSPITAL | Age: 56
End: 2021-06-07

## 2021-06-07 DIAGNOSIS — Z00.00 ENCOUNTER FOR GENERAL ADULT MEDICAL EXAMINATION WITHOUT ABNORMAL FINDINGS: ICD-10-CM

## 2021-06-07 LAB
BASOPHILS # BLD AUTO: 0.05 10*3/MM3 (ref 0–0.2)
BASOPHILS NFR BLD AUTO: 0.7 % (ref 0–1.5)
CREAT SERPL-MCNC: 1.05 MG/DL (ref 0.57–1)
DEPRECATED RDW RBC AUTO: 45.5 FL (ref 37–54)
EOSINOPHIL # BLD AUTO: 0.33 10*3/MM3 (ref 0–0.4)
EOSINOPHIL NFR BLD AUTO: 4.9 % (ref 0.3–6.2)
ERYTHROCYTE [DISTWIDTH] IN BLOOD BY AUTOMATED COUNT: 13.8 % (ref 12.3–15.4)
GFR SERPL CREATININE-BSD FRML MDRD: 54 ML/MIN/1.73
HCT VFR BLD AUTO: 27.7 % (ref 34–46.6)
HGB BLD-MCNC: 9.4 G/DL (ref 12–15.9)
IMM GRANULOCYTES # BLD AUTO: 0.02 10*3/MM3 (ref 0–0.05)
IMM GRANULOCYTES NFR BLD AUTO: 0.3 % (ref 0–0.5)
LYMPHOCYTES # BLD AUTO: 1.94 10*3/MM3 (ref 0.7–3.1)
LYMPHOCYTES NFR BLD AUTO: 29 % (ref 19.6–45.3)
MCH RBC QN AUTO: 31 PG (ref 26.6–33)
MCHC RBC AUTO-ENTMCNC: 33.9 G/DL (ref 31.5–35.7)
MCV RBC AUTO: 91.4 FL (ref 79–97)
MONOCYTES # BLD AUTO: 0.53 10*3/MM3 (ref 0.1–0.9)
MONOCYTES NFR BLD AUTO: 7.9 % (ref 5–12)
NEUTROPHILS NFR BLD AUTO: 3.81 10*3/MM3 (ref 1.7–7)
NEUTROPHILS NFR BLD AUTO: 57.2 % (ref 42.7–76)
NRBC BLD AUTO-RTO: 0 /100 WBC (ref 0–0.2)
PLATELET # BLD AUTO: 144 10*3/MM3 (ref 140–450)
PMV BLD AUTO: 9.7 FL (ref 6–12)
RBC # BLD AUTO: 3.03 10*6/MM3 (ref 3.77–5.28)
WBC # BLD AUTO: 6.68 10*3/MM3 (ref 3.4–10.8)

## 2021-06-07 PROCEDURE — 85025 COMPLETE CBC W/AUTO DIFF WBC: CPT | Performed by: INTERNAL MEDICINE

## 2021-06-07 PROCEDURE — 82565 ASSAY OF CREATININE: CPT | Performed by: INTERNAL MEDICINE

## 2021-06-08 ENCOUNTER — TRANSCRIBE ORDERS (OUTPATIENT)
Dept: PREADMISSION TESTING | Facility: HOSPITAL | Age: 56
End: 2021-06-08

## 2021-06-08 DIAGNOSIS — Z01.818 OTHER SPECIFIED PRE-OPERATIVE EXAMINATION: Primary | ICD-10-CM

## 2021-06-11 ENCOUNTER — OFFICE VISIT (OUTPATIENT)
Dept: INFECTIOUS DISEASES | Facility: CLINIC | Age: 56
End: 2021-06-11

## 2021-06-11 ENCOUNTER — TELEPHONE (OUTPATIENT)
Dept: INFECTIOUS DISEASES | Facility: CLINIC | Age: 56
End: 2021-06-11

## 2021-06-11 VITALS
BODY MASS INDEX: 41.56 KG/M2 | HEIGHT: 66 IN | WEIGHT: 258.6 LBS | SYSTOLIC BLOOD PRESSURE: 155 MMHG | TEMPERATURE: 98.8 F | DIASTOLIC BLOOD PRESSURE: 85 MMHG | HEART RATE: 81 BPM

## 2021-06-11 DIAGNOSIS — Z79.2 LONG TERM (CURRENT) USE OF ANTIBIOTICS: ICD-10-CM

## 2021-06-11 DIAGNOSIS — M46.20 VERTEBRAL OSTEOMYELITIS (HCC): Primary | ICD-10-CM

## 2021-06-11 PROCEDURE — 99214 OFFICE O/P EST MOD 30 MIN: CPT | Performed by: INTERNAL MEDICINE

## 2021-06-11 NOTE — PROGRESS NOTES
Reason for clinic visits: Follow up for left shoulder septic arthritis and epidural abscess    HPI: Stefani Bhandari is a 55 y.o. female who was last seen in hospital on May 6. Since that time the patient has been on cefazolin. She has tolerated the antibiotic well and denies any abdominal pain nausea vomiting or diarrhea. No rashes. She has not had any issues with her PICC line. She continues to report shoulder pain and is scheduled for surgery at the end of June. Her back incision has healed well and she reports only minimal pain if she is up all day. She denies any fevers chills or night sweats.       Past Medical History:   Diagnosis Date   • Anemia    • Anxiety    • Chronic back pain    • Cirrhosis (CMS/HCC)    • Clostridium difficile colitis 01/31/2021   • DDD (degenerative disc disease), lumbar    • Depression    • Diabetes mellitus (CMS/HCC)    • Elevated LFTs    • NICK (generalized anxiety disorder)    • History of 2019 novel coronavirus disease (COVID-19) 11/2020   • History of epilepsy     CHILDHOOD   • Hyperlipidemia    • Hypertension    • Hypothyroidism    • Leg swelling     HX   • MDD (major depressive disorder)    • Morbid obesity (CMS/HCC)    • MSSA (methicillin susceptible Staphylococcus aureus) infection 01/2020   • METZGER (nonalcoholic steatohepatitis) 2000   • OA (osteoarthritis)    • Obesity    • Septic arthritis of shoulder, left (CMS/HCC)     WITH OSTEOMYELITIS   • Shoulder pain    • Sleep apnea     DOES NOT USE CPAP CURRENTLY/WAITING FOR NEW MACHINE   • Wears glasses        Past Surgical History:   Procedure Laterality Date   • COLONOSCOPY N/A 2/11/2021    Procedure: COLONOSCOPY INTO CECUM;  Surgeon: Joshua Bender MD;  Location: [facility] ENDOSCOPY;  Service: Gastroenterology;  Laterality: N/A;  pre: same  post: diverticulosis   • ENDOSCOPY N/A 2/11/2021    Procedure: ESOPHAGOGASTRODUODENOSCOPY WITH BIOPSIES;  Surgeon: Joshua Bender MD;  Location: [facility] ENDOSCOPY;  Service:  "Gastroenterology;  Laterality: N/A;  pre: anemia, rectal bleeding, abnormal CT  post: hiatal hernia, esophagitis, gastropathy   • HYSTERECTOMY  2007   • INCISION AND DRAINAGE SHOULDER Left 1/30/2021    Procedure: INCISION AND DRAINAGE SHOULDER;  Surgeon: Juaquin Richardson II, MD;  Location: [facility] OR;  Service: Orthopedics;  Laterality: Left;   • LUMBAR FUSION N/A 5/3/2021    Procedure: Irrigation and debridement lumbar spine, lumbar 3-lumbar 4, lumbar 4-lumbar 5, lumbar 5-left sacral 1 laminectomy and fusion with instrumentation;  Surgeon: Dylan Clark MD;  Location: [facility] OR;  Service: Orthopedic Spine;  Laterality: N/A;   • TOTAL SHOULDER ARTHROPLASTY Left 4/27/2021    Procedure: OPEN IRRIGATION AND DEBRIDEMENT OF THE LEFT SHOULDER WITH OSTEOTOMY AND SPACER PLACEMENT;  Surgeon: Juaquin Richardson II, MD;  Location: [facility] OR;  Service: Orthopedics;  Laterality: Left;   • TUBAL ABDOMINAL LIGATION  1996       Social History   reports that she quit smoking about 4 years ago. Her smoking use included cigarettes. She quit after 20.00 years of use. She has never used smokeless tobacco. She reports current alcohol use. She reports previous drug use.    Family History  family history includes Arthritis in her brother, mother, and sister; Asthma in her mother; Breast cancer in her paternal cousin; Diabetes in her brother, mother, and sister; Heart attack in her brother, father, and sister; Hyperlipidemia in her sister; Hypertension in her brother, father, mother, and sister; No Known Problems in an other family member; Obesity in her mother; Sleep apnea in her mother.    Allergies   Allergen Reactions   • Tramadol Anaphylaxis     \"seizure like behavior\"       The medication list has been reviewed and updated.     Review of Systems  Pertinent items are noted in HPI, all other systems reviewed and negative    Vital Signs   Temp:  [98.8 °F (37.1 °C)] 98.8 °F (37.1 °C)  Heart Rate:  [81] " 81  BP: (155)/(85) 155/85    Physical Exam:   General: In no acute distress  Cardiovascular: RRR no murmurs    Respiratory: Breathing comfortably on room air   GI: Abdomen is soft, non-tender, non-distended, positive bowel sounds bilaterally  Musculoskeletal: Left shoulder with incision well-healed no erythema no purulence, lumbar spine incision healed well  Skin: No rashes     Lab Results   Component Value Date    WBC 6.68 06/07/2021    HGB 9.4 (L) 06/07/2021    HCT 27.7 (L) 06/07/2021    MCV 91.4 06/07/2021     06/07/2021       Lab Results   Component Value Date    GLUCOSE 153 (H) 05/07/2021    BUN 22 (H) 05/07/2021    CREATININE 1.05 (H) 06/07/2021    EGFRIFNONA 54 (L) 06/07/2021    EGFRIFAFRI 76 02/19/2021    BCR 25.6 (H) 05/07/2021    CO2 25.0 05/07/2021    CALCIUM 7.8 (L) 05/07/2021    PROTENTOTREF 7.4 02/10/2021    ALBUMIN 2.90 (L) 05/06/2021    LABIL2 0.5 (L) 02/10/2021    AST 41 (H) 05/06/2021    ALT 19 05/06/2021       Lab Results   Component Value Date    SEDRATE 13 04/30/2021       Lab Results   Component Value Date    CRP 2.88 (H) 04/30/2021       5/3 OR lumbar spine cx MSSA    2/10 GI PCR neg  2/10 C diff positive  2/8 COVID neg     1/30 operative left shoulder wound culture with MSSA  1/26 BCx neg x 2  1/24 BCx MSSA     Assessment:  This is a 55 y.o. female who presents to clinic today for follow up of MSSA bacteremia, MSSA left native shoulder septic arthritis and lumbar spine epidural abscess. She completed an 8-week course of cefazolin complicated by C. difficile colitis. Unfortunately she was readmitted to the hospital and was found to have persistent infection in her lumbar spine. She underwent L3-S1 laminectomy medial fasciectomy foraminotomy and bilateral posterior lateral fusion with local bone graft on May 3. Operative cultures are growing MSSA. On Patricia 14 the patient will have finished a 6-week course of cefazolin. We will discontinue IV antibiotics and start her on dicloxacillin 500  mg p.o. 3 times daily x30 days    Plan:   1.  Discontinue IV cefazolin as planned on June 14  2. We will ask home health to pull her PICC line  3. Reviewed the patient CBC and CMP  4.  Start dicloxacillin 500 mg p.o. 3 times daily x 30 days      Return to Infectious Disease clinic to be determined    Time: More than 50% of time spent in counseling and coordination of care:  Total face-to-face/floor time 30 min.  Time spent in counseling 30 min. Counseling included the following topics: Need for oral antibiotics, future treatment plan

## 2021-06-11 NOTE — TELEPHONE ENCOUNTER
Patient was seen in our office today and the stop date for her IV Antibiotics remains 6/14/21. Her PICC line may be removed by Home Health RN and antibiotics discontinued on that date. Patient is aware. Liza Laws RN

## 2021-06-15 ENCOUNTER — APPOINTMENT (OUTPATIENT)
Dept: PREADMISSION TESTING | Facility: HOSPITAL | Age: 56
End: 2021-06-15

## 2021-06-17 ENCOUNTER — HOSPITAL ENCOUNTER (OUTPATIENT)
Dept: GENERAL RADIOLOGY | Facility: HOSPITAL | Age: 56
Discharge: HOME | End: 2021-06-17

## 2021-06-17 ENCOUNTER — PRE-ADMISSION TESTING (OUTPATIENT)
Dept: PREADMISSION TESTING | Facility: HOSPITAL | Age: 56
End: 2021-06-17

## 2021-06-17 VITALS
SYSTOLIC BLOOD PRESSURE: 166 MMHG | RESPIRATION RATE: 18 BRPM | OXYGEN SATURATION: 97 % | BODY MASS INDEX: 40.18 KG/M2 | HEART RATE: 104 BPM | WEIGHT: 256 LBS | DIASTOLIC BLOOD PRESSURE: 96 MMHG | HEIGHT: 67 IN | TEMPERATURE: 98.2 F

## 2021-06-17 LAB
ALBUMIN SERPL-MCNC: 3.7 G/DL (ref 3.5–5.2)
ALBUMIN/GLOB SERPL: 0.8 G/DL
ALP SERPL-CCNC: 146 U/L (ref 39–117)
ALT SERPL W P-5'-P-CCNC: 33 U/L (ref 1–33)
ANION GAP SERPL CALCULATED.3IONS-SCNC: 12.8 MMOL/L (ref 5–15)
APTT PPP: 34.5 SECONDS (ref 22.7–35.4)
AST SERPL-CCNC: 74 U/L (ref 1–32)
BACTERIA UR QL AUTO: ABNORMAL /HPF
BASOPHILS # BLD AUTO: 0.03 10*3/MM3 (ref 0–0.2)
BASOPHILS NFR BLD AUTO: 0.4 % (ref 0–1.5)
BILIRUB SERPL-MCNC: 0.4 MG/DL (ref 0–1.2)
BILIRUB UR QL STRIP: NEGATIVE
BUN SERPL-MCNC: 15 MG/DL (ref 6–20)
BUN/CREAT SERPL: 18.8 (ref 7–25)
CALCIUM SPEC-SCNC: 8.6 MG/DL (ref 8.6–10.5)
CHLORIDE SERPL-SCNC: 103 MMOL/L (ref 98–107)
CLARITY UR: CLEAR
CO2 SERPL-SCNC: 23.2 MMOL/L (ref 22–29)
COLOR UR: YELLOW
CREAT SERPL-MCNC: 0.8 MG/DL (ref 0.57–1)
CRP SERPL-MCNC: 0.3 MG/DL (ref 0–0.5)
DEPRECATED RDW RBC AUTO: 49.8 FL (ref 37–54)
EOSINOPHIL # BLD AUTO: 0.18 10*3/MM3 (ref 0–0.4)
EOSINOPHIL NFR BLD AUTO: 2.5 % (ref 0.3–6.2)
ERYTHROCYTE [DISTWIDTH] IN BLOOD BY AUTOMATED COUNT: 14.4 % (ref 12.3–15.4)
ERYTHROCYTE [SEDIMENTATION RATE] IN BLOOD: 92 MM/HR (ref 0–30)
GFR SERPL CREATININE-BSD FRML MDRD: 74 ML/MIN/1.73
GLOBULIN UR ELPH-MCNC: 4.5 GM/DL
GLUCOSE SERPL-MCNC: 108 MG/DL (ref 65–99)
GLUCOSE UR STRIP-MCNC: NEGATIVE MG/DL
HCT VFR BLD AUTO: 33.1 % (ref 34–46.6)
HGB BLD-MCNC: 10.9 G/DL (ref 12–15.9)
HGB UR QL STRIP.AUTO: ABNORMAL
HYALINE CASTS UR QL AUTO: ABNORMAL /LPF
IMM GRANULOCYTES # BLD AUTO: 0.01 10*3/MM3 (ref 0–0.05)
IMM GRANULOCYTES NFR BLD AUTO: 0.1 % (ref 0–0.5)
INR PPP: 1.11 (ref 0.9–1.1)
KETONES UR QL STRIP: NEGATIVE
LEUKOCYTE ESTERASE UR QL STRIP.AUTO: ABNORMAL
LYMPHOCYTES # BLD AUTO: 1.39 10*3/MM3 (ref 0.7–3.1)
LYMPHOCYTES NFR BLD AUTO: 19.2 % (ref 19.6–45.3)
MCH RBC QN AUTO: 31.2 PG (ref 26.6–33)
MCHC RBC AUTO-ENTMCNC: 32.9 G/DL (ref 31.5–35.7)
MCV RBC AUTO: 94.8 FL (ref 79–97)
MONOCYTES # BLD AUTO: 0.22 10*3/MM3 (ref 0.1–0.9)
MONOCYTES NFR BLD AUTO: 3 % (ref 5–12)
NEUTROPHILS NFR BLD AUTO: 5.42 10*3/MM3 (ref 1.7–7)
NEUTROPHILS NFR BLD AUTO: 74.8 % (ref 42.7–76)
NITRITE UR QL STRIP: NEGATIVE
NRBC BLD AUTO-RTO: 0 /100 WBC (ref 0–0.2)
PH UR STRIP.AUTO: 6 [PH] (ref 5–8)
PLATELET # BLD AUTO: 102 10*3/MM3 (ref 140–450)
PMV BLD AUTO: 9 FL (ref 6–12)
POTASSIUM SERPL-SCNC: 4.1 MMOL/L (ref 3.5–5.2)
PROT SERPL-MCNC: 8.2 G/DL (ref 6–8.5)
PROT UR QL STRIP: NEGATIVE
PROTHROMBIN TIME: 14.1 SECONDS (ref 11.7–14.2)
RBC # BLD AUTO: 3.49 10*6/MM3 (ref 3.77–5.28)
RBC # UR: ABNORMAL /HPF
REF LAB TEST METHOD: ABNORMAL
SODIUM SERPL-SCNC: 139 MMOL/L (ref 136–145)
SP GR UR STRIP: 1.01 (ref 1–1.03)
SQUAMOUS #/AREA URNS HPF: ABNORMAL /HPF
UROBILINOGEN UR QL STRIP: ABNORMAL
WBC # BLD AUTO: 7.25 10*3/MM3 (ref 3.4–10.8)
WBC UR QL AUTO: ABNORMAL /HPF

## 2021-06-17 PROCEDURE — 86140 C-REACTIVE PROTEIN: CPT

## 2021-06-17 PROCEDURE — 73030 X-RAY EXAM OF SHOULDER: CPT

## 2021-06-17 PROCEDURE — 85025 COMPLETE CBC W/AUTO DIFF WBC: CPT

## 2021-06-17 PROCEDURE — 85610 PROTHROMBIN TIME: CPT

## 2021-06-17 PROCEDURE — 81001 URINALYSIS AUTO W/SCOPE: CPT

## 2021-06-17 PROCEDURE — 85730 THROMBOPLASTIN TIME PARTIAL: CPT

## 2021-06-17 PROCEDURE — 71046 X-RAY EXAM CHEST 2 VIEWS: CPT

## 2021-06-17 PROCEDURE — 85652 RBC SED RATE AUTOMATED: CPT

## 2021-06-17 PROCEDURE — 36415 COLL VENOUS BLD VENIPUNCTURE: CPT

## 2021-06-17 PROCEDURE — 80053 COMPREHEN METABOLIC PANEL: CPT

## 2021-06-17 RX ORDER — CHLORHEXIDINE GLUCONATE 500 MG/1
CLOTH TOPICAL TAKE AS DIRECTED
COMMUNITY
End: 2021-08-18

## 2021-06-17 NOTE — DISCHARGE INSTRUCTIONS
Take the following medications the morning of surgery: LEVOTHYROXINE AND EFFEXOR    ARRIVE AT 8AM ON 6/22/21    If you are on prescription narcotic pain medication to control your pain you may also take that medication the morning of surgery.    General Instructions:  • Do not eat solid food after midnight the night before surgery.  • You may drink clear liquids day of surgery but must stop at least one hour before your hospital arrival time.  • It is beneficial for you to have a clear drink that contains carbohydrates the day of surgery.  We suggest a 12 to 20 ounce bottle of Gatorade or Powerade for non-diabetic patients or a 12 to 20 ounce bottle of G2 or Powerade Zero for diabetic patients. (Pediatric patients, are not advised to drink a 12 to 20 ounce carbohydrate drink)    Clear liquids are liquids you can see through.  Nothing red in color.     Plain water                               Sports drinks  Sodas                                   Gelatin (Jell-O)  Fruit juices without pulp such as white grape juice and apple juice  Popsicles that contain no fruit or yogurt  Tea or coffee (no cream or milk added)  Gatorade / Powerade  G2 / Powerade Zero    • Infants may have breast milk up to four hours before surgery.  • Infants drinking formula may drink formula up to six hours before surgery.   • Patients who avoid smoking, chewing tobacco and alcohol for 4 weeks prior to surgery have a reduced risk of post-operative complications.  Quit smoking as many days before surgery as you can.  • Do not smoke, use chewing tobacco or drink alcohol the day of surgery.   • If applicable bring your C-PAP/ BI-PAP machine.  • Bring any papers given to you in the doctor’s office.  • Wear clean comfortable clothes.  • Do not wear contact lenses, false eyelashes or make-up.  Bring a case for your glasses.   • Bring crutches or walker if applicable.  • Remove all piercings.  Leave jewelry and any other valuables at home.  • Hair  extensions with metal clips must be removed prior to surgery.  • The Pre-Admission Testing nurse will instruct you to bring medications if unable to obtain an accurate list in Pre-Admission Testing.        If you were given a blood bank ID arm band remember to bring it with you the day of surgery.    Preventing a Surgical Site Infection:  • For 2 to 3 days before surgery, avoid shaving with a razor because the razor can irritate skin and make it easier to develop an infection.    • Any areas of open skin can increase the risk of a post-operative wound infection by allowing bacteria to enter and travel throughout the body.  Notify your surgeon if you have any skin wounds / rashes even if it is not near the expected surgical site.  The area will need assessed to determine if surgery should be delayed until it is healed.  • The night prior to surgery shower using a fresh bar of anti-bacterial soap (such as Dial) and clean washcloth.  Sleep in a clean bed with clean clothing.  Do not allow pets to sleep with you.  • Shower on the morning of surgery using a fresh bar of anti-bacterial soap (such as Dial) and clean washcloth.  Dry with a clean towel and dress in clean clothing.  • Ask your surgeon if you will be receiving antibiotics prior to surgery.  • Make sure you, your family, and all healthcare providers clean their hands with soap and water or an alcohol based hand  before caring for you or your wound.    Day of surgery:  Your arrival time is approximately two hours before your scheduled surgery time.  Upon arrival, a Pre-op nurse and Anesthesiologist will review your health history, obtain vital signs, and answer questions you may have.  The only belongings needed at this time will be a list of your home medications and if applicable your C-PAP/BI-PAP machine.  A Pre-op nurse will start an IV and you may receive medication in preparation for surgery, including something to help you relax.     Please be  aware that surgery does come with discomfort.  We want to make every effort to control your discomfort so please discuss any uncontrolled symptoms with your nurse.   Your doctor will most likely have prescribed pain medications.      If you are going home after surgery you will receive individualized written care instructions before being discharged.  A responsible adult must drive you to and from the hospital on the day of your surgery and stay with you for 24 hours.  Discharge prescriptions can be filled by the hospital pharmacy during regular pharmacy hours.  If you are having surgery late in the day/evening your prescription may be e-prescribed to your pharmacy.  Please verify your pharmacy hours or chose a 24 hour pharmacy to avoid not having access to your prescription because your pharmacy has closed for the day.    If you are staying overnight following surgery, you will be transported to your hospital room following the recovery period.  [facility] has all private rooms.    If you have any questions please call Pre-Admission Testing at [phone].  Deductibles and co-payments are collected on the day of service. Please be prepared to pay the required co-pay, deductible or deposit on the day of service as defined by your plan.    Patient Education for Self-Quarantine Process    Following your COVID testing, we strongly recommend that you do not leave your home after you have been tested for COVID except to get medical care. This includes not going to work, school or to public areas.  If this is not possible for you to do please limit your activities to only required outings.  Be sure to wear a mask when you are with other people, practice social distancing and wash your hands frequently.      The following items provide additional details to keep you safe.  • Wash your hands with soap and water frequently for at least 20 seconds.   • Avoid touching your eyes, nose and mouth with unwashed  hands.  • Do not share anything - utensils, towels, food from the same bowl.   • Have your own utensils, drinking glass, dishes, towels and bedding.   • Do not have visitors.   • Do use FaceTime to stay in touch with family and friends.  • You should stay in a specific room away from others if possible.   • Stay at least 6 feet away from others in the home if you cannot have a dedicated room to yourself.   • Do not snuggle with your pet. While the CDC says there is no evidence that pets can spread COVID-19 or be infected from humans, it is probably best to avoid “petting, snuggling, being kissed or licked and sharing food (during self-quarantine)”, according to the CDC.   • Sanitize household surfaces daily. Include all high touch areas (door handles, light switches, phones, countertops, etc.)  • Do not share a bathroom with others, if possible.   • Wear a mask around others in your home if you are unable to stay in a separate room or 6 feet apart. If  you are unable to wear a mask, have your family member wear a mask if they must be within 6 feet of you.   Call your surgeon immediately if you experience any of the following symptoms:  • Sore Throat  • Shortness of Breath or difficulty breathing  • Cough  • Chills  • Body soreness or muscle pain  • Headache  • Fever  • New loss of taste or smell  • Do not arrive for your surgery ill.  Your procedure will need to be rescheduled to another time.  You will need to call your physician before the day of surgery to avoid any unnecessary exposure to hospital staff as well as other patients.    CHLORHEXIDINE CLOTH INSTRUCTIONS  The morning of surgery follow these instructions using the Chlorhexidine cloths you've been given.  These steps reduce bacteria on the body.  Do not use the cloths near your eyes, ears mouth, genitalia or on open wounds.  Throw the cloths away after use but do not try to flush them down a toilet.      • Open and remove one cloth at a time from the  package.    • Leave the cloth unfolded and begin the bathing.  • Massage the skin with the cloths using gentle pressure to remove bacteria.  Do not scrub harshly.   • Follow the steps below with one 2% CHG cloth per area (6 total cloths).  • One cloth for neck, shoulders and chest.  • One cloth for both arms, hands, fingers and underarms (do underarms last).  • One cloth for the abdomen followed by groin.  • One cloth for right leg and foot including between the toes.  • One cloth for left leg and foot including between the toes.  • The last cloth is to be used for the back of the neck, back and buttocks.    Allow the CHG to air dry 3 minutes on the skin which will give it time to work and decrease the chance of irritation.  The skin may feel sticky until it is dry.  Do not rinse with water or any other liquid or you will lose the beneficial effects of the CHG.  If mild skin irritation occurs, do rinse the skin to remove the CHG.  Report this to the nurse at time of admission.  Do not apply lotions, creams, ointments, deodorants or perfumes after using the clothes. Dress in clean clothes before coming to the hospital.    BACTROBAN NASAL OINTMENT  There are many germs normally in your nose. Bactroban is an ointment that will help reduce these germs. Please follow these instructions for Bactroban use:      _1___The day before surgery in the morning  Date____6/21/21____    __2__The day before surgery in the evening              Date____6/21/21____    _3___The day of surgery in the morning    Date_____6/22/21___    **Squirt ½ package of Bactroban Ointment onto a cotton applicator and apply to inside of 1st nostril.  Squirt the remaining Bactroban and apply to the inside of the other nostril.

## 2021-06-18 ENCOUNTER — OFFICE VISIT (OUTPATIENT)
Dept: FAMILY MEDICINE CLINIC | Facility: CLINIC | Age: 56
End: 2021-06-18

## 2021-06-18 ENCOUNTER — TELEPHONE (OUTPATIENT)
Dept: NEUROSURGERY | Facility: CLINIC | Age: 56
End: 2021-06-18

## 2021-06-18 VITALS
HEIGHT: 67 IN | BODY MASS INDEX: 39.55 KG/M2 | OXYGEN SATURATION: 100 % | TEMPERATURE: 98.2 F | HEART RATE: 95 BPM | WEIGHT: 252 LBS | DIASTOLIC BLOOD PRESSURE: 88 MMHG | SYSTOLIC BLOOD PRESSURE: 158 MMHG

## 2021-06-18 DIAGNOSIS — K52.9 GASTROENTERITIS: Primary | ICD-10-CM

## 2021-06-18 DIAGNOSIS — M86.39 CHRONIC MULTIFOCAL OSTEOMYELITIS OF MULTIPLE SITES (HCC): ICD-10-CM

## 2021-06-18 DIAGNOSIS — E78.2 MIXED HYPERLIPIDEMIA: ICD-10-CM

## 2021-06-18 DIAGNOSIS — E11.69 TYPE 2 DIABETES MELLITUS WITH OTHER SPECIFIED COMPLICATION, WITHOUT LONG-TERM CURRENT USE OF INSULIN (HCC): ICD-10-CM

## 2021-06-18 PROCEDURE — 99215 OFFICE O/P EST HI 40 MIN: CPT | Performed by: NURSE PRACTITIONER

## 2021-06-18 RX ORDER — ONDANSETRON 4 MG/1
4 TABLET, ORALLY DISINTEGRATING ORAL EVERY 8 HOURS PRN
Qty: 24 TABLET | Refills: 0 | Status: SHIPPED | OUTPATIENT
Start: 2021-06-18 | End: 2021-08-18

## 2021-06-18 NOTE — PROGRESS NOTES
Subjective   Stefani Bhandari is a 55 y.o. female who presents as a new patient to establish. Having shoulder surgery next week. Caught a stomach bug and is still feeling nauseated.    History of Present Illness   New patient in this office, so all problems and complexity new to me. I reviewed the chart and narrative. History provided by patient and daughter Katlyn.  Has antibiotic spacer in L shoulder since January. To have shoulder replacement next week. Cleared now by ID to finish replacement. Has lost 60 lbs during illness. Had been under care of Dr. Martinez for 15 yrs. Had c diff during both courses of IV antibiotics. She additionally developed discitis and osteomyelitis in spine as well. The pain from her spinal surgery is improving.   This prolonged illness has been frustrated, but overall mentally doing ok.   Still on restrictions from back surgery.   A stomach bug has gone through the family. 24 hours each. Her daughter and grandsons initially and now her. Mild nausea, fatigue and fever. Last vomited 11pm last night. No diarrhea anymore.   The following portions of the patient's history were reviewed and updated as appropriate: allergies, current medications, past family history, past medical history, past social history, past surgical history and problem list.    Review of Systems   Constitutional: Positive for activity change. Negative for appetite change, fatigue and fever.   HENT: Negative.    Respiratory: Negative.    Cardiovascular: Negative.    Gastrointestinal: Positive for diarrhea and nausea. Negative for abdominal pain, anal bleeding and rectal pain.   Musculoskeletal: Positive for arthralgias and back pain. Negative for gait problem and neck pain.   Skin: Negative.    Allergic/Immunologic: Negative.    Neurological: Negative.    Hematological: Negative.    Psychiatric/Behavioral: Positive for decreased concentration. Negative for confusion. The patient is not nervous/anxious.      /88    "Pulse 95   Temp 98.2 °F (36.8 °C) (Infrared)   Ht 170.2 cm (67\")   Wt 114 kg (252 lb)   SpO2 100%   BMI 39.47 kg/m²     Objective   Physical Exam  Vitals and nursing note reviewed.   Constitutional:       Appearance: She is well-developed. She is not diaphoretic.   HENT:      Head: Normocephalic and atraumatic.   Neck:      Thyroid: No thyromegaly.   Cardiovascular:      Rate and Rhythm: Normal rate and regular rhythm.      Pulses:           Carotid pulses are 2+ on the right side and 2+ on the left side.     Heart sounds: Normal heart sounds.   Pulmonary:      Effort: Pulmonary effort is normal.      Breath sounds: Normal breath sounds.   Abdominal:      General: Bowel sounds are normal.      Palpations: Abdomen is soft.   Musculoskeletal:      Cervical back: Normal range of motion and neck supple.      Lumbar back: Decreased range of motion (in brace).      Right ankle: Normal.      Left ankle: Normal.   Lymphadenopathy:      Cervical: No cervical adenopathy.   Neurological:      Mental Status: She is oriented to person, place, and time.   Psychiatric:         Mood and Affect: Mood normal.         Behavior: Behavior normal.         Thought Content: Thought content normal.         Judgment: Judgment normal.       Assessment/Plan   Problems Addressed this Visit        Cardiac and Vasculature    Mixed hyperlipidemia       Endocrine and Metabolic    Type 2 diabetes mellitus, without long-term current use of insulin (CMS/[facility])       Infectious Diseases    Osteomyelitis (CMS/[facility])      Other Visit Diagnoses     Gastroenteritis    -  Primary    Relevant Medications    ondansetron ODT (ZOFRAN-ODT) 4 MG disintegrating tablet      Diagnoses       Codes Comments    Gastroenteritis    -  Primary ICD-10-CM: K52.9  ICD-9-CM: 558.9     Type 2 diabetes mellitus with other specified complication, without long-term current use of insulin (CMS/[facility])     ICD-10-CM: E11.69  ICD-9-CM: 250.80     Mixed hyperlipidemia     ICD-10-CM: " E78.2  ICD-9-CM: 272.2     Chronic multifocal osteomyelitis of multiple sites (CMS/HCC)     ICD-10-CM: M86.39  ICD-9-CM: 730.19         gastroenteritis--as several family members affected--likely just a bug and already settling. Would not go through with surgery if any diarrhea. Would reschedule.  T2DM--well controlled and intentionally losing weight. Walking for exercise. FU 8/2021  HLD--on statin and zetia  Osteomyelitis--continue care and instruction of infectious diseases--Dr. Porras. High risk of recurrence c diff as well. Would treat ASAP if occurs.   Anemia--has not seen hematology recently. Would recommend monitoring.    This document is intended for medical expert use only. Reading of this document by patients and/or patient's family without participating medical staff guidance may result in misinterpretation and unintended morbidity.  Any interpretation of such data is the responsibility of the patient and/or family member responsible for the patient in concert with their primary or specialist providers, not to be left for sources of online searches such as [employer], Confide or similar queries. Relying on these approaches to knowledge may result in misinterpretation, misguided goals of care and even death should patients or family members try recommendations outside of the realm of professional medical care in a supervised way.    Please allow 3-5 business days for recommendations based on new results    Go to the ER for any possible lifethreatening symptoms such as chest pain or shortness of air.    I personally spent 55 minutes reviewing the chart before the visit, time with the patient, and time documenting the visit.

## 2021-06-19 ENCOUNTER — LAB (OUTPATIENT)
Dept: LAB | Facility: HOSPITAL | Age: 56
End: 2021-06-19

## 2021-06-19 DIAGNOSIS — Z01.818 OTHER SPECIFIED PRE-OPERATIVE EXAMINATION: ICD-10-CM

## 2021-06-19 LAB — SARS-COV-2 ORF1AB RESP QL NAA+PROBE: NOT DETECTED

## 2021-06-19 PROCEDURE — U0004 COV-19 TEST NON-CDC HGH THRU: HCPCS

## 2021-06-19 PROCEDURE — C9803 HOPD COVID-19 SPEC COLLECT: HCPCS

## 2021-06-21 ENCOUNTER — ANESTHESIA EVENT (OUTPATIENT)
Dept: PERIOP | Facility: HOSPITAL | Age: 56
End: 2021-06-21

## 2021-06-22 ENCOUNTER — APPOINTMENT (OUTPATIENT)
Dept: GENERAL RADIOLOGY | Facility: HOSPITAL | Age: 56
End: 2021-06-22

## 2021-06-22 ENCOUNTER — HOSPITAL ENCOUNTER (OUTPATIENT)
Facility: HOSPITAL | Age: 56
Discharge: HOME | End: 2021-06-23
Attending: ORTHOPAEDIC SURGERY | Admitting: ORTHOPAEDIC SURGERY

## 2021-06-22 ENCOUNTER — ANESTHESIA (OUTPATIENT)
Dept: PERIOP | Facility: HOSPITAL | Age: 56
End: 2021-06-22

## 2021-06-22 DIAGNOSIS — L08.9: ICD-10-CM

## 2021-06-22 DIAGNOSIS — S40.252A: ICD-10-CM

## 2021-06-22 PROBLEM — Z96.612 STATUS POST REVERSE ARTHROPLASTY OF LEFT SHOULDER: Status: ACTIVE | Noted: 2021-06-22

## 2021-06-22 LAB
GLUCOSE BLDC GLUCOMTR-MCNC: 109 MG/DL (ref 70–130)
GLUCOSE BLDC GLUCOMTR-MCNC: 163 MG/DL (ref 70–130)
GLUCOSE BLDC GLUCOMTR-MCNC: 281 MG/DL (ref 70–130)

## 2021-06-22 PROCEDURE — 25010000002 ONDANSETRON PER 1 MG: Performed by: NURSE ANESTHETIST, CERTIFIED REGISTERED

## 2021-06-22 PROCEDURE — 87205 SMEAR GRAM STAIN: CPT | Performed by: ORTHOPAEDIC SURGERY

## 2021-06-22 PROCEDURE — 25010000002 HYDROMORPHONE PER 4 MG: Performed by: NURSE ANESTHETIST, CERTIFIED REGISTERED

## 2021-06-22 PROCEDURE — 25010000002 ROPIVACAINE PER 1 MG: Performed by: ANESTHESIOLOGY

## 2021-06-22 PROCEDURE — G0378 HOSPITAL OBSERVATION PER HR: HCPCS

## 2021-06-22 PROCEDURE — 25010000002 PROPOFOL 10 MG/ML EMULSION: Performed by: NURSE ANESTHETIST, CERTIFIED REGISTERED

## 2021-06-22 PROCEDURE — 25010000002 MIDAZOLAM PER 1 MG: Performed by: ANESTHESIOLOGY

## 2021-06-22 PROCEDURE — 25010000002 DEXAMETHASONE PER 1 MG: Performed by: NURSE ANESTHETIST, CERTIFIED REGISTERED

## 2021-06-22 PROCEDURE — 25010000003 CEFAZOLIN IN DEXTROSE 2-4 GM/100ML-% SOLUTION: Performed by: ORTHOPAEDIC SURGERY

## 2021-06-22 PROCEDURE — 73020 X-RAY EXAM OF SHOULDER: CPT

## 2021-06-22 PROCEDURE — C1889 IMPLANT/INSERT DEVICE, NOC: HCPCS | Performed by: ORTHOPAEDIC SURGERY

## 2021-06-22 PROCEDURE — 25010000002 NEOSTIGMINE 5 MG/10ML SOLUTION: Performed by: NURSE ANESTHETIST, CERTIFIED REGISTERED

## 2021-06-22 PROCEDURE — 25010000002 DEXAMETHASONE PER 1 MG: Performed by: ANESTHESIOLOGY

## 2021-06-22 PROCEDURE — 87075 CULTR BACTERIA EXCEPT BLOOD: CPT | Performed by: ORTHOPAEDIC SURGERY

## 2021-06-22 PROCEDURE — 25010000002 PHENYLEPHRINE PER 1 ML: Performed by: NURSE ANESTHETIST, CERTIFIED REGISTERED

## 2021-06-22 PROCEDURE — 82962 GLUCOSE BLOOD TEST: CPT

## 2021-06-22 PROCEDURE — 87070 CULTURE OTHR SPECIMN AEROBIC: CPT | Performed by: ORTHOPAEDIC SURGERY

## 2021-06-22 PROCEDURE — C1776 JOINT DEVICE (IMPLANTABLE): HCPCS | Performed by: ORTHOPAEDIC SURGERY

## 2021-06-22 PROCEDURE — 25010000002 FENTANYL CITRATE (PF) 50 MCG/ML SOLUTION: Performed by: NURSE ANESTHETIST, CERTIFIED REGISTERED

## 2021-06-22 DEVICE — IMPLANTABLE DEVICE: Type: IMPLANTABLE DEVICE | Site: SHOULDER | Status: FUNCTIONAL

## 2021-06-22 DEVICE — RSS GLENOSPHERE, CONCENTRIC-S, 2MM
Type: IMPLANTABLE DEVICE | Site: SHOULDER | Status: FUNCTIONAL
Brand: TITAN™

## 2021-06-22 DEVICE — BASEPLT GLEN RSS NEG/S TI 27.3X22.8X2MM: Type: IMPLANTABLE DEVICE | Site: SHOULDER | Status: FUNCTIONAL

## 2021-06-22 DEVICE — SUT FW 5 W .5 CIR CUT NDL 48M AR7211: Type: IMPLANTABLE DEVICE | Site: SHOULDER | Status: FUNCTIONAL

## 2021-06-22 DEVICE — RSS 4.5MM STAR SCREW AND CAP; 15MM LENGTH
Type: IMPLANTABLE DEVICE | Site: SHOULDER | Status: FUNCTIONAL
Brand: TITAN™

## 2021-06-22 DEVICE — RSS 4.5MM STAR SCREW AND CAP; 20MM LENGTH
Type: IMPLANTABLE DEVICE | Site: SHOULDER | Status: FUNCTIONAL
Brand: TITAN™

## 2021-06-22 DEVICE — RSS 4.5MM STAR SCREW AND CAP; 25MM LENGTH
Type: IMPLANTABLE DEVICE | Site: SHOULDER | Status: FUNCTIONAL
Brand: TITAN™

## 2021-06-22 DEVICE — KNOTLESS TISSUE CONTROL DEVICE, VIOLET UNIDIRECTIONAL (ANTIBACTERIAL) SYNTHETIC ABSORBABLE DEVICE
Type: IMPLANTABLE DEVICE | Site: SHOULDER | Status: FUNCTIONAL
Brand: STRATAFIX

## 2021-06-22 RX ORDER — CEFAZOLIN SODIUM 2 G/100ML
2 INJECTION, SOLUTION INTRAVENOUS EVERY 8 HOURS
Status: COMPLETED | OUTPATIENT
Start: 2021-06-22 | End: 2021-06-23

## 2021-06-22 RX ORDER — SODIUM CHLORIDE 9 MG/ML
100 INJECTION, SOLUTION INTRAVENOUS CONTINUOUS
Status: DISCONTINUED | OUTPATIENT
Start: 2021-06-22 | End: 2021-06-23 | Stop reason: HOSPADM

## 2021-06-22 RX ORDER — KETAMINE HYDROCHLORIDE 10 MG/ML
INJECTION INTRAMUSCULAR; INTRAVENOUS AS NEEDED
Status: DISCONTINUED | OUTPATIENT
Start: 2021-06-22 | End: 2021-06-22 | Stop reason: SURG

## 2021-06-22 RX ORDER — SODIUM CHLORIDE 0.9 % (FLUSH) 0.9 %
3 SYRINGE (ML) INJECTION EVERY 12 HOURS SCHEDULED
Status: DISCONTINUED | OUTPATIENT
Start: 2021-06-22 | End: 2021-06-23 | Stop reason: HOSPADM

## 2021-06-22 RX ORDER — ONDANSETRON 2 MG/ML
INJECTION INTRAMUSCULAR; INTRAVENOUS AS NEEDED
Status: DISCONTINUED | OUTPATIENT
Start: 2021-06-22 | End: 2021-06-22 | Stop reason: SURG

## 2021-06-22 RX ORDER — PROMETHAZINE HYDROCHLORIDE 25 MG/1
25 SUPPOSITORY RECTAL ONCE AS NEEDED
Status: DISCONTINUED | OUTPATIENT
Start: 2021-06-22 | End: 2021-06-22 | Stop reason: HOSPADM

## 2021-06-22 RX ORDER — ROPIVACAINE HYDROCHLORIDE 2 MG/ML
INJECTION, SOLUTION EPIDURAL; INFILTRATION; PERINEURAL
Status: DISCONTINUED | OUTPATIENT
Start: 2021-06-22 | End: 2021-06-22 | Stop reason: SURG

## 2021-06-22 RX ORDER — DIPHENHYDRAMINE HCL 25 MG
25 CAPSULE ORAL
Status: DISCONTINUED | OUTPATIENT
Start: 2021-06-22 | End: 2021-06-22 | Stop reason: HOSPADM

## 2021-06-22 RX ORDER — ONDANSETRON 2 MG/ML
4 INJECTION INTRAMUSCULAR; INTRAVENOUS ONCE AS NEEDED
Status: DISCONTINUED | OUTPATIENT
Start: 2021-06-22 | End: 2021-06-22 | Stop reason: HOSPADM

## 2021-06-22 RX ORDER — OXYCODONE HYDROCHLORIDE 5 MG/1
20 TABLET ORAL EVERY 4 HOURS PRN
Status: DISCONTINUED | OUTPATIENT
Start: 2021-06-22 | End: 2021-06-23 | Stop reason: HOSPADM

## 2021-06-22 RX ORDER — DEXAMETHASONE SODIUM PHOSPHATE 4 MG/ML
INJECTION, SOLUTION INTRA-ARTICULAR; INTRALESIONAL; INTRAMUSCULAR; INTRAVENOUS; SOFT TISSUE
Status: DISCONTINUED | OUTPATIENT
Start: 2021-06-22 | End: 2021-06-22 | Stop reason: SURG

## 2021-06-22 RX ORDER — BUPIVACAINE HYDROCHLORIDE 5 MG/ML
INJECTION, SOLUTION EPIDURAL; INTRACAUDAL
Status: DISCONTINUED | OUTPATIENT
Start: 2021-06-22 | End: 2021-06-22 | Stop reason: SURG

## 2021-06-22 RX ORDER — SODIUM CHLORIDE 0.9 % (FLUSH) 0.9 %
10 SYRINGE (ML) INJECTION AS NEEDED
Status: DISCONTINUED | OUTPATIENT
Start: 2021-06-22 | End: 2021-06-22 | Stop reason: HOSPADM

## 2021-06-22 RX ORDER — DOCUSATE SODIUM 100 MG/1
100 CAPSULE, LIQUID FILLED ORAL 2 TIMES DAILY PRN
Status: DISCONTINUED | OUTPATIENT
Start: 2021-06-22 | End: 2021-06-23 | Stop reason: HOSPADM

## 2021-06-22 RX ORDER — ACETAMINOPHEN 500 MG
1000 TABLET ORAL ONCE
Status: COMPLETED | OUTPATIENT
Start: 2021-06-22 | End: 2021-06-22

## 2021-06-22 RX ORDER — FLUMAZENIL 0.1 MG/ML
0.2 INJECTION INTRAVENOUS AS NEEDED
Status: DISCONTINUED | OUTPATIENT
Start: 2021-06-22 | End: 2021-06-22 | Stop reason: HOSPADM

## 2021-06-22 RX ORDER — CELECOXIB 200 MG/1
200 CAPSULE ORAL ONCE
Status: COMPLETED | OUTPATIENT
Start: 2021-06-22 | End: 2021-06-22

## 2021-06-22 RX ORDER — OXYCODONE AND ACETAMINOPHEN 10; 325 MG/1; MG/1
1 TABLET ORAL EVERY 4 HOURS PRN
Status: DISCONTINUED | OUTPATIENT
Start: 2021-06-22 | End: 2021-06-22 | Stop reason: HOSPADM

## 2021-06-22 RX ORDER — LIDOCAINE HYDROCHLORIDE 20 MG/ML
INJECTION, SOLUTION INFILTRATION; PERINEURAL AS NEEDED
Status: DISCONTINUED | OUTPATIENT
Start: 2021-06-22 | End: 2021-06-22 | Stop reason: SURG

## 2021-06-22 RX ORDER — ONDANSETRON 4 MG/1
4 TABLET, ORALLY DISINTEGRATING ORAL EVERY 8 HOURS PRN
Status: DISCONTINUED | OUTPATIENT
Start: 2021-06-22 | End: 2021-06-23 | Stop reason: HOSPADM

## 2021-06-22 RX ORDER — PROPOFOL 10 MG/ML
VIAL (ML) INTRAVENOUS AS NEEDED
Status: DISCONTINUED | OUTPATIENT
Start: 2021-06-22 | End: 2021-06-22 | Stop reason: SURG

## 2021-06-22 RX ORDER — MIDAZOLAM HYDROCHLORIDE 1 MG/ML
1 INJECTION INTRAMUSCULAR; INTRAVENOUS
Status: DISCONTINUED | OUTPATIENT
Start: 2021-06-22 | End: 2021-06-22 | Stop reason: HOSPADM

## 2021-06-22 RX ORDER — BUPIVACAINE HYDROCHLORIDE AND EPINEPHRINE 2.5; 5 MG/ML; UG/ML
INJECTION, SOLUTION INFILTRATION; PERINEURAL AS NEEDED
Status: DISCONTINUED | OUTPATIENT
Start: 2021-06-22 | End: 2021-06-22 | Stop reason: HOSPADM

## 2021-06-22 RX ORDER — DIPHENHYDRAMINE HYDROCHLORIDE 50 MG/ML
12.5 INJECTION INTRAMUSCULAR; INTRAVENOUS
Status: DISCONTINUED | OUTPATIENT
Start: 2021-06-22 | End: 2021-06-22 | Stop reason: HOSPADM

## 2021-06-22 RX ORDER — FAMOTIDINE 10 MG/ML
20 INJECTION, SOLUTION INTRAVENOUS
Status: COMPLETED | OUTPATIENT
Start: 2021-06-22 | End: 2021-06-22

## 2021-06-22 RX ORDER — VENLAFAXINE 75 MG/1
75 TABLET ORAL 2 TIMES DAILY
Status: DISCONTINUED | OUTPATIENT
Start: 2021-06-22 | End: 2021-06-23 | Stop reason: HOSPADM

## 2021-06-22 RX ORDER — TRANEXAMIC ACID 100 MG/ML
INJECTION, SOLUTION INTRAVENOUS AS NEEDED
Status: DISCONTINUED | OUTPATIENT
Start: 2021-06-22 | End: 2021-06-22 | Stop reason: SURG

## 2021-06-22 RX ORDER — SODIUM CHLORIDE 0.9 % (FLUSH) 0.9 %
10 SYRINGE (ML) INJECTION AS NEEDED
Status: DISCONTINUED | OUTPATIENT
Start: 2021-06-22 | End: 2021-06-23 | Stop reason: HOSPADM

## 2021-06-22 RX ORDER — ROCURONIUM BROMIDE 10 MG/ML
INJECTION, SOLUTION INTRAVENOUS AS NEEDED
Status: DISCONTINUED | OUTPATIENT
Start: 2021-06-22 | End: 2021-06-22 | Stop reason: SURG

## 2021-06-22 RX ORDER — LABETALOL HYDROCHLORIDE 5 MG/ML
5 INJECTION, SOLUTION INTRAVENOUS
Status: DISCONTINUED | OUTPATIENT
Start: 2021-06-22 | End: 2021-06-22 | Stop reason: HOSPADM

## 2021-06-22 RX ORDER — GLYCOPYRROLATE 0.2 MG/ML
INJECTION INTRAMUSCULAR; INTRAVENOUS AS NEEDED
Status: DISCONTINUED | OUTPATIENT
Start: 2021-06-22 | End: 2021-06-22 | Stop reason: SURG

## 2021-06-22 RX ORDER — FUROSEMIDE 40 MG/1
40 TABLET ORAL DAILY
Status: DISCONTINUED | OUTPATIENT
Start: 2021-06-22 | End: 2021-06-23 | Stop reason: HOSPADM

## 2021-06-22 RX ORDER — CYCLOBENZAPRINE HCL 10 MG
10 TABLET ORAL 3 TIMES DAILY PRN
Status: DISCONTINUED | OUTPATIENT
Start: 2021-06-22 | End: 2021-06-23 | Stop reason: HOSPADM

## 2021-06-22 RX ORDER — HYDROMORPHONE HCL 110MG/55ML
PATIENT CONTROLLED ANALGESIA SYRINGE INTRAVENOUS AS NEEDED
Status: DISCONTINUED | OUTPATIENT
Start: 2021-06-22 | End: 2021-06-22 | Stop reason: SURG

## 2021-06-22 RX ORDER — OXYCODONE HYDROCHLORIDE 5 MG/1
5 TABLET ORAL ONCE
Status: COMPLETED | OUTPATIENT
Start: 2021-06-22 | End: 2021-06-22

## 2021-06-22 RX ORDER — SODIUM CHLORIDE 0.9 % (FLUSH) 0.9 %
10 SYRINGE (ML) INJECTION EVERY 12 HOURS SCHEDULED
Status: DISCONTINUED | OUTPATIENT
Start: 2021-06-22 | End: 2021-06-22 | Stop reason: HOSPADM

## 2021-06-22 RX ORDER — ONDANSETRON 2 MG/ML
4 INJECTION INTRAMUSCULAR; INTRAVENOUS EVERY 6 HOURS PRN
Status: DISCONTINUED | OUTPATIENT
Start: 2021-06-22 | End: 2021-06-23 | Stop reason: HOSPADM

## 2021-06-22 RX ORDER — FENTANYL CITRATE 50 UG/ML
50 INJECTION, SOLUTION INTRAMUSCULAR; INTRAVENOUS
Status: DISCONTINUED | OUTPATIENT
Start: 2021-06-22 | End: 2021-06-22 | Stop reason: HOSPADM

## 2021-06-22 RX ORDER — CEFAZOLIN SODIUM 2 G/100ML
2 INJECTION, SOLUTION INTRAVENOUS ONCE
Status: COMPLETED | OUTPATIENT
Start: 2021-06-22 | End: 2021-06-22

## 2021-06-22 RX ORDER — LOSARTAN POTASSIUM 100 MG/1
100 TABLET ORAL EVERY MORNING
Status: DISCONTINUED | OUTPATIENT
Start: 2021-06-23 | End: 2021-06-23 | Stop reason: HOSPADM

## 2021-06-22 RX ORDER — ASPIRIN 325 MG
325 TABLET, DELAYED RELEASE (ENTERIC COATED) ORAL DAILY
Status: DISCONTINUED | OUTPATIENT
Start: 2021-06-23 | End: 2021-06-23 | Stop reason: HOSPADM

## 2021-06-22 RX ORDER — PROMETHAZINE HYDROCHLORIDE 12.5 MG/1
12.5 SUPPOSITORY RECTAL EVERY 6 HOURS PRN
Status: DISCONTINUED | OUTPATIENT
Start: 2021-06-22 | End: 2021-06-23 | Stop reason: HOSPADM

## 2021-06-22 RX ORDER — NALOXONE HCL 0.4 MG/ML
0.1 VIAL (ML) INJECTION
Status: DISCONTINUED | OUTPATIENT
Start: 2021-06-22 | End: 2021-06-23 | Stop reason: HOSPADM

## 2021-06-22 RX ORDER — ATORVASTATIN CALCIUM 20 MG/1
40 TABLET, FILM COATED ORAL DAILY
Status: DISCONTINUED | OUTPATIENT
Start: 2021-06-22 | End: 2021-06-23 | Stop reason: HOSPADM

## 2021-06-22 RX ORDER — PROMETHAZINE HYDROCHLORIDE 12.5 MG/1
12.5 TABLET ORAL EVERY 6 HOURS PRN
Status: DISCONTINUED | OUTPATIENT
Start: 2021-06-22 | End: 2021-06-23 | Stop reason: HOSPADM

## 2021-06-22 RX ORDER — SODIUM CHLORIDE, SODIUM LACTATE, POTASSIUM CHLORIDE, CALCIUM CHLORIDE 600; 310; 30; 20 MG/100ML; MG/100ML; MG/100ML; MG/100ML
9 INJECTION, SOLUTION INTRAVENOUS CONTINUOUS PRN
Status: DISCONTINUED | OUTPATIENT
Start: 2021-06-22 | End: 2021-06-22 | Stop reason: HOSPADM

## 2021-06-22 RX ORDER — EPHEDRINE SULFATE 50 MG/ML
5 INJECTION, SOLUTION INTRAVENOUS ONCE AS NEEDED
Status: DISCONTINUED | OUTPATIENT
Start: 2021-06-22 | End: 2021-06-22 | Stop reason: HOSPADM

## 2021-06-22 RX ORDER — DEXAMETHASONE SODIUM PHOSPHATE 10 MG/ML
INJECTION INTRAMUSCULAR; INTRAVENOUS AS NEEDED
Status: DISCONTINUED | OUTPATIENT
Start: 2021-06-22 | End: 2021-06-22 | Stop reason: SURG

## 2021-06-22 RX ORDER — FENTANYL CITRATE 50 UG/ML
INJECTION, SOLUTION INTRAMUSCULAR; INTRAVENOUS AS NEEDED
Status: DISCONTINUED | OUTPATIENT
Start: 2021-06-22 | End: 2021-06-22 | Stop reason: SURG

## 2021-06-22 RX ORDER — HYDROMORPHONE HYDROCHLORIDE 1 MG/ML
0.5 INJECTION, SOLUTION INTRAMUSCULAR; INTRAVENOUS; SUBCUTANEOUS
Status: DISCONTINUED | OUTPATIENT
Start: 2021-06-22 | End: 2021-06-22 | Stop reason: HOSPADM

## 2021-06-22 RX ORDER — HYDROCODONE BITARTRATE AND ACETAMINOPHEN 7.5; 325 MG/1; MG/1
1 TABLET ORAL ONCE AS NEEDED
Status: DISCONTINUED | OUTPATIENT
Start: 2021-06-22 | End: 2021-06-22 | Stop reason: HOSPADM

## 2021-06-22 RX ORDER — OXYCODONE AND ACETAMINOPHEN 10; 325 MG/1; MG/1
1 TABLET ORAL EVERY 4 HOURS PRN
Status: DISCONTINUED | OUTPATIENT
Start: 2021-06-22 | End: 2021-06-23 | Stop reason: HOSPADM

## 2021-06-22 RX ORDER — DICLOXACILLIN SODIUM 250 MG/1
500 CAPSULE ORAL EVERY 8 HOURS SCHEDULED
Status: DISCONTINUED | OUTPATIENT
Start: 2021-06-23 | End: 2021-06-23 | Stop reason: HOSPADM

## 2021-06-22 RX ORDER — LEVOTHYROXINE SODIUM 88 UG/1
88 TABLET ORAL
Status: DISCONTINUED | OUTPATIENT
Start: 2021-06-23 | End: 2021-06-23 | Stop reason: HOSPADM

## 2021-06-22 RX ORDER — NALOXONE HCL 0.4 MG/ML
0.2 VIAL (ML) INJECTION AS NEEDED
Status: DISCONTINUED | OUTPATIENT
Start: 2021-06-22 | End: 2021-06-22 | Stop reason: HOSPADM

## 2021-06-22 RX ORDER — PROMETHAZINE HYDROCHLORIDE 25 MG/1
25 TABLET ORAL ONCE AS NEEDED
Status: DISCONTINUED | OUTPATIENT
Start: 2021-06-22 | End: 2021-06-22 | Stop reason: HOSPADM

## 2021-06-22 RX ORDER — IBUPROFEN 600 MG/1
600 TABLET ORAL ONCE AS NEEDED
Status: DISCONTINUED | OUTPATIENT
Start: 2021-06-22 | End: 2021-06-22 | Stop reason: HOSPADM

## 2021-06-22 RX ORDER — ONDANSETRON 4 MG/1
4 TABLET, FILM COATED ORAL EVERY 6 HOURS PRN
Status: DISCONTINUED | OUTPATIENT
Start: 2021-06-22 | End: 2021-06-23 | Stop reason: HOSPADM

## 2021-06-22 RX ORDER — NEOSTIGMINE METHYLSULFATE 0.5 MG/ML
INJECTION, SOLUTION INTRAVENOUS AS NEEDED
Status: DISCONTINUED | OUTPATIENT
Start: 2021-06-22 | End: 2021-06-22 | Stop reason: SURG

## 2021-06-22 RX ORDER — FAMOTIDINE 20 MG/1
40 TABLET, FILM COATED ORAL DAILY
Status: DISCONTINUED | OUTPATIENT
Start: 2021-06-22 | End: 2021-06-23 | Stop reason: HOSPADM

## 2021-06-22 RX ADMIN — PROPOFOL 50 MG: 10 INJECTION, EMULSION INTRAVENOUS at 10:21

## 2021-06-22 RX ADMIN — CEFAZOLIN SODIUM 2 G: 2 INJECTION, SOLUTION INTRAVENOUS at 21:48

## 2021-06-22 RX ADMIN — DEXAMETHASONE SODIUM PHOSPHATE 8 MG: 10 INJECTION INTRAMUSCULAR; INTRAVENOUS at 10:55

## 2021-06-22 RX ADMIN — ONDANSETRON 4 MG: 2 INJECTION INTRAMUSCULAR; INTRAVENOUS at 11:28

## 2021-06-22 RX ADMIN — CELECOXIB 200 MG: 200 CAPSULE ORAL at 09:29

## 2021-06-22 RX ADMIN — OXYCODONE 5 MG: 5 TABLET ORAL at 09:29

## 2021-06-22 RX ADMIN — LIDOCAINE HYDROCHLORIDE 80 MG: 20 INJECTION, SOLUTION INFILTRATION; PERINEURAL at 10:19

## 2021-06-22 RX ADMIN — BUPIVACAINE HYDROCHLORIDE 15 ML: 5 INJECTION, SOLUTION EPIDURAL; INTRACAUDAL; PERINEURAL at 12:16

## 2021-06-22 RX ADMIN — PHENYLEPHRINE HYDROCHLORIDE 100 MCG: 10 INJECTION INTRAVENOUS at 10:57

## 2021-06-22 RX ADMIN — ATORVASTATIN CALCIUM 40 MG: 20 TABLET, FILM COATED ORAL at 20:30

## 2021-06-22 RX ADMIN — FAMOTIDINE 40 MG: 20 TABLET, FILM COATED ORAL at 20:31

## 2021-06-22 RX ADMIN — PHENYLEPHRINE HYDROCHLORIDE 150 MCG: 10 INJECTION INTRAVENOUS at 11:23

## 2021-06-22 RX ADMIN — ROCURONIUM BROMIDE 35 MG: 50 INJECTION INTRAVENOUS at 10:19

## 2021-06-22 RX ADMIN — KETAMINE HYDROCHLORIDE 30 MG: 10 INJECTION INTRAMUSCULAR; INTRAVENOUS at 10:30

## 2021-06-22 RX ADMIN — FENTANYL CITRATE 50 MCG: 50 INJECTION INTRAMUSCULAR; INTRAVENOUS at 11:13

## 2021-06-22 RX ADMIN — PHENYLEPHRINE HYDROCHLORIDE 100 MCG: 10 INJECTION INTRAVENOUS at 11:13

## 2021-06-22 RX ADMIN — FENTANYL CITRATE 50 MCG: 50 INJECTION INTRAMUSCULAR; INTRAVENOUS at 11:05

## 2021-06-22 RX ADMIN — TRANEXAMIC ACID 1000 MG: 1 INJECTION, SOLUTION INTRAVENOUS at 10:38

## 2021-06-22 RX ADMIN — MIDAZOLAM 1 MG: 1 INJECTION INTRAMUSCULAR; INTRAVENOUS at 09:32

## 2021-06-22 RX ADMIN — SODIUM CHLORIDE, POTASSIUM CHLORIDE, SODIUM LACTATE AND CALCIUM CHLORIDE 9 ML/HR: 600; 310; 30; 20 INJECTION, SOLUTION INTRAVENOUS at 08:41

## 2021-06-22 RX ADMIN — CEFAZOLIN SODIUM 2 G: 2 INJECTION, SOLUTION INTRAVENOUS at 10:08

## 2021-06-22 RX ADMIN — PHENYLEPHRINE HYDROCHLORIDE 100 MCG: 10 INJECTION INTRAVENOUS at 11:05

## 2021-06-22 RX ADMIN — ACETAMINOPHEN 1000 MG: 500 TABLET, FILM COATED ORAL at 09:28

## 2021-06-22 RX ADMIN — PHENYLEPHRINE HYDROCHLORIDE 150 MCG: 10 INJECTION INTRAVENOUS at 10:55

## 2021-06-22 RX ADMIN — NEOSTIGMINE METHYLSULFATE 1.5 MG: 0.5 INJECTION INTRAVENOUS at 11:31

## 2021-06-22 RX ADMIN — ROPIVACAINE HYDROCHLORIDE 15 ML: 2 INJECTION, SOLUTION EPIDURAL; INFILTRATION at 12:16

## 2021-06-22 RX ADMIN — FENTANYL CITRATE 50 MCG: 50 INJECTION INTRAMUSCULAR; INTRAVENOUS at 10:17

## 2021-06-22 RX ADMIN — PROPOFOL 180 MG: 10 INJECTION, EMULSION INTRAVENOUS at 10:19

## 2021-06-22 RX ADMIN — GLYCOPYRROLATE 0.2 MG: 0.2 INJECTION INTRAMUSCULAR; INTRAVENOUS at 11:31

## 2021-06-22 RX ADMIN — DEXAMETHASONE SODIUM PHOSPHATE 4 MG: 4 INJECTION, SOLUTION INTRAMUSCULAR; INTRAVENOUS at 12:16

## 2021-06-22 RX ADMIN — PHENYLEPHRINE HYDROCHLORIDE 150 MCG: 10 INJECTION INTRAVENOUS at 10:33

## 2021-06-22 RX ADMIN — PHENYLEPHRINE HYDROCHLORIDE 100 MCG: 10 INJECTION INTRAVENOUS at 10:47

## 2021-06-22 RX ADMIN — FENTANYL CITRATE 50 MCG: 50 INJECTION INTRAMUSCULAR; INTRAVENOUS at 10:40

## 2021-06-22 RX ADMIN — HYDROMORPHONE HYDROCHLORIDE 0.5 MG: 2 INJECTION, SOLUTION INTRAMUSCULAR; INTRAVENOUS; SUBCUTANEOUS at 11:31

## 2021-06-22 RX ADMIN — FAMOTIDINE 20 MG: 10 INJECTION INTRAVENOUS at 09:28

## 2021-06-22 RX ADMIN — VENLAFAXINE HYDROCHLORIDE 75 MG: 75 TABLET ORAL at 20:30

## 2021-06-22 RX ADMIN — SODIUM CHLORIDE 100 ML/HR: 9 INJECTION, SOLUTION INTRAVENOUS at 21:47

## 2021-06-22 NOTE — OP NOTE
Reverse Total Shoulder revision for prior infection fracture Operative Note  Dr. SYLVIA Richardson II  [phone]    PATIENT NAME: Stefani Bhandari  MRN: [id number]  : 1965 AGE: 55 y.o. GENDER: female  DATE OF OPERATION: 2021  PREOPERATIVE DIAGNOSIS: Retained antibiotic spacer of the left shoulder  POSTOPERATIVE DIAGNOSIS: Same  OPERATION PERFORMED: Left Reverse Total Shoulder revision  SURGEON: Juaquin Richardson MD  Circulator: Brenda Ibarra RN  Scrub Person: Ruma Phelps; Joseline Luna  Vendor Representative: Polly Manriquez Ben  Assistant: Annia Escalante PA  ANESTHESIA: General  ASSISTANT: Annia Escalante. This case would not have been possible without another set of skilled surgical hands for retraction, use of instrumentation, and general assistance.  This assistance was vital to the success of the case.   ESTIMATED BLOOD LOSS: 250cc  SPONGE AND NEEDLE COUNT: Correct  INDICATIONS: This patient suffered a proximal humerus fracture. A discussion of operative versus nonoperative treatment was had with the patient. They elected to undergo reverse total shoulder arthroplasty. The risks of surgery were discussed and included the risk of anesthesia, infection, damage to neurovascular structures, implant loosening/fialure, fracture, hardware prominence, malunion/nonunion, the need for further procedures, medical complications, and others. No guarantees were made. The patient wished to proceed with surgery and a surgical consent was signed.  COMPONENTS:   · Titan reverse shoulder system  · RSS glenoid baseplate-S  · Multiple screws  · Glenosphere, concentric-S  · Reverse shoulder system reverse body small and body screw  · TSS press-fit stem size 14  · RSS Hx L liner standard, +6    PERTINENT FINDINGS: Proximal Humerus Fracture    DETAILS OF PROCEDURE:  The patient was met in the preoperative area. The site was marked. The consent and H&P were reviewed. The patient was then wheeled back  to the operative suite and transferred to the operative table. The patient underwent anesthesia. Surgical alcohol was used to thoroughly clean the operative area. The arm was prepped and draped in the normal sterile fashion which included ChloraPrep and multiple layers of sterile draping.  A surgical timeout was performed in which administration of preoperative antibiotics as well as the surgical site were confirmed.    An deltopectoral approach to the proximal humerus was then utilized.  Care was taken to avoid damage to the cephalic vein.  Dissection was carried down to the proximal humerus where retractors were placed appropriately.  Deep cultures were taken.    I next identified the antibiotic spacer and was able to externally rotate the shoulder and dislocate this.  The antibiotic spacer was removed using an osteotome and mallet.  No bony damage was done.  I then placed retractors around the glenoid where some fibrosis and scar tissue had formed.  This was sharply debrided.  Next a pin was placed to guide the placement of the glenoid.  Reaming was then performed followed by placement of the central and peripheral screws.  The real baseplate was then seated into position and verified to be well fixated.    I then turned my attention back to the humerus.  Adequate broaching was performed until there was enough fill.  I then trialed the shoulder using a standard body and noted that a +5 body would be needed.  The real implants were opened while sutures were passed into the supraspinatus and infraspinatus.  I was unable to find sufficient subscap to repair.  After the real implant was open, the shoulder was trialed 1 final time and then a real polyethylene was opened and inserted.  The rotator cuff was then tied down into position and the shoulder was thoroughly irrigated and closed in layers.    The deltopectoral interval was then closed with a running stitch.  The subcu and skin was then closed in layers.  A  "sterile dressing was then applied.  The patient was awoken from anesthesia and moved over to the bed.  The patient was then taken the PACU in good condition.      R \"Kavon\" Debra BAKER MD  Orthopaedic Surgery  [facility]  [phone]    \  "

## 2021-06-22 NOTE — ANESTHESIA PROCEDURE NOTES
Peripheral Block    Pre-sedation assessment completed: 6/22/2021 12:09 PM    Patient reassessed immediately prior to procedure    Patient location during procedure: holding area  Start time: 6/22/2021 12:11 PM  Stop time: 6/22/2021 12:16 PM  Reason for block: at surgeon's request and post-op pain management  Performed by  Anesthesiologist: Howard Chang DO  Preanesthetic Checklist  Completed: patient identified, IV checked, site marked, risks and benefits discussed, surgical consent, monitors and equipment checked, pre-op evaluation and timeout performed  Prep:  Pt Position: sitting  Sterile barriers:gloves, mask, cap and washed/disinfected hands  Prep: Betadine  Patient monitoring: blood pressure monitoring, continuous pulse oximetry and EKG  Procedure  Sedation:yes  Performed under: local infiltration  Guidance:ultrasound guided  Images:still images obtained, printed/placed on chart    Laterality:left  Block Type:interscalene  Injection Technique:single-shot  Needle Type:echogenic  Needle Gauge:22 G  Resistance on Injection: none    Medications Used: dexamethasone (DECADRON) injection, 4 mg  bupivacaine (PF) (MARCAINE) 0.5 % injection, 15 mL  ropivacaine (NAROPIN) 0.2 % injection, 15 mL  Med admintered at 6/22/2021 12:16 PM      Medications  Comment:Ultrasound Interpretation:  Using ultrasound guidance the needle was placed in close proximity to the brachial plexus and anesthetic was injected in the area of the nerve and spread of the anesthetic was seen on ultrasound in close proximity thereto.  There were no abnormalities seen on ultrasound; a digital image was taken; and the patient tolerated the procedure with no complications.      Post Assessment  Injection Assessment: negative aspiration for heme, no paresthesia on injection and incremental injection  Patient Tolerance:comfortable throughout block  Complications:no

## 2021-06-22 NOTE — ANESTHESIA PREPROCEDURE EVALUATION
Anesthesia Evaluation     Patient summary reviewed   NPO Solid Status: > 8 hours             Airway   Mallampati: II  Neck ROM: full  No difficulty expected  Dental      Pulmonary    (+) sleep apnea,   Cardiovascular     Rhythm: regular    (+) hypertension,       Neuro/Psych  GI/Hepatic/Renal/Endo    (+) obesity,   liver disease, diabetes mellitus,     Musculoskeletal     Abdominal    Substance History      OB/GYN          Other   arthritis,                      Anesthesia Plan    ASA 3     general       Anesthetic plan, all risks, benefits, and alternatives have been provided, discussed and informed consent has been obtained with: patient.  Use of blood products discussed with patient .

## 2021-06-22 NOTE — ANESTHESIA PROCEDURE NOTES
Airway  Urgency: elective    Date/Time: 6/22/2021 10:23 AM  Airway not difficult    General Information and Staff    Patient location during procedure: OR  Anesthesiologist: Howard Chang DO  CRNA: Gabriela Luna CRNA    Indications and Patient Condition  Indications for airway management: airway protection    Preoxygenated: yes  Mask difficulty assessment: 1 - vent by mask    Final Airway Details  Final airway type: endotracheal airway      Successful airway: ETT  Cuffed: yes   Successful intubation technique: direct laryngoscopy  Facilitating devices/methods: intubating stylet  Endotracheal tube insertion site: oral  Blade: Krishna  Blade size: 3  ETT size (mm): 7.0  Cormack-Lehane Classification: grade I - full view of glottis  Placement verified by: chest auscultation and capnometry   Measured from: lips  ETT/EBT  to lips (cm): 22  Number of attempts at approach: 1  Assessment: lips, teeth, and gum same as pre-op and atraumatic intubation

## 2021-06-22 NOTE — ANESTHESIA POSTPROCEDURE EVALUATION
Patient: Stefani Bhandari    Procedure Summary     Date: 06/22/21 Room / Location: [facility] OR [address] [place] [address] MAIN OR    Anesthesia Start: 1012 Anesthesia Stop: 1153    Procedure: TOTAL SHOULDER ARTHROPLASTY (Left Arm Upper) Diagnosis:     Surgeons: Juaquin Richardson II, MD Provider: Howard Chang DO    Anesthesia Type: general ASA Status: 3          Anesthesia Type: general    Vitals  Vitals Value Taken Time   /70 06/22/21 1245   Temp 37 °C (98.6 °F) 06/22/21 1149   Pulse 95 06/22/21 1251   Resp 14 06/22/21 1245   SpO2 95 % 06/22/21 1251   Vitals shown include unvalidated device data.        Post Anesthesia Care and Evaluation    Patient location during evaluation: PACU  Patient participation: complete - patient participated  Level of consciousness: awake  Pain score: 3  Pain management: adequate  Airway patency: patent  Anesthetic complications: No anesthetic complications  PONV Status: controlled  Cardiovascular status: acceptable  Respiratory status: acceptable  Hydration status: acceptable

## 2021-06-22 NOTE — H&P
Orthopaedic Surgery  History & Physical  Dr. SYLVIA Richardson II  [phone]    HPI:  Patient is a 55 y.o. Not  or  female who presents with history of a left shoulder infection requiring I&D and subsequent spacer placement.  She is here today for shoulder replant    MEDICAL HISTORY  Past Medical History:   Diagnosis Date   • Anemia    • Anxiety    • Chronic back pain    • Cirrhosis (CMS/HCC)    • Clostridium difficile colitis 01/31/2021   • DDD (degenerative disc disease), lumbar    • Depression    • Diabetes mellitus (CMS/HCC)    • Elevated LFTs    • NICK (generalized anxiety disorder)    • History of 2019 novel coronavirus disease (COVID-19) 11/2020   • History of epilepsy     CHILDHOOD   • Hyperlipidemia    • Hypertension    • Hypothyroidism    • Leg swelling     HX   • MDD (major depressive disorder)    • Morbid obesity (CMS/HCC)    • MSSA (methicillin susceptible Staphylococcus aureus) infection 01/2020   • METZGER (nonalcoholic steatohepatitis) 2000   • OA (osteoarthritis)    • Obesity    • Septic arthritis of shoulder, left (CMS/HCC)     WITH OSTEOMYELITIS   • Shoulder pain    • Sleep apnea     DOES NOT USE CPAP CURRENTLY/WAITING FOR NEW MACHINE   • Wears glasses    ·   Past Surgical History:   Procedure Laterality Date   • COLONOSCOPY N/A 2/11/2021    Procedure: COLONOSCOPY INTO CECUM;  Surgeon: Joshua Bender MD;  Location: [facility] ENDOSCOPY;  Service: Gastroenterology;  Laterality: N/A;  pre: same  post: diverticulosis   • ENDOSCOPY N/A 2/11/2021    Procedure: ESOPHAGOGASTRODUODENOSCOPY WITH BIOPSIES;  Surgeon: Joshua Bender MD;  Location: [facility] ENDOSCOPY;  Service: Gastroenterology;  Laterality: N/A;  pre: anemia, rectal bleeding, abnormal CT  post: hiatal hernia, esophagitis, gastropathy   • HYSTERECTOMY  2007   • INCISION AND DRAINAGE SHOULDER Left 1/30/2021    Procedure: INCISION AND DRAINAGE SHOULDER;  Surgeon: Juaquin Richardson II, MD;  Location: [facility] OR;   Service: Orthopedics;  Laterality: Left;   • LUMBAR FUSION N/A 5/3/2021    Procedure: Irrigation and debridement lumbar spine, lumbar 3-lumbar 4, lumbar 4-lumbar 5, lumbar 5-left sacral 1 laminectomy and fusion with instrumentation;  Surgeon: Dylan Clark MD;  Location: [facility];  Service: Orthopedic Spine;  Laterality: N/A;   • TOTAL SHOULDER ARTHROPLASTY Left 4/27/2021    Procedure: OPEN IRRIGATION AND DEBRIDEMENT OF THE LEFT SHOULDER WITH OSTEOTOMY AND SPACER PLACEMENT;  Surgeon: Juaquin Richardson II, MD;  Location: [facility];  Service: Orthopedics;  Laterality: Left;   • TUBAL ABDOMINAL LIGATION  1996   ·   Prior to Admission medications    Medication Sig Start Date End Date Taking? Authorizing Provider   aspirin  MG tablet Take 1 tablet by mouth Daily.  Patient taking differently: Take 325 mg by mouth Daily. PT TO CHECK WITH MD FOR HOLD DATE. 5/9/21   Nona Farias APRN   Chlorhexidine Gluconate Cloth 2 % pads Apply  topically Take As Directed. PREOP    Provider, MD Anuradha   cholecalciferol (VITAMIN D3) 25 MCG (1000 UT) tablet Take 2,000 Units by mouth Every Other Day.    Provider, MD Anuradha   cyclobenzaprine (FLEXERIL) 10 MG tablet Take 1 tablet by mouth 3 (Three) Times a Day As Needed for Muscle Spasms. for muscle spams 2/19/21   Dea Martinez MD   dicloxacillin (DYNAPEN) 500 MG capsule Take 1 capsule by mouth 3 (Three) Times a Day for 30 days. 6/11/21 7/11/21  Dianne Porras MD   ezetimibe (ZETIA) 10 MG tablet TAKE 1 TABLET BY MOUTH DAILY  Patient taking differently: Take 10 mg by mouth Every Morning. 3/12/21   Dea Mratinez MD   furosemide (LASIX) 40 MG tablet TAKE 1 TABLET BY MOUTH TWICE DAILY  Patient taking differently: Take 40 mg by mouth Daily. 4/15/21   Dea Martinez MD   levothyroxine (SYNTHROID, LEVOTHROID) 88 MCG tablet Take 1 tablet by mouth Daily.  Patient taking differently: Take 88 mcg by mouth Every Morning. 2/19/21   Dea Martinez  "MD   losartan (COZAAR) 100 MG tablet TAKE 1 TABLET BY MOUTH DAILY  Patient taking differently: Take 100 mg by mouth Every Morning. 21   Dea Martinez MD   metFORMIN (GLUCOPHAGE) 500 MG tablet Take 2 tablets by mouth 2 (two) times a day.  Patient taking differently: Take 1,000 mg by mouth 2 (two) times a day. Pt takes 2 a.m and 1 pm 21   Dea Martinez MD   ondansetron ODT (ZOFRAN-ODT) 4 MG disintegrating tablet Place 1 tablet on the tongue Every 8 (Eight) Hours As Needed for Nausea or Vomiting. 21   Stephanie Izaguirre APRN   oxyCODONE-acetaminophen (PERCOCET)  MG per tablet Take 1 tablet by mouth Every 8 (Eight) Hours As Needed. 21   ProviderAnuradha MD   simvastatin (ZOCOR) 80 MG tablet TAKE 1 TABLET BY MOUTH EVERY NIGHT 21   Dea Martinez MD   venlafaxine (EFFEXOR) 75 MG tablet TAKE 1 TABLET BY MOUTH TWICE DAILY  Patient taking differently: Take 75 mg by mouth 2 (Two) Times a Day. 21   Dea Martinez MD   ·   Allergies   Allergen Reactions   • Tramadol Anaphylaxis     \"seizure like behavior\"   ·   Most Recent Immunizations   Administered Date(s) Administered   • Flulaval/Fluarix/Fluzone Quad 10/14/2020   • Shingrix 10/21/2020   ·   Social History     Tobacco Use   • Smoking status: Former Smoker     Years: 20.00     Types: Cigarettes     Quit date:      Years since quittin.4   • Smokeless tobacco: Never Used   • Tobacco comment: 1 pack per 2 days   Substance Use Topics   • Alcohol use: Yes     Comment: RARELY/EVERY 3-4 MONTHS   ·    Social History     Substance and Sexual Activity   Drug Use Never   ·     REVIEW OF SYSTEMS:  · Head: negative for headache  · Respiratory: negative for shortness of breath.   · Cardiovascular: negative for chest pain.   · Gastrointestinal: negative abdominal pain.   · Neurological: negative for LOC  · Psychiatric/Behavioral: negative for memory loss.   · All other systems reviewed and are negative    VITALS: There were no " vitals taken for this visit. There is no height or weight on file to calculate BMI.    PHYSICAL EXAM:   · CONSTITUTIONAL: A&Ox3, No acute distress  · LUNGS: Equal chest rise, no shortness of air  · CARDIOVASCULAR: palpable peripheral pulses  · SKIN: no skin lesions in the area examined  · LYMPH: no lymphadenopathy in the area examined  · EXTREMITY: Left Upper Extremity  · Tenderness to Palpation: No tenderness to palpation  · Gross Deformity: Incision well-healed  · Pulses:  Brisk Capillary Refill  · Sensation: Intact to Radial, Median, Ulnar Nerves and grossly throughout extremity  · Motor: 5/5 Radial/Ulnar/Median/AIN/PIN Motor Nerves    RADIOLOGY REVIEW:   XR Shoulder 2+ View Left    Result Date: 6/17/2021  No acute process.    LEFT SHOULDER 2 VIEWS.  FINDINGS: A proximal left humerus prosthesis is seen and is well seated within the humerus. The left humerus is high riding in relation to the glenoid and AC joint with narrowing of the humeral head to acromion distance. There is mild AC joint arthropathy.  No fractures are seen.  IMPRESSION: 1. Postoperative changes of the proximal left humerus prosthesis. 2. Left humeral head is high riding.  This report was finalized on 6/17/2021 9:10 PM by Dr. Ketan Shepherd M.D.      XR Chest PA & Lateral    Result Date: 6/17/2021  No acute process.    LEFT SHOULDER 2 VIEWS.  FINDINGS: A proximal left humerus prosthesis is seen and is well seated within the humerus. The left humerus is high riding in relation to the glenoid and AC joint with narrowing of the humeral head to acromion distance. There is mild AC joint arthropathy.  No fractures are seen.  IMPRESSION: 1. Postoperative changes of the proximal left humerus prosthesis. 2. Left humeral head is high riding.  This report was finalized on 6/17/2021 9:10 PM by Dr. Ketan Shepherd M.D.        LABS:   Results for the past 24 hours: No results found for this or any previous visit (from the past 24  "hour(s)).    IMPRESSION:  Patient is a 55 y.o. Not  or  female with left shoulder retained antibiotic spacer    PLAN:   · Admited to: Juaquin Richardson II, MD left shoulder  · Disposition: Removal of antibiotic spacer with placement of reverse total shoulder arthroplasty versus possible revision of spacer if there is continued infection.    R \"Kavon\" Debra BAKER MD  Orthopaedic Surgery  [facility]  [phone] - [place] Office  [phone] - [place] Office    "

## 2021-06-23 ENCOUNTER — READMISSION MANAGEMENT (OUTPATIENT)
Dept: CALL CENTER | Facility: HOSPITAL | Age: 56
End: 2021-06-23

## 2021-06-23 VITALS
RESPIRATION RATE: 18 BRPM | DIASTOLIC BLOOD PRESSURE: 70 MMHG | BODY MASS INDEX: 38.96 KG/M2 | WEIGHT: 248.2 LBS | SYSTOLIC BLOOD PRESSURE: 125 MMHG | TEMPERATURE: 98.7 F | HEART RATE: 84 BPM | HEIGHT: 67 IN | OXYGEN SATURATION: 92 %

## 2021-06-23 LAB
ANION GAP SERPL CALCULATED.3IONS-SCNC: 7.6 MMOL/L (ref 5–15)
BUN SERPL-MCNC: 16 MG/DL (ref 6–20)
BUN/CREAT SERPL: 19.3 (ref 7–25)
CALCIUM SPEC-SCNC: 8 MG/DL (ref 8.6–10.5)
CHLORIDE SERPL-SCNC: 104 MMOL/L (ref 98–107)
CO2 SERPL-SCNC: 24.4 MMOL/L (ref 22–29)
CREAT SERPL-MCNC: 0.83 MG/DL (ref 0.57–1)
GFR SERPL CREATININE-BSD FRML MDRD: 71 ML/MIN/1.73
GLUCOSE SERPL-MCNC: 103 MG/DL (ref 65–99)
HCT VFR BLD AUTO: 27.8 % (ref 34–46.6)
HGB BLD-MCNC: 9.4 G/DL (ref 12–15.9)
POTASSIUM SERPL-SCNC: 4.6 MMOL/L (ref 3.5–5.2)
SODIUM SERPL-SCNC: 136 MMOL/L (ref 136–145)

## 2021-06-23 PROCEDURE — 97165 OT EVAL LOW COMPLEX 30 MIN: CPT

## 2021-06-23 PROCEDURE — 25010000003 CEFAZOLIN IN DEXTROSE 2-4 GM/100ML-% SOLUTION: Performed by: ORTHOPAEDIC SURGERY

## 2021-06-23 PROCEDURE — 85014 HEMATOCRIT: CPT | Performed by: ORTHOPAEDIC SURGERY

## 2021-06-23 PROCEDURE — 85018 HEMOGLOBIN: CPT | Performed by: ORTHOPAEDIC SURGERY

## 2021-06-23 PROCEDURE — G0378 HOSPITAL OBSERVATION PER HR: HCPCS

## 2021-06-23 PROCEDURE — 97110 THERAPEUTIC EXERCISES: CPT

## 2021-06-23 PROCEDURE — 80048 BASIC METABOLIC PNL TOTAL CA: CPT | Performed by: ORTHOPAEDIC SURGERY

## 2021-06-23 RX ORDER — OXYCODONE AND ACETAMINOPHEN 10; 325 MG/1; MG/1
1 TABLET ORAL EVERY 4 HOURS PRN
Qty: 50 TABLET | Refills: 0 | Status: SHIPPED | OUTPATIENT
Start: 2021-06-23 | End: 2021-07-02

## 2021-06-23 RX ADMIN — OXYCODONE HYDROCHLORIDE AND ACETAMINOPHEN 1 TABLET: 10; 325 TABLET ORAL at 05:29

## 2021-06-23 RX ADMIN — OXYCODONE HYDROCHLORIDE AND ACETAMINOPHEN 1 TABLET: 10; 325 TABLET ORAL at 09:41

## 2021-06-23 RX ADMIN — FUROSEMIDE 40 MG: 40 TABLET ORAL at 08:55

## 2021-06-23 RX ADMIN — CEFAZOLIN SODIUM 2 G: 2 INJECTION, SOLUTION INTRAVENOUS at 05:29

## 2021-06-23 RX ADMIN — VENLAFAXINE HYDROCHLORIDE 75 MG: 75 TABLET ORAL at 08:55

## 2021-06-23 RX ADMIN — LEVOTHYROXINE SODIUM 88 MCG: 0.09 TABLET ORAL at 05:29

## 2021-06-23 RX ADMIN — LOSARTAN POTASSIUM 100 MG: 100 TABLET, FILM COATED ORAL at 08:55

## 2021-06-23 RX ADMIN — FAMOTIDINE 40 MG: 20 TABLET, FILM COATED ORAL at 08:55

## 2021-06-23 RX ADMIN — ASPIRIN 325 MG: 325 TABLET, COATED ORAL at 08:55

## 2021-06-23 RX ADMIN — ATORVASTATIN CALCIUM 40 MG: 20 TABLET, FILM COATED ORAL at 08:55

## 2021-06-23 NOTE — THERAPY EVALUATION
Patient Name: Stefani Bhandari  : 1965    MRN: [id number]                              Today's Date: 2021       Admit Date: 2021    Visit Dx:     ICD-10-CM ICD-9-CM   1. Foreign body of left shoulder with infection  S40.252A 912.7    L08.9      Patient Active Problem List   Diagnosis   • Essential hypertension   • Sleep apnea   • Anxiety and depression   • Type 2 diabetes mellitus, without long-term current use of insulin (CMS/HCC)   • Obesity, Class III, BMI 40-49.9 (morbid obesity) (CMS/HCC)   • Hypothyroidism (acquired)   • Mixed hyperlipidemia   • Lumbar degenerative disc disease   • Thrombocytopenia (CMS/HCC)   • Pulmonary nodules   • Elevated liver function tests   • History of COVID-19   • Septic arthritis of shoulder, left (CMS/HCC)   • MSSA bacteremia   • 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 (CMS/HCC)   • Anasarca   • Anemia   • Gastritis   • Clostridium difficile colitis   • Prosthetic shoulder infection (CMS/HCC)   • Osteomyelitis of left shoulder (CMS/HCC)   • Discitis of lumbar region   • Obesity (BMI 30-39.9)   • Osteomyelitis (CMS/HCC)   • Status post reverse arthroplasty of left shoulder     Past Medical History:   Diagnosis Date   • Anemia    • Anxiety    • Chronic back pain    • Cirrhosis (CMS/HCC)    • Clostridium difficile colitis 2021   • DDD (degenerative disc disease), lumbar    • Depression    • Diabetes mellitus (CMS/HCC)    • Elevated LFTs    • NICK (generalized anxiety disorder)    • History of 2019 novel coronavirus disease (COVID-19) 2020   • History of epilepsy     CHILDHOOD   • Hyperlipidemia    • Hypertension    • Hypothyroidism    • Leg swelling     HX   • MDD (major depressive disorder)    • Morbid obesity (CMS/HCC)    • MSSA (methicillin susceptible Staphylococcus aureus) infection 2020   • METZGER (nonalcoholic steatohepatitis)    • OA (osteoarthritis)    • Obesity    • Septic arthritis of shoulder, left (CMS/HCC)     WITH OSTEOMYELITIS   • Shoulder pain     • Sleep apnea     DOES NOT USE CPAP CURRENTLY/WAITING FOR NEW MACHINE   • Wears glasses      Past Surgical History:   Procedure Laterality Date   • COLONOSCOPY N/A 2/11/2021    Procedure: COLONOSCOPY INTO CECUM;  Surgeon: Joshua Bender MD;  Location: [facility] ENDOSCOPY;  Service: Gastroenterology;  Laterality: N/A;  pre: same  post: diverticulosis   • ENDOSCOPY N/A 2/11/2021    Procedure: ESOPHAGOGASTRODUODENOSCOPY WITH BIOPSIES;  Surgeon: Joshua Bender MD;  Location: [facility] ENDOSCOPY;  Service: Gastroenterology;  Laterality: N/A;  pre: anemia, rectal bleeding, abnormal CT  post: hiatal hernia, esophagitis, gastropathy   • HYSTERECTOMY  2007   • INCISION AND DRAINAGE SHOULDER Left 1/30/2021    Procedure: INCISION AND DRAINAGE SHOULDER;  Surgeon: Juaquin Richardson II, MD;  Location: [facility] OR;  Service: Orthopedics;  Laterality: Left;   • LUMBAR FUSION N/A 5/3/2021    Procedure: Irrigation and debridement lumbar spine, lumbar 3-lumbar 4, lumbar 4-lumbar 5, lumbar 5-left sacral 1 laminectomy and fusion with instrumentation;  Surgeon: Dylan Clark MD;  Location: [facility] OR;  Service: Orthopedic Spine;  Laterality: N/A;   • TOTAL SHOULDER ARTHROPLASTY Left 4/27/2021    Procedure: OPEN IRRIGATION AND DEBRIDEMENT OF THE LEFT SHOULDER WITH OSTEOTOMY AND SPACER PLACEMENT;  Surgeon: Juaquin Richardson II, MD;  Location: [facility];  Service: Orthopedics;  Laterality: Left;   • TUBAL ABDOMINAL LIGATION  1996     General Information     Row Name 06/23/21 0852          OT Time and Intention    Document Type  evaluation  -SM     Mode of Treatment  occupational therapy;individual therapy  -     Row Name 06/23/21 0852          General Information    Patient Profile Reviewed  yes  -SM     Prior Level of Function  min assist:;ADL's pt reports multiple shoulder and back surgeries since January and family assists at home with ADL as needed  -SM     Existing Precautions/Restrictions   non-weight bearing;left;shoulder;spinal;brace worn when out of bed LSO brace, L sling  -[facility] Name 06/23/21 0852          Living Environment    Lives With  child(dyan), adult;grandchild(dyan)  -[facility] Name 06/23/21 0852          Cognition    Orientation Status (Cognition)  oriented x 4  -[facility] Name 06/23/21 0852          Safety Issues, Functional Mobility    Impairments Affecting Function (Mobility)  pain;strength  -       User Key  (r) = Recorded By, (t) = Taken By, (c) = Cosigned By    Initials Name Provider Type     Amber Agustin OT Occupational Therapist          Mobility/ADL's     [facility] Name 06/23/21 0854          Transfers    Transfers  toilet transfer;sit-stand transfer  -     Sit-Stand [place] (Transfers)  standby assist  -     [place] Level (Toilet Transfer)  standby assist  -SM     Row Name 06/23/21 0854          Functional Mobility    Functional Mobility- Ind. Level  standby assist  -     Functional Mobility- Comment  to bathroom  -[facility] Name 06/23/21 0854          Activities of Daily Living    BADL Assessment/Intervention  lower body dressing;toileting;upper body dressing  -[facility] Name 06/23/21 0854          Lower Body Dressing Assessment/Training    [place] Level (Lower Body Dressing)  lower body dressing skills;don;pants/bottoms;moderate assist (50% patient effort)  -     Comment (Lower Body Dressing)  has reacher at home for spinal precautions  -[facility] Name 06/23/21 0854          Toileting Assessment/Training    [place] Level (Toileting)  toileting skills;adjust/manage clothing;minimum assist (75% patient effort)  -[facility] Name 06/23/21 0854          Upper Body Dressing Assessment/Training    [place] Level (Upper Body Dressing)  upper body dressing skills;don;front opening garment;standby assist;verbal cues  -     Comment (Upper Body Dressing)  linnette technique  -       User Key  (r) = Recorded By, (t) = Taken By, (c) = Cosigned By     Initials Name Provider Type     Amber Agustin OT Occupational Therapist        Obj/Interventions     [facility] Name 06/23/21 0855          Sensory Interventions    Comment, Sensory Intervention  Pt reports surgical block has worn off  -[facility] Name 06/23/21 0855          Range of Motion Comprehensive    Comment, General Range of Motion  RUE post op weakness, LUE AROM WFL  -[facility] Name 06/23/21 0855          Strength Comprehensive (MMT)    Comment, General Manual Muscle Testing (MMT) Assessment  RUE NT- NWB, LUE > or = 3/5  -[facility] Name 06/23/21 0855          Shoulder (Therapeutic Exercise)    Shoulder (Therapeutic Exercise)  pendulum exercises  -     Shoulder Pendulum Exercises (Therapeutic Exercise)  standing;left  -[facility] Name 06/23/21 0855          Elbow/Forearm (Therapeutic Exercise)    Elbow/Forearm (Therapeutic Exercise)  AROM (active range of motion)  -     Elbow/Forearm AROM (Therapeutic Exercise)  left;flexion;extension;supination;pronation  -[facility] Name 06/23/21 0855          Wrist (Therapeutic Exercise)    Wrist (Therapeutic Exercise)  AROM (active range of motion)  -     Wrist AROM (Therapeutic Exercise)  left;flexion;extension  -SM     Row Name 06/23/21 0855          Hand (Therapeutic Exercise)    Hand (Therapeutic Exercise)  AROM (active range of motion)  -     Hand AROM/AAROM (Therapeutic Exercise)  AROM (active range of motion);left;finger flexion;finger extension  -[facility] Name 06/23/21 0855          Therapeutic Exercise    Therapeutic Exercise  shoulder;wrist;elbow/forearm;hand  -       User Key  (r) = Recorded By, (t) = Taken By, (c) = Cosigned By    Initials Name Provider Type     Amber Agustin OT Occupational Therapist        Goals/Plan     Row Name 06/23/21 0900          Dressing Goal 1 (OT)    Activity/Device (Dressing Goal 1, OT)  dressing skills, all;upper body dressing  -     [place]/Cues Needed (Dressing Goal 1, OT)  standby assist  -      Time Frame (Dressing Goal 1, OT)  short term goal (STG);by discharge  -SM     Strategies/Barriers (Dressing Goal 1, OT)  with linnette dressing technique  -SM     Progress/Outcome (Dressing Goal 1, OT)  goal met  -     Row Name 06/23/21 0900          ROM Goal 1 (OT)    ROM Goal 1 (OT)  Pt to be independent with shoulder HEP.  -SM     Time Frame (ROM Goal 1, OT)  short term goal (STG);by discharge  -SM     Progress/Outcome (ROM Goal 1, OT)  goal met  -       User Key  (r) = Recorded By, (t) = Taken By, (c) = Cosigned By    Initials Name Provider Type    SM Amber Agustin, OT Occupational Therapist        Clinical Impression     Row Name 06/23/21 0857          Pain Assessment    Additional Documentation  Pain Scale: Numbers Pre/Post-Treatment (Group)  -SM     Row Name 06/23/21 0857          Pain Scale: Numbers Pre/Post-Treatment    Pretreatment Pain Rating  3/10  -SM     Pain Location - Side  Left  -     Pain Location  shoulder  -SM     Pain Intervention(s)  Cold applied  -[facility] Name 06/23/21 0857          Plan of Care Review    Plan of Care Reviewed With  patient  -SM     Outcome Summary  Pt is a 55 y.o female s/p L reverse total shoulder revision, POD 1. Pt lives with family who is very supportive and able to assist after discharge with ADL as needed. Pt engaged in shoulder pendulums and AROM digits, wrist, elbow this date. Pt able to don/doff sling and shirt with SBA maintaining shoulder precautions. Up to bathroom SBA, needs Min A for ADL 2/2 minimal use of LUE. Plans to discharge home today.  -     Row Name 06/23/21 0857          Therapy Assessment/Plan (OT)    Rehab Potential (OT)  good, to achieve stated therapy goals  -     Criteria for Skilled Therapeutic Interventions Met (OT)  yes;skilled treatment is necessary  -     Therapy Frequency (OT)  evaluation only  -     Row Name 06/23/21 0857          Therapy Plan Review/Discharge Plan (OT)    Anticipated Discharge Disposition (OT)  home with  outpatient therapy services  -     Row Name 06/23/21 0857          Positioning and Restraints    Pre-Treatment Position  sitting in chair/recliner  -     Post Treatment Position  chair  -SM     In Chair  reclined;call light within reach;encouraged to call for assist;exit alarm on;notified nsg  -       User Key  (r) = Recorded By, (t) = Taken By, (c) = Cosigned By    Initials Name Provider Type    Amber Varghese OT Occupational Therapist        Outcome Measures     Row Name 06/23/21 0900          How much help from another is currently needed...    Putting on and taking off regular lower body clothing?  3  -SM     Bathing (including washing, rinsing, and drying)  3  -SM     Toileting (which includes using toilet bed pan or urinal)  3  -SM     Putting on and taking off regular upper body clothing  3  -SM     Taking care of personal grooming (such as brushing teeth)  3  -SM     Eating meals  3  -SM     AM-PAC 6 Clicks Score (OT)  18  -     Row Name 06/23/21 0900          Functional Assessment    Outcome Measure Options  AM-PAC 6 Clicks Daily Activity (OT)  -       User Key  (r) = Recorded By, (t) = Taken By, (c) = Cosigned By    Initials Name Provider Type    Amber Varghese OT Occupational Therapist        Occupational Therapy Education                 Title: PT OT SLP Therapies (In Progress)     Topic: Occupational Therapy (In Progress)     Point: ADL training (Done)     Description:   Instruct learner(s) on proper safety adaptation and remediation techniques during self care or transfers.   Instruct in proper use of assistive devices.              Learning Progress Summary           Patient Acceptance, E,H, MACRINA,DU by  at 6/23/2021 0901    Comment: Pt educated in shoulder HEP, shoulder precautions, NWB LUE, sling management, linnette dressing technique                   Point: Home exercise program (Done)     Description:   Instruct learner(s) on appropriate technique for monitoring, assisting  and/or progressing therapeutic exercises/activities.              Learning Progress Summary           Patient Acceptance, E,H, VU,DU by  at 6/23/2021 0901    Comment: Pt educated in shoulder HEP, shoulder precautions, NWB LUE, sling management, linnette dressing technique                   Point: Precautions (Done)     Description:   Instruct learner(s) on prescribed precautions during self-care and functional transfers.              Learning Progress Summary           Patient Acceptance, E,H, VU,DU by  at 6/23/2021 0901    Comment: Pt educated in shoulder HEP, shoulder precautions, NWB LUE, sling management, linnette dressing technique                   Point: Body mechanics (Not Started)     Description:   Instruct learner(s) on proper positioning and spine alignment during self-care, functional mobility activities and/or exercises.              Learner Progress:  Not documented in this visit.                      User Key     Initials Effective Dates Name Provider Type Discipline     04/02/20 -  Amber Agustin OT Occupational Therapist OT              OT Recommendation and Plan  Therapy Frequency (OT): evaluation only  Plan of Care Review  Plan of Care Reviewed With: patient  Outcome Summary: Pt is a 55 y.o female s/p L reverse total shoulder revision, POD 1. Pt lives with family who is very supportive and able to assist after discharge with ADL as needed. Pt engaged in shoulder pendulums and AROM digits, wrist, elbow this date. Pt able to don/doff sling and shirt with SBA maintaining shoulder precautions. Up to bathroom SBA, needs Min A for ADL 2/2 minimal use of LUE. Plans to discharge home today.     Time Calculation:   Time Calculation- OT     Row Name 06/23/21 0902             Time Calculation- OT    OT Start Time  0812  -      OT Stop Time  0830  -      OT Time Calculation (min)  18 min  -      Total Timed Code Minutes- OT  12 minute(s)  -      OT Received On  06/23/21  -      OT - Next  Appointment  06/23/21  -SM         Timed Charges    82294 - OT Therapeutic Exercise Minutes  12  -SM         Untimed Charges    OT Eval/Re-eval Minutes  6  -SM         Total Minutes    Timed Charges Total Minutes  12  -SM      Untimed Charges Total Minutes  6  -SM       Total Minutes  18  -SM        User Key  (r) = Recorded By, (t) = Taken By, (c) = Cosigned By    Initials Name Provider Type     Amber Agustin OT Occupational Therapist        Therapy Charges for Today     Code Description Service Date Service Provider Modifiers Qty    [id number]  OT EVAL LOW COMPLEXITY 2 6/23/2021 Amber Agustin OT GO 1    [id number]  OT THER PROC EA 15 MIN 6/23/2021 Amber Agustin OT GO 1               Amber Agustin OT  6/23/2021

## 2021-06-23 NOTE — PLAN OF CARE
Goal Outcome Evaluation:  Plan of Care Reviewed With: patient        Progress: improving  Outcome Summary: Pt remains stable. Ambulates with stand by assist. IS block is still in effect to LUE, no c/o pain with sling in place. Dressing is CDI. Voiding per BRP. Educated on htn managment with meds and BP monitoring. Plans to d/c home today. WCMIAH.

## 2021-06-23 NOTE — OUTREACH NOTE
Prep Survey      Responses   [facility] facility patient discharged from?  [place]   Is LACE score < 7 ?  No   Emergency Room discharge w/ pulse ox?  No   Eligibility  [facility]   Date of Admission  06/22/21   Date of Discharge  06/23/21   Discharge Disposition  Home or Self Care   Discharge diagnosis  s/p total shoulder arthro   Does the patient have one of the following disease processes/diagnoses(primary or secondary)?  Total Joint Replacement   Does the patient have Home health ordered?  No   Is there a DME ordered?  No   Prep survey completed?  Yes          Danielle Manriquez RN

## 2021-06-23 NOTE — PLAN OF CARE
Goal Outcome Evaluation:  Plan of Care Reviewed With: patient           Outcome Summary: Pt is a 55 y.o female s/p L reverse total shoulder revision, POD 1. Pt lives with family who is very supportive and able to assist after discharge with ADL as needed. Pt engaged in shoulder pendulums and AROM digits, wrist, elbow this date. Pt able to don/doff sling and shirt with SBA maintaining shoulder precautions. Up to bathroom SBA, needs Min A for ADL 2/2 minimal use of LUE. Plans to discharge home today.    Appropriate PPE worn during encounter including gloves, mask, and eye protection. Hand hygiene completed. Pt wore a mask.

## 2021-06-23 NOTE — CASE MANAGEMENT/SOCIAL WORK
Case Management Discharge Note      Final Note: Home with family/friend support.         Selected Continued Care - Discharged on 6/23/2021 Admission date: 6/22/2021 - Discharge disposition: Home or Self Care    Destination    No services have been selected for the patient.              Durable Medical Equipment    No services have been selected for the patient.              Dialysis/Infusion    No services have been selected for the patient.              Home Medical Care    No services have been selected for the patient.              Therapy    No services have been selected for the patient.              Community Resources    No services have been selected for the patient.              Community & DME    No services have been selected for the patient.                Selected Continued Care - Prior Encounters Includes selections from prior encounters from 3/24/2021 to 6/23/2021    Discharged on 5/8/2021 Admission date: 4/27/2021 - Discharge disposition: Home-Health Care [facility]    Home Medical Care     Service Provider Selected Services Address Phone Fax Patient Preferred    [facility] [place]  [facility] [address] [id number] [phone] --                         Final Discharge Disposition Code: 01 - home or self-care

## 2021-06-24 ENCOUNTER — TRANSITIONAL CARE MANAGEMENT TELEPHONE ENCOUNTER (OUTPATIENT)
Dept: CALL CENTER | Facility: HOSPITAL | Age: 56
End: 2021-06-24

## 2021-06-24 NOTE — OUTREACH NOTE
Call Center TCM Note      Responses   [facility] patient discharged from?  [place]   Does the patient have one of the following disease processes/diagnoses(primary or secondary)?  Total Joint Replacement   Joint surgery performed?  Shoulder   TCM attempt successful?  No   Unsuccessful attempts  Attempt 2          Anne Raza MA    6/24/2021, 15:20 EDT

## 2021-06-24 NOTE — OUTREACH NOTE
Call Center TCM Note      Responses   [facility] patient discharged from?  [place]   Does the patient have one of the following disease processes/diagnoses(primary or secondary)?  Total Joint Replacement   Joint surgery performed?  Shoulder   TCM attempt successful?  No   Unsuccessful attempts  Attempt 1          Anne Raza MA    6/24/2021, 10:44 EDT

## 2021-06-25 ENCOUNTER — TRANSITIONAL CARE MANAGEMENT TELEPHONE ENCOUNTER (OUTPATIENT)
Dept: CALL CENTER | Facility: HOSPITAL | Age: 56
End: 2021-06-25

## 2021-06-25 LAB
BACTERIA SPEC AEROBE CULT: NORMAL
BACTERIA SPEC AEROBE CULT: NORMAL
GRAM STN SPEC: NORMAL

## 2021-06-25 NOTE — OUTREACH NOTE
Call Center TCM Note      Responses   [facility] patient discharged from?  [place]   Does the patient have one of the following disease processes/diagnoses(primary or secondary)?  Total Joint Replacement   TCM attempt successful?  Yes   Call start time  1159   Call end time  1206   Discharge diagnosis  s/p total shoulder arthro   Person spoke with today (if not patient) and relationship  Katlyn, daughter   Does the patient have a follow up appointment with their surgeon?  Yes   Has the patient kept scheduled appointments due by today?  N/A   What is the Home health agency?   [facility]   Has home health visited the patient within 72 hours of discharge?  Yes   Psychosocial issues?  No   Has the patient began therapy sessions (either in the home or as an out patient)?  No   Does the patient have a wound vac in place?  N/A   Has the patient fallen since discharge?  No   Did the patient receive a copy of their discharge instructions?  Yes   Nursing interventions  Reviewed instructions with patient   What is the patient's perception of their functional status since discharge?  Improving   Is the patient able to teach back signs and symptoms of infection?  Temp >100.4 for 24h or longer, Incisional drainage, Shortness of breath or chest pain, Blisters around incision, Increased swelling or redness around incision (not associated with surgical edema), Severe discomfort or pain   Is the patient able to teach back how to prevent infection?  Check incision daily, Eat well-balanced diet, Shower only as directed by surgeon, No tub baths, hot tub or swimming   Is the patient able to teach back signs and symptoms of DVT?  Shortness of breath or chest pain, Area hot to touch, Redness in calf, Swelling in calf, Severe pain in calf   Is the patient able to teach back home safety measures?  Ability to shower   If the patient is a current smoker, are they able to teach back resources for cessation?  Not a smoker   Is the  patient/caregiver able to teach back the hierarchy of who to call/visit for symptoms/problems? PCP, Specialist, Home health nurse, Urgent Care, ED, 911  Yes   TCM call completed?  Yes   Wrap up additional comments  Daughter states pt is doing better today. Daughter denies any s/s infection. No questions/concerns.          Bianca Buckley RN    6/25/2021, 12:08 EDT

## 2021-06-27 LAB
BACTERIA SPEC ANAEROBE CULT: NORMAL
BACTERIA SPEC ANAEROBE CULT: NORMAL

## 2021-06-30 ENCOUNTER — READMISSION MANAGEMENT (OUTPATIENT)
Dept: CALL CENTER | Facility: HOSPITAL | Age: 56
End: 2021-06-30

## 2021-06-30 NOTE — OUTREACH NOTE
Total Joint Week 2 Survey      Responses   [facility] patient discharged from?  [place]   Does the patient have one of the following disease processes/diagnoses(primary or secondary)?  Total Joint Replacement   Joint surgery performed?  Shoulder   Week 2 attempt successful?  Yes   Call start time  1658   Call end time  1701   Has the patient been back in either the hospital or Emergency Department since discharge?  No   Discharge diagnosis  s/p total shoulder arthro   Person spoke with today (if not patient) and relationship  Katlyn, daughter   Is the patient taking all medications as directed (includes completed medication regime)?  Yes   Is the patient able to teach back alternate methods of pain control?  Reposition, Shoulder-elevate above heart/ keep in sling as advised   Does the patient have a follow up appointment with their surgeon?  No   What is preventing the patient from scheduling follow up appointments within 7 days of discharge?  Waiting on return call   Has the patient kept scheduled appointments due by today?  N/A   Comments  Has appt with PCP today but had to reschedule due to car issues   Psychosocial issues?  No   Has the patient fallen since discharge?  No   What is the patient's perception of their functional status since discharge?  Improving   Is the patient able to teach back signs and symptoms of infection?  Temp >100.4 for 24h or longer, Incisional drainage, Shortness of breath or chest pain, Blisters around incision, Increased swelling or redness around incision (not associated with surgical edema), Severe discomfort or pain   Is the patient able to teach back how to prevent infection?  Wash hands before and after touching incision, Check incision daily, No tub baths, hot tub or swimming, No lotion or creams   If the patient is a current smoker, are they able to teach back resources for cessation?  Not a smoker   Is the patient/caregiver able to teach back the hierarchy of who to  call/visit for symptoms/problems? PCP, Specialist, Home health nurse, Urgent Care, ED, 911  Yes   Additional teach back comments  States she is doing well.  Has some soreness but incision site is looking like it is healing well.   Week 2 call completed?  Yes   Wrap up additional comments  Daughter to call PCP office to reschedule appt and waiting to hear back from surgeon's office for an appt.          Lesvia Lancaster LPN

## 2021-07-15 ENCOUNTER — READMISSION MANAGEMENT (OUTPATIENT)
Dept: CALL CENTER | Facility: HOSPITAL | Age: 56
End: 2021-07-15

## 2021-07-15 NOTE — OUTREACH NOTE
Total Joint Month 1 Survey      Responses   [facility] patient discharged from?  [place]   Does the patient have one of the following disease processes/diagnoses(primary or secondary)?  Total Joint Replacement   Joint surgery performed?  Shoulder   Month 1 attempt successful?  Yes   Call start time  1619   Call end time  1622   Discharge diagnosis  s/p total shoulder arthro   Is the patient taking all medications as directed (includes completed medication regime)?  Yes   Is the patient able to teach back alternate methods of pain control?  Ice, Reposition, Correct alignment   Has the patient kept scheduled appointments due by today?  Yes   Is the patient still receiving Home Health Services?  N/A   Is the patient still attending therapy sessions(either in the home or as an outpatient)?  No [will be sstarting PT on 7/19/21]   Has the patient fallen since discharge?  No   What is the patient's perception of their functional status since discharge?  Improving   Is the patient able to teach back signs and symptoms of infection?  Severe discomfort or pain, Shortness of breath or chest pain, Changes in mobility, Increased swelling or redness around incision (not associated with surgical edema)   Is the patient/caregiver able to teach back the hierarchy of who to call/visit for symptoms/problems? PCP, Specialist, Home health nurse, Urgent Care, ED, 911  Yes   Month 1 call completed?  Yes          Rica Henriquez RN

## 2021-07-23 ENCOUNTER — OFFICE VISIT (OUTPATIENT)
Dept: ORTHOPEDIC SURGERY | Facility: CLINIC | Age: 56
End: 2021-07-23

## 2021-07-23 VITALS — HEIGHT: 67 IN | TEMPERATURE: 97.5 F | BODY MASS INDEX: 38.92 KG/M2 | WEIGHT: 248 LBS

## 2021-07-23 DIAGNOSIS — R52 PAIN: Primary | ICD-10-CM

## 2021-07-23 PROCEDURE — 72100 X-RAY EXAM L-S SPINE 2/3 VWS: CPT | Performed by: ORTHOPAEDIC SURGERY

## 2021-07-23 PROCEDURE — 99024 POSTOP FOLLOW-UP VISIT: CPT | Performed by: ORTHOPAEDIC SURGERY

## 2021-08-18 ENCOUNTER — OFFICE VISIT (OUTPATIENT)
Dept: FAMILY MEDICINE CLINIC | Facility: CLINIC | Age: 56
End: 2021-08-18

## 2021-08-18 ENCOUNTER — READMISSION MANAGEMENT (OUTPATIENT)
Dept: CALL CENTER | Facility: HOSPITAL | Age: 56
End: 2021-08-18

## 2021-08-18 VITALS
TEMPERATURE: 97.1 F | DIASTOLIC BLOOD PRESSURE: 80 MMHG | HEART RATE: 82 BPM | WEIGHT: 256 LBS | BODY MASS INDEX: 40.18 KG/M2 | HEIGHT: 67 IN | OXYGEN SATURATION: 98 % | SYSTOLIC BLOOD PRESSURE: 132 MMHG

## 2021-08-18 DIAGNOSIS — E78.2 MIXED HYPERLIPIDEMIA: ICD-10-CM

## 2021-08-18 DIAGNOSIS — E11.69 TYPE 2 DIABETES MELLITUS WITH OTHER SPECIFIED COMPLICATION, WITHOUT LONG-TERM CURRENT USE OF INSULIN (HCC): Primary | ICD-10-CM

## 2021-08-18 DIAGNOSIS — I10 ESSENTIAL HYPERTENSION: ICD-10-CM

## 2021-08-18 DIAGNOSIS — E11.9 ENCOUNTER FOR COMPREHENSIVE DIABETIC FOOT EXAMINATION, TYPE 2 DIABETES MELLITUS (HCC): ICD-10-CM

## 2021-08-18 DIAGNOSIS — E55.9 VITAMIN D DEFICIENCY: ICD-10-CM

## 2021-08-18 DIAGNOSIS — E03.9 HYPOTHYROIDISM (ACQUIRED): ICD-10-CM

## 2021-08-18 PROCEDURE — 99214 OFFICE O/P EST MOD 30 MIN: CPT | Performed by: NURSE PRACTITIONER

## 2021-08-18 RX ORDER — LEVOCETIRIZINE DIHYDROCHLORIDE 5 MG/1
5 TABLET, FILM COATED ORAL EVERY EVENING
COMMUNITY
End: 2022-04-16 | Stop reason: SDUPTHER

## 2021-08-18 NOTE — OUTREACH NOTE
Total Joint Month 2 Survey      Responses   [facility] patient discharged from?  [place]   Does the patient have one of the following disease processes/diagnoses(primary or secondary)?  Total Joint Replacement   Joint surgery performed?  Shoulder   Month 2 attempt successful?  Yes   Call start time  1140   Has the patient been back in either the hospital or Emergency Department since discharge?  No   Discharge diagnosis  s/p total shoulder arthro   Is the patient taking all medications as directed (includes completed medication regime)?  Yes   Is the patient able to teach back alternate methods of pain control?  Reposition, Correct alignment, Ice   Medication comments  Pain meds for back but not every day.   Has the patient kept scheduled appointments due by today?  Yes   Comments  Saw surgeon 3 weeks ago.   Is the patient still receiving Home Health Services?  N/A   Is the patient still attending therapy sessions(either in the home or as an outpatient)?  No   Has the patient fallen since discharge?  No   What is the patient's perception of their functional status since discharge?  Improving   Is the patient able to teach back signs and symptoms of infection?  Temp >100.4 for 24h or longer, Incisional drainage, Blisters around incision, Increased swelling or redness around incision (not associated with surgical edema), Changes in mobility, Severe discomfort or pain, Shortness of breath or chest pain   If the patient has signs and symptoms of infection, list patient actions taken  Healed pretty much completely she says.   If the patient is a current smoker, are they able to teach back resources for cessation?  Not a smoker   Is the patient/caregiver able to teach back the hierarchy of who to call/visit for symptoms/problems? PCP, Specialist, Home health nurse, Urgent Care, ED, 911  Yes   Additional teach back comments  She is doing outpt PT with [facility], no questions or concerns at this time. Says the arm is  gaining more ROM and improving weekly.   Month 2 Call Completed?  Yes          Kecia Orozco RN

## 2021-08-18 NOTE — PROGRESS NOTES
"Subjective   Stefani Bhandari is a 55 y.o. female who presents for a follow up on diabetes, hypertension.    History of Present Illness   Reverse total shoulder 6/22/2021--ROM is improving and therapist thinks doing well. No c diff after surgery. PT 2-3 x week  Less walking secondary to allergy flare. Clear rhinitis. Head pain and drainage is improving. Planning on trip to [place] for 2 weeks.     Last A1c in April, well controlled. Diabetic several years on metformin  The following portions of the patient's history were reviewed and updated as appropriate: allergies, current medications, past family history, past medical history, past social history, past surgical history and problem list.    Review of Systems   Constitutional: Positive for activity change. Negative for appetite change, chills, fever and unexpected weight change.   HENT: Negative.    Eyes: Negative.  Negative for visual disturbance.   Respiratory: Negative.    Cardiovascular: Negative.  Negative for chest pain.   Gastrointestinal: Negative.  Negative for constipation and diarrhea.   Endocrine: Negative.    Genitourinary: Negative for difficulty urinating and frequency.   Musculoskeletal: Negative.    Skin:        Scar to shoulder with no evidence infection   Neurological: Negative for weakness and headaches.   Hematological: Negative.    Psychiatric/Behavioral: Negative.  Negative for dysphoric mood.     /80   Pulse 82   Temp 97.1 °F (36.2 °C) (Infrared)   Ht 170.2 cm (67\")   Wt 116 kg (256 lb)   SpO2 98%   BMI 40.10 kg/m²     Objective   Physical Exam  Vitals and nursing note reviewed.   Constitutional:       Appearance: She is well-developed. She is not diaphoretic.   HENT:      Head: Normocephalic and atraumatic.   Neck:      Thyroid: No thyromegaly.   Cardiovascular:      Rate and Rhythm: Normal rate and regular rhythm.      Pulses:           Carotid pulses are 2+ on the right side and 2+ on the left side.     Heart sounds: Normal heart " sounds.   Pulmonary:      Effort: Pulmonary effort is normal.      Breath sounds: Normal breath sounds.   Musculoskeletal:      Cervical back: Normal range of motion and neck supple.   Feet:      Right foot:      Protective Sensation: 7 sites sensed.      Skin integrity: Callus and dry skin present.      Left foot:      Protective Sensation: 9 sites sensed.   Lymphadenopathy:      Cervical: No cervical adenopathy.   Skin:     Comments: Scar to L shoulder with no heat or redness   Psychiatric:         Behavior: Behavior normal.         Thought Content: Thought content normal.         Judgment: Judgment normal.       Assessment/Plan   Problems Addressed this Visit        Cardiac and Vasculature    Essential hypertension    Relevant Orders    CBC (No Diff)    Comprehensive Metabolic Panel    Mixed hyperlipidemia    Relevant Orders    Lipid Panel       Endocrine and Metabolic    Type 2 diabetes mellitus, without long-term current use of insulin (CMS/[facility]) - Primary    Relevant Orders    Hemoglobin A1c    Microalbumin / Creatinine Urine Ratio - Urine, Clean Catch    Hypothyroidism (acquired)    Relevant Orders    TSH      Other Visit Diagnoses     Hospital discharge follow-up        Vitamin D deficiency        Relevant Orders    Vitamin D 25 Hydroxy    Encounter for comprehensive diabetic foot examination, type 2 diabetes mellitus (CMS/[facility])          Diagnoses       Codes Comments    Type 2 diabetes mellitus with other specified complication, without long-term current use of insulin (CMS/[facility])    -  Primary ICD-10-CM: E11.69  ICD-9-CM: 250.80     Mixed hyperlipidemia     ICD-10-CM: E78.2  ICD-9-CM: 272.2     Hypothyroidism (acquired)     ICD-10-CM: E03.9  ICD-9-CM: 244.9     Essential hypertension     ICD-10-CM: I10  ICD-9-CM: 401.9     Hospital discharge follow-up     ICD-10-CM: Z09  ICD-9-CM: V67.59     Vitamin D deficiency     ICD-10-CM: E55.9  ICD-9-CM: 268.9     Encounter for comprehensive diabetic foot examination,  type 2 diabetes mellitus (CMS/[facility])     ICD-10-CM: E11.9  ICD-9-CM: 250.00         A0DR--ogcuoq monitoring  HLD on low intensity simvastatin and zetia, consider change  Hypothyroid--monitor as weight change and stresses of multiple hospitalizations this year  HTN--well controlled--appropriate ARB  Vit D replacement for bone healing--monitor--was not in hospital overnight. Continue ortho and PT care. Consider water exercises when in [place]  Diabetic foot exam--discussed role and purpose. She reports has never had similar exam before.        Answers for HPI/ROS submitted by the patient on 8/17/2021  Please describe your symptoms.: After surgery visit  Have you had these symptoms before?: No  How long have you been having these symptoms?: Greater than 2 weeks  What is the primary reason for your visit?: Other    This document is intended for medical expert use only. Reading of this document by patients and/or patient's family without participating medical staff guidance may result in misinterpretation and unintended morbidity.  Any interpretation of such data is the responsibility of the patient and/or family member responsible for the patient in concert with their primary or specialist providers, not to be left for sources of online searches such as [employer], [employer] or similar queries. Relying on these approaches to knowledge may result in misinterpretation, misguided goals of care and even death should patients or family members try recommendations outside of the realm of professional medical care in a supervised way.    Please allow 3-5 business days for recommendations based on new results    Go to the ER for any possible lifethreatening symptoms such as chest pain or shortness of air.     I personally spent 25 minutes reviewing the chart before the visit, time with the patient, and time documenting the visit.

## 2021-08-19 LAB
25(OH)D3+25(OH)D2 SERPL-MCNC: 41.1 NG/ML (ref 30–100)
ALBUMIN SERPL-MCNC: 4.2 G/DL (ref 3.5–5.2)
ALBUMIN/CREAT UR: <12 MG/G CREAT (ref 0–29)
ALBUMIN/GLOB SERPL: 1.1 G/DL
ALP SERPL-CCNC: 116 U/L (ref 39–117)
ALT SERPL-CCNC: 27 U/L (ref 1–33)
AST SERPL-CCNC: 39 U/L (ref 1–32)
BILIRUB SERPL-MCNC: 0.5 MG/DL (ref 0–1.2)
BUN SERPL-MCNC: 15 MG/DL (ref 6–20)
BUN/CREAT SERPL: 15.3 (ref 7–25)
CALCIUM SERPL-MCNC: 9.4 MG/DL (ref 8.6–10.5)
CHLORIDE SERPL-SCNC: 101 MMOL/L (ref 98–107)
CHOLEST SERPL-MCNC: 139 MG/DL (ref 0–200)
CO2 SERPL-SCNC: 25.5 MMOL/L (ref 22–29)
CREAT SERPL-MCNC: 0.98 MG/DL (ref 0.57–1)
CREAT UR-MCNC: 25.7 MG/DL
ERYTHROCYTE [DISTWIDTH] IN BLOOD BY AUTOMATED COUNT: 12.4 % (ref 12.3–15.4)
GLOBULIN SER CALC-MCNC: 3.9 GM/DL
GLUCOSE SERPL-MCNC: 93 MG/DL (ref 65–99)
HBA1C MFR BLD: 5.6 % (ref 4.8–5.6)
HCT VFR BLD AUTO: 36 % (ref 34–46.6)
HDLC SERPL-MCNC: 46 MG/DL (ref 40–60)
HGB BLD-MCNC: 12.5 G/DL (ref 12–15.9)
LDLC SERPL CALC-MCNC: 69 MG/DL (ref 0–100)
MCH RBC QN AUTO: 32.1 PG (ref 26.6–33)
MCHC RBC AUTO-ENTMCNC: 34.7 G/DL (ref 31.5–35.7)
MCV RBC AUTO: 92.5 FL (ref 79–97)
MICROALBUMIN UR-MCNC: <3 UG/ML
PLATELET # BLD AUTO: 134 10*3/MM3 (ref 140–450)
POTASSIUM SERPL-SCNC: 3.7 MMOL/L (ref 3.5–5.2)
PROT SERPL-MCNC: 8.1 G/DL (ref 6–8.5)
RBC # BLD AUTO: 3.89 10*6/MM3 (ref 3.77–5.28)
SODIUM SERPL-SCNC: 137 MMOL/L (ref 136–145)
TRIGL SERPL-MCNC: 140 MG/DL (ref 0–150)
TSH SERPL DL<=0.005 MIU/L-ACNC: 3.31 UIU/ML (ref 0.27–4.2)
VLDLC SERPL CALC-MCNC: 24 MG/DL (ref 5–40)
WBC # BLD AUTO: 5.67 10*3/MM3 (ref 3.4–10.8)

## 2021-08-24 NOTE — PROGRESS NOTES
Diabetes still very well controlled. Continue the walking. Blood counts improving. Platelets likely still a little low secondary to all the infections. Will investigate further if don't continue to improve. Cholesterol improved. FU 6 months

## 2021-08-31 ENCOUNTER — TELEPHONE (OUTPATIENT)
Dept: FAMILY MEDICINE CLINIC | Facility: CLINIC | Age: 56
End: 2021-08-31

## 2021-09-21 ENCOUNTER — READMISSION MANAGEMENT (OUTPATIENT)
Dept: CALL CENTER | Facility: HOSPITAL | Age: 56
End: 2021-09-21

## 2021-09-21 NOTE — OUTREACH NOTE
Total Joint Month 3 Survey      Responses   [facility] patient discharged from?  [place]   Does the patient have one of the following disease processes/diagnoses(primary or secondary)?  Total Joint Replacement   Joint surgery performed?  Shoulder   Month 3 attempt successful?  Yes   Call start time  1059   Call end time  1105   Has the patient been back in either the hospital or Emergency Department since discharge?  No   Discharge diagnosis  s/p total shoulder arthro   Is the patient taking all medications as directed (includes completed medication regime)?  Yes   Is the patient able to teach back alternate methods of pain control?  -- [She is doing well. ]   Has the patient kept scheduled appointments due by today?  Yes   Is the patient still receiving Home Health Services?  N/A   Is the patient still attending therapy sessions(either in the home or as an outpatient)?  Yes [She is going to PT twice a week. ]   Has the patient fallen since discharge?  No   If the patient has fallen, were there any injuries?  No   What is the patient's perception of their functional status since discharge?  Improving   Is the patient able to teach back signs and symptoms of infection?  Temp >100.4 for 24h or longer, Incisional drainage, Blisters around incision, Increased swelling or redness around incision (not associated with surgical edema), Changes in mobility, Severe discomfort or pain, Shortness of breath or chest pain   If the patient has signs and symptoms of infection, list patient actions taken  She is healing nicely.    If the patient is a current smoker, are they able to teach back resources for cessation?  Not a smoker   Is the patient/caregiver able to teach back the hierarchy of who to call/visit for symptoms/problems? PCP, Specialist, Home health nurse, Urgent Care, ED, 911  Yes   Graduated  Yes   Is the patient interested in additional calls from an ambulatory ?  NOTE:  applies to high risk  patients requiring additional follow-up.  No   Did the patient feel the follow up calls were helpful during their recovery period?  Yes   Was the number of calls appropriate?  Yes   Wrap up additional comments  She is doing very well.           Celia Casiano RN

## 2021-10-08 ENCOUNTER — TRANSCRIBE ORDERS (OUTPATIENT)
Dept: ADMINISTRATIVE | Facility: HOSPITAL | Age: 56
End: 2021-10-08

## 2021-10-08 ENCOUNTER — LAB (OUTPATIENT)
Dept: LAB | Facility: HOSPITAL | Age: 56
End: 2021-10-08

## 2021-10-08 DIAGNOSIS — M25.512 LEFT SHOULDER PAIN, UNSPECIFIED CHRONICITY: Primary | ICD-10-CM

## 2021-10-08 DIAGNOSIS — M25.512 LEFT SHOULDER PAIN, UNSPECIFIED CHRONICITY: ICD-10-CM

## 2021-10-08 LAB
CRP SERPL-MCNC: <0.3 MG/DL (ref 0–0.5)
ERYTHROCYTE [SEDIMENTATION RATE] IN BLOOD: 43 MM/HR (ref 0–30)

## 2021-10-08 PROCEDURE — 85652 RBC SED RATE AUTOMATED: CPT

## 2021-10-08 PROCEDURE — 86140 C-REACTIVE PROTEIN: CPT

## 2021-10-08 PROCEDURE — 36415 COLL VENOUS BLD VENIPUNCTURE: CPT

## 2021-10-12 ENCOUNTER — OFFICE VISIT (OUTPATIENT)
Dept: ORTHOPEDIC SURGERY | Facility: CLINIC | Age: 56
End: 2021-10-12

## 2021-10-12 VITALS — BODY MASS INDEX: 39.24 KG/M2 | HEIGHT: 67 IN | TEMPERATURE: 97.5 F | WEIGHT: 250 LBS

## 2021-10-12 DIAGNOSIS — Z98.1 S/P LUMBAR FUSION: ICD-10-CM

## 2021-10-12 DIAGNOSIS — M48.062 SPINAL STENOSIS OF LUMBAR REGION WITH NEUROGENIC CLAUDICATION: Primary | ICD-10-CM

## 2021-10-12 PROCEDURE — 99213 OFFICE O/P EST LOW 20 MIN: CPT | Performed by: ORTHOPAEDIC SURGERY

## 2021-10-12 PROCEDURE — 72100 X-RAY EXAM L-S SPINE 2/3 VWS: CPT | Performed by: ORTHOPAEDIC SURGERY

## 2021-10-12 RX ORDER — IBUPROFEN 600 MG/1
600 TABLET ORAL 4 TIMES DAILY PRN
COMMUNITY
Start: 2021-09-10 | End: 2022-06-13 | Stop reason: SDUPTHER

## 2021-10-12 NOTE — PROGRESS NOTES
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  Shortly afterward Dr. Richardson replaced her previously infected shoulder.  He saw her recently and suggested she keep a follow-up here.  She has back pain and some radiating leg pain but overall doing much better and is pain management and doing fairly well.  The shoulder is doing fairly well.  Good strength in the legs on examination the wound is pristine.  No fluctuance or induration and she is certainly not febrile.  Recent sed rate was 43 but CRP was completely normal.  Lumbar films obtained today suggest solid fusion in good position of the implants compared to prior films.  I do not think she is infected and I think she should do physical therapy for the back I want her to come and see me in January and get a final lumbar x-ray

## 2021-10-29 RX ORDER — EZETIMIBE 10 MG/1
10 TABLET ORAL DAILY
Qty: 90 TABLET | Refills: 0 | Status: SHIPPED | OUTPATIENT
Start: 2021-10-29 | End: 2022-01-31 | Stop reason: SDUPTHER

## 2021-12-13 RX ORDER — SIMVASTATIN 80 MG
80 TABLET ORAL NIGHTLY
Qty: 90 TABLET | Refills: 0 | Status: SHIPPED | OUTPATIENT
Start: 2021-12-13 | End: 2022-07-06 | Stop reason: SDUPTHER

## 2021-12-28 RX ORDER — VENLAFAXINE 75 MG/1
TABLET ORAL
Qty: 180 TABLET | Refills: 0 | Status: SHIPPED | OUTPATIENT
Start: 2021-12-28 | End: 2022-04-08 | Stop reason: SDUPTHER

## 2022-01-03 ENCOUNTER — TELEPHONE (OUTPATIENT)
Dept: GASTROENTEROLOGY | Facility: CLINIC | Age: 57
End: 2022-01-03

## 2022-01-03 DIAGNOSIS — D64.9 ANEMIA, UNSPECIFIED TYPE: Primary | ICD-10-CM

## 2022-01-03 NOTE — TELEPHONE ENCOUNTER
----- Message from Radha Alvarenga sent at 1/3/2022 10:48 AM EST -----  Regarding: Capsule Approval  Remedios,    I received a letter from this pt's insurance approving a capsule study. I don't have an order for one and not sure when the request was placed to her insurance.(not done by me)  If she needs to be scheduled for one she is covered for it through 8/31/2022. Copy of approval letter scanned into .    Thanks  Ioana

## 2022-01-03 NOTE — TELEPHONE ENCOUNTER
Called pt, she states she already had capsule endoscopy.  Per chart, was done in April.    Advised pt to have CBC drawn to check for anemia. Pt will go to [facility].  Order placed.  Update send to Dr. Mandujano and Remedios BARNEY

## 2022-01-03 NOTE — TELEPHONE ENCOUNTER
Per Jaylen Mandujano MD Davis, Remedios, RegMildred Rep; Kecia Patel, RN; Mala Kim, RN  Cc: [facility] Clinical 2 Pool  Caller: Unspecified (Today, 10:59 AM)  Her anemia had resolved in August.  Please bring her in for a CBC.  If she is anemic she will need capsule endoscopy if she is not anemic she will not

## 2022-01-11 NOTE — TELEPHONE ENCOUNTER
I left message with pts daughter requesting to have patient contact the office to see if she is still taking dicloxacililn.

## 2022-01-11 NOTE — TELEPHONE ENCOUNTER
I spoke w/patient regarding rx request for dicloxacillin.  Patient states that she was told whenever she has dental work that she needs an abx.  I informed the patient that typically her dentist would prescribe the abx.  Patient states that she asked both her dentist and her PCP and neither of them would prescribe due to her medical history.  I informed the patient that I would make Dr. Porras aware.

## 2022-01-12 ENCOUNTER — TELEPHONE (OUTPATIENT)
Dept: FAMILY MEDICINE CLINIC | Facility: CLINIC | Age: 57
End: 2022-01-12

## 2022-01-12 NOTE — TELEPHONE ENCOUNTER
----- Message from NITESH Roach sent at 1/12/2022  1:16 PM EST -----  Regarding: FW: Prescription needs  Thanks, that's what I was reading as well  ----- Message -----  From: Dianne Porras MD  Sent: 1/12/2022   8:56 AM EST  To: NITESH Roach  Subject: FW: Prescription needs                           These are the conditions that have recommendations for dental prophylaxis:    Prosthetic cardiac valve or material    ·Presence of cardiac prosthetic valve    ·Transcatheter implantation of prosthetic valves    ·Cardiac valve repair with devices, including annuloplasty, rings, or clips    ·Left ventricular assist devices or implantable heart    ?Previous, relapse, or recurrent IE    ?Congenital heart disease    ·Unrepaired cyanotic congenital coronary heart disease, including palliative shunts and conduits    ·Completely repaired congenital heart defect with prosthetic material or device, whether placed by surgery or by transcatheter during the first six months after the procedure    ·Repaired congenital heart disease with residual defects at the site of or adjacent to the site of a prosthetic patch or prosthetic device    ·Surgical or transcatheter pulmonary artery valve or conduit placement such as Lillian valve and Contegra conduit    ?Cardiac transplant recipients who develop cardiac valvulopathy    The pt does not have any of these conditions. Sometimes, the orthopedic surgeons recommend prophylaxis in the setting of joint hardware but that is outside the guidelines. She can contact Dr. Clark's office for his recommendations but I do not recommend prophylaxis.     Dianne Porras    ----- Message -----  From: Stephanie Izaguirre APRN  Sent: 1/11/2022   1:01 PM EST  To: Dianne Porras MD  Subject: FW: Prescription needs                               ----- Message -----  From: Marian Garcia MA  Sent: 1/10/2022   8:41 AM EST  To: NITESH Roach  Subject: FW: Prescription needs                              ----- Message -----  From: Stefani Bhandari  Sent: 1/7/2022   9:48 AM EST  To: [facility] Clinical [facility]  Subject: Prescription needs                               I need a prescription for my teeth so that I can go to the dentist. I was told to ask my dr with all the medical problems I had I was told I would need to get an antibiotic to take before I have any dental work done and they told me to message you. It is from me have that MSSA and the infection it causes in ny body for almost 6 months.     As well I need refills on certain prescriptions at [facility] on [facility] so if you can it is for my diclofenac and my metformin. I think that is the only ones until I see you next month.

## 2022-01-27 RX ORDER — LEVOTHYROXINE SODIUM 88 UG/1
88 TABLET ORAL DAILY
Qty: 30 TABLET | OUTPATIENT
Start: 2022-01-27

## 2022-01-27 RX ORDER — CYCLOBENZAPRINE HCL 10 MG
TABLET ORAL
Qty: 270 TABLET | Refills: 1 | OUTPATIENT
Start: 2022-01-27

## 2022-01-31 RX ORDER — EZETIMIBE 10 MG/1
10 TABLET ORAL DAILY
Qty: 90 TABLET | Refills: 0 | Status: SHIPPED | OUTPATIENT
Start: 2022-01-31 | End: 2022-04-08 | Stop reason: SDUPTHER

## 2022-02-24 RX ORDER — LOSARTAN POTASSIUM 100 MG/1
100 TABLET ORAL
Qty: 90 TABLET | Refills: 0 | OUTPATIENT
Start: 2022-02-24

## 2022-02-24 RX ORDER — LEVOTHYROXINE SODIUM 88 UG/1
88 TABLET ORAL DAILY
Qty: 90 TABLET | Refills: 1 | OUTPATIENT
Start: 2022-02-24

## 2022-03-03 RX ORDER — LOSARTAN POTASSIUM 100 MG/1
100 TABLET ORAL
Qty: 90 TABLET | Refills: 0 | Status: SHIPPED | OUTPATIENT
Start: 2022-03-03 | End: 2022-04-08 | Stop reason: SDUPTHER

## 2022-03-03 RX ORDER — LEVOTHYROXINE SODIUM 88 UG/1
88 TABLET ORAL DAILY
Qty: 90 TABLET | Refills: 0 | Status: SHIPPED | OUTPATIENT
Start: 2022-03-03 | End: 2022-06-06

## 2022-04-08 ENCOUNTER — OFFICE VISIT (OUTPATIENT)
Dept: FAMILY MEDICINE CLINIC | Facility: CLINIC | Age: 57
End: 2022-04-08

## 2022-04-08 VITALS
RESPIRATION RATE: 20 BRPM | TEMPERATURE: 97.9 F | DIASTOLIC BLOOD PRESSURE: 70 MMHG | BODY MASS INDEX: 45.67 KG/M2 | OXYGEN SATURATION: 99 % | WEIGHT: 291 LBS | HEIGHT: 67 IN | HEART RATE: 74 BPM | SYSTOLIC BLOOD PRESSURE: 142 MMHG

## 2022-04-08 DIAGNOSIS — E55.9 VITAMIN D DEFICIENCY: ICD-10-CM

## 2022-04-08 DIAGNOSIS — Z23 IMMUNIZATION DUE: ICD-10-CM

## 2022-04-08 DIAGNOSIS — L40.9 PSORIASIS: ICD-10-CM

## 2022-04-08 DIAGNOSIS — E03.8 OTHER SPECIFIED HYPOTHYROIDISM: ICD-10-CM

## 2022-04-08 DIAGNOSIS — M54.50 LOW BACK PAIN, UNSPECIFIED BACK PAIN LATERALITY, UNSPECIFIED CHRONICITY, UNSPECIFIED WHETHER SCIATICA PRESENT: Primary | ICD-10-CM

## 2022-04-08 DIAGNOSIS — F32.A ANXIETY AND DEPRESSION: ICD-10-CM

## 2022-04-08 DIAGNOSIS — E78.2 MIXED HYPERLIPIDEMIA: ICD-10-CM

## 2022-04-08 DIAGNOSIS — R06.83 HABITUAL SNORING: ICD-10-CM

## 2022-04-08 DIAGNOSIS — F41.9 ANXIETY AND DEPRESSION: ICD-10-CM

## 2022-04-08 DIAGNOSIS — R53.82 CHRONIC FATIGUE: ICD-10-CM

## 2022-04-08 DIAGNOSIS — E11.69 TYPE 2 DIABETES MELLITUS WITH OTHER SPECIFIED COMPLICATION, WITHOUT LONG-TERM CURRENT USE OF INSULIN: ICD-10-CM

## 2022-04-08 PROCEDURE — 90472 IMMUNIZATION ADMIN EACH ADD: CPT | Performed by: NURSE PRACTITIONER

## 2022-04-08 PROCEDURE — 90732 PPSV23 VACC 2 YRS+ SUBQ/IM: CPT | Performed by: NURSE PRACTITIONER

## 2022-04-08 PROCEDURE — 90471 IMMUNIZATION ADMIN: CPT | Performed by: NURSE PRACTITIONER

## 2022-04-08 PROCEDURE — 90715 TDAP VACCINE 7 YRS/> IM: CPT | Performed by: NURSE PRACTITIONER

## 2022-04-08 PROCEDURE — 99214 OFFICE O/P EST MOD 30 MIN: CPT | Performed by: NURSE PRACTITIONER

## 2022-04-08 RX ORDER — SIMVASTATIN 80 MG
80 TABLET ORAL NIGHTLY
Qty: 90 TABLET | Refills: 0 | Status: CANCELLED | OUTPATIENT
Start: 2022-04-08

## 2022-04-08 RX ORDER — LEVOTHYROXINE SODIUM 88 UG/1
88 TABLET ORAL DAILY
Qty: 90 TABLET | Refills: 0 | Status: CANCELLED | OUTPATIENT
Start: 2022-04-08

## 2022-04-08 RX ORDER — CYCLOBENZAPRINE HCL 10 MG
10 TABLET ORAL 3 TIMES DAILY PRN
Qty: 270 TABLET | Refills: 0 | Status: SHIPPED | OUTPATIENT
Start: 2022-04-08 | End: 2022-04-27

## 2022-04-08 RX ORDER — FUROSEMIDE 40 MG/1
40 TABLET ORAL DAILY
Qty: 90 TABLET | Refills: 0 | Status: SHIPPED | OUTPATIENT
Start: 2022-04-08 | End: 2022-04-18

## 2022-04-08 RX ORDER — EZETIMIBE 10 MG/1
10 TABLET ORAL DAILY
Qty: 90 TABLET | Refills: 0 | Status: SHIPPED | OUTPATIENT
Start: 2022-04-08 | End: 2022-06-13 | Stop reason: SDUPTHER

## 2022-04-08 RX ORDER — TRIAMCINOLONE ACETONIDE 5 MG/G
1 CREAM TOPICAL 3 TIMES DAILY
Qty: 30 G | Refills: 0 | Status: SHIPPED | OUTPATIENT
Start: 2022-04-08

## 2022-04-08 RX ORDER — LOSARTAN POTASSIUM 100 MG/1
100 TABLET ORAL
Qty: 90 TABLET | Refills: 0 | Status: SHIPPED | OUTPATIENT
Start: 2022-04-08 | End: 2022-06-13 | Stop reason: SDUPTHER

## 2022-04-08 RX ORDER — VENLAFAXINE 75 MG/1
75 TABLET ORAL 2 TIMES DAILY
Qty: 180 TABLET | Refills: 0 | Status: SHIPPED | OUTPATIENT
Start: 2022-04-08 | End: 2022-08-17 | Stop reason: SDUPTHER

## 2022-04-08 RX ORDER — BUPROPION HYDROCHLORIDE 150 MG/1
150 TABLET ORAL DAILY
Qty: 90 TABLET | Refills: 0 | Status: SHIPPED | OUTPATIENT
Start: 2022-04-08 | End: 2022-06-22

## 2022-04-08 NOTE — PROGRESS NOTES
"Subjective   Stefani Bhandari is a 56 y.o. female. Diabetes FU    Diabetes  She presents for her follow-up diabetic visit. She has type 2 diabetes mellitus. Hypoglycemia symptoms include nervousness/anxiousness. Associated symptoms include fatigue. Pertinent negatives for diabetes include no chest pain. Diabetic current diet: stress eating. She participates in exercise intermittently.   thinking about moving to [place] as breathed better when there.   Needs dental work.   Lived whole life in same house and ready for change  Got weight down to 245, at night cannot sleep, naps during day   Sadness and irritability,   Back pain knee pain, pain shoots to top of thigh. Back pain not typically waking her up.  The following portions of the patient's history were reviewed and updated as appropriate: allergies, current medications, past family history, past medical history, past social history, past surgical history and problem list.    Review of Systems   Constitutional: Positive for activity change and fatigue.   HENT: Positive for dental problem.    Respiratory: Positive for apnea and shortness of breath. Negative for cough and wheezing.    Cardiovascular: Negative for chest pain, palpitations and leg swelling.   Endocrine: Negative.    Musculoskeletal: Positive for arthralgias and back pain.   Skin: Negative.    Hematological: Negative.    Psychiatric/Behavioral: Positive for agitation, sleep disturbance, depressed mood and stress. Negative for behavioral problems and suicidal ideas. The patient is nervous/anxious.      /70   Pulse 74   Temp 97.9 °F (36.6 °C) (Temporal)   Resp 20   Ht 170.2 cm (67\")   Wt 132 kg (291 lb)   SpO2 99%   BMI 45.58 kg/m²   122/60  Objective   Physical Exam  Vitals and nursing note reviewed.   Constitutional:       General: She is not in acute distress.     Appearance: She is well-developed. She is obese.   Neck:      Thyroid: No thyromegaly.   Cardiovascular:      Rate and Rhythm: " Normal rate and regular rhythm.      Heart sounds: Normal heart sounds.   Pulmonary:      Effort: Pulmonary effort is normal.      Breath sounds: Normal breath sounds.   Musculoskeletal:      Cervical back: Normal range of motion and neck supple.   Neurological:      General: No focal deficit present.   Psychiatric:         Mood and Affect: Mood normal.         Behavior: Behavior normal.         Thought Content: Thought content normal.         Judgment: Judgment normal.       Assessment/Plan   Problems Addressed this Visit        Cardiac and Vasculature    Mixed hyperlipidemia    Relevant Medications    ezetimibe (ZETIA) 10 MG tablet    Other Relevant Orders    Comprehensive Metabolic Panel (Completed)    Lipid Panel (Completed)       Endocrine and Metabolic    Type 2 diabetes mellitus, without long-term current use of insulin (HCC)    Relevant Medications    glucose blood test strip    Other Relevant Orders    Hemoglobin A1c (Completed)    Vitamin D 25 Hydroxy (Completed)       Mental Health    Anxiety and depression    Relevant Medications    venlafaxine (EFFEXOR) 75 MG tablet    buPROPion XL (Wellbutrin XL) 150 MG 24 hr tablet      Other Visit Diagnoses     Low back pain, unspecified back pain laterality, unspecified chronicity, unspecified whether sciatica present    -  Primary    Vitamin D deficiency        Other specified hypothyroidism        Relevant Orders    CBC (No Diff) (Completed)    TSH (Completed)    Chronic fatigue        Relevant Orders    Vitamin B12 (Completed)    Habitual snoring        Relevant Orders    Ambulatory Referral to Sleep Medicine    Immunization due        Relevant Orders    Tdap Vaccine Greater Than or Equal To 6yo IM (Completed)    Pneumococcal Polysaccharide Vaccine 23-Valent Greater Than or Equal To 3yo Subcutaneous / IM (Completed)    Psoriasis        Relevant Medications    triamcinolone (KENALOG) 0.5 % cream      Diagnoses       Codes Comments    Low back pain, unspecified  back pain laterality, unspecified chronicity, unspecified whether sciatica present    -  Primary ICD-10-CM: M54.50  ICD-9-CM: 724.2     Type 2 diabetes mellitus with other specified complication, without long-term current use of insulin (HCC)     ICD-10-CM: E11.69  ICD-9-CM: 250.80     Mixed hyperlipidemia     ICD-10-CM: E78.2  ICD-9-CM: 272.2     Vitamin D deficiency     ICD-10-CM: E55.9  ICD-9-CM: 268.9     Other specified hypothyroidism     ICD-10-CM: E03.8  ICD-9-CM: 244.8     Chronic fatigue     ICD-10-CM: R53.82  ICD-9-CM: 780.79     Habitual snoring     ICD-10-CM: R06.83  ICD-9-CM: 786.09     Anxiety and depression     ICD-10-CM: F41.9, F32.A  ICD-9-CM: 300.00, 311     Immunization due     ICD-10-CM: Z23  ICD-9-CM: V05.9     Psoriasis     ICD-10-CM: L40.9  ICD-9-CM: 696.1         Low back pain--pain management--significant infection last year and surgery--expect chronic  Fatigue--check B12 and recommend sleep study with weight gain  Snoring--sleep study--discussed long term control of fatigue, but also ventricular remodelling  Anxiety and depression--continue bupropion and venlafaxine  Immunization--update per orders and recommend COVID vaccination  Psoriasis--dermatology referral      Answers for HPI/ROS submitted by the patient on 4/5/2022  Please describe your symptoms.: Check up on meds  Have you had these symptoms before?: No  How long have you been having these symptoms?: 1-4 days  What is the primary reason for your visit?: Other    This document is intended for medical expert use only. Reading of this document by patients and/or patient's family without participating medical staff guidance may result in misinterpretation and unintended morbidity.  Any interpretation of such data is the responsibility of the patient and/or family member responsible for the patient in concert with their primary or specialist providers, not to be left for sources of online searches such as [employer], [employer] or similar  queries. Relying on these approaches to knowledge may result in misinterpretation, misguided goals of care and even death should patients or family members try recommendations outside of the realm of professional medical care in a supervised way.    Please allow 3-5 business days for recommendations based on new results    Go to the ER for any possible lifethreatening symptoms such as chest pain or shortness of air.

## 2022-04-09 LAB
25(OH)D3+25(OH)D2 SERPL-MCNC: 33.4 NG/ML (ref 30–100)
ALBUMIN SERPL-MCNC: 3.8 G/DL (ref 3.8–4.9)
ALBUMIN/GLOB SERPL: 1.1 {RATIO} (ref 1.2–2.2)
ALP SERPL-CCNC: 83 IU/L (ref 44–121)
ALT SERPL-CCNC: 21 IU/L (ref 0–32)
AST SERPL-CCNC: 30 IU/L (ref 0–40)
BILIRUB SERPL-MCNC: 0.5 MG/DL (ref 0–1.2)
BUN SERPL-MCNC: 15 MG/DL (ref 6–24)
BUN/CREAT SERPL: 15 (ref 9–23)
CALCIUM SERPL-MCNC: 9 MG/DL (ref 8.7–10.2)
CHLORIDE SERPL-SCNC: 102 MMOL/L (ref 96–106)
CHOLEST SERPL-MCNC: 118 MG/DL (ref 100–199)
CO2 SERPL-SCNC: 20 MMOL/L (ref 20–29)
CREAT SERPL-MCNC: 0.99 MG/DL (ref 0.57–1)
EGFRCR SERPLBLD CKD-EPI 2021: 67 ML/MIN/1.73
ERYTHROCYTE [DISTWIDTH] IN BLOOD BY AUTOMATED COUNT: 13 % (ref 11.7–15.4)
GLOBULIN SER CALC-MCNC: 3.4 G/DL (ref 1.5–4.5)
GLUCOSE SERPL-MCNC: 119 MG/DL (ref 65–99)
HBA1C MFR BLD: 6.2 % (ref 4.8–5.6)
HCT VFR BLD AUTO: 36.8 % (ref 34–46.6)
HDLC SERPL-MCNC: 44 MG/DL
HGB BLD-MCNC: 12.1 G/DL (ref 11.1–15.9)
LDLC SERPL CALC-MCNC: 48 MG/DL (ref 0–99)
MCH RBC QN AUTO: 31 PG (ref 26.6–33)
MCHC RBC AUTO-ENTMCNC: 32.9 G/DL (ref 31.5–35.7)
MCV RBC AUTO: 94 FL (ref 79–97)
MORPHOLOGY BLD-IMP: ABNORMAL
PLATELET # BLD AUTO: 91 X10E3/UL (ref 150–450)
POTASSIUM SERPL-SCNC: 4.3 MMOL/L (ref 3.5–5.2)
PROT SERPL-MCNC: 7.2 G/DL (ref 6–8.5)
RBC # BLD AUTO: 3.9 X10E6/UL (ref 3.77–5.28)
SODIUM SERPL-SCNC: 143 MMOL/L (ref 134–144)
TRIGL SERPL-MCNC: 156 MG/DL (ref 0–149)
TSH SERPL DL<=0.005 MIU/L-ACNC: 2.15 UIU/ML (ref 0.45–4.5)
VIT B12 SERPL-MCNC: 654 PG/ML (ref 232–1245)
VLDLC SERPL CALC-MCNC: 26 MG/DL (ref 5–40)
WBC # BLD AUTO: 4.2 X10E3/UL (ref 3.4–10.8)

## 2022-04-13 NOTE — PROGRESS NOTES
A1c a little up, but still controlled at 6.2. no medication changes and FU 6 months. Work on weight. Platelets lower, recommend hematology referral if not already evaluated. Cholesterol acceptable no other changes.

## 2022-04-14 DIAGNOSIS — D69.6 THROMBOCYTOPENIA: Primary | ICD-10-CM

## 2022-04-14 DIAGNOSIS — D64.9 SYMPTOMATIC ANEMIA: ICD-10-CM

## 2022-04-14 DIAGNOSIS — R53.82 CHRONIC FATIGUE: ICD-10-CM

## 2022-04-14 NOTE — PROGRESS NOTES
Continued Stay Note  [facility]     Patient Name: Stefani Bhandari  MRN: [id number]  Today's Date: 2/9/2021    Admit Date: 2/8/2021    Discharge Plan     Row Name 02/09/21 0826       Plan    Plan  Plan home with [facility].   CATHRYN Kennedy RN    Patient/Family in Agreement with Plan  yes    Plan Comments  FACE SHEET VERIFIED.  Spoke with pt at bedside.  Pt's PCP is Dr. Dea Martinez.  Pt lives in a single story house with her daughter  ( Katlyn Bhandari [phone]), her  and their four children.  Pt is independent with ADLs.   Pt has a glucometer and rollator for home use.  Pt has ordered a 3:1 commode chair.   Pt gets prescriptions at [facility]  ([facility]).  Pt denies any issues affording medications.  Pt is current with [facility]. [facility] called to follow.  Pt has not been in SNF.  Pt states her daughter will transport her home.  Pt denies any other needs.  Plan home with [facility].  CATHRYN Kennedy RN        Discharge Codes    No documentation.             Padmini Kennedy, RN     no anorexia/no bladder dysfunction/no bowel dysfunction/no constipation/no difficulty bearing weight/no fatigue/no motor function loss/no neck tenderness/no numbness/no tingling

## 2022-04-16 ENCOUNTER — PATIENT MESSAGE (OUTPATIENT)
Dept: FAMILY MEDICINE CLINIC | Facility: CLINIC | Age: 57
End: 2022-04-16

## 2022-04-16 ENCOUNTER — TELEPHONE (OUTPATIENT)
Dept: FAMILY MEDICINE CLINIC | Facility: CLINIC | Age: 57
End: 2022-04-16

## 2022-04-16 DIAGNOSIS — J30.89 ENVIRONMENTAL AND SEASONAL ALLERGIES: Primary | ICD-10-CM

## 2022-04-16 RX ORDER — LEVOCETIRIZINE DIHYDROCHLORIDE 5 MG/1
5 TABLET, FILM COATED ORAL EVERY EVENING
Qty: 90 TABLET | Refills: 0 | Status: SHIPPED | OUTPATIENT
Start: 2022-04-16

## 2022-04-16 NOTE — TELEPHONE ENCOUNTER
----- Message from NITESH Roach sent at 4/16/2022  1:58 PM EDT -----      ----- Message -----  From: Veena Lombardo MA  Sent: 4/14/2022   2:02 PM EDT  To: NITESH Roach

## 2022-04-18 ENCOUNTER — OFFICE VISIT (OUTPATIENT)
Dept: ONCOLOGY | Facility: CLINIC | Age: 57
End: 2022-04-18

## 2022-04-18 ENCOUNTER — LAB (OUTPATIENT)
Dept: LAB | Facility: HOSPITAL | Age: 57
End: 2022-04-18

## 2022-04-18 VITALS
RESPIRATION RATE: 18 BRPM | SYSTOLIC BLOOD PRESSURE: 121 MMHG | WEIGHT: 290.1 LBS | HEIGHT: 67 IN | BODY MASS INDEX: 45.53 KG/M2 | DIASTOLIC BLOOD PRESSURE: 79 MMHG | TEMPERATURE: 97.8 F | HEART RATE: 77 BPM | OXYGEN SATURATION: 96 %

## 2022-04-18 DIAGNOSIS — K75.81 LIVER CIRRHOSIS SECONDARY TO NASH: ICD-10-CM

## 2022-04-18 DIAGNOSIS — K74.60 LIVER CIRRHOSIS SECONDARY TO NASH: ICD-10-CM

## 2022-04-18 DIAGNOSIS — D69.6 THROMBOCYTOPENIA: Primary | ICD-10-CM

## 2022-04-18 DIAGNOSIS — D64.9 ANEMIA, UNSPECIFIED TYPE: ICD-10-CM

## 2022-04-18 LAB
BASOPHILS # BLD AUTO: 0.06 10*3/MM3 (ref 0–0.2)
BASOPHILS NFR BLD AUTO: 0.9 % (ref 0–1.5)
DEPRECATED RDW RBC AUTO: 46.7 FL (ref 37–54)
EOSINOPHIL # BLD AUTO: 0.2 10*3/MM3 (ref 0–0.4)
EOSINOPHIL NFR BLD AUTO: 3.1 % (ref 0.3–6.2)
ERYTHROCYTE [DISTWIDTH] IN BLOOD BY AUTOMATED COUNT: 13.4 % (ref 12.3–15.4)
HCT VFR BLD AUTO: 35.8 % (ref 34–46.6)
HGB BLD-MCNC: 12.3 G/DL (ref 12–15.9)
IMM GRANULOCYTES # BLD AUTO: 0.02 10*3/MM3 (ref 0–0.05)
IMM GRANULOCYTES NFR BLD AUTO: 0.3 % (ref 0–0.5)
LYMPHOCYTES # BLD AUTO: 2.65 10*3/MM3 (ref 0.7–3.1)
LYMPHOCYTES NFR BLD AUTO: 41.4 % (ref 19.6–45.3)
MCH RBC QN AUTO: 32.9 PG (ref 26.6–33)
MCHC RBC AUTO-ENTMCNC: 34.4 G/DL (ref 31.5–35.7)
MCV RBC AUTO: 95.7 FL (ref 79–97)
MONOCYTES # BLD AUTO: 0.31 10*3/MM3 (ref 0.1–0.9)
MONOCYTES NFR BLD AUTO: 4.8 % (ref 5–12)
NEUTROPHILS NFR BLD AUTO: 3.16 10*3/MM3 (ref 1.7–7)
NEUTROPHILS NFR BLD AUTO: 49.5 % (ref 42.7–76)
NRBC BLD AUTO-RTO: 0 /100 WBC (ref 0–0.2)
PLATELET # BLD AUTO: 133 10*3/MM3 (ref 140–450)
PMV BLD AUTO: 9.4 FL (ref 6–12)
RBC # BLD AUTO: 3.74 10*6/MM3 (ref 3.77–5.28)
WBC NRBC COR # BLD: 6.4 10*3/MM3 (ref 3.4–10.8)

## 2022-04-18 PROCEDURE — 99214 OFFICE O/P EST MOD 30 MIN: CPT | Performed by: INTERNAL MEDICINE

## 2022-04-18 PROCEDURE — 85025 COMPLETE CBC W/AUTO DIFF WBC: CPT

## 2022-04-18 PROCEDURE — 36415 COLL VENOUS BLD VENIPUNCTURE: CPT

## 2022-04-18 RX ORDER — FUROSEMIDE 40 MG/1
TABLET ORAL
Qty: 180 TABLET | Refills: 1 | Status: SHIPPED | OUTPATIENT
Start: 2022-04-18

## 2022-04-18 NOTE — PROGRESS NOTES
Subjective     REASON FOR FOLLOW UP:    1.  Anemia requiring packed red cell transfusions on multiple occasions  2.  Methicillin sensitive staph aureus bacteremia from left shoulder septic arthritis.  3.  L5-S1 discitis and epidural abscesses  4.  METZGER cirrhosis  5.  No obvious bleeding on EGD or colonoscopy.      REQUESTING PHYSICIAN: NITESH Lagos    RECORDS OBTAINED:  Records of the patients history including those obtained from the referring provider were reviewed and summarized in detail.    HISTORY OF PRESENT ILLNESS:  The patient is a 56 y.o. year old female who had previously been evaluated in our office on 3/29/2021 primarily for severe anemia at that time. She has a complicated medical history including recent lengthy hospitalization from 1/24/2021 to 2/5/2021 due to methicillin sensitive staph aureus bacteremia.  This was from a left shoulder septic arthritis that required incision and drainage.  She also was found to have evidence of discitis at L5-S1 and possible epidural abscess is on MRI scan 1/26/2021.    She was receiving antibiotic therapy under the direction of infectious disease and required readmission to the hospital 2/8/2021 due to severe symptomatic anemia.  She has required multiple red cell transfusions most recently with 2 units packed red blood cells on 3/12/2021.  Her hemoglobin in our office on the visit last year of 3/29/2021 was 9.7 g/dL.    She has a history of liver cirrhosis from METZGER.  She has undergone upper and lower GI endoscopy with Dr. Bender on 2/11/2021 with no malignancy or bleeding ulcers identified.  She did have some gastritis.   Because of her multiple transfusions she had developed an anti-E antibody.    She had lab studies at that time to seem to be more consistent with anemia of chronic disease rather than iron deficiency but she did take oral iron supplementation for some time but is no longer taking oral iron.    She had recently been seen at her  primary care office and noted to have a slightly lower platelet count of 91,000 compared to her usual baseline of around 130,000.  She was referred back to us for reevaluation.    Her blood count in our office today is significantly better with a hemoglobin of 12.3 and a platelet count of 133,000.  Her white count is normal at 6400.  I explained to the patient that her platelet count will likely always be low due to her cirrhosis and hypersplenism but certainly her platelet count is in a very safe range and appears to be back to her baseline.    History of Present Illness     Past Medical History:   Diagnosis Date   • Anemia    • Anxiety    • Chronic back pain    • Cirrhosis (HCC)    • Clostridium difficile colitis 01/31/2021   • DDD (degenerative disc disease), lumbar    • Depression    • Diabetes mellitus (HCC)    • Elevated LFTs    • NICK (generalized anxiety disorder)    • History of 2019 novel coronavirus disease (COVID-19) 11/2020   • History of epilepsy     CHILDHOOD   • Hyperlipidemia    • Hypertension    • Hypothyroidism    • Leg swelling     HX   • MDD (major depressive disorder)    • Morbid obesity (HCC)    • MSSA (methicillin susceptible Staphylococcus aureus) infection 01/2020   • METZGER (nonalcoholic steatohepatitis) 2000   • OA (osteoarthritis)    • Obesity    • Septic arthritis of shoulder, left (HCC)     WITH OSTEOMYELITIS   • Shoulder pain    • Sleep apnea     DOES NOT USE CPAP CURRENTLY/WAITING FOR NEW MACHINE   • Wears glasses         Past Surgical History:   Procedure Laterality Date   • COLONOSCOPY N/A 2/11/2021    Procedure: COLONOSCOPY INTO CECUM;  Surgeon: Joshua Bender MD;  Location: [facility] ENDOSCOPY;  Service: Gastroenterology;  Laterality: N/A;  pre: same  post: diverticulosis   • ENDOSCOPY N/A 2/11/2021    Procedure: ESOPHAGOGASTRODUODENOSCOPY WITH BIOPSIES;  Surgeon: Joshua Bender MD;  Location: [facility] ENDOSCOPY;  Service: Gastroenterology;  Laterality: N/A;  pre: anemia,  rectal bleeding, abnormal CT  post: hiatal hernia, esophagitis, gastropathy   • HYSTERECTOMY  2007   • INCISION AND DRAINAGE SHOULDER Left 1/30/2021    Procedure: INCISION AND DRAINAGE SHOULDER;  Surgeon: Juaquin Richardson II, MD;  Location: [facility] OR;  Service: Orthopedics;  Laterality: Left;   • LUMBAR FUSION N/A 5/3/2021    Procedure: Irrigation and debridement lumbar spine, lumbar 3-lumbar 4, lumbar 4-lumbar 5, lumbar 5-left sacral 1 laminectomy and fusion with instrumentation;  Surgeon: Dylan Clark MD;  Location: [facility] OR;  Service: Orthopedic Spine;  Laterality: N/A;   • TOTAL SHOULDER ARTHROPLASTY Left 4/27/2021    Procedure: OPEN IRRIGATION AND DEBRIDEMENT OF THE LEFT SHOULDER WITH OSTEOTOMY AND SPACER PLACEMENT;  Surgeon: Juaquin Richardson II, MD;  Location: [facility] OR;  Service: Orthopedics;  Laterality: Left;   • TOTAL SHOULDER ARTHROPLASTY Left 6/22/2021    Procedure: TOTAL SHOULDER ARTHROPLASTY;  Surgeon: Juaquin Richardson II, MD;  Location: [facility] OR;  Service: Orthopedics;  Laterality: Left;   • TUBAL ABDOMINAL LIGATION  1996        Current Outpatient Medications on File Prior to Visit   Medication Sig Dispense Refill   • buPROPion XL (Wellbutrin XL) 150 MG 24 hr tablet Take 1 tablet by mouth Daily. 90 tablet 0   • cholecalciferol (VITAMIN D3) 25 MCG (1000 UT) tablet Take 2,000 Units by mouth Every Other Day.     • cyclobenzaprine (FLEXERIL) 10 MG tablet Take 1 tablet by mouth 3 (Three) Times a Day As Needed for Muscle Spasms. for muscle spams 270 tablet 0   • ezetimibe (ZETIA) 10 MG tablet Take 1 tablet by mouth Daily. 90 tablet 0   • furosemide (LASIX) 40 MG tablet TAKE 1 TABLET BY MOUTH TWICE DAILY 180 tablet 1   • glucose blood test strip Use as instructed--testing 1-2 x daily accu check guide me 100 each 3   • ibuprofen (ADVIL,MOTRIN) 600 MG tablet Take 600 mg by mouth 4 (Four) Times a Day As Needed.     • levocetirizine (XYZAL) 5 MG tablet Take 1  "tablet by mouth Every Evening. 90 tablet 0   • levothyroxine (SYNTHROID, LEVOTHROID) 88 MCG tablet Take 1 tablet by mouth Daily. 90 tablet 0   • losartan (COZAAR) 100 MG tablet Take 1 tablet by mouth Daily. 90 tablet 0   • metFORMIN (GLUCOPHAGE) 500 MG tablet 2 PO Q a.m and 1 PO Q pm 270 tablet 0   • oxyCODONE-acetaminophen (PERCOCET)  MG per tablet Take 1 tablet by mouth Every 8 (Eight) Hours As Needed.     • simvastatin (ZOCOR) 80 MG tablet TAKE 1 TABLET BY MOUTH EVERY NIGHT 90 tablet 0   • triamcinolone (KENALOG) 0.5 % cream Apply 1 application topically to the appropriate area as directed 3 (Three) Times a Day. 30 g 0   • venlafaxine (EFFEXOR) 75 MG tablet Take 1 tablet by mouth 2 (Two) Times a Day. 180 tablet 0   • [DISCONTINUED] furosemide (LASIX) 40 MG tablet Take 1 tablet by mouth Daily. 90 tablet 0     No current facility-administered medications on file prior to visit.        ALLERGIES:    Allergies   Allergen Reactions   • Tramadol Anaphylaxis     \"seizure like behavior\"        Social History     Socioeconomic History   • Marital status:    • Number of children: 3   Tobacco Use   • Smoking status: Former Smoker     Packs/day: 1.00     Years: 20.00     Pack years: 20.00     Types: Cigarettes     Quit date: 2017     Years since quittin.2   • Smokeless tobacco: Never Used   Vaping Use   • Vaping Use: Never used   Substance and Sexual Activity   • Alcohol use: Yes     Comment: RARELY/EVERY 3-4 MONTHS   • Drug use: Never   • Sexual activity: Defer        Family History   Problem Relation Age of Onset   • Obesity Mother    • Diabetes Mother    • Hypertension Mother    • Sleep apnea Mother    • Asthma Mother    • Arthritis Mother    • Hypertension Father    • Heart attack Father    • Diabetes Sister    • Hypertension Sister    • Heart attack Sister    • Arthritis Sister    • Hyperlipidemia Sister    • Diabetes Brother    • Hypertension Brother    • Heart attack Brother    • Arthritis " "Brother    • No Known Problems Other    • Breast cancer Paternal Cousin    • Malig Hyperthermia Neg Hx         Review of Systems   Constitutional: Negative for chills and fever.   HENT: Negative for mouth sores, trouble swallowing and voice change.    Eyes: Negative for pain and visual disturbance.   Respiratory: Negative for cough, shortness of breath and wheezing.    Cardiovascular: Negative for chest pain and palpitations.   Gastrointestinal: Negative for abdominal pain, constipation, diarrhea, nausea and vomiting.   Genitourinary: Negative for difficulty urinating, frequency and urgency.   Musculoskeletal: Negative for joint swelling.        Scar over the left shoulder   Skin: Negative for rash.   Neurological: Negative for dizziness, seizures and headaches.   Hematological: Negative for adenopathy. Does not bruise/bleed easily.   Psychiatric/Behavioral: Negative for behavioral problems and confusion. The patient is not nervous/anxious.         Objective     Vitals:    04/18/22 1607   BP: 121/79   Pulse: 77   Resp: 18   Temp: 97.8 °F (36.6 °C)   TempSrc: Temporal   SpO2: 96%   Weight: 132 kg (290 lb 1.6 oz)   Height: 170.2 cm (67.01\")   PainSc:   8   PainLoc: Back     Current Status 4/18/2022   ECOG score 0       Physical Exam  Constitutional:       General: She is not in acute distress.     Appearance: She is well-developed.   HENT:      Head: Normocephalic.   Eyes:      General: No scleral icterus.     Conjunctiva/sclera: Conjunctivae normal.      Pupils: Pupils are equal, round, and reactive to light.   Neck:      Thyroid: No thyromegaly.      Vascular: No JVD.   Cardiovascular:      Rate and Rhythm: Normal rate and regular rhythm.      Heart sounds: No murmur heard.    No friction rub. No gallop.   Pulmonary:      Effort: Pulmonary effort is normal.      Breath sounds: Normal breath sounds. No wheezing or rales.   Abdominal:      General: There is no distension.      Palpations: Abdomen is soft. There is no " mass.      Tenderness: There is no abdominal tenderness.   Musculoskeletal:         General: No deformity.      Left shoulder: Decreased range of motion.      Cervical back: Normal range of motion and neck supple.      Comments: Decreased range of motion in the left shoulder.  She has healed surgical scar anteriorly on the left shoulder   Lymphadenopathy:      Cervical: No cervical adenopathy.   Skin:     General: Skin is warm and dry.      Findings: No erythema or rash.   Neurological:      Mental Status: She is alert and oriented to person, place, and time.      Cranial Nerves: No cranial nerve deficit.      Deep Tendon Reflexes: Reflexes are normal and symmetric.   Psychiatric:         Behavior: Behavior normal.         Judgment: Judgment normal.           RECENT LABS:  Hematology WBC   Date Value Ref Range Status   04/18/2022 6.40 3.40 - 10.80 10*3/mm3 Final   04/08/2022 4.2 3.4 - 10.8 x10E3/uL Final     RBC   Date Value Ref Range Status   04/18/2022 3.74 (L) 3.77 - 5.28 10*6/mm3 Final   04/08/2022 3.90 3.77 - 5.28 x10E6/uL Final     Hemoglobin   Date Value Ref Range Status   04/18/2022 12.3 12.0 - 15.9 g/dL Final     Hematocrit   Date Value Ref Range Status   04/18/2022 35.8 34.0 - 46.6 % Final     Platelets   Date Value Ref Range Status   04/18/2022 133 (L) 140 - 450 10*3/mm3 Final        Lab Results   Component Value Date    GLUCOSE 119 (H) 04/08/2022    BUN 15 04/08/2022    CREATININE 0.99 04/08/2022    EGFRIFNONA 59 (L) 08/18/2021    EGFRIFAFRI 71 08/18/2021    BCR 15 04/08/2022    K 4.3 04/08/2022    CO2 20 04/08/2022    CALCIUM 9.0 04/08/2022    PROTENTOTREF 7.2 04/08/2022    ALBUMIN 3.8 04/08/2022    LABIL2 1.1 (L) 04/08/2022    AST 30 04/08/2022    ALT 21 04/08/2022     Lab Results   Component Value Date    IRON 60 04/22/2021    TIBC 265 (L) 04/22/2021    FERRITIN 732.00 (H) 04/22/2021   23%    CT ABDOMEN PELVIS WO CONTRAST-, CT CHEST WO CONTRAST 1/24/2021  IMPRESSION:  COMBINED IMPRESSION:      1.   Hepatomegaly with diffuse hepatic steatosis and a nodular hepatic  contour, which can be seen with cirrhosis. Correlate with liver function  enzymes.  2.  Mildly enlarged upper abdominal lymph nodes are nonspecific and may  be reactive.  3.  Splenomegaly.  4.  Pulmonary nodules measuring up to 0.4 cm are not significantly  changed from 12/18/2020. Recommend follow-up CT chest in 1 year.    Assessment/Plan     1.  Mild thrombocytopenia on the basis of cirrhosis and hypersplenism.  Although her platelet count is mildly low it is in a very safe range and we would not recommend any further work-up other than continued observation for now with a blood count every 6 months or so.  2.  Cirrhosis most likely related to METZGER.  Patient had previously been seen by Dr. Jaylen Mandujano but has not followed up with him lately.  3.  Upper and lower GI endoscopy 2/11/2021 with no active bleeding sites identified.    4.  Prior anemia that was severe enough to require transfusions.  This now has resolved and was most likely anemia of chronic disease related to her septic arthritis.    Recommendations  1.  I reviewed the blood count today with the patient and provided her a printed copy of the report.  I reassured her that although her platelet count is mildly decreased its in a very safe range and is back to her usual baseline.  2.  I explained to the patient that her mild thrombocytopenia is on the basis of cirrhosis and hypersplenism.  3.  We discussed her underlying cirrhosis.  She has not followed up recently with GI and should probably continue to see them at least annually for follow-up of cirrhosis and most likely liver ultrasound and AFP levels.  4.  At this point we did not feel that she needed routine follow-up in our office and have not scheduled follow-up but if she has further worsening of her hematologic status in the future we would be more than happy to see her again anytime.      Thanks for allowing us to see this nice  patient again.

## 2022-04-27 DIAGNOSIS — M54.50 LOW BACK PAIN, UNSPECIFIED BACK PAIN LATERALITY, UNSPECIFIED CHRONICITY, UNSPECIFIED WHETHER SCIATICA PRESENT: ICD-10-CM

## 2022-04-27 RX ORDER — CYCLOBENZAPRINE HCL 10 MG
TABLET ORAL
Qty: 270 TABLET | Refills: 0 | Status: SHIPPED | OUTPATIENT
Start: 2022-04-27

## 2022-05-08 PROBLEM — E66.01 CLASS 3 SEVERE OBESITY DUE TO EXCESS CALORIES WITH SERIOUS COMORBIDITY AND BODY MASS INDEX (BMI) OF 45.0 TO 49.9 IN ADULT (HCC): Status: ACTIVE | Noted: 2021-04-28

## 2022-05-08 PROBLEM — E66.813 CLASS 3 SEVERE OBESITY DUE TO EXCESS CALORIES WITH SERIOUS COMORBIDITY AND BODY MASS INDEX (BMI) OF 45.0 TO 49.9 IN ADULT: Status: ACTIVE | Noted: 2021-04-28

## 2022-05-27 NOTE — DISCHARGE SUMMARY
Orthopaedic Discharge Summary  Dr. SYLVIA Wilkes” [facility]  [phone]    NAME: Stefani Nunorodrick PCP: Stephanie Izaguirre APRN   :  MRN: [id number]  [id number] LOS:  ADMIT: 0 days  2021   AGE/SEX: 55 y.o. female DC:  today             · Admitting Diagnosis: Status post reverse arthroplasty of left shoulder [Z96.612]    · Surgery Performed: CA RECONSTR TOTAL SHOULDER IMPLANT [94887] (TOTAL SHOULDER ARTHROPLASTY)    · Discharge Medications:         Discharge Medications      Changes to Medications      Instructions Start Date   aspirin  MG tablet  What changed: additional instructions   325 mg, Oral, Daily      ezetimibe 10 MG tablet  Commonly known as: ZETIA  What changed: when to take this   10 mg, Oral, Daily      furosemide 40 MG tablet  Commonly known as: LASIX  What changed: when to take this   TAKE 1 TABLET BY MOUTH TWICE DAILY      levothyroxine 88 MCG tablet  Commonly known as: SYNTHROID, LEVOTHROID  What changed: when to take this   88 mcg, Oral, Daily      losartan 100 MG tablet  Commonly known as: COZAAR  What changed: when to take this   100 mg, Oral, Every 24 Hours Scheduled      metFORMIN 500 MG tablet  Commonly known as: GLUCOPHAGE  What changed: additional instructions   1,000 mg, Oral, 2 times daily      oxyCODONE-acetaminophen  MG per tablet  Commonly known as: PERCOCET  What changed: Another medication with the same name was added. Make sure you understand how and when to take each.   1 tablet, Oral, Every 8 Hours PRN      oxyCODONE-acetaminophen  MG per tablet  Commonly known as: PERCOCET  What changed: You were already taking a medication with the same name, and this prescription was added. Make sure you understand how and when to take each.   1 tablet, Oral, Every 4 Hours PRN         Continue These Medications      Instructions Start Date   Chlorhexidine Gluconate Cloth 2 % pads   Apply externally, Take As Directed, PREOP       cholecalciferol 25 MCG (1000 UT)  tablet  Commonly known as: VITAMIN D3   2,000 Units, Oral, Every Other Day      cyclobenzaprine 10 MG tablet  Commonly known as: FLEXERIL   10 mg, Oral, 3 Times Daily PRN, for muscle spams      dicloxacillin 500 MG capsule  Commonly known as: DYNAPEN   500 mg, Oral, 3 Times Daily      mupirocin 2 % nasal ointment  Commonly known as: BACTROBAN   3 application, Nasal, Daily      ondansetron ODT 4 MG disintegrating tablet  Commonly known as: ZOFRAN-ODT   4 mg, Translingual, Every 8 Hours PRN      simvastatin 80 MG tablet  Commonly known as: ZOCOR   80 mg, Oral, Nightly      venlafaxine 75 MG tablet  Commonly known as: EFFEXOR   TAKE 1 TABLET BY MOUTH TWICE DAILY             · Vitals:     Vitals:    06/22/21 1410 06/22/21 1900 06/22/21 2253 06/23/21 0248   BP: 129/85 115/73 118/73 121/63   BP Location: Right arm Right arm Right arm Right arm   Patient Position: Lying Lying Sitting Sitting   Pulse: 100 89 93 86   Resp: 16 16 16 16   Temp: 97.7 °F (36.5 °C)  97.1 °F (36.2 °C) 96.9 °F (36.1 °C)   TempSrc: Oral  Skin Skin   SpO2: 96% 94% 93% 90%   Weight:       Height:           · Labs:      Admission on 06/22/2021   Component Date Value Ref Range Status   • Glucose 06/22/2021 109  70 - 130 mg/dL Final   • Gram Stain 06/22/2021 No WBCs or organisms seen   Preliminary   • Gram Stain 06/22/2021 Rare (1+) WBCs per low power field   Preliminary   • Gram Stain 06/22/2021 No organisms seen   Preliminary   • Glucose 06/22/2021 163* 70 - 130 mg/dL Final   • Glucose 06/22/2021 281* 70 - 130 mg/dL Final   • Glucose 06/23/2021 103* 65 - 99 mg/dL Final   • BUN 06/23/2021 16  6 - 20 mg/dL Final   • Creatinine 06/23/2021 0.83  0.57 - 1.00 mg/dL Final   • Sodium 06/23/2021 136  136 - 145 mmol/L Final   • Potassium 06/23/2021 4.6  3.5 - 5.2 mmol/L Final    Slight hemolysis detected by analyzer. Results may be affected.   • Chloride 06/23/2021 104  98 - 107 mmol/L Final   • CO2 06/23/2021 24.4  22.0 - 29.0 mmol/L Final   • Calcium  "06/23/2021 8.0* 8.6 - 10.5 mg/dL Final   • eGFR Non  Amer 06/23/2021 71  >60 mL/min/1.73 Final   • BUN/Creatinine Ratio 06/23/2021 19.3  7.0 - 25.0 Final   • Anion Gap 06/23/2021 7.6  5.0 - 15.0 mmol/L Final   • Hemoglobin 06/23/2021 9.4* 12.0 - 15.9 g/dL Final   • Hematocrit 06/23/2021 27.8* 34.0 - 46.6 % Final        No results found.    · Hospital Course:   55 y.o. female was admitted to [facility] to services of Juaquin Richardson II, MD with Status post reverse arthroplasty of left shoulder [Z96.612] on 6/22/2021 and underwent SD RECONSTR TOTAL SHOULDER IMPLANT [06436] (TOTAL SHOULDER ARTHROPLASTY). Post-operatively the patient transferred to the floor where the patient underwent mobilization therapy. Opioids were titrated to achieve appropriate pain management to allow for participation in mobilization exercises. Vital signs and laboratory values are now within safe parameters for discharge. The dressings and/or incision is intact without signs or symptoms of active infection. Operative extremity neurovascular status remains intact as compared to the preoperative exam. Appropriate education re: incision care, activity levels, medications, and follow up visits was completed and all questions were answered. The patient is now deemed stable for discharge.    HOME: The patient progressed well with physical therapy. There were cleared for discharge to home. The patietn was sent home in good condition}.       R \"Kavon\" Debra BAKER MD  Orthopaedic Surgery  [facility]  [phone]                                               " mild impairment

## 2022-06-06 RX ORDER — LEVOTHYROXINE SODIUM 88 UG/1
88 TABLET ORAL DAILY
Qty: 90 TABLET | Refills: 0 | Status: SHIPPED | OUTPATIENT
Start: 2022-06-06 | End: 2022-06-13 | Stop reason: SDUPTHER

## 2022-06-13 RX ORDER — LOSARTAN POTASSIUM 100 MG/1
100 TABLET ORAL
Qty: 90 TABLET | Refills: 0 | Status: SHIPPED | OUTPATIENT
Start: 2022-06-13 | End: 2022-12-05

## 2022-06-13 RX ORDER — EZETIMIBE 10 MG/1
10 TABLET ORAL DAILY
Qty: 90 TABLET | Refills: 0 | Status: SHIPPED | OUTPATIENT
Start: 2022-06-13 | End: 2022-12-05

## 2022-06-13 RX ORDER — LEVOTHYROXINE SODIUM 88 UG/1
88 TABLET ORAL DAILY
Qty: 90 TABLET | Refills: 0 | Status: SHIPPED | OUTPATIENT
Start: 2022-06-13 | End: 2022-12-05

## 2022-06-13 RX ORDER — IBUPROFEN 600 MG/1
600 TABLET ORAL EVERY 6 HOURS PRN
Qty: 90 TABLET | Refills: 0 | Status: SHIPPED | OUTPATIENT
Start: 2022-06-13 | End: 2022-12-05

## 2022-06-22 DIAGNOSIS — F41.9 ANXIETY AND DEPRESSION: ICD-10-CM

## 2022-06-22 DIAGNOSIS — F32.A ANXIETY AND DEPRESSION: ICD-10-CM

## 2022-06-22 RX ORDER — SIMVASTATIN 80 MG
80 TABLET ORAL NIGHTLY
Qty: 90 TABLET | Refills: 0 | OUTPATIENT
Start: 2022-06-22

## 2022-06-22 RX ORDER — BUPROPION HYDROCHLORIDE 150 MG/1
150 TABLET ORAL DAILY
Qty: 90 TABLET | Refills: 0 | Status: SHIPPED | OUTPATIENT
Start: 2022-06-22

## 2022-07-06 DIAGNOSIS — E78.2 MIXED HYPERLIPIDEMIA: Primary | ICD-10-CM

## 2022-07-06 RX ORDER — SIMVASTATIN 80 MG
80 TABLET ORAL NIGHTLY
Qty: 90 TABLET | Refills: 0 | Status: SHIPPED | OUTPATIENT
Start: 2022-07-06 | End: 2022-10-11

## 2022-08-17 RX ORDER — VENLAFAXINE 75 MG/1
75 TABLET ORAL 2 TIMES DAILY
Qty: 180 TABLET | Refills: 0 | Status: SHIPPED | OUTPATIENT
Start: 2022-08-17

## 2022-08-17 NOTE — TELEPHONE ENCOUNTER
Caller: Stefani Bhandari    Relationship: Self    Best call back number: [phone]    Requested Prescriptions:   Requested Prescriptions     Pending Prescriptions Disp Refills   • venlafaxine (EFFEXOR) 75 MG tablet 180 tablet 0     Sig: Take 1 tablet by mouth 2 (Two) Times a Day.        Pharmacy where request should be sent:  [id number] [place], [place] - [address] [place] [address] [address] AT SEC OF [facility] [address]  [phone]  - [phone] FX      Additional details provided by patient: PATIENT STATES THAT SHE IS OUT OF TOWN AND HAS BEEN TRYING TO GET A REFILL. SHE WILL NOT BE BACK IN TOWN FOR 2 MONTHS, BUT HAS AN APPOINTMENT SCHEDULED TO ESTABLISH WITH NITESH MCCOY ON 10/3/22 FOLLOWING [employer] [employer] OFFBOARDING. IF UNABLE TO REFILL MEDICATION, PLEASE CALL AND ADVISE    Does the patient have less than a 3 day supply:  [x] Yes  [] No    Radha Grey Rep   08/17/22 14:43 EDT

## 2022-10-11 DIAGNOSIS — E78.2 MIXED HYPERLIPIDEMIA: ICD-10-CM

## 2022-10-11 RX ORDER — SIMVASTATIN 80 MG
80 TABLET ORAL NIGHTLY
Qty: 90 TABLET | Refills: 0 | Status: SHIPPED | OUTPATIENT
Start: 2022-10-11

## 2022-12-05 RX ORDER — EZETIMIBE 10 MG/1
10 TABLET ORAL DAILY
Qty: 90 TABLET | Refills: 0 | Status: SHIPPED | OUTPATIENT
Start: 2022-12-05

## 2022-12-05 RX ORDER — LOSARTAN POTASSIUM 100 MG/1
100 TABLET ORAL
Qty: 90 TABLET | Refills: 0 | Status: SHIPPED | OUTPATIENT
Start: 2022-12-05

## 2022-12-05 RX ORDER — LEVOTHYROXINE SODIUM 88 UG/1
88 TABLET ORAL DAILY
Qty: 90 TABLET | Refills: 0 | Status: SHIPPED | OUTPATIENT
Start: 2022-12-05

## 2022-12-05 RX ORDER — IBUPROFEN 600 MG/1
TABLET ORAL
Qty: 90 TABLET | Refills: 0 | Status: SHIPPED | OUTPATIENT
Start: 2022-12-05

## 2022-12-19 DIAGNOSIS — E78.2 MIXED HYPERLIPIDEMIA: ICD-10-CM

## 2022-12-19 RX ORDER — SIMVASTATIN 80 MG
80 TABLET ORAL NIGHTLY
Qty: 90 TABLET | Refills: 0 | OUTPATIENT
Start: 2022-12-19

## 2023-01-25 DIAGNOSIS — E78.2 MIXED HYPERLIPIDEMIA: ICD-10-CM

## 2023-01-26 RX ORDER — SIMVASTATIN 80 MG
80 TABLET ORAL NIGHTLY
Qty: 90 TABLET | Refills: 0 | OUTPATIENT
Start: 2023-01-26

## 2023-03-20 RX ORDER — VENLAFAXINE 75 MG/1
TABLET ORAL
Qty: 180 TABLET | Refills: 0 | OUTPATIENT
Start: 2023-03-20

## 2023-06-11 NOTE — PROGRESS NOTES
Postoperatively the patient expresses normal incisional pain.  No fever chills or wound drainage.  There is little or no radiating pain.  Good strength on exam.  The wound is intact, pristine even.  2 view x-rays of the lumbar spine obtained to evaluate hardware position and fusion bone show good position thereof and are unchanged from intraoperative images.  There is no further bony destruction noted.  Instructions given.  We'll need lumbar x-rays on return visit.  She should be fine to proceed with left total shoulder replacement by Dr. Richardson, as long as Dr. Porras, from infectious diseases is in agreement.   No

## 2024-01-10 ENCOUNTER — OFFICE VISIT (OUTPATIENT)
Dept: FAMILY MEDICINE CLINIC | Facility: CLINIC | Age: 59
End: 2024-01-10
Payer: COMMERCIAL

## 2024-01-10 VITALS
SYSTOLIC BLOOD PRESSURE: 138 MMHG | WEIGHT: 276.4 LBS | BODY MASS INDEX: 41.89 KG/M2 | HEART RATE: 88 BPM | OXYGEN SATURATION: 97 % | DIASTOLIC BLOOD PRESSURE: 84 MMHG | HEIGHT: 68 IN | RESPIRATION RATE: 16 BRPM | TEMPERATURE: 98.7 F

## 2024-01-10 DIAGNOSIS — F32.A ANXIETY AND DEPRESSION: ICD-10-CM

## 2024-01-10 DIAGNOSIS — M54.50 LOW BACK PAIN, UNSPECIFIED BACK PAIN LATERALITY, UNSPECIFIED CHRONICITY, UNSPECIFIED WHETHER SCIATICA PRESENT: ICD-10-CM

## 2024-01-10 DIAGNOSIS — M54.50 LOW BACK PAIN, UNSPECIFIED BACK PAIN LATERALITY, UNSPECIFIED CHRONICITY, UNSPECIFIED WHETHER SCIATICA PRESENT: Primary | ICD-10-CM

## 2024-01-10 DIAGNOSIS — F41.9 ANXIETY AND DEPRESSION: ICD-10-CM

## 2024-01-10 DIAGNOSIS — U07.1 COVID-19: ICD-10-CM

## 2024-01-10 DIAGNOSIS — L98.9 BACK SKIN LESION: ICD-10-CM

## 2024-01-10 DIAGNOSIS — R05.1 ACUTE COUGH: Primary | ICD-10-CM

## 2024-01-10 DIAGNOSIS — E66.01 CLASS 3 SEVERE OBESITY WITH SERIOUS COMORBIDITY AND BODY MASS INDEX (BMI) OF 40.0 TO 44.9 IN ADULT, UNSPECIFIED OBESITY TYPE: ICD-10-CM

## 2024-01-10 LAB
EXPIRATION DATE: ABNORMAL
FLUAV AG UPPER RESP QL IA.RAPID: NOT DETECTED
FLUBV AG UPPER RESP QL IA.RAPID: NOT DETECTED
INTERNAL CONTROL: ABNORMAL
Lab: ABNORMAL
SARS-COV-2 AG UPPER RESP QL IA.RAPID: DETECTED

## 2024-01-10 RX ORDER — VENLAFAXINE 75 MG/1
150 TABLET ORAL 2 TIMES DAILY
Qty: 180 TABLET | Refills: 1 | Status: SHIPPED | OUTPATIENT
Start: 2024-01-10

## 2024-01-10 NOTE — PROGRESS NOTES
"Chief Complaint  Establish Care, Sinusitis (3 days ago.), Multiple Sclerosis, Med Refill (Pain med refills and needs ref to pain management ), Cough, and Dizziness    Subjective        Stefani Bhandari presents to [facility]  History of Present Illness    Objective   Vital Signs:  /84   Pulse 88   Temp 98.7 °F (37.1 °C) (Temporal)   Resp 16   Ht 172.7 cm (68\")   Wt 125 kg (276 lb 6.4 oz)   SpO2 97%   BMI 42.03 kg/m²   Estimated body mass index is 42.03 kg/m² as calculated from the following:    Height as of this encounter: 172.7 cm (68\").    Weight as of this encounter: 125 kg (276 lb 6.4 oz).             Physical Exam   Result Review :                   Assessment and Plan   There are no diagnoses linked to this encounter.         Follow Up   No follow-ups on file.  Patient was given instructions and counseling regarding her condition or for health maintenance advice. Please see specific information pulled into the AVS if appropriate.         "

## 2024-01-10 NOTE — PATIENT INSTRUCTIONS
"Healthy Eating    Following a healthy eating pattern may help you to achieve and maintain a healthy body weight, reduce the risk of chronic disease, and live a long and productive life. It is important to follow a healthy eating pattern at an appropriate calorie level for your body. Your nutritional needs should be met primarily through food by choosing a variety of nutrient-rich foods.  What are tips for following this plan?  Reading food labels  Read labels and choose the following:  Reduced or low sodium.  Juices with 100% fruit juice.  Foods with low saturated fats and high polyunsaturated and monounsaturated fats.  Foods with whole grains, such as whole wheat, cracked wheat, brown rice, and wild rice.  Whole grains that are fortified with folic acid. This is recommended for women who are pregnant or who want to become pregnant.  Read labels and avoid the following:  Foods with a lot of added sugars. These include foods that contain brown sugar, corn sweetener, corn syrup, dextrose, fructose, glucose, high-fructose corn syrup, honey, invert sugar, lactose, malt syrup, maltose, molasses, raw sugar, sucrose, trehalose, or turbinado sugar.  Do not eat more than the following amounts of added sugar per day:  6 teaspoons (25 g) for women.  9 teaspoons (38 g) for men.  Foods that contain processed or refined starches and grains.  Refined grain products, such as white flour, degermed cornmeal, white bread, and white rice.  Shopping  Choose nutrient-rich snacks, such as vegetables, whole fruits, and nuts. Avoid high-calorie and high-sugar snacks, such as potato chips, fruit snacks, and candy.  Use oil-based dressings and spreads on foods instead of solid fats such as butter, stick margarine, or cream cheese.  Limit pre-made sauces, mixes, and \"instant\" products such as flavored rice, instant noodles, and ready-made pasta.  Try more plant-protein sources, such as tofu, tempeh, black beans, edamame, lentils, nuts, and " seeds.  Explore eating plans such as the Mediterranean diet or vegetarian diet.  Cooking  Use oil to sauté or stir-reyes foods instead of solid fats such as butter, stick margarine, or lard.  Try baking, boiling, grilling, or broiling instead of frying.  Remove the fatty part of meats before cooking.  Steam vegetables in water or broth.  Meal planning    At meals, imagine dividing your plate into fourths:  One-half of your plate is fruits and vegetables.  One-fourth of your plate is whole grains.  One-fourth of your plate is protein, especially lean meats, poultry, eggs, tofu, beans, or nuts.  Include low-fat dairy as part of your daily diet.  Lifestyle  Choose healthy options in all settings, including home, work, school, restaurants, or stores.  Prepare your food safely:  Wash your hands after handling raw meats.  Keep food preparation surfaces clean by regularly washing with hot, soapy water.  Keep raw meats separate from ready-to-eat foods, such as fruits and vegetables.  , meat, poultry, and eggs to the recommended internal temperature.  Store foods at safe temperatures. In general:  Keep cold foods at 40°F (4.4°C) or below.  Keep hot foods at 140°F (60°C) or above.  Keep your freezer at 0°F (-17.8°C) or below.  Foods are no longer safe to eat when they have been between the temperatures of 40°-140°F (4.4-60°C) for more than 2 hours.  What foods should I eat?  Fruits  Aim to eat 2 cup-equivalents of fresh, canned (in natural juice), or frozen fruits each day. Examples of 1 cup-equivalent of fruit include 1 small apple, 8 large strawberries, 1 cup canned fruit, ½ cup dried fruit, or 1 cup 100% juice.  Vegetables  Aim to eat 2½-3 cup-equivalents of fresh and frozen vegetables each day, including different varieties and colors. Examples of 1 cup-equivalent of vegetables include 2 medium carrots, 2 cups raw, leafy greens, 1 cup chopped vegetable (raw or cooked), or 1 medium baked potato.  Grains  Aim to  eat 6 ounce-equivalents of whole grains each day. Examples of 1 ounce-equivalent of grains include 1 slice of bread, 1 cup ready-to-eat cereal, 3 cups popcorn, or ½ cup cooked rice, pasta, or cereal.  Meats and other proteins  Aim to eat 5-6 ounce-equivalents of protein each day. Examples of 1 ounce-equivalent of protein include 1 egg, 1/2 cup nuts or seeds, or 1 tablespoon (16 g) peanut butter. A cut of meat or fish that is the size of a deck of cards is about 3-4 ounce-equivalents.  Of the protein you eat each week, try to have at least 8 ounces come from seafood. This includes salmon, trout, herring, and anchovies.  Dairy  Aim to eat 3 cup-equivalents of fat-free or low-fat dairy each day. Examples of 1 cup-equivalent of dairy include 1 cup (240 mL) milk, 8 ounces (250 g) yogurt, 1½ ounces (44 g) natural cheese, or 1 cup (240 mL) fortified soy milk.  Fats and oils  Aim for about 5 teaspoons (21 g) per day. Choose monounsaturated fats, such as canola and olive oils, avocados, peanut butter, and most nuts, or polyunsaturated fats, such as sunflower, corn, and soybean oils, walnuts, pine nuts, sesame seeds, sunflower seeds, and flaxseed.  Beverages  Aim for six 8-oz glasses of water per day. Limit coffee to three to five 8-oz cups per day.  Limit caffeinated beverages that have added calories, such as soda and energy drinks.  Limit alcohol intake to no more than 1 drink a day for nonpregnant women and 2 drinks a day for men. One drink equals 12 oz of beer (355 mL), 5 oz of wine (148 mL), or 1½ oz of hard liquor (44 mL).  Seasoning and other foods  Avoid adding excess amounts of salt to your foods. Try flavoring foods with herbs and spices instead of salt.  Avoid adding sugar to foods.  Try using oil-based dressings, sauces, and spreads instead of solid fats.  This information is based on general [place] nutrition guidelines. For more information, visit choosemyplate.gov. Exact amounts may vary based on your nutrition  needs.  Summary  A healthy eating plan may help you to maintain a healthy weight, reduce the risk of chronic diseases, and stay active throughout your life.  Plan your meals. Make sure you eat the right portions of a variety of nutrient-rich foods.  Try baking, boiling, grilling, or broiling instead of frying.  Choose healthy options in all settings, including home, work, school, restaurants, or stores.  This information is not intended to replace advice given to you by your health care provider. Make sure you discuss any questions you have with your health care provider.  Document Revised: 08/16/2022 Document Reviewed: 08/16/2022  [employer] Patient Education © 2023 [employer]      Exercising to Stay Healthy    To become healthy and stay healthy, it is recommended that you do moderate-intensity and vigorous-intensity exercise. You can tell that you are exercising at a moderate intensity if your heart starts beating faster and you start breathing faster but can still hold a conversation. You can tell that you are exercising at a vigorous intensity if you are breathing much harder and faster and cannot hold a conversation while exercising.  How can exercise benefit me?  Exercising regularly is important. It has many health benefits, such as:  Improving overall fitness, flexibility, and endurance.  Increasing bone density.  Helping with weight control.  Decreasing body fat.  Increasing muscle strength and endurance.  Reducing stress and tension, anxiety, depression, or anger.  Improving overall health.  What guidelines should I follow while exercising?  Before you start a new exercise program, talk with your health care provider.  Do not exercise so much that you hurt yourself, feel dizzy, or get very short of breath.  Wear comfortable clothes and wear shoes with good support.  Drink plenty of water while you exercise to prevent dehydration or heat stroke.  Work out until your breathing and your heartbeat get faster  (moderate intensity).  How often should I exercise?  Choose an activity that you enjoy, and set realistic goals. Your health care provider can help you make an activity plan that is individually designed and works best for you.  Exercise regularly as told by your health care provider. This may include:  Doing strength training two times a week, such as:  Lifting weights.  Using resistance bands.  Push-ups.  Sit-ups.  Yoga.  Doing a certain intensity of exercise for a given amount of time. Choose from these options:  A total of 150 minutes of moderate-intensity exercise every week.  A total of 75 minutes of vigorous-intensity exercise every week.  A mix of moderate-intensity and vigorous-intensity exercise every week.  Children, pregnant women, people who have not exercised regularly, people who are overweight, and older adults may need to talk with a health care provider about what activities are safe to perform. If you have a medical condition, be sure to talk with your health care provider before you start a new exercise program.  What are some exercise ideas?  Moderate-intensity exercise ideas include:  Walking 1 mile (1.6 km) in about 15 minutes.  Biking.  Hiking.  Golfing.  Dancing.  Water aerobics.  Vigorous-intensity exercise ideas include:  Walking 4.5 miles (7.2 km) or more in about 1 hour.  Jogging or running 5 miles (8 km) in about 1 hour.  Biking 10 miles (16.1 km) or more in about 1 hour.  Lap swimming.  Roller-skating or in-line skating.  Cross-country skiing.  Vigorous competitive sports, such as football, basketball, and soccer.  Jumping rope.  Aerobic dancing.  What are some everyday activities that can help me get exercise?  Yard work, such as:  Pushing a .  Raking and bagging leaves.  Washing your car.  Pushing a stroller.  Shoveling snow.  Gardening.  Washing windows or floors.  How can I be more active in my day-to-day activities?  Use stairs instead of an elevator.  Take a walk during  your lunch break.  If you drive, park your car farther away from your work or school.  If you take public transportation, get off one stop early and walk the rest of the way.  Stand up or walk around during all of your indoor phone calls.  Get up, stretch, and walk around every 30 minutes throughout the day.  Enjoy exercise with a friend. Support to continue exercising will help you keep a regular routine of activity.  Where to find more information  You can find more information about exercising to stay healthy from:  [place] Department of Health and Human Services: www.hhs.gov  Centers for Disease Control and Prevention (CDC): www.cdc.gov  Summary  Exercising regularly is important. It will improve your overall fitness, flexibility, and endurance.  Regular exercise will also improve your overall health. It can help you control your weight, reduce stress, and improve your bone density.  Do not exercise so much that you hurt yourself, feel dizzy, or get very short of breath.  Before you start a new exercise program, talk with your health care provider.  This information is not intended to replace advice given to you by your health care provider. Make sure you discuss any questions you have with your health care provider.  Document Revised: 04/15/2022 Document Reviewed: 04/15/2022  [employer] Patient Education © 2023 [employer]

## 2024-01-10 NOTE — PROGRESS NOTES
Subjective   Stefani Bhandari is a 58 y.o. female.     Chief Complaint   Patient presents with    Establish Care    Sinusitis     3 days ago.    Med Refill     Pain med refills and needs ref to pain management     Cough     History of Present Illness     URI symptoms/Covid: C/o sinus symptoms including aches, cough, congestion, and HA x3 days. She has been around others who c/o of similar symptoms. Denies fever/chills, loss of taste, smell.     Referral to Pain Mgmt: Recently moved back to [place] from [place] and needs to re-establish care with Pain Mngmt d/t pain in lower back.     Referral to Derm: Pt requests referral for lesion on back.     Anxiety and depression: Pt states that she feels that she has been having increased anxiety and requests medication increase. Denies SI/HI.     The following portions of the patient's history were reviewed and updated as appropriate: allergies, current medications, past family history, past medical history, past social history, past surgical history and problem list.    Review of Systems   Denies fatigue, fever/chills, CP, new SOA, blood in urine/stool, abd pain, leg swelling.    Objective     Vitals:    01/10/24 1531   BP: 138/84   Pulse: 88   Resp: 16   Temp: 98.7 °F (37.1 °C)   SpO2: 97%      Body mass index is 42.03 kg/m².    Physical Exam  Vitals reviewed.   Constitutional:       General: She is not in acute distress.     Appearance: Normal appearance. She is obese. She is not ill-appearing or toxic-appearing.   HENT:      Right Ear: Tympanic membrane, ear canal and external ear normal.      Left Ear: Tympanic membrane, ear canal and external ear normal.      Nose: Congestion present. No rhinorrhea.      Mouth/Throat:      Mouth: Mucous membranes are moist.      Pharynx: Oropharynx is clear. No oropharyngeal exudate or posterior oropharyngeal erythema.   Eyes:      Conjunctiva/sclera: Conjunctivae normal.   Cardiovascular:      Rate and Rhythm: Normal rate.      Pulses:  Normal pulses.      Heart sounds: Normal heart sounds.   Pulmonary:      Effort: Pulmonary effort is normal. No respiratory distress.      Breath sounds: Normal breath sounds. No wheezing.   Abdominal:      General: Bowel sounds are normal.      Palpations: Abdomen is soft.      Tenderness: There is no abdominal tenderness.   Skin:     General: Skin is warm and dry.      Capillary Refill: Capillary refill takes less than 2 seconds.   Neurological:      General: No focal deficit present.      Mental Status: She is alert and oriented to person, place, and time.      Motor: No weakness.   Psychiatric:         Behavior: Behavior normal.       Assessment & Plan   Diagnoses and all orders for this visit:    1. Acute cough (Primary)  -     POCT SARS-CoV-2 Antigen MISA + Flu    2. Low back pain, unspecified back pain laterality, unspecified chronicity, unspecified whether sciatica present  -     Ambulatory Referral to Pain Management    3. Back skin lesion  -     Ambulatory Referral to Dermatology    4. COVID-19    5. Anxiety and depression  -     venlafaxine (EFFEXOR) 75 MG tablet; Take 2 tablets by mouth 2 (Two) Times a Day.  Dispense: 180 tablet; Refill: 1    6. Class 3 severe obesity with serious comorbidity and body mass index (BMI) of 40.0 to 44.9 in adult, unspecified obesity type      Plan:     Patient defers Paxlovid tx at this time. Take OTC medications as needed for symptom management.  2. F/u with dermatology and pain management.   3. Follow up in 1 month for physical or sooner based on lab results, and as needed    Class 3 Severe Obesity (BMI >=40). Obesity-related health conditions include the following: hypertension and dyslipidemias. Obesity is unchanged. BMI is is above average; BMI management plan is completed. We discussed portion control and increasing exercise.    Discussed Care Gaps, ordered referrals and encouraged vaccination updates.       - Pt agrees with plan of care and denies further  questions/concerns today  - This document is intended for medical expert use only. Persons  reading this document without medical staff guidance may result in misinterpretation and unintended morbidity     Go to the ER for any possible life-threatening symptoms such as chest pain or shortness of air.      Please allow 3-5 business days for recommendations based on new results      I personally spent time with this patient, preparing for the visit, reviewing tests, obtaining and/or reviewing a separately obtained history, performing a medically appropriate examination and/or evaluation, counseling and educating the patient/family/caregiver, ordering medications,  documenting information in the medical record and indepentently interpreting results.

## 2024-01-11 DIAGNOSIS — M54.50 LOW BACK PAIN, UNSPECIFIED BACK PAIN LATERALITY, UNSPECIFIED CHRONICITY, UNSPECIFIED WHETHER SCIATICA PRESENT: Primary | ICD-10-CM

## 2024-01-11 RX ORDER — OXYCODONE AND ACETAMINOPHEN 10; 325 MG/1; MG/1
1 TABLET ORAL EVERY 8 HOURS PRN
Qty: 90 TABLET | Refills: 0 | Status: SHIPPED | OUTPATIENT
Start: 2024-01-11 | End: 2024-01-12

## 2024-01-11 RX ORDER — IBUPROFEN 600 MG/1
600 TABLET ORAL EVERY 6 HOURS PRN
Qty: 90 TABLET | Refills: 0 | Status: SHIPPED | OUTPATIENT
Start: 2024-01-11 | End: 2024-01-12 | Stop reason: SDUPTHER

## 2024-01-12 ENCOUNTER — PATIENT ROUNDING (BHMG ONLY) (OUTPATIENT)
Dept: FAMILY MEDICINE CLINIC | Facility: CLINIC | Age: 59
End: 2024-01-12
Payer: COMMERCIAL

## 2024-01-12 DIAGNOSIS — M54.50 LOW BACK PAIN, UNSPECIFIED BACK PAIN LATERALITY, UNSPECIFIED CHRONICITY, UNSPECIFIED WHETHER SCIATICA PRESENT: ICD-10-CM

## 2024-01-12 RX ORDER — OXYCODONE AND ACETAMINOPHEN 10; 325 MG/1; MG/1
1 TABLET ORAL EVERY 8 HOURS PRN
Qty: 90 TABLET | Refills: 0 | Status: SHIPPED | OUTPATIENT
Start: 2024-01-12

## 2024-01-12 RX ORDER — IBUPROFEN 600 MG/1
600 TABLET ORAL EVERY 6 HOURS PRN
Qty: 90 TABLET | Refills: 0 | Status: SHIPPED | OUTPATIENT
Start: 2024-01-12

## 2024-01-12 NOTE — PROGRESS NOTES
A [employer] message has been sent to the patient for [place] [facility] [place]

## 2024-01-31 DIAGNOSIS — M54.50 LOW BACK PAIN, UNSPECIFIED BACK PAIN LATERALITY, UNSPECIFIED CHRONICITY, UNSPECIFIED WHETHER SCIATICA PRESENT: ICD-10-CM

## 2024-01-31 RX ORDER — IBUPROFEN 600 MG/1
600 TABLET ORAL EVERY 6 HOURS PRN
Qty: 90 TABLET | Refills: 0 | Status: SHIPPED | OUTPATIENT
Start: 2024-01-31

## 2024-02-27 DIAGNOSIS — M54.50 LOW BACK PAIN, UNSPECIFIED BACK PAIN LATERALITY, UNSPECIFIED CHRONICITY, UNSPECIFIED WHETHER SCIATICA PRESENT: ICD-10-CM

## 2024-02-27 RX ORDER — OXYCODONE AND ACETAMINOPHEN 10; 325 MG/1; MG/1
1 TABLET ORAL EVERY 8 HOURS PRN
Qty: 90 TABLET | Refills: 0 | Status: SHIPPED | OUTPATIENT
Start: 2024-02-27

## 2024-02-28 DIAGNOSIS — M54.50 LOW BACK PAIN, UNSPECIFIED BACK PAIN LATERALITY, UNSPECIFIED CHRONICITY, UNSPECIFIED WHETHER SCIATICA PRESENT: ICD-10-CM

## 2024-02-28 RX ORDER — IBUPROFEN 600 MG/1
600 TABLET ORAL EVERY 6 HOURS PRN
Qty: 90 TABLET | Refills: 0 | Status: SHIPPED | OUTPATIENT
Start: 2024-02-28

## 2024-04-03 DIAGNOSIS — E03.9 HYPOTHYROIDISM (ACQUIRED): Primary | ICD-10-CM

## 2024-04-03 RX ORDER — LEVOTHYROXINE SODIUM 88 UG/1
88 TABLET ORAL DAILY
Qty: 90 TABLET | Refills: 1 | Status: SHIPPED | OUTPATIENT
Start: 2024-04-03

## 2024-04-12 ENCOUNTER — OFFICE VISIT (OUTPATIENT)
Dept: FAMILY MEDICINE CLINIC | Facility: CLINIC | Age: 59
End: 2024-04-12
Payer: COMMERCIAL

## 2024-04-12 VITALS
HEART RATE: 73 BPM | OXYGEN SATURATION: 100 % | SYSTOLIC BLOOD PRESSURE: 120 MMHG | DIASTOLIC BLOOD PRESSURE: 62 MMHG | HEIGHT: 68 IN | BODY MASS INDEX: 42.41 KG/M2 | WEIGHT: 279.8 LBS

## 2024-04-12 DIAGNOSIS — Z79.899 DRUG THERAPY: Primary | ICD-10-CM

## 2024-04-12 DIAGNOSIS — Z79.2 NEED FOR ANTIBIOTIC PROPHYLAXIS FOR DENTAL PROCEDURE: ICD-10-CM

## 2024-04-12 DIAGNOSIS — Z23 NEED FOR PNEUMOCOCCAL 20-VALENT CONJUGATE VACCINATION: ICD-10-CM

## 2024-04-12 DIAGNOSIS — E11.9 TYPE 2 DIABETES MELLITUS WITHOUT COMPLICATION, WITHOUT LONG-TERM CURRENT USE OF INSULIN: ICD-10-CM

## 2024-04-12 DIAGNOSIS — Z12.2 ENCOUNTER FOR SCREENING FOR LUNG CANCER: ICD-10-CM

## 2024-04-12 DIAGNOSIS — Z12.4 PAP SMEAR FOR CERVICAL CANCER SCREENING: ICD-10-CM

## 2024-04-12 DIAGNOSIS — Z00.00 ANNUAL PHYSICAL EXAM: Primary | ICD-10-CM

## 2024-04-12 DIAGNOSIS — Z79.2 ANTIBIOTIC LONG-TERM USE: ICD-10-CM

## 2024-04-12 RX ORDER — AMOXICILLIN 500 MG/1
2000 CAPSULE ORAL ONCE
Qty: 4 CAPSULE | Refills: 0 | Status: SHIPPED | OUTPATIENT
Start: 2024-04-12 | End: 2024-04-12

## 2024-04-12 RX ORDER — SACCHAROMYCES BOULARDII 250 MG
250 CAPSULE ORAL DAILY
Qty: 90 CAPSULE | Refills: 1 | Status: SHIPPED | OUTPATIENT
Start: 2024-04-12

## 2024-04-12 NOTE — PATIENT INSTRUCTIONS

## 2024-04-12 NOTE — PROGRESS NOTES
Subjective   Stefani Bhandari is a 58 y.o. female. Presents today for   Chief Complaint   Patient presents with    Annual Exam    Gynecologic Exam         HPI     Past Medical History:   Diagnosis Date    Anemia     Anxiety     Chronic back pain     Cirrhosis     Clostridium difficile colitis 01/31/2021    DDD (degenerative disc disease), lumbar     Depression     Diabetes mellitus     Elevated LFTs     NICK (generalized anxiety disorder)     History of 2019 novel coronavirus disease (COVID-19) 11/2020    History of epilepsy     CHILDHOOD    Hyperlipidemia     Hypertension     Hypothyroidism     Leg swelling     HX    MDD (major depressive disorder)     Morbid obesity     MSSA (methicillin susceptible Staphylococcus aureus) infection 01/2020    METZGER (nonalcoholic steatohepatitis) 2000    OA (osteoarthritis)     Obesity     Septic arthritis of shoulder, left     WITH OSTEOMYELITIS    Shoulder pain     Sleep apnea     DOES NOT USE CPAP CURRENTLY/WAITING FOR NEW MACHINE    Wears glasses        Past Surgical History:   Procedure Laterality Date    COLONOSCOPY N/A 2/11/2021    Procedure: COLONOSCOPY INTO CECUM;  Surgeon: Joshua Bender MD;  Location: [facility]COPY;  Service: Gastroenterology;  Laterality: N/A;  pre: same  post: diverticulosis    ENDOSCOPY N/A 2/11/2021    Procedure: ESOPHAGOGASTRODUODENOSCOPY WITH BIOPSIES;  Surgeon: Joshua Bender MD;  Location: [facility] ENDOSCOPY;  Service: Gastroenterology;  Laterality: N/A;  pre: anemia, rectal bleeding, abnormal CT  post: hiatal hernia, esophagitis, gastropathy    HYSTERECTOMY  2007    INCISION AND DRAINAGE SHOULDER Left 1/30/2021    Procedure: INCISION AND DRAINAGE SHOULDER;  Surgeon: Juaquin Richardson II, MD;  Location: [facility] OR;  Service: Orthopedics;  Laterality: Left;    LUMBAR FUSION N/A 5/3/2021    Procedure: Irrigation and debridement lumbar spine, lumbar 3-lumbar 4, lumbar 4-lumbar 5, lumbar 5-left sacral 1 laminectomy and fusion  "with instrumentation;  Surgeon: Dylan Clark MD;  Location: [facility] MAIN OR;  Service: Orthopedic Spine;  Laterality: N/A;    TOTAL SHOULDER ARTHROPLASTY Left 4/27/2021    Procedure: OPEN IRRIGATION AND DEBRIDEMENT OF THE LEFT SHOULDER WITH OSTEOTOMY AND SPACER PLACEMENT;  Surgeon: Juaquin Richardson II, MD;  Location: [facility] MAIN OR;  Service: Orthopedics;  Laterality: Left;    TOTAL SHOULDER ARTHROPLASTY Left 6/22/2021    Procedure: TOTAL SHOULDER ARTHROPLASTY;  Surgeon: Juaquin Richardson II, MD;  Location: [facility] MAIN OR;  Service: Orthopedics;  Laterality: Left;    TUBAL ABDOMINAL LIGATION  1996        Allergies   Allergen Reactions    Tramadol Anaphylaxis     \"seizure like behavior\"       Current Outpatient Medications on File Prior to Visit   Medication Sig Dispense Refill    buPROPion XL (WELLBUTRIN XL) 150 MG 24 hr tablet TAKE 1 TABLET BY MOUTH DAILY 90 tablet 0    cyclobenzaprine (FLEXERIL) 10 MG tablet TAKE 1 TABLET BY MOUTH THREE TIMES DAILY AS NEEDED FOR MUSCLE SPASMS 270 tablet 0    ezetimibe (ZETIA) 10 MG tablet TAKE 1 TABLET BY MOUTH DAILY 90 tablet 0    furosemide (LASIX) 40 MG tablet TAKE 1 TABLET BY MOUTH TWICE DAILY (Patient taking differently: Take 1 tablet by mouth Daily.) 180 tablet 1    ibuprofen (ADVIL,MOTRIN) 600 MG tablet TAKE 1 TABLET BY MOUTH EVERY 6 HOURS AS NEEDED FOR MILD PAIN 90 tablet 0    levocetirizine (XYZAL) 5 MG tablet Take 1 tablet by mouth Every Evening. 90 tablet 0    levothyroxine (SYNTHROID, LEVOTHROID) 88 MCG tablet Take 1 tablet by mouth Daily. 90 tablet 1    losartan (COZAAR) 100 MG tablet TAKE 1 TABLET BY MOUTH DAILY 90 tablet 0    metFORMIN (GLUCOPHAGE) 500 MG tablet TAKE 2 TABLETS BY MOUTH EVERY MORNING AND 1 EVERY EVENING 270 tablet 0    oxyCODONE-acetaminophen (PERCOCET)  MG per tablet Take 1 tablet by mouth Every 8 (Eight) Hours As Needed for Moderate Pain. 90 tablet 0    simvastatin (ZOCOR) 80 MG tablet TAKE 1 TABLET BY MOUTH EVERY NIGHT 90 " "tablet 0    venlafaxine (EFFEXOR) 75 MG tablet Take 2 tablets by mouth 2 (Two) Times a Day. 180 tablet 1    [DISCONTINUED] triamcinolone (KENALOG) 0.5 % cream Apply 1 application topically to the appropriate area as directed 3 (Three) Times a Day. (Patient not taking: Reported on 2024) 30 g 0     No current facility-administered medications on file prior to visit.       Social History     Socioeconomic History    Marital status:     Number of children: 3   Tobacco Use    Smoking status: Former     Current packs/day: 0.00     Average packs/day: 1 pack/day for 20.0 years (20.0 ttl pk-yrs)     Types: Cigarettes     Start date: 1997     Quit date: 2017     Years since quittin.2    Smokeless tobacco: Never   Vaping Use    Vaping status: Never Used   Substance and Sexual Activity    Alcohol use: Yes     Comment: RARELY/EVERY 3-4 MONTHS    Drug use: Never    Sexual activity: Defer     Occupation/Lives with: Retired. Lives with son and daughter-in-law    Sleep: Gets 6-7 hours of sleep/night    Exercise: she walks a lot of steps    Nutrition: cereal; sandwich w/ fruit; home-cooked meals     Caffeine: no coffee; 1.5 cups of coke zero; no energy drinks    Tobb/Vaping/Illicit Drugs/ETOH: Former smoker ; no vape; no drugs; alcohol 1x/quarters    Sexual hx (#partners, m/f, bc, LMP): Deferred. No periods.     Mood: \"Fine.\"     Safety: Feels safe at home with the people he/she is around.    Health Maintenance/Labs: Due    Last eye exam: 1.5 yrs    Last dentist visit: upcoming in 2 wks    Specialists: pain mngmt, ortho, podiatry,   ___________________________  Review of Systems   Denies dizziness, fatigue, fever/chills, CP, SOA, headache, blood in urine/stool, abd pain, leg swelling.    Objective   Vitals:    24 1544   BP: 120/62   Pulse: 73   SpO2: 100%   Weight: 127 kg (279 lb 12.8 oz)   Height: 172.7 cm (68\")     Body mass index is 42.54 kg/m².    Physical Exam  Vitals reviewed. Exam conducted with " a chaperone present.   Constitutional:       General: She is not in acute distress.     Appearance: Normal appearance. She is obese. She is not ill-appearing or toxic-appearing.   HENT:      Right Ear: External ear normal.      Left Ear: External ear normal.      Nose: No congestion or rhinorrhea.      Mouth/Throat:      Mouth: Mucous membranes are moist.      Pharynx: Oropharynx is clear.   Eyes:      Conjunctiva/sclera: Conjunctivae normal.   Cardiovascular:      Rate and Rhythm: Normal rate.      Pulses: Normal pulses.      Heart sounds: Normal heart sounds.   Pulmonary:      Effort: Pulmonary effort is normal. No respiratory distress.      Breath sounds: Normal breath sounds.   Abdominal:      General: Bowel sounds are normal.      Palpations: Abdomen is soft.      Tenderness: There is no abdominal tenderness.      Hernia: There is no hernia in the left inguinal area or right inguinal area.   Genitourinary:     Exam position: Lithotomy position.      Pubic Area: No rash or pubic lice.       Labia:         Right: No rash, tenderness, lesion or injury.         Left: No rash, tenderness, lesion or injury.       Vagina: Normal.      Comments: Hysterectomy  Lymphadenopathy:      Lower Body: No right inguinal adenopathy. No left inguinal adenopathy.   Skin:     General: Skin is warm and dry.      Capillary Refill: Capillary refill takes less than 2 seconds.   Neurological:      General: No focal deficit present.      Mental Status: She is alert and oriented to person, place, and time.      Motor: No weakness.   Psychiatric:         Behavior: Behavior normal.         Procedures     Assessment & Plan   Diagnoses and all orders for this visit:    1. Annual physical exam (Primary)    2. Pap smear for cervical cancer screening  -     IGP, Rfx Aptima HPV ASCU    3. Need for pneumococcal 20-valent conjugate vaccination  -     Pneumococcal Conjugate Vaccine 20-Valent (PCV20)    4. Encounter for screening for lung cancer  -       CT Chest Low Dose Cancer Screening WO; Future    5. Antibiotic long-term use  -     saccharomyces boulardii (Florastor) 250 MG capsule; Take 1 capsule by mouth Daily.  Dispense: 90 capsule; Refill: 1    6. Type 2 diabetes mellitus without complication, without long-term current use of insulin  -     Semaglutide,0.25 or 0.5MG/DOS, (OZEMPIC) 2 MG/3ML solution pen-injector; Inject 0.25 mg under the skin into the appropriate area as directed 1 (One) Time Per Week for 4 doses.  Dispense: 2 mL; Refill: 0    7. Need for antibiotic prophylaxis for dental procedure  -     amoxicillin (AMOXIL) 500 MG capsule; Take 4 capsules by mouth 1 (One) Time for 1 dose.  Dispense: 4 capsule; Refill: 0       Plan:     Complete labs today in office, or as soon as possible  If pap normal, do not need to repeat, per recommendations.     Return in about 6 months (around 10/12/2024) for Recheck.     Counseled on a healthy diet. Use condoms 100% of time with sex as IUD does not protect against STIs. Eat a healthy diet and exercise routinely. Avoid smoking/alcohol and drugs. Use seatbelt 100% of time.     Discussed Care Gaps, ordered referrals and encouraged vaccination updates.       - Pt agrees with plan of care and denies further questions/concerns today  - This document is intended for medical expert use only. Persons  reading this document without medical staff guidance may result in misinterpretation and unintended morbidity     Go to the ER for any possible life-threatening symptoms such as chest pain or shortness of air.      Please allow 3-5 business days for recommendations based on new results      I personally spent time with this patient, preparing for the visit, reviewing tests, obtaining and/or reviewing a separately obtained history, performing a medically appropriate examination and/or evaluation, counseling and educating the patient/family/caregiver, ordering medications,  documenting information in the medical record and  indepentently interpreting results.

## 2024-04-13 LAB
ALBUMIN SERPL-MCNC: 4.1 G/DL (ref 3.8–4.9)
ALBUMIN/GLOB SERPL: 1.3 {RATIO} (ref 1.2–2.2)
ALP SERPL-CCNC: 78 IU/L (ref 44–121)
ALT SERPL-CCNC: 25 IU/L (ref 0–32)
AST SERPL-CCNC: 31 IU/L (ref 0–40)
BASOPHILS # BLD AUTO: 0 X10E3/UL (ref 0–0.2)
BASOPHILS NFR BLD AUTO: 1 %
BILIRUB SERPL-MCNC: 0.4 MG/DL (ref 0–1.2)
BUN SERPL-MCNC: 15 MG/DL (ref 6–24)
BUN/CREAT SERPL: 15 (ref 9–23)
CALCIUM SERPL-MCNC: 9.2 MG/DL (ref 8.7–10.2)
CHLORIDE SERPL-SCNC: 102 MMOL/L (ref 96–106)
CHOLEST SERPL-MCNC: 121 MG/DL (ref 100–199)
CO2 SERPL-SCNC: 24 MMOL/L (ref 20–29)
CREAT SERPL-MCNC: 1 MG/DL (ref 0.57–1)
EGFRCR SERPLBLD CKD-EPI 2021: 65 ML/MIN/1.73
EOSINOPHIL # BLD AUTO: 0.1 X10E3/UL (ref 0–0.4)
EOSINOPHIL NFR BLD AUTO: 3 %
ERYTHROCYTE [DISTWIDTH] IN BLOOD BY AUTOMATED COUNT: 13.1 % (ref 11.7–15.4)
GLOBULIN SER CALC-MCNC: 3.2 G/DL (ref 1.5–4.5)
GLUCOSE SERPL-MCNC: 116 MG/DL (ref 70–99)
HBA1C MFR BLD: 7.2 % (ref 4.8–5.6)
HCT VFR BLD AUTO: 39.4 % (ref 34–46.6)
HDLC SERPL-MCNC: 51 MG/DL
HGB BLD-MCNC: 13 G/DL (ref 11.1–15.9)
IMM GRANULOCYTES # BLD AUTO: 0 X10E3/UL (ref 0–0.1)
IMM GRANULOCYTES NFR BLD AUTO: 0 %
LDLC SERPL CALC-MCNC: 52 MG/DL (ref 0–99)
LYMPHOCYTES # BLD AUTO: 1.9 X10E3/UL (ref 0.7–3.1)
LYMPHOCYTES NFR BLD AUTO: 38 %
MCH RBC QN AUTO: 30.4 PG (ref 26.6–33)
MCHC RBC AUTO-ENTMCNC: 33 G/DL (ref 31.5–35.7)
MCV RBC AUTO: 92 FL (ref 79–97)
MONOCYTES # BLD AUTO: 0.3 X10E3/UL (ref 0.1–0.9)
MONOCYTES NFR BLD AUTO: 6 %
NEUTROPHILS # BLD AUTO: 2.6 X10E3/UL (ref 1.4–7)
NEUTROPHILS NFR BLD AUTO: 52 %
PLATELET # BLD AUTO: 107 X10E3/UL (ref 150–450)
POTASSIUM SERPL-SCNC: 4.3 MMOL/L (ref 3.5–5.2)
PROT SERPL-MCNC: 7.3 G/DL (ref 6–8.5)
RBC # BLD AUTO: 4.27 X10E6/UL (ref 3.77–5.28)
SODIUM SERPL-SCNC: 141 MMOL/L (ref 134–144)
T4 FREE SERPL-MCNC: 0.96 NG/DL (ref 0.82–1.77)
TRIGL SERPL-MCNC: 96 MG/DL (ref 0–149)
TSH SERPL DL<=0.005 MIU/L-ACNC: 6.14 UIU/ML (ref 0.45–4.5)
VLDLC SERPL CALC-MCNC: 18 MG/DL (ref 5–40)
WBC # BLD AUTO: 4.9 X10E3/UL (ref 3.4–10.8)

## 2024-04-15 DIAGNOSIS — E03.9 HYPOTHYROIDISM (ACQUIRED): ICD-10-CM

## 2024-04-15 PROBLEM — K29.70 GASTRITIS: Status: RESOLVED | Noted: 2021-02-13 | Resolved: 2024-04-15

## 2024-04-15 PROBLEM — Z86.16 HISTORY OF COVID-19: Status: RESOLVED | Noted: 2021-01-24 | Resolved: 2024-04-15

## 2024-04-15 RX ORDER — LEVOTHYROXINE SODIUM 88 UG/1
88 TABLET ORAL DAILY
Qty: 90 TABLET | Refills: 1 | Status: SHIPPED | OUTPATIENT
Start: 2024-04-15

## 2024-04-17 LAB
CONV .: NORMAL
CYTOLOGIST CVX/VAG CYTO: NORMAL
CYTOLOGY CVX/VAG DOC CYTO: NORMAL
CYTOLOGY CVX/VAG DOC THIN PREP: NORMAL
DX ICD CODE: NORMAL
Lab: NORMAL
Lab: NORMAL
OTHER STN SPEC: NORMAL
RECOM F/U CVX/VAG CYTO: NORMAL
STAT OF ADQ CVX/VAG CYTO-IMP: NORMAL

## 2024-04-19 DIAGNOSIS — D69.6 THROMBOCYTOPENIA: Primary | ICD-10-CM

## 2024-04-19 DIAGNOSIS — Z12.31 ENCOUNTER FOR SCREENING MAMMOGRAM FOR MALIGNANT NEOPLASM OF BREAST: ICD-10-CM

## 2024-04-19 DIAGNOSIS — Z12.11 COLON CANCER SCREENING: ICD-10-CM

## 2024-04-19 DIAGNOSIS — Z91.89 AT RISK FOR OSTEOPOROSIS: ICD-10-CM

## 2024-04-24 DIAGNOSIS — M54.50 LOW BACK PAIN, UNSPECIFIED BACK PAIN LATERALITY, UNSPECIFIED CHRONICITY, UNSPECIFIED WHETHER SCIATICA PRESENT: ICD-10-CM

## 2024-04-24 RX ORDER — IBUPROFEN 600 MG/1
600 TABLET ORAL EVERY 6 HOURS PRN
Qty: 90 TABLET | Refills: 0 | Status: SHIPPED | OUTPATIENT
Start: 2024-04-24

## 2024-05-03 DIAGNOSIS — D69.6 THROMBOCYTOPENIA: Primary | ICD-10-CM

## 2024-05-08 ENCOUNTER — TELEPHONE (OUTPATIENT)
Dept: FAMILY MEDICINE CLINIC | Facility: CLINIC | Age: 59
End: 2024-05-08
Payer: COMMERCIAL

## 2024-05-16 ENCOUNTER — TELEPHONE (OUTPATIENT)
Dept: FAMILY MEDICINE CLINIC | Facility: CLINIC | Age: 59
End: 2024-05-16
Payer: COMMERCIAL

## 2024-05-16 DIAGNOSIS — F32.A ANXIETY AND DEPRESSION: ICD-10-CM

## 2024-05-16 DIAGNOSIS — J30.89 ENVIRONMENTAL AND SEASONAL ALLERGIES: ICD-10-CM

## 2024-05-16 DIAGNOSIS — F41.9 ANXIETY AND DEPRESSION: ICD-10-CM

## 2024-05-16 RX ORDER — FUROSEMIDE 40 MG/1
40 TABLET ORAL 2 TIMES DAILY
Qty: 180 TABLET | Refills: 1 | Status: SHIPPED | OUTPATIENT
Start: 2024-05-16

## 2024-05-16 RX ORDER — FUROSEMIDE 40 MG/1
40 TABLET ORAL 2 TIMES DAILY
Qty: 180 TABLET | Refills: 1 | Status: SHIPPED | OUTPATIENT
Start: 2024-05-16 | End: 2024-05-16 | Stop reason: SDUPTHER

## 2024-05-16 RX ORDER — LEVOCETIRIZINE DIHYDROCHLORIDE 5 MG/1
5 TABLET, FILM COATED ORAL EVERY EVENING
Qty: 90 TABLET | Refills: 1 | Status: SHIPPED | OUTPATIENT
Start: 2024-05-16 | End: 2024-05-16 | Stop reason: SDUPTHER

## 2024-05-16 RX ORDER — LEVOCETIRIZINE DIHYDROCHLORIDE 5 MG/1
5 TABLET, FILM COATED ORAL EVERY EVENING
Qty: 90 TABLET | Refills: 1 | Status: SHIPPED | OUTPATIENT
Start: 2024-05-16

## 2024-05-16 RX ORDER — BUPROPION HYDROCHLORIDE 150 MG/1
150 TABLET ORAL DAILY
Qty: 90 TABLET | Refills: 1 | Status: SHIPPED | OUTPATIENT
Start: 2024-05-16

## 2024-05-16 RX ORDER — BUPROPION HYDROCHLORIDE 150 MG/1
150 TABLET ORAL DAILY
Qty: 90 TABLET | Refills: 1 | Status: SHIPPED | OUTPATIENT
Start: 2024-05-16 | End: 2024-05-16 | Stop reason: SDUPTHER

## 2024-05-16 NOTE — TELEPHONE ENCOUNTER
Pt needed refills on furosemide, levoceterizine, bupropion, and the next dose up of Ozempic. I have sent everything for refill except the Ozempic since it's the next dose up but also I do not even see Ozempic on medication list. Please advise. She wants it sent to [facility] on [address]. Thanks.

## 2024-05-17 DIAGNOSIS — E11.69 TYPE 2 DIABETES MELLITUS WITH OTHER SPECIFIED COMPLICATION, WITHOUT LONG-TERM CURRENT USE OF INSULIN: Primary | ICD-10-CM

## 2024-05-31 ENCOUNTER — HOSPITAL ENCOUNTER (OUTPATIENT)
Dept: MAMMOGRAPHY | Facility: HOSPITAL | Age: 59
Discharge: HOME | End: 2024-05-31
Payer: COMMERCIAL

## 2024-05-31 ENCOUNTER — HOSPITAL ENCOUNTER (OUTPATIENT)
Dept: BONE DENSITY | Facility: HOSPITAL | Age: 59
Discharge: HOME | End: 2024-05-31
Payer: COMMERCIAL

## 2024-05-31 DIAGNOSIS — Z78.0 POSTMENOPAUSAL: Primary | ICD-10-CM

## 2024-05-31 DIAGNOSIS — Z12.31 ENCOUNTER FOR SCREENING MAMMOGRAM FOR MALIGNANT NEOPLASM OF BREAST: ICD-10-CM

## 2024-05-31 DIAGNOSIS — Z13.820 SCREENING FOR OSTEOPOROSIS: ICD-10-CM

## 2024-05-31 DIAGNOSIS — Z91.89 AT RISK FOR OSTEOPOROSIS: ICD-10-CM

## 2024-05-31 PROCEDURE — 77063 BREAST TOMOSYNTHESIS BI: CPT

## 2024-05-31 PROCEDURE — 77067 SCR MAMMO BI INCL CAD: CPT

## 2024-05-31 PROCEDURE — 77080 DXA BONE DENSITY AXIAL: CPT

## 2024-06-03 ENCOUNTER — TELEPHONE (OUTPATIENT)
Dept: GASTROENTEROLOGY | Facility: CLINIC | Age: 59
End: 2024-06-03
Payer: COMMERCIAL

## 2024-06-03 DIAGNOSIS — M51.36 LUMBAR DEGENERATIVE DISC DISEASE: Primary | ICD-10-CM

## 2024-06-03 DIAGNOSIS — N63.0 PERSISTENT NODULARITY OF BREAST: Primary | ICD-10-CM

## 2024-06-03 NOTE — TELEPHONE ENCOUNTER
Called pt and advised she is not due for another colonoscopy until 2/2031.  She states her PCP thought she was due.  Advised pt to contact PCP to see if she thought there was a reason to have a colonoscopy sooner.  Pt verbalized understanding.        Medical Necessity Statement: Based on my medical judgement, Mohs surgery is the most appropriate treatment for this cancer compared to other treatments.

## 2024-06-03 NOTE — TELEPHONE ENCOUNTER
Hub staff attempted to follow warm transfer process and was unsuccessful     Caller: Stefani Bhandari    Relationship to patient: Self    Best call back number: [phone]    Patient is needing: PATIENT NEEDS TO SCHEDULE HER COLONOSCOPY. PLEASE CALL AND SCHEDULE

## 2024-06-05 ENCOUNTER — TELEPHONE (OUTPATIENT)
Dept: FAMILY MEDICINE CLINIC | Facility: CLINIC | Age: 59
End: 2024-06-05
Payer: COMMERCIAL

## 2024-06-05 DIAGNOSIS — N63.0 PERSISTENT NODULARITY OF BREAST: Primary | ICD-10-CM

## 2024-06-05 NOTE — TELEPHONE ENCOUNTER
LALITHA Khan:    6/4 - please put in new order for us breast right limited - we do not do complete breast ultrasounds. Thank you

## 2024-06-06 DIAGNOSIS — M54.50 LOW BACK PAIN, UNSPECIFIED BACK PAIN LATERALITY, UNSPECIFIED CHRONICITY, UNSPECIFIED WHETHER SCIATICA PRESENT: ICD-10-CM

## 2024-06-06 RX ORDER — CYCLOBENZAPRINE HCL 10 MG
10 TABLET ORAL 3 TIMES DAILY PRN
Qty: 90 TABLET | Refills: 1 | Status: SHIPPED | OUTPATIENT
Start: 2024-06-06

## 2024-06-26 ENCOUNTER — HOSPITAL ENCOUNTER (OUTPATIENT)
Dept: ULTRASOUND IMAGING | Facility: HOSPITAL | Age: 59
Discharge: HOME | End: 2024-06-26
Payer: COMMERCIAL

## 2024-06-26 ENCOUNTER — HOSPITAL ENCOUNTER (OUTPATIENT)
Dept: MAMMOGRAPHY | Facility: HOSPITAL | Age: 59
Discharge: HOME | End: 2024-06-26
Payer: COMMERCIAL

## 2024-06-26 DIAGNOSIS — N63.0 PERSISTENT NODULARITY OF BREAST: ICD-10-CM

## 2024-06-26 PROCEDURE — 77065 DX MAMMO INCL CAD UNI: CPT

## 2024-06-26 PROCEDURE — G0279 TOMOSYNTHESIS, MAMMO: HCPCS

## 2024-06-26 PROCEDURE — 76642 ULTRASOUND BREAST LIMITED: CPT

## 2024-07-09 DIAGNOSIS — F32.A ANXIETY AND DEPRESSION: ICD-10-CM

## 2024-07-09 DIAGNOSIS — F41.9 ANXIETY AND DEPRESSION: ICD-10-CM

## 2024-07-09 RX ORDER — VENLAFAXINE 75 MG/1
150 TABLET ORAL 2 TIMES DAILY
Qty: 180 TABLET | Refills: 1 | Status: SHIPPED | OUTPATIENT
Start: 2024-07-09

## 2024-07-09 RX ORDER — EZETIMIBE 10 MG/1
10 TABLET ORAL DAILY
Qty: 90 TABLET | Refills: 0 | Status: SHIPPED | OUTPATIENT
Start: 2024-07-09

## 2024-07-11 ENCOUNTER — HOSPITAL ENCOUNTER (OUTPATIENT)
Dept: CT IMAGING | Facility: HOSPITAL | Age: 59
Discharge: HOME | End: 2024-07-11
Payer: COMMERCIAL

## 2024-07-11 DIAGNOSIS — Z12.2 ENCOUNTER FOR SCREENING FOR LUNG CANCER: ICD-10-CM

## 2024-07-11 PROCEDURE — 71271 CT THORAX LUNG CANCER SCR C-: CPT

## 2024-07-18 DIAGNOSIS — E11.69 TYPE 2 DIABETES MELLITUS WITH OTHER SPECIFIED COMPLICATION, WITHOUT LONG-TERM CURRENT USE OF INSULIN: Primary | ICD-10-CM

## 2024-07-29 DIAGNOSIS — M54.50 LOW BACK PAIN, UNSPECIFIED BACK PAIN LATERALITY, UNSPECIFIED CHRONICITY, UNSPECIFIED WHETHER SCIATICA PRESENT: ICD-10-CM

## 2024-07-29 RX ORDER — IBUPROFEN 600 MG/1
600 TABLET ORAL EVERY 6 HOURS PRN
Qty: 90 TABLET | Refills: 0 | Status: SHIPPED | OUTPATIENT
Start: 2024-07-29

## 2024-08-27 DIAGNOSIS — E11.69 TYPE 2 DIABETES MELLITUS WITH OTHER SPECIFIED COMPLICATION, WITHOUT LONG-TERM CURRENT USE OF INSULIN: ICD-10-CM

## 2024-09-07 DIAGNOSIS — F41.9 ANXIETY AND DEPRESSION: ICD-10-CM

## 2024-09-07 DIAGNOSIS — F32.A ANXIETY AND DEPRESSION: ICD-10-CM

## 2024-09-09 RX ORDER — BUPROPION HYDROCHLORIDE 150 MG/1
150 TABLET ORAL DAILY
Qty: 90 TABLET | Refills: 0 | Status: SHIPPED | OUTPATIENT
Start: 2024-09-09

## 2024-09-14 DIAGNOSIS — M54.50 LOW BACK PAIN, UNSPECIFIED BACK PAIN LATERALITY, UNSPECIFIED CHRONICITY, UNSPECIFIED WHETHER SCIATICA PRESENT: ICD-10-CM

## 2024-09-16 RX ORDER — IBUPROFEN 600 MG/1
600 TABLET, FILM COATED ORAL EVERY 6 HOURS PRN
Qty: 90 TABLET | Refills: 0 | OUTPATIENT
Start: 2024-09-16

## 2024-09-23 DIAGNOSIS — M54.50 LOW BACK PAIN, UNSPECIFIED BACK PAIN LATERALITY, UNSPECIFIED CHRONICITY, UNSPECIFIED WHETHER SCIATICA PRESENT: ICD-10-CM

## 2024-09-23 RX ORDER — IBUPROFEN 600 MG/1
600 TABLET, FILM COATED ORAL EVERY 6 HOURS PRN
Qty: 90 TABLET | Refills: 0 | Status: SHIPPED | OUTPATIENT
Start: 2024-09-23

## 2024-10-01 DIAGNOSIS — Z79.2 ANTIBIOTIC LONG-TERM USE: ICD-10-CM

## 2024-10-01 DIAGNOSIS — E11.69 TYPE 2 DIABETES MELLITUS WITH OTHER SPECIFIED COMPLICATION, WITHOUT LONG-TERM CURRENT USE OF INSULIN: ICD-10-CM

## 2024-10-01 RX ORDER — SACCHAROMYCES BOULARDII 250 MG
250 CAPSULE ORAL DAILY
Qty: 90 CAPSULE | Refills: 0 | Status: SHIPPED | OUTPATIENT
Start: 2024-10-01

## 2024-10-04 RX ORDER — LOSARTAN POTASSIUM 100 MG/1
100 TABLET ORAL
Qty: 90 TABLET | Refills: 0 | Status: SHIPPED | OUTPATIENT
Start: 2024-10-04

## 2024-10-07 DIAGNOSIS — M54.50 LOW BACK PAIN, UNSPECIFIED BACK PAIN LATERALITY, UNSPECIFIED CHRONICITY, UNSPECIFIED WHETHER SCIATICA PRESENT: ICD-10-CM

## 2024-10-07 RX ORDER — CYCLOBENZAPRINE HCL 10 MG
10 TABLET ORAL 3 TIMES DAILY PRN
Qty: 90 TABLET | Refills: 1 | Status: SHIPPED | OUTPATIENT
Start: 2024-10-07

## 2024-10-16 ENCOUNTER — OFFICE VISIT (OUTPATIENT)
Dept: FAMILY MEDICINE CLINIC | Facility: CLINIC | Age: 59
End: 2024-10-16
Payer: COMMERCIAL

## 2024-10-16 VITALS
SYSTOLIC BLOOD PRESSURE: 122 MMHG | HEART RATE: 79 BPM | HEIGHT: 68 IN | OXYGEN SATURATION: 99 % | WEIGHT: 286.8 LBS | DIASTOLIC BLOOD PRESSURE: 72 MMHG | BODY MASS INDEX: 43.47 KG/M2

## 2024-10-16 DIAGNOSIS — E03.9 HYPOTHYROIDISM (ACQUIRED): ICD-10-CM

## 2024-10-16 DIAGNOSIS — Z79.2 ANTIBIOTIC LONG-TERM USE: ICD-10-CM

## 2024-10-16 DIAGNOSIS — E11.9 TYPE 2 DIABETES MELLITUS WITHOUT COMPLICATION, WITHOUT LONG-TERM CURRENT USE OF INSULIN: Primary | ICD-10-CM

## 2024-10-16 DIAGNOSIS — I10 ESSENTIAL HYPERTENSION: ICD-10-CM

## 2024-10-16 DIAGNOSIS — Z23 NEED FOR INFLUENZA VACCINATION: ICD-10-CM

## 2024-10-16 DIAGNOSIS — E78.2 MIXED HYPERLIPIDEMIA: ICD-10-CM

## 2024-10-16 DIAGNOSIS — D69.6 THROMBOCYTOPENIA: ICD-10-CM

## 2024-10-16 PROCEDURE — 90656 IIV3 VACC NO PRSV 0.5 ML IM: CPT

## 2024-10-16 PROCEDURE — 99214 OFFICE O/P EST MOD 30 MIN: CPT

## 2024-10-16 PROCEDURE — 90471 IMMUNIZATION ADMIN: CPT

## 2024-10-16 RX ORDER — SIMVASTATIN 80 MG
80 TABLET ORAL NIGHTLY
Qty: 90 TABLET | Refills: 1 | Status: SHIPPED | OUTPATIENT
Start: 2024-10-16

## 2024-10-16 RX ORDER — SACCHAROMYCES BOULARDII 250 MG
250 CAPSULE ORAL DAILY
Qty: 90 CAPSULE | Refills: 1 | Status: SHIPPED | OUTPATIENT
Start: 2024-10-16

## 2024-10-16 NOTE — PATIENT INSTRUCTIONS

## 2024-10-16 NOTE — PROGRESS NOTES
Subjective   Stefani Bhandari is a 58 y.o. female.     Chief Complaint   Patient presents with    Follow-up     History of Present Illness  The patient presents for evaluation of multiple medical concerns.    She is currently on a regimen of bupropion, Flexeril, Zetia, furosemide, ibuprofen as needed, Xyzal, levothyroxine, losartan, metformin, simvastatin, Effexor, and Ozempic 1 mg once a week. She has discontinued Percocet for the past 3 to 4 months due to insurance issues with her MRI.    She reports intermittent back pain, which she manages with bed exercises. She also experiences occasional sharp pain when standing up from the toilet or getting out of bed, but it resolves by the next morning. She takes 2 ibuprofen daily.    She requests a refill of simvastatin and probiotics.    She does not monitor her blood sugar at home and reports no symptoms of uncontrolled diabetes such as frequent urination, excessive thirst, hunger, neuropathy, numbness, or tingling in her hands or feet. She notes that Ozempic has been effective in curbing her appetite. She reports no recent changes in her vision.     The following portions of the patient's history were reviewed and updated as appropriate: allergies, current medications, past family history, past medical history, past social history, past surgical history and problem list.    Review of Systems   Denies new-onset dizziness, fatigue, fever/chills, CP, SOA, headache, blood in urine/stool, abd pain, leg swelling.    Results       Objective     Vitals:    10/16/24 1102   BP: 122/72   Pulse: 79   SpO2: 99%      Body mass index is 43.61 kg/m².    Physical Exam  Vitals reviewed.   Constitutional:       General: She is not in acute distress.     Appearance: Normal appearance. She is obese. She is not ill-appearing or toxic-appearing.   HENT:      Right Ear: External ear normal.      Left Ear: External ear normal.      Nose: No congestion or rhinorrhea.      Mouth/Throat:       Mouth: Mucous membranes are moist.      Pharynx: Oropharynx is clear.   Eyes:      Conjunctiva/sclera: Conjunctivae normal.   Cardiovascular:      Rate and Rhythm: Normal rate.      Heart sounds: Normal heart sounds.   Pulmonary:      Effort: Pulmonary effort is normal. No respiratory distress.      Breath sounds: Normal breath sounds.   Skin:     General: Skin is warm and dry.      Capillary Refill: Capillary refill takes less than 2 seconds.   Neurological:      General: No focal deficit present.      Mental Status: She is alert and oriented to person, place, and time.      Motor: No weakness.   Psychiatric:         Behavior: Behavior normal.       Assessment & Plan     Diagnoses and all orders for this visit:    1. Type 2 diabetes mellitus without complication, without long-term current use of insulin (Primary)  -     Microalbumin / Creatinine Urine Ratio - Urine, Clean Catch  -     Hemoglobin A1c    2. Essential hypertension  -     Comprehensive metabolic panel    3. Hypothyroidism (acquired)  -     TSH Rfx On Abnormal To Free T4    4. Mixed hyperlipidemia  -     Lipid panel  -     simvastatin (ZOCOR) 80 MG tablet; Take 1 tablet by mouth Every Night.  Dispense: 90 tablet; Refill: 1    5. Thrombocytopenia  -     CBC w AUTO Differential    6. Antibiotic long-term use  -     saccharomyces boulardii (Florastor) 250 MG capsule; Take 1 capsule by mouth Daily.  Dispense: 90 capsule; Refill: 1    7. Need for influenza vaccination  -     Fluzone >6mos ([id number])      Assessment & Plan  1. Diabetes Mellitus.  Comprehensive blood work will be conducted today to assess diabetes, including hemoglobin A1c. She is currently on Metformin and Ozempic 1 mg once a week. If her A1c levels are suboptimal, supplies for home blood glucose monitoring will be provided. If her A1c levels are satisfactory, the dose of Ozempic may be increased to 2 mg to enhance its therapeutic effect. She is advised to continue her current medication  regimen and focus on dietary modifications to promote healthier eating habits.    2. Muscle Pain.  She reports intermittent muscle pain in the lower back area that has been occurring for the past couple of months. The pain is described as a sharp, burning sensation that can last most of the day but is not present every day. It is suspected to be muscle-related. She is advised to take anti-inflammatories, use a heating pad, and apply topical creams as needed. If the pain persists or worsens, further imaging may be considered.    3. Medication Management.  She is currently taking multiple medications including bupropion, Flexeril, Zetia, furosemide, ibuprofen as needed, Xyzal, levothyroxine, losartan, metformin, simvastatin, Effexor, and Ozempic. A three-month supply of simvastatin and probiotics will be provided. She inquired about getting a three-month supply of ibuprofen, which should be feasible. She is advised to contact the pharmacy for any additional refill requests.    4. Health Maintenance.  The influenza vaccine will be administered today.    Follow-up  Patient is scheduled for a follow-up visit in 6 months.     Discussed Care Gaps, ordered referrals and encouraged vaccination updates.       - Pt agrees with plan of care and denies further questions/concerns today  - This document is intended for medical expert use only. Persons  reading this document without medical staff guidance may result in misinterpretation and unintended morbidity     Go to the ER for any possible life-threatening symptoms such as chest pain or shortness of air.      Please allow 3-5 business days for recommendations based on new results      I personally spent time with this patient, preparing for the visit, reviewing tests, obtaining and/or reviewing a separately obtained history, performing a medically appropriate examination and/or evaluation, counseling and educating the patient/family/caregiver, ordering medications,   documenting information in the medical record and indepentently interpreting results.       Patient or patient representative verbalized consent for the use of Ambient Listening during the visit with  NITESH Nance for chart documentation. 10/16/2024  11:22 EDT     No

## 2024-10-17 LAB
ALBUMIN SERPL-MCNC: 4.2 G/DL (ref 3.8–4.9)
ALBUMIN/CREAT UR: <28 MG/G CREAT (ref 0–29)
ALP SERPL-CCNC: 90 IU/L (ref 44–121)
ALT SERPL-CCNC: 25 IU/L (ref 0–32)
AST SERPL-CCNC: 34 IU/L (ref 0–40)
BASOPHILS # BLD AUTO: 0 X10E3/UL (ref 0–0.2)
BASOPHILS NFR BLD AUTO: 1 %
BILIRUB SERPL-MCNC: 0.5 MG/DL (ref 0–1.2)
BUN SERPL-MCNC: 13 MG/DL (ref 6–24)
BUN/CREAT SERPL: 14 (ref 9–23)
CALCIUM SERPL-MCNC: 9.5 MG/DL (ref 8.7–10.2)
CHLORIDE SERPL-SCNC: 99 MMOL/L (ref 96–106)
CHOLEST SERPL-MCNC: 117 MG/DL (ref 100–199)
CO2 SERPL-SCNC: 28 MMOL/L (ref 20–29)
CREAT SERPL-MCNC: 0.93 MG/DL (ref 0.57–1)
CREAT UR-MCNC: 10.8 MG/DL
EGFRCR SERPLBLD CKD-EPI 2021: 71 ML/MIN/1.73
EOSINOPHIL # BLD AUTO: 0.1 X10E3/UL (ref 0–0.4)
EOSINOPHIL NFR BLD AUTO: 3 %
ERYTHROCYTE [DISTWIDTH] IN BLOOD BY AUTOMATED COUNT: 13 % (ref 11.7–15.4)
GLOBULIN SER CALC-MCNC: 3.5 G/DL (ref 1.5–4.5)
GLUCOSE SERPL-MCNC: 106 MG/DL (ref 70–99)
HBA1C MFR BLD: 6.9 % (ref 4.8–5.6)
HCT VFR BLD AUTO: 38.9 % (ref 34–46.6)
HDLC SERPL-MCNC: 52 MG/DL
HGB BLD-MCNC: 13 G/DL (ref 11.1–15.9)
IMM GRANULOCYTES # BLD AUTO: 0 X10E3/UL (ref 0–0.1)
IMM GRANULOCYTES NFR BLD AUTO: 1 %
LDLC SERPL CALC-MCNC: 48 MG/DL (ref 0–99)
LYMPHOCYTES # BLD AUTO: 1.5 X10E3/UL (ref 0.7–3.1)
LYMPHOCYTES NFR BLD AUTO: 30 %
MCH RBC QN AUTO: 32 PG (ref 26.6–33)
MCHC RBC AUTO-ENTMCNC: 33.4 G/DL (ref 31.5–35.7)
MCV RBC AUTO: 96 FL (ref 79–97)
MICROALBUMIN UR-MCNC: <3 UG/ML
MONOCYTES # BLD AUTO: 0.3 X10E3/UL (ref 0.1–0.9)
MONOCYTES NFR BLD AUTO: 6 %
NEUTROPHILS # BLD AUTO: 3.1 X10E3/UL (ref 1.4–7)
NEUTROPHILS NFR BLD AUTO: 59 %
PLATELET # BLD AUTO: 136 X10E3/UL (ref 150–450)
POTASSIUM SERPL-SCNC: 3.7 MMOL/L (ref 3.5–5.2)
PROT SERPL-MCNC: 7.7 G/DL (ref 6–8.5)
RBC # BLD AUTO: 4.06 X10E6/UL (ref 3.77–5.28)
SODIUM SERPL-SCNC: 143 MMOL/L (ref 134–144)
TRIGL SERPL-MCNC: 89 MG/DL (ref 0–149)
TSH SERPL DL<=0.005 MIU/L-ACNC: 1.97 UIU/ML (ref 0.45–4.5)
VLDLC SERPL CALC-MCNC: 17 MG/DL (ref 5–40)
WBC # BLD AUTO: 5.1 X10E3/UL (ref 3.4–10.8)

## 2024-10-24 DIAGNOSIS — F41.9 ANXIETY AND DEPRESSION: ICD-10-CM

## 2024-10-24 DIAGNOSIS — F32.A ANXIETY AND DEPRESSION: ICD-10-CM

## 2024-10-24 RX ORDER — VENLAFAXINE 75 MG/1
150 TABLET ORAL 2 TIMES DAILY
Qty: 180 TABLET | Refills: 1 | Status: SHIPPED | OUTPATIENT
Start: 2024-10-24

## 2024-11-12 DIAGNOSIS — J30.89 ENVIRONMENTAL AND SEASONAL ALLERGIES: ICD-10-CM

## 2024-11-12 RX ORDER — FUROSEMIDE 40 MG/1
40 TABLET ORAL 2 TIMES DAILY
Qty: 180 TABLET | Refills: 1 | Status: SHIPPED | OUTPATIENT
Start: 2024-11-12

## 2024-11-12 RX ORDER — LEVOCETIRIZINE DIHYDROCHLORIDE 5 MG/1
5 TABLET, FILM COATED ORAL EVERY EVENING
Qty: 90 TABLET | Refills: 1 | Status: SHIPPED | OUTPATIENT
Start: 2024-11-12

## 2024-11-13 DIAGNOSIS — M54.50 LOW BACK PAIN, UNSPECIFIED BACK PAIN LATERALITY, UNSPECIFIED CHRONICITY, UNSPECIFIED WHETHER SCIATICA PRESENT: ICD-10-CM

## 2024-11-13 DIAGNOSIS — E11.69 TYPE 2 DIABETES MELLITUS WITH OTHER SPECIFIED COMPLICATION, WITHOUT LONG-TERM CURRENT USE OF INSULIN: ICD-10-CM

## 2024-11-13 DIAGNOSIS — Z79.2 ANTIBIOTIC LONG-TERM USE: ICD-10-CM

## 2024-11-13 RX ORDER — IBUPROFEN 600 MG/1
600 TABLET, FILM COATED ORAL EVERY 6 HOURS PRN
Qty: 90 TABLET | Refills: 1 | Status: SHIPPED | OUTPATIENT
Start: 2024-11-13 | End: 2024-11-13 | Stop reason: SDUPTHER

## 2024-11-13 RX ORDER — IBUPROFEN 600 MG/1
600 TABLET, FILM COATED ORAL EVERY 6 HOURS PRN
Qty: 270 TABLET | Refills: 0 | Status: SHIPPED | OUTPATIENT
Start: 2024-11-13

## 2024-11-13 RX ORDER — SACCHAROMYCES BOULARDII 250 MG
250 CAPSULE ORAL DAILY
Qty: 90 CAPSULE | Refills: 1 | Status: SHIPPED | OUTPATIENT
Start: 2024-11-13

## 2024-12-03 DIAGNOSIS — M54.50 LOW BACK PAIN, UNSPECIFIED BACK PAIN LATERALITY, UNSPECIFIED CHRONICITY, UNSPECIFIED WHETHER SCIATICA PRESENT: ICD-10-CM

## 2024-12-03 RX ORDER — CYCLOBENZAPRINE HCL 10 MG
10 TABLET ORAL 3 TIMES DAILY PRN
Qty: 90 TABLET | Refills: 1 | Status: SHIPPED | OUTPATIENT
Start: 2024-12-03

## 2024-12-06 RX ORDER — LOSARTAN POTASSIUM 100 MG/1
100 TABLET ORAL
Qty: 90 TABLET | Refills: 0 | Status: SHIPPED | OUTPATIENT
Start: 2024-12-06

## 2024-12-13 DIAGNOSIS — E11.69 TYPE 2 DIABETES MELLITUS WITH OTHER SPECIFIED COMPLICATION, WITHOUT LONG-TERM CURRENT USE OF INSULIN: Primary | ICD-10-CM

## 2024-12-13 RX ORDER — SEMAGLUTIDE 2.68 MG/ML
2 INJECTION, SOLUTION SUBCUTANEOUS WEEKLY
Qty: 3 ML | Refills: 2 | Status: SHIPPED | OUTPATIENT
Start: 2024-12-13 | End: 2025-03-01

## 2024-12-13 RX ORDER — EZETIMIBE 10 MG/1
10 TABLET ORAL DAILY
Qty: 90 TABLET | Refills: 1 | Status: SHIPPED | OUTPATIENT
Start: 2024-12-13

## 2025-01-27 DIAGNOSIS — R92.8 ABNORMAL MAMMOGRAM OF RIGHT BREAST: Primary | ICD-10-CM

## 2025-02-21 ENCOUNTER — HOSPITAL ENCOUNTER (OUTPATIENT)
Dept: ULTRASOUND IMAGING | Facility: HOSPITAL | Age: 60
Discharge: HOME | End: 2025-02-21
Payer: COMMERCIAL

## 2025-02-21 DIAGNOSIS — E03.9 HYPOTHYROIDISM (ACQUIRED): ICD-10-CM

## 2025-02-21 RX ORDER — LEVOTHYROXINE SODIUM 88 UG/1
88 TABLET ORAL DAILY
Qty: 90 TABLET | Refills: 1 | Status: SHIPPED | OUTPATIENT
Start: 2025-02-21

## 2025-03-13 DIAGNOSIS — E11.69 TYPE 2 DIABETES MELLITUS WITH OTHER SPECIFIED COMPLICATION, WITHOUT LONG-TERM CURRENT USE OF INSULIN: ICD-10-CM

## 2025-03-13 DIAGNOSIS — M54.50 LOW BACK PAIN, UNSPECIFIED BACK PAIN LATERALITY, UNSPECIFIED CHRONICITY, UNSPECIFIED WHETHER SCIATICA PRESENT: ICD-10-CM

## 2025-03-13 DIAGNOSIS — F41.9 ANXIETY AND DEPRESSION: ICD-10-CM

## 2025-03-13 DIAGNOSIS — F32.A ANXIETY AND DEPRESSION: ICD-10-CM

## 2025-03-13 RX ORDER — SEMAGLUTIDE 2.68 MG/ML
INJECTION, SOLUTION SUBCUTANEOUS
Qty: 3 ML | Refills: 2 | OUTPATIENT
Start: 2025-03-13

## 2025-03-13 RX ORDER — BUPROPION HYDROCHLORIDE 150 MG/1
150 TABLET ORAL DAILY
Qty: 90 TABLET | Refills: 0 | Status: SHIPPED | OUTPATIENT
Start: 2025-03-13

## 2025-03-13 RX ORDER — CYCLOBENZAPRINE HCL 10 MG
10 TABLET ORAL 3 TIMES DAILY PRN
Qty: 90 TABLET | Refills: 1 | Status: SHIPPED | OUTPATIENT
Start: 2025-03-13

## 2025-03-13 RX ORDER — IBUPROFEN 600 MG/1
600 TABLET, FILM COATED ORAL EVERY 6 HOURS PRN
Qty: 270 TABLET | Refills: 0 | Status: SHIPPED | OUTPATIENT
Start: 2025-03-13

## 2025-03-18 DIAGNOSIS — E78.2 MIXED HYPERLIPIDEMIA: ICD-10-CM

## 2025-03-18 RX ORDER — SIMVASTATIN 80 MG
80 TABLET ORAL NIGHTLY
Qty: 90 TABLET | Refills: 1 | Status: SHIPPED | OUTPATIENT
Start: 2025-03-18

## 2025-03-26 DIAGNOSIS — E11.69 TYPE 2 DIABETES MELLITUS WITH OTHER SPECIFIED COMPLICATION, WITHOUT LONG-TERM CURRENT USE OF INSULIN: ICD-10-CM

## 2025-03-27 RX ORDER — SEMAGLUTIDE 2.68 MG/ML
2 INJECTION, SOLUTION SUBCUTANEOUS WEEKLY
Qty: 3 ML | Refills: 2 | Status: SHIPPED | OUTPATIENT
Start: 2025-03-27 | End: 2025-06-13

## 2025-04-02 RX ORDER — LOSARTAN POTASSIUM 100 MG/1
100 TABLET ORAL
Qty: 90 TABLET | Refills: 0 | Status: SHIPPED | OUTPATIENT
Start: 2025-04-02

## 2025-04-16 ENCOUNTER — OFFICE VISIT (OUTPATIENT)
Dept: FAMILY MEDICINE CLINIC | Facility: CLINIC | Age: 60
End: 2025-04-16
Payer: COMMERCIAL

## 2025-04-16 VITALS
HEIGHT: 68 IN | BODY MASS INDEX: 44.1 KG/M2 | WEIGHT: 291 LBS | SYSTOLIC BLOOD PRESSURE: 126 MMHG | OXYGEN SATURATION: 98 % | DIASTOLIC BLOOD PRESSURE: 74 MMHG | HEART RATE: 81 BPM

## 2025-04-16 DIAGNOSIS — K74.60 LIVER CIRRHOSIS SECONDARY TO NASH: ICD-10-CM

## 2025-04-16 DIAGNOSIS — R79.89 ELEVATED LIVER FUNCTION TESTS: ICD-10-CM

## 2025-04-16 DIAGNOSIS — E03.9 HYPOTHYROIDISM (ACQUIRED): ICD-10-CM

## 2025-04-16 DIAGNOSIS — K75.81 LIVER CIRRHOSIS SECONDARY TO NASH: ICD-10-CM

## 2025-04-16 DIAGNOSIS — I10 ESSENTIAL HYPERTENSION: Primary | ICD-10-CM

## 2025-04-16 DIAGNOSIS — E55.9 VITAMIN D DEFICIENCY: ICD-10-CM

## 2025-04-16 DIAGNOSIS — E11.9 TYPE 2 DIABETES MELLITUS WITHOUT COMPLICATION, WITHOUT LONG-TERM CURRENT USE OF INSULIN: ICD-10-CM

## 2025-04-16 DIAGNOSIS — M54.50 LOW BACK PAIN, UNSPECIFIED BACK PAIN LATERALITY, UNSPECIFIED CHRONICITY, UNSPECIFIED WHETHER SCIATICA PRESENT: ICD-10-CM

## 2025-04-16 DIAGNOSIS — E78.2 MIXED HYPERLIPIDEMIA: ICD-10-CM

## 2025-04-16 DIAGNOSIS — D69.6 THROMBOCYTOPENIA: ICD-10-CM

## 2025-04-16 DIAGNOSIS — Z78.9 TAKES DIETARY SUPPLEMENTS: ICD-10-CM

## 2025-04-16 RX ORDER — SIMVASTATIN 80 MG
80 TABLET ORAL NIGHTLY
Qty: 90 TABLET | Refills: 1 | Status: SHIPPED | OUTPATIENT
Start: 2025-04-16

## 2025-04-16 RX ORDER — CYCLOBENZAPRINE HCL 10 MG
10 TABLET ORAL 3 TIMES DAILY PRN
Qty: 270 TABLET | Refills: 1 | Status: SHIPPED | OUTPATIENT
Start: 2025-04-16

## 2025-04-16 RX ORDER — FUROSEMIDE 40 MG/1
40 TABLET ORAL 2 TIMES DAILY
Qty: 180 TABLET | Refills: 1 | Status: SHIPPED | OUTPATIENT
Start: 2025-04-16

## 2025-04-16 NOTE — PATIENT INSTRUCTIONS
"Healthy Eating, Adult  Healthy eating may help you get and keep a healthy body weight, reduce the risk of chronic disease, and live a long and productive life. It is important to follow a healthy eating pattern. Your nutritional and calorie needs should be met mainly by different nutrient-rich foods.  What are tips for following this plan?  Reading food labels  Read labels and choose the following:  Reduced or low sodium products.  Juices with 100% fruit juice.  Foods with low saturated fats (<3 g per serving) and high polyunsaturated and monounsaturated fats.  Foods with whole grains, such as whole wheat, cracked wheat, brown rice, and wild rice.  Whole grains that are fortified with folic acid. This is recommended for females who are pregnant or who want to become pregnant.  Read labels and do not eat or drink the following:  Foods or drinks with added sugars. These include foods that contain brown sugar, corn sweetener, corn syrup, dextrose, fructose, glucose, high-fructose corn syrup, honey, invert sugar, lactose, malt syrup, maltose, molasses, raw sugar, sucrose, trehalose, or turbinado sugar.  Limit your intake of added sugars to less than 10% of your total daily calories. Do not eat more than the following amounts of added sugar per day:  6 teaspoons (25 g) for females.  9 teaspoons (38 g) for males.  Foods that contain processed or refined starches and grains.  Refined grain products, such as white flour, degermed cornmeal, white bread, and white rice.  Shopping  Choose nutrient-rich snacks, such as vegetables, whole fruits, and nuts. Avoid high-calorie and high-sugar snacks, such as potato chips, fruit snacks, and candy.  Use oil-based dressings and spreads on foods instead of solid fats such as butter, margarine, sour cream, or cream cheese.  Limit pre-made sauces, mixes, and \"instant\" products such as flavored rice, instant noodles, and ready-made pasta.  Try more plant-protein sources, such as tofu, " tempeh, black beans, edamame, lentils, nuts, and seeds.  Explore eating plans such as the Mediterranean diet or vegetarian diet.  Try heart-healthy dips made with beans and healthy fats like hummus and guacamole. Vegetables go great with these.  Cooking  Use oil to sauté or stir-reyes foods instead of solid fats such as butter, margarine, or lard.  Try baking, boiling, grilling, or broiling instead of frying.  Remove the fatty part of meats before cooking.  Steam vegetables in water or broth.  Meal planning    At meals, imagine dividing your plate into fourths:  One-half of your plate is fruits and vegetables.  One-fourth of your plate is whole grains.  One-fourth of your plate is protein, especially lean meats, poultry, eggs, tofu, beans, or nuts.  Include low-fat dairy as part of your daily diet.  Lifestyle  Choose healthy options in all settings, including home, work, school, restaurants, or stores.  Prepare your food safely:  Wash your hands after handling raw meats.  Where you prepare food, keep surfaces clean by regularly washing with hot, soapy water.  Keep raw meats separate from ready-to-eat foods, such as fruits and vegetables.  , meat, poultry, and eggs to the recommended temperature. Get a food thermometer.  Store foods at safe temperatures. In general:  Keep cold foods at 40°F (4.4°C) or below.  Keep hot foods at 140°F (60°C) or above.  Keep your freezer at 0°F (-17.8°C) or below.  Foods are not safe to eat if they have been between the temperatures of °F (4.4-60°C) for more than 2 hours.  What foods should I eat?  Fruits  Aim to eat 1½-2½ cups of fresh, canned (in natural juice), or frozen fruits each day. One cup of fruit equals 1 small apple, 1 large banana, 8 large strawberries, 1 cup (237 g) canned fruit, ½ cup (82 g) dried fruit, or 1 cup (240 mL) 100% juice.  Vegetables  Aim to eat 2-4 cups of fresh and frozen vegetables each day, including different varieties and colors. One cup  of vegetables equals 1 cup (91 g) broccoli or cauliflower florets, 2 medium carrots, 2 cups (150 g) raw, leafy greens, 1 large tomato, 1 large pace pepper, 1 large sweet potato, or 1 medium white potato.  Grains  Aim to eat 5-10 ounce-equivalents of whole grains each day. Examples of 1 ounce-equivalent of grains include 1 slice of bread, 1 cup (40 g) ready-to-eat cereal, 3 cups (24 g) popcorn, or ½ cup (93 g) cooked rice.  Meats and other proteins  Try to eat 5-7 ounce-equivalents of protein each day. Examples of 1 ounce-equivalent of protein include 1 egg, ½ oz nuts (12 almonds, 24 pistachios, or 7 walnut halves), 1/4 cup (90 g) cooked beans, 6 tablespoons (90 g) hummus or 1 tablespoon (16 g) peanut butter. A cut of meat or fish that is the size of a deck of cards is about 3-4 ounce-equivalents (85 g).  Of the protein you eat each week, try to have at least 8 sounce (227 g) of seafood. This is about 2 servings per week. This includes salmon, trout, herring, sardines, and anchovies.  Dairy  Aim to eat 3 cup-equivalents of fat-free or low-fat dairy each day. Examples of 1 cup-equivalent of dairy include 1 cup (240 mL) milk, 8 ounces (250 g) yogurt, 1½ ounces (44 g) natural cheese, or 1 cup (240 mL) fortified soy milk.  Fats and oils  Aim for about 5 teaspoons (21 g) of fats and oils per day. Choose monounsaturated fats, such as canola and olive oils, mayonnaise made with olive oil or avocado oil, avocados, peanut butter, and most nuts, or polyunsaturated fats, such as sunflower, corn, and soybean oils, walnuts, pine nuts, sesame seeds, sunflower seeds, and flaxseed.  Beverages  Aim for 6 eight-ounce glasses of water per day. Limit coffee to 3-5 eight-ounce cups per day.  Limit caffeinated beverages that have added calories, such as soda and energy drinks.  If you drink alcohol:  Limit how much you have to:  0-1 drink a day if you are female.  0-2 drinks a day if you are male.  Know how much alcohol is in your drink.  In the [place] one drink is one 12 oz bottle of beer (355 mL), one 5 oz glass of wine (148 mL), or one 1½ oz glass of hard liquor (44 mL).  Seasoning and other foods  Try not to add too much salt to your food. Try using herbs and spices instead of salt.  Try not to add sugar to food.  This information is based on [place] nutrition guidelines. To learn more, visit [employer].gov. Exact amounts may vary. You may need different amounts.  This information is not intended to replace advice given to you by your health care provider. Make sure you discuss any questions you have with your health care provider.  Document Revised: 09/18/2023 Document Reviewed: 09/18/2023  [employer] Patient Education © 2023 [employer]    Exercising to Stay Healthy  To become healthy and stay healthy, it is recommended that you do moderate-intensity and vigorous-intensity exercise. You can tell that you are exercising at a moderate intensity if your heart starts beating faster and you start breathing faster but can still hold a conversation. You can tell that you are exercising at a vigorous intensity if you are breathing much harder and faster and cannot hold a conversation while exercising.  How can exercise benefit me?  Exercising regularly is important. It has many health benefits, such as:  Improving overall fitness, flexibility, and endurance.  Increasing bone density.  Helping with weight control.  Decreasing body fat.  Increasing muscle strength and endurance.  Reducing stress and tension, anxiety, depression, or anger.  Improving overall health.  What guidelines should I follow while exercising?  Before you start a new exercise program, talk with your health care provider.  Do not exercise so much that you hurt yourself, feel dizzy, or get very short of breath.  Wear comfortable clothes and wear shoes with good support.  Drink plenty of water while you exercise to prevent dehydration or heat stroke.  Work out until your breathing and  your heartbeat get faster (moderate intensity).  How often should I exercise?  Choose an activity that you enjoy, and set realistic goals. Your health care provider can help you make an activity plan that is individually designed and works best for you.  Exercise regularly as told by your health care provider. This may include:  Doing strength training two times a week, such as:  Lifting weights.  Using resistance bands.  Push-ups.  Sit-ups.  Yoga.  Doing a certain intensity of exercise for a given amount of time. Choose from these options:  A total of 150 minutes of moderate-intensity exercise every week.  A total of 75 minutes of vigorous-intensity exercise every week.  A mix of moderate-intensity and vigorous-intensity exercise every week.  Children, pregnant women, people who have not exercised regularly, people who are overweight, and older adults may need to talk with a health care provider about what activities are safe to perform. If you have a medical condition, be sure to talk with your health care provider before you start a new exercise program.  What are some exercise ideas?  Moderate-intensity exercise ideas include:  Walking 1 mile (1.6 km) in about 15 minutes.  Biking.  Hiking.  Golfing.  Dancing.  Water aerobics.  Vigorous-intensity exercise ideas include:  Walking 4.5 miles (7.2 km) or more in about 1 hour.  Jogging or running 5 miles (8 km) in about 1 hour.  Biking 10 miles (16.1 km) or more in about 1 hour.  Lap swimming.  Roller-skating or in-line skating.  Cross-country skiing.  Vigorous competitive sports, such as football, basketball, and soccer.  Jumping rope.  Aerobic dancing.  What are some everyday activities that can help me get exercise?  Yard work, such as:  Pushing a .  Raking and bagging leaves.  Washing your car.  Pushing a stroller.  Shoveling snow.  Gardening.  Washing windows or floors.  How can I be more active in my day-to-day activities?  Use stairs instead of an  elevator.  Take a walk during your lunch break.  If you drive, park your car farther away from your work or school.  If you take public transportation, get off one stop early and walk the rest of the way.  Stand up or walk around during all of your indoor phone calls.  Get up, stretch, and walk around every 30 minutes throughout the day.  Enjoy exercise with a friend. Support to continue exercising will help you keep a regular routine of activity.  Where to find more information  You can find more information about exercising to stay healthy from:  [place] Department of Health and Human Services: www.hhs.gov  Centers for Disease Control and Prevention (CDC): www.cdc.gov  Summary  Exercising regularly is important. It will improve your overall fitness, flexibility, and endurance.  Regular exercise will also improve your overall health. It can help you control your weight, reduce stress, and improve your bone density.  Do not exercise so much that you hurt yourself, feel dizzy, or get very short of breath.  Before you start a new exercise program, talk with your health care provider.  This information is not intended to replace advice given to you by your health care provider. Make sure you discuss any questions you have with your health care provider.  Document Revised: 04/15/2022 Document Reviewed: 04/15/2022  Elsevier Patient Education © 2023 [employer]

## 2025-04-16 NOTE — PROGRESS NOTES
Subjective   Stefani Bhandari is a 59 y.o. female.     Patient or patient representative verbalized consent for the use of Ambient Listening during the visit with  NITESH Nance for chart documentation. 4/16/2025  10:57 EDT    Chief Complaint   Patient presents with    Follow-up       History of Present Illness  The patient is a 59-year-old female who presents for a follow-up visit.    Knee Condition  She has been abstaining from Ozempic this week in preparation for an upcoming knee surgery, scheduled either on 04/21/2025 or 04/24/2025. She reports no abnormal swelling in her lower extremities. Cyclobenzaprine is taken at least twice daily, occasionally increasing the dosage to three times daily due to her inability to perform necessary tasks because of her knee condition.  - Onset: Ongoing knee condition requiring surgery.  - Alleviating/Aggravating Factors: Abstaining from Ozempic; taking cyclobenzaprine at least twice daily, sometimes three times daily.  - Timing: Surgery scheduled for 04/21/2025 or 04/24/2025.  - Severity: Inability to perform necessary tasks due to knee condition.    Medication Regimen  A regimen of furosemide and simvastatin is currently being followed, both of which require refills. Additionally, cyclobenzaprine is also due for a refill. Dietary supplementation includes two gummy-type multivitamins.    Ophthalmologist Consultation  An ophthalmologist consultation occurred earlier this year at [facility], resulting in a prescription for new glasses. Her eyes were examined in the context of her diabetes.       The following portions of the patient's history were reviewed and updated as appropriate: allergies, current medications, past family history, past medical history, past social history, past surgical history and problem list.    Results         Objective     Vitals:    04/16/25 1035   BP: 126/74   Pulse: 81   SpO2: 98%      Body mass index is 44.25 kg/m².    Physical Exam  Vitals  reviewed.   Constitutional:       Appearance: Normal appearance. She is obese.   Cardiovascular:      Rate and Rhythm: Normal rate and regular rhythm.   Pulmonary:      Effort: Pulmonary effort is normal.      Breath sounds: Normal breath sounds.   Skin:     General: Skin is warm and dry.   Neurological:      Mental Status: She is alert and oriented to person, place, and time.   Psychiatric:         Behavior: Behavior normal.         Assessment & Plan  1. Diabetes Mellitus  - Ozempic held this week due to upcoming knee surgery scheduled for 04/21/2025 or 04/24/2025  - Due for an A1c recheck; fasting since 0030 hours  - Blood work ordered today: BMP, A1c, thyroid function tests, cholesterol panel, CBC, vitamin B12, vitamin D, triglycerides, kidney function tests, liver function tests, and electrolyte levels  - Follow-up in 6 months for physical examination around 10/2025    2. Medication Management  - Prescriptions for furosemide, simvastatin, and cyclobenzaprine renewed for 90 days  - Medications sent to [facility] in [facility]  - Cyclobenzaprine taken at least twice daily, occasionally three times    3. Health Maintenance  - Eye exam completed this year at [facility]; new glasses obtained  - Eye exam included diabetes-related checks  - Blood work ordered today to screen for anemia and assess cell counts    4. Hypertension  - Blood pressure recorded at 126/74  - No abnormal lower extremity swelling noted  - Heart and lung sounds normal       Discussed Care Gaps, ordered referrals and encouraged vaccination updates.       - Pt agrees with plan of care and denies further questions/concerns today  - This document is intended for medical expert use only. Persons  reading this document without medical staff guidance may result in misinterpretation and unintended morbidity     Go to the ER for any possible life-threatening symptoms such as chest pain or shortness of air.      Please allow 3-5 business days for  recommendations based on new results      I personally spent time with this patient, preparing for the visit, reviewing tests, obtaining and/or reviewing a separately obtained history, performing a medically appropriate examination and/or evaluation, counseling and educating the patient/family/caregiver, ordering medications,  documenting information in the medical record and indepentently interpreting results.

## 2025-04-17 LAB
25(OH)D3+25(OH)D2 SERPL-MCNC: 31.4 NG/ML (ref 30–100)
ALBUMIN SERPL-MCNC: 3.8 G/DL (ref 3.8–4.9)
ALP SERPL-CCNC: 88 IU/L (ref 44–121)
ALT SERPL-CCNC: 41 IU/L (ref 0–32)
AST SERPL-CCNC: 43 IU/L (ref 0–40)
BASOPHILS # BLD AUTO: 0 X10E3/UL (ref 0–0.2)
BASOPHILS NFR BLD AUTO: 1 %
BILIRUB SERPL-MCNC: 0.4 MG/DL (ref 0–1.2)
BUN SERPL-MCNC: 16 MG/DL (ref 8–27)
BUN/CREAT SERPL: 16 (ref 12–28)
CALCIUM SERPL-MCNC: 8.6 MG/DL (ref 8.7–10.3)
CHLORIDE SERPL-SCNC: 103 MMOL/L (ref 96–106)
CHOLEST SERPL-MCNC: 113 MG/DL (ref 100–199)
CO2 SERPL-SCNC: 25 MMOL/L (ref 20–29)
CREAT SERPL-MCNC: 0.97 MG/DL (ref 0.57–1)
EGFRCR SERPLBLD CKD-EPI 2021: 67 ML/MIN/1.73
EOSINOPHIL # BLD AUTO: 0.1 X10E3/UL (ref 0–0.4)
EOSINOPHIL NFR BLD AUTO: 2 %
ERYTHROCYTE [DISTWIDTH] IN BLOOD BY AUTOMATED COUNT: 13.2 % (ref 11.7–15.4)
GLOBULIN SER CALC-MCNC: 2.9 G/DL (ref 1.5–4.5)
GLUCOSE SERPL-MCNC: 134 MG/DL (ref 70–99)
HBA1C MFR BLD: 7.5 % (ref 4.8–5.6)
HCT VFR BLD AUTO: 36.8 % (ref 34–46.6)
HDLC SERPL-MCNC: 55 MG/DL
HGB BLD-MCNC: 12 G/DL (ref 11.1–15.9)
IMM GRANULOCYTES # BLD AUTO: 0 X10E3/UL (ref 0–0.1)
IMM GRANULOCYTES NFR BLD AUTO: 0 %
LDLC SERPL CALC-MCNC: 44 MG/DL (ref 0–99)
LYMPHOCYTES # BLD AUTO: 1.4 X10E3/UL (ref 0.7–3.1)
LYMPHOCYTES NFR BLD AUTO: 29 %
MCH RBC QN AUTO: 30.8 PG (ref 26.6–33)
MCHC RBC AUTO-ENTMCNC: 32.6 G/DL (ref 31.5–35.7)
MCV RBC AUTO: 94 FL (ref 79–97)
MONOCYTES # BLD AUTO: 0.3 X10E3/UL (ref 0.1–0.9)
MONOCYTES NFR BLD AUTO: 5 %
MORPHOLOGY BLD-IMP: ABNORMAL
NEUTROPHILS # BLD AUTO: 2.9 X10E3/UL (ref 1.4–7)
NEUTROPHILS NFR BLD AUTO: 63 %
PLATELET # BLD AUTO: 97 X10E3/UL (ref 150–450)
POTASSIUM SERPL-SCNC: 4.4 MMOL/L (ref 3.5–5.2)
PROT SERPL-MCNC: 6.7 G/DL (ref 6–8.5)
RBC # BLD AUTO: 3.9 X10E6/UL (ref 3.77–5.28)
SODIUM SERPL-SCNC: 140 MMOL/L (ref 134–144)
TRIGL SERPL-MCNC: 64 MG/DL (ref 0–149)
TSH SERPL DL<=0.005 MIU/L-ACNC: 2.2 UIU/ML (ref 0.45–4.5)
VIT B12 SERPL-MCNC: 814 PG/ML (ref 232–1245)
VLDLC SERPL CALC-MCNC: 14 MG/DL (ref 5–40)
WBC # BLD AUTO: 4.7 X10E3/UL (ref 3.4–10.8)

## 2025-05-03 DIAGNOSIS — J30.89 ENVIRONMENTAL AND SEASONAL ALLERGIES: ICD-10-CM

## 2025-05-04 RX ORDER — LEVOCETIRIZINE DIHYDROCHLORIDE 5 MG/1
5 TABLET, FILM COATED ORAL EVERY EVENING
Qty: 90 TABLET | Refills: 1 | Status: SHIPPED | OUTPATIENT
Start: 2025-05-04

## 2025-06-09 DIAGNOSIS — F32.A ANXIETY AND DEPRESSION: ICD-10-CM

## 2025-06-09 DIAGNOSIS — F41.9 ANXIETY AND DEPRESSION: ICD-10-CM

## 2025-06-09 RX ORDER — VENLAFAXINE 75 MG/1
150 TABLET ORAL 2 TIMES DAILY
Qty: 180 TABLET | Refills: 1 | Status: SHIPPED | OUTPATIENT
Start: 2025-06-09

## 2025-06-09 RX ORDER — EZETIMIBE 10 MG/1
10 TABLET ORAL DAILY
Qty: 90 TABLET | Refills: 1 | Status: SHIPPED | OUTPATIENT
Start: 2025-06-09

## 2025-06-09 RX ORDER — BUPROPION HYDROCHLORIDE 150 MG/1
150 TABLET ORAL DAILY
Qty: 90 TABLET | Refills: 1 | Status: SHIPPED | OUTPATIENT
Start: 2025-06-09

## 2025-07-15 DIAGNOSIS — M54.50 LOW BACK PAIN, UNSPECIFIED BACK PAIN LATERALITY, UNSPECIFIED CHRONICITY, UNSPECIFIED WHETHER SCIATICA PRESENT: ICD-10-CM

## 2025-07-15 RX ORDER — IBUPROFEN 600 MG/1
600 TABLET, FILM COATED ORAL EVERY 6 HOURS PRN
Qty: 270 TABLET | Refills: 0 | Status: SHIPPED | OUTPATIENT
Start: 2025-07-15

## 2025-07-15 RX ORDER — LOSARTAN POTASSIUM 100 MG/1
100 TABLET ORAL
Qty: 90 TABLET | Refills: 0 | Status: SHIPPED | OUTPATIENT
Start: 2025-07-15

## (undated) DEVICE — DRSNG GZ PETROLTM XEROFORM CURAD 1X8IN STRL

## (undated) DEVICE — SPONGE,LAP,12"X12",XR,ST,5/PK,40PK/CS: Brand: MEDLINE

## (undated) DEVICE — ANTIBACTERIAL UNDYED BRAIDED (POLYGLACTIN 910), SYNTHETIC ABSORBABLE SUTURE: Brand: COATED VICRYL

## (undated) DEVICE — GLV SURG BIOGEL LTX PF 7 1/2

## (undated) DEVICE — DUAL CUT SAGITTAL BLADE

## (undated) DEVICE — SOL NACL 0.9PCT 1000ML

## (undated) DEVICE — ARM SLING: Brand: DEROYAL

## (undated) DEVICE — GLV SURG SIGNATURE ESSENTIAL PF LTX SZ8.5

## (undated) DEVICE — MAT FLR ABSORBENT LG 4FT 10 2.5FT

## (undated) DEVICE — OPTIFOAM GENTLE SA, POSTOP, 4X8: Brand: MEDLINE

## (undated) DEVICE — SPNG GZ WOVN 4X4IN 12PLY 10/BX STRL

## (undated) DEVICE — APPL CHLORAPREP HI/LITE 26ML ORNG

## (undated) DEVICE — PENCL E/S ULTRAVAC TELESCP NOSE HOLSTR 10FT

## (undated) DEVICE — NEEDLE, QUINCKE, 20GX3.5": Brand: MEDLINE

## (undated) DEVICE — DRAPE,REIN 53X77,STERILE: Brand: MEDLINE

## (undated) DEVICE — TRAP FLD MINIVAC MEGADYNE 100ML

## (undated) DEVICE — SUT VIC 0 CT1 36IN J946H

## (undated) DEVICE — ADAPT CLN BIOGUARD AIR/H2O DISP

## (undated) DEVICE — DRAPE,U/ SHT,SPLIT,PLAS,STERIL: Brand: MEDLINE

## (undated) DEVICE — GLV SURG PREMIERPRO ORTHO LTX PF SZ9 BRN

## (undated) DEVICE — CODMAN® SURGICAL PATTIES 1/2" X 3" (1.27CM X 7.62CM): Brand: CODMAN®

## (undated) DEVICE — GLV SURG SENSICARE W/ALOE PF LF 9 STRL

## (undated) DEVICE — SUT VIC 1 OS8 27IN J699H

## (undated) DEVICE — PK SHLDR OPN 40

## (undated) DEVICE — TUBING, SUCTION, 1/4" X 10', STRAIGHT: Brand: MEDLINE

## (undated) DEVICE — SUT ETHLN 3/0 PC5 18IN 1893G

## (undated) DEVICE — CONTAINER,SPECIMEN,OR STERILE,4OZ: Brand: MEDLINE

## (undated) DEVICE — STPLR SKIN VISISTAT WD 35CT

## (undated) DEVICE — SOL ISO/ALC RUB 70PCT 4OZ

## (undated) DEVICE — HANDPIECE SET WITH COAXIAL HIGH FLOW TIP AND SUCTION TUBE: Brand: INTERPULSE

## (undated) DEVICE — 1000 S-DRAPE TOWEL DRAPE 10/BX: Brand: STERI-DRAPE™

## (undated) DEVICE — LN SMPL CO2 SHTRM SD STREAM W/M LUER

## (undated) DEVICE — SPNG LAP 18X18IN LF STRL PK/5

## (undated) DEVICE — TOTAL TRAY, 16FR 10ML SIL FOLEY, URN: Brand: MEDLINE

## (undated) DEVICE — GOWN,ECLIPSE,FABRIC-REINFORCED,X-LARGE: Brand: MEDLINE

## (undated) DEVICE — MEDI-VAC YANKAUER SUCTION HANDLE W/BULBOUS TIP: Brand: CARDINAL HEALTH

## (undated) DEVICE — MSK ENDO PORT O2 POM ELITE CURAPLEX A/

## (undated) DEVICE — 3M™ IOBAN™ 2 ANTIMICROBIAL INCISE DRAPE 6650EZ: Brand: IOBAN™ 2

## (undated) DEVICE — DISPOSABLE BIPOLAR FORCEPS 7 3/4" (19.7CM) SCOVILLE BAYONET, 1.5MM TIP AND 12 FT. (3.6M) CABLE: Brand: KIRWAN

## (undated) DEVICE — GLV SURG BIOGEL LTX PF 8 1/2

## (undated) DEVICE — GLV SURG PREMIERPRO ORTHO LTX PF SZ8.5 BRN

## (undated) DEVICE — KT ORCA ORCAPOD DISP STRL

## (undated) DEVICE — TOOL MR8-15BA50T MR8 15CM BAL SYMTRI 5MM: Brand: MIDAS REX MR8

## (undated) DEVICE — SUT MNCRYL PLS ANTIB UD 4/0 PS2 18IN

## (undated) DEVICE — ADHS SKIN SURG TISS VISC PREMIERPRO EXOFIN HI/VISC FAST/DRY

## (undated) DEVICE — ZIP 16 SURGICAL SKIN CLOSURE DEVICE, PSA: Brand: ZIP 16 SURGICAL SKIN CLOSURE DEVICE

## (undated) DEVICE — PK NEURO SPINE 40

## (undated) DEVICE — PATIENT RETURN ELECTRODE, SINGLE-USE, CONTACT QUALITY MONITORING, ADULT, WITH 9FT CORD, FOR PATIENTS WEIGING OVER 33LBS. (15KG): Brand: MEGADYNE

## (undated) DEVICE — INTENDED FOR TISSUE SEPARATION, AND OTHER PROCEDURES THAT REQUIRE A SHARP SURGICAL BLADE TO PUNCTURE OR CUT.: Brand: BARD-PARKER ® STAINLESS STEEL BLADES

## (undated) DEVICE — GLV SURG PREMIERPRO ORTHO LTX PF SZ8 BRN

## (undated) DEVICE — CULT AER/ANAEROB FASTIDIOUS BACT

## (undated) DEVICE — SINGLE-USE BIOPSY FORCEPS: Brand: RADIAL JAW 4

## (undated) DEVICE — THE MILL DISPOSABLE - MEDIUM

## (undated) DEVICE — SENSR O2 OXIMAX FNGR A/ 18IN NONSTR

## (undated) DEVICE — PAD,ABDOMINAL,8"X10",ST,LF: Brand: MEDLINE

## (undated) DEVICE — GLV SURG SENSICARE PI MIC PF SZ7 LF STRL

## (undated) DEVICE — DRSNG WND GZ PAD BORDERED 4X8IN STRL

## (undated) DEVICE — GLV SURG BIOGEL LTX PF 7

## (undated) DEVICE — SHEET, DRAPE, SPLIT, STERILE: Brand: MEDLINE

## (undated) DEVICE — 450 ML BOTTLE OF 0.05% CHLORHEXIDINE GLUCONATE IN 99.95% STERILE WATER FOR IRRIGATION, USP AND APPLICATOR.: Brand: IRRISEPT ANTIMICROBIAL WOUND LAVAGE

## (undated) DEVICE — BITEBLOCK OMNI BLOC